# Patient Record
Sex: MALE | Race: WHITE | Employment: UNEMPLOYED | URBAN - METROPOLITAN AREA
[De-identification: names, ages, dates, MRNs, and addresses within clinical notes are randomized per-mention and may not be internally consistent; named-entity substitution may affect disease eponyms.]

---

## 2017-01-04 PROBLEM — R00.0 TACHYCARDIA, UNSPECIFIED: Status: ACTIVE | Noted: 2017-01-04

## 2017-01-05 ENCOUNTER — HOSPITAL ENCOUNTER (OUTPATIENT)
Dept: LAB | Age: 57
Discharge: HOME | End: 2017-01-05
Payer: COMMERCIAL

## 2017-01-05 ENCOUNTER — OFFICE VISIT (OUTPATIENT)
Dept: FAMILY MEDICINE CLINIC | Age: 57
End: 2017-01-05

## 2017-01-05 VITALS
BODY MASS INDEX: 25.16 KG/M2 | SYSTOLIC BLOOD PRESSURE: 104 MMHG | RESPIRATION RATE: 18 BRPM | OXYGEN SATURATION: 96 % | HEIGHT: 68 IN | TEMPERATURE: 98.2 F | WEIGHT: 166 LBS | DIASTOLIC BLOOD PRESSURE: 68 MMHG | HEART RATE: 76 BPM

## 2017-01-05 DIAGNOSIS — Z83.2 FAMILY HISTORY OF HEMOPHILIA: ICD-10-CM

## 2017-01-05 DIAGNOSIS — D50.9 IRON DEFICIENCY ANEMIA, UNSPECIFIED IRON DEFICIENCY ANEMIA TYPE: ICD-10-CM

## 2017-01-05 DIAGNOSIS — K64.8 INTERNAL HEMORRHOID: Primary | ICD-10-CM

## 2017-01-05 DIAGNOSIS — Z11.59 NEED FOR HEPATITIS C SCREENING TEST: ICD-10-CM

## 2017-01-05 DIAGNOSIS — Z23 NEED FOR STREPTOCOCCUS PNEUMONIAE VACCINATION: ICD-10-CM

## 2017-01-05 DIAGNOSIS — K62.5 RECTAL BLEEDING: ICD-10-CM

## 2017-01-05 LAB
ANISOCYTOSIS: ABNORMAL
APTT: 26.5 SEC (ref 21.6–35.4)
BANDED NEUTROPHILS RELATIVE PERCENT: 1 %
BASOPHILS ABSOLUTE: 0 K/UL (ref 0–0.2)
BASOPHILS RELATIVE PERCENT: 0.9 %
BURR CELLS: ABNORMAL
EOSINOPHILS ABSOLUTE: 0 K/UL (ref 0–0.7)
EOSINOPHILS RELATIVE PERCENT: 1.5 %
HCT VFR BLD CALC: 39.6 % (ref 42–52)
HEMOGLOBIN: 12.7 G/DL (ref 14–18)
HEPATITIS C ANTIBODY INTERPRETATION: NORMAL
HYPOCHROMIA: ABNORMAL
INR BLD: 0.9
LYMPHOCYTES ABSOLUTE: 1.9 K/UL (ref 1–4.8)
LYMPHOCYTES RELATIVE PERCENT: 12 %
MACROCYTES: 0
MCH RBC QN AUTO: 29 PG (ref 27–31.3)
MCHC RBC AUTO-ENTMCNC: 32 % (ref 33–37)
MCV RBC AUTO: 90.5 FL (ref 80–100)
MICROCYTES: 0
MONOCYTES ABSOLUTE: 3.3 K/UL (ref 0.2–0.8)
MONOCYTES RELATIVE PERCENT: 20.9 %
NEUTROPHILS ABSOLUTE: 10.7 K/UL (ref 1.4–6.5)
NEUTROPHILS RELATIVE PERCENT: 66 %
OVALOCYTES: ABNORMAL
PDW BLD-RTO: 15.1 % (ref 11.5–14.5)
PLATELET # BLD: ABNORMAL K/UL (ref 130–400)
PLATELET SLIDE REVIEW: ABNORMAL
POIKILOCYTES: ABNORMAL
POLYCHROMASIA: 0
PROTHROMBIN TIME: 10.1 SEC (ref 8.1–13.7)
RBC # BLD: 4.38 M/UL (ref 4.7–6.1)
SMUDGE CELLS: 0.9
WBC # BLD: 15.9 K/UL (ref 4.8–10.8)

## 2017-01-05 PROCEDURE — 85610 PROTHROMBIN TIME: CPT

## 2017-01-05 PROCEDURE — 36415 COLL VENOUS BLD VENIPUNCTURE: CPT

## 2017-01-05 PROCEDURE — 85240 CLOT FACTOR VIII AHG 1 STAGE: CPT

## 2017-01-05 PROCEDURE — 85025 COMPLETE CBC W/AUTO DIFF WBC: CPT

## 2017-01-05 PROCEDURE — 86803 HEPATITIS C AB TEST: CPT

## 2017-01-05 PROCEDURE — 85246 CLOT FACTOR VIII VW ANTIGEN: CPT

## 2017-01-05 PROCEDURE — 99214 OFFICE O/P EST MOD 30 MIN: CPT | Performed by: NURSE PRACTITIONER

## 2017-01-05 PROCEDURE — 85245 CLOT FACTOR VIII VW RISTOCTN: CPT

## 2017-01-05 PROCEDURE — 90670 PCV13 VACCINE IM: CPT | Performed by: NURSE PRACTITIONER

## 2017-01-05 PROCEDURE — 85730 THROMBOPLASTIN TIME PARTIAL: CPT

## 2017-01-05 PROCEDURE — 90471 IMMUNIZATION ADMIN: CPT | Performed by: NURSE PRACTITIONER

## 2017-01-05 RX ORDER — PRAVASTATIN SODIUM 40 MG
40 TABLET ORAL DAILY
Qty: 30 TABLET | Refills: 3 | Status: SHIPPED | OUTPATIENT
Start: 2017-01-05 | End: 2017-05-25 | Stop reason: SDUPTHER

## 2017-01-05 ASSESSMENT — PATIENT HEALTH QUESTIONNAIRE - PHQ9
1. LITTLE INTEREST OR PLEASURE IN DOING THINGS: 0
SUM OF ALL RESPONSES TO PHQ QUESTIONS 1-9: 0
SUM OF ALL RESPONSES TO PHQ9 QUESTIONS 1 & 2: 0
2. FEELING DOWN, DEPRESSED OR HOPELESS: 0

## 2017-01-06 ENCOUNTER — TELEPHONE (OUTPATIENT)
Dept: FAMILY MEDICINE CLINIC | Age: 57
End: 2017-01-06

## 2017-01-06 RX ORDER — AMOXICILLIN AND CLAVULANATE POTASSIUM 875; 125 MG/1; MG/1
1 TABLET, FILM COATED ORAL EVERY 12 HOURS
Qty: 20 TABLET | Refills: 0 | Status: SHIPPED | OUTPATIENT
Start: 2017-01-06 | End: 2017-01-16

## 2017-01-08 LAB
FACTOR VIII ACTIVITY: 160 % (ref 56–191)
VON WILLEBRAND ACTIVITY RCF: 74 % (ref 51–215)
VON WILLEBRAND AG: 123 % (ref 52–214)

## 2017-01-17 ENCOUNTER — HOSPITAL ENCOUNTER (OUTPATIENT)
Dept: LAB | Age: 57
Discharge: HOME | End: 2017-01-17
Payer: COMMERCIAL

## 2017-01-17 ENCOUNTER — OFFICE VISIT (OUTPATIENT)
Dept: FAMILY MEDICINE CLINIC | Age: 57
End: 2017-01-17

## 2017-01-17 VITALS
HEIGHT: 68 IN | WEIGHT: 167 LBS | BODY MASS INDEX: 25.31 KG/M2 | RESPIRATION RATE: 20 BRPM | HEART RATE: 89 BPM | OXYGEN SATURATION: 95 % | DIASTOLIC BLOOD PRESSURE: 68 MMHG | SYSTOLIC BLOOD PRESSURE: 102 MMHG | TEMPERATURE: 98 F

## 2017-01-17 DIAGNOSIS — I10 ESSENTIAL HYPERTENSION: Chronic | ICD-10-CM

## 2017-01-17 DIAGNOSIS — D72.829 LEUKOCYTOSIS, UNSPECIFIED TYPE: ICD-10-CM

## 2017-01-17 DIAGNOSIS — R73.03 BORDERLINE DIABETES: ICD-10-CM

## 2017-01-17 DIAGNOSIS — D72.829 LEUKOCYTOSIS, UNSPECIFIED TYPE: Primary | ICD-10-CM

## 2017-01-17 DIAGNOSIS — E78.2 MIXED HYPERLIPIDEMIA: Chronic | ICD-10-CM

## 2017-01-17 LAB
BASOPHILS ABSOLUTE: 0.1 K/UL (ref 0–0.2)
BASOPHILS RELATIVE PERCENT: 1.2 %
EOSINOPHILS ABSOLUTE: 0.2 K/UL (ref 0–0.7)
EOSINOPHILS RELATIVE PERCENT: 2.2 %
HCT VFR BLD CALC: 39.4 % (ref 42–52)
HEMOGLOBIN: 12.9 G/DL (ref 14–18)
LYMPHOCYTES ABSOLUTE: 1.5 K/UL (ref 1–4.8)
LYMPHOCYTES RELATIVE PERCENT: 14.4 %
MCH RBC QN AUTO: 29.3 PG (ref 27–31.3)
MCHC RBC AUTO-ENTMCNC: 32.8 % (ref 33–37)
MCV RBC AUTO: 89.5 FL (ref 80–100)
MONOCYTES ABSOLUTE: 1.2 K/UL (ref 0.2–0.8)
MONOCYTES RELATIVE PERCENT: 12.1 %
NEUTROPHILS ABSOLUTE: 7.1 K/UL (ref 1.4–6.5)
NEUTROPHILS RELATIVE PERCENT: 70.1 %
PDW BLD-RTO: 15.3 % (ref 11.5–14.5)
PLATELET # BLD: 375 K/UL (ref 130–400)
RBC # BLD: 4.4 M/UL (ref 4.7–6.1)
WBC # BLD: 10.2 K/UL (ref 4.8–10.8)

## 2017-01-17 PROCEDURE — 85025 COMPLETE CBC W/AUTO DIFF WBC: CPT

## 2017-01-17 PROCEDURE — 99213 OFFICE O/P EST LOW 20 MIN: CPT | Performed by: NURSE PRACTITIONER

## 2017-01-17 PROCEDURE — 36415 COLL VENOUS BLD VENIPUNCTURE: CPT

## 2017-01-26 RX ORDER — ISOSORBIDE MONONITRATE 30 MG/1
TABLET, EXTENDED RELEASE ORAL
Qty: 30 TABLET | Refills: 3 | Status: SHIPPED | OUTPATIENT
Start: 2017-01-26 | End: 2017-03-10

## 2017-02-27 ENCOUNTER — TELEPHONE (OUTPATIENT)
Dept: FAMILY MEDICINE CLINIC | Age: 57
End: 2017-02-27

## 2017-03-10 ENCOUNTER — OFFICE VISIT (OUTPATIENT)
Dept: FAMILY MEDICINE CLINIC | Age: 57
End: 2017-03-10

## 2017-03-10 VITALS
WEIGHT: 154 LBS | BODY MASS INDEX: 23.34 KG/M2 | SYSTOLIC BLOOD PRESSURE: 94 MMHG | DIASTOLIC BLOOD PRESSURE: 62 MMHG | OXYGEN SATURATION: 98 % | RESPIRATION RATE: 18 BRPM | TEMPERATURE: 97.5 F | HEART RATE: 108 BPM | HEIGHT: 68 IN

## 2017-03-10 DIAGNOSIS — R10.32 LLQ ABDOMINAL PAIN: Primary | ICD-10-CM

## 2017-03-10 DIAGNOSIS — K62.5 RECTAL BLEED: ICD-10-CM

## 2017-03-10 PROCEDURE — 99214 OFFICE O/P EST MOD 30 MIN: CPT | Performed by: NURSE PRACTITIONER

## 2017-03-10 RX ORDER — NITROGLYCERIN 0.4 MG/1
0.4 TABLET SUBLINGUAL EVERY 5 MIN PRN
Qty: 25 TABLET | Refills: 3 | Status: SHIPPED | OUTPATIENT
Start: 2017-03-10 | End: 2018-02-13 | Stop reason: SDUPTHER

## 2017-03-15 ENCOUNTER — HOSPITAL ENCOUNTER (OUTPATIENT)
Dept: LAB | Age: 57
Discharge: HOME | End: 2017-03-15
Payer: COMMERCIAL

## 2017-03-15 DIAGNOSIS — R10.32 LLQ ABDOMINAL PAIN: ICD-10-CM

## 2017-03-15 LAB
ALBUMIN SERPL-MCNC: 4.3 G/DL (ref 3.9–4.9)
ALP BLD-CCNC: 197 U/L (ref 35–104)
ALT SERPL-CCNC: 32 U/L (ref 0–41)
ANION GAP SERPL CALCULATED.3IONS-SCNC: 10 MEQ/L (ref 7–13)
AST SERPL-CCNC: 27 U/L (ref 0–40)
BASOPHILS ABSOLUTE: 0 K/UL (ref 0–0.2)
BASOPHILS RELATIVE PERCENT: 0 %
BILIRUB SERPL-MCNC: 0.4 MG/DL (ref 0–1.2)
BUN BLDV-MCNC: 16 MG/DL (ref 6–20)
CALCIUM SERPL-MCNC: 9.4 MG/DL (ref 8.6–10.2)
CHLORIDE BLD-SCNC: 98 MEQ/L (ref 98–107)
CO2: 28 MEQ/L (ref 22–29)
CREAT SERPL-MCNC: 0.9 MG/DL (ref 0.7–1.2)
EOSINOPHILS ABSOLUTE: 0.5 K/UL (ref 0–0.7)
EOSINOPHILS RELATIVE PERCENT: 4 %
GFR AFRICAN AMERICAN: >60
GFR NON-AFRICAN AMERICAN: >60
GLOBULIN: 2.9 G/DL (ref 2.3–3.5)
GLUCOSE BLD-MCNC: 122 MG/DL (ref 74–109)
HCT VFR BLD CALC: 42.4 % (ref 42–52)
HEMOGLOBIN: 13.9 G/DL (ref 14–18)
LYMPHOCYTES ABSOLUTE: 2.9 K/UL (ref 1–4.8)
LYMPHOCYTES RELATIVE PERCENT: 24 %
MCH RBC QN AUTO: 29.1 PG (ref 27–31.3)
MCHC RBC AUTO-ENTMCNC: 32.7 % (ref 33–37)
MCV RBC AUTO: 88.8 FL (ref 80–100)
MONOCYTES ABSOLUTE: 1 K/UL (ref 0.2–0.8)
MONOCYTES RELATIVE PERCENT: 8 %
NEUTROPHILS ABSOLUTE: 7.7 K/UL (ref 1.4–6.5)
NEUTROPHILS RELATIVE PERCENT: 64 %
PDW BLD-RTO: 14.1 % (ref 11.5–14.5)
PLATELET # BLD: 371 K/UL (ref 130–400)
PLATELET SLIDE REVIEW: ADEQUATE
POIKILOCYTES: ABNORMAL
POTASSIUM SERPL-SCNC: 4.1 MEQ/L (ref 3.5–5.1)
RBC # BLD: 4.78 M/UL (ref 4.7–6.1)
SODIUM BLD-SCNC: 136 MEQ/L (ref 132–144)
TOTAL PROTEIN: 7.2 G/DL (ref 6.4–8.1)
WBC # BLD: 12 K/UL (ref 4.8–10.8)

## 2017-03-15 PROCEDURE — 85025 COMPLETE CBC W/AUTO DIFF WBC: CPT

## 2017-03-15 PROCEDURE — 36415 COLL VENOUS BLD VENIPUNCTURE: CPT

## 2017-03-15 PROCEDURE — 80053 COMPREHEN METABOLIC PANEL: CPT

## 2017-03-29 RX ORDER — PANTOPRAZOLE SODIUM 20 MG/1
TABLET, DELAYED RELEASE ORAL
Qty: 30 TABLET | Refills: 1 | Status: SHIPPED | OUTPATIENT
Start: 2017-03-29 | End: 2017-05-25 | Stop reason: SDUPTHER

## 2017-05-25 RX ORDER — PRAVASTATIN SODIUM 40 MG
TABLET ORAL
Qty: 30 TABLET | Refills: 3 | Status: SHIPPED | OUTPATIENT
Start: 2017-05-25 | End: 2017-10-05 | Stop reason: SDUPTHER

## 2017-05-25 RX ORDER — PANTOPRAZOLE SODIUM 20 MG/1
TABLET, DELAYED RELEASE ORAL
Qty: 30 TABLET | Refills: 0 | Status: SHIPPED | OUTPATIENT
Start: 2017-05-25 | End: 2017-06-28 | Stop reason: SDUPTHER

## 2017-06-06 DIAGNOSIS — R73.03 BORDERLINE DIABETES: ICD-10-CM

## 2017-06-06 DIAGNOSIS — I25.119 ATHEROSCLEROSIS OF NATIVE CORONARY ARTERY WITH ANGINA PECTORIS, UNSPECIFIED WHETHER NATIVE OR TRANSPLANTED HEART (HCC): Primary | ICD-10-CM

## 2017-06-21 ENCOUNTER — OFFICE VISIT (OUTPATIENT)
Dept: CARDIOLOGY | Age: 57
End: 2017-06-21

## 2017-06-21 VITALS
BODY MASS INDEX: 23.79 KG/M2 | WEIGHT: 157 LBS | HEART RATE: 72 BPM | SYSTOLIC BLOOD PRESSURE: 128 MMHG | DIASTOLIC BLOOD PRESSURE: 80 MMHG | RESPIRATION RATE: 12 BRPM | HEIGHT: 68 IN

## 2017-06-21 DIAGNOSIS — I25.10 CAD S/P PERCUTANEOUS CORONARY ANGIOPLASTY: ICD-10-CM

## 2017-06-21 DIAGNOSIS — R01.1 HEART MURMUR: Chronic | ICD-10-CM

## 2017-06-21 DIAGNOSIS — Z95.2 HISTORY OF AORTIC VALVE REPLACEMENT: Chronic | ICD-10-CM

## 2017-06-21 DIAGNOSIS — Z82.49 FAMILY HISTORY OF HEART DISEASE: ICD-10-CM

## 2017-06-21 DIAGNOSIS — Q27.30 AVM (ARTERIOVENOUS MALFORMATION): Primary | ICD-10-CM

## 2017-06-21 DIAGNOSIS — I35.0 AORTIC VALVE STENOSIS, UNSPECIFIED ETIOLOGY: ICD-10-CM

## 2017-06-21 DIAGNOSIS — Z86.73 HISTORY OF STROKE: ICD-10-CM

## 2017-06-21 DIAGNOSIS — R00.0 TACHYCARDIA, UNSPECIFIED: ICD-10-CM

## 2017-06-21 DIAGNOSIS — Z98.61 CAD S/P PERCUTANEOUS CORONARY ANGIOPLASTY: ICD-10-CM

## 2017-06-21 PROCEDURE — 99213 OFFICE O/P EST LOW 20 MIN: CPT | Performed by: INTERNAL MEDICINE

## 2017-06-21 ASSESSMENT — ENCOUNTER SYMPTOMS
COUGH: 0
APNEA: 0
CHEST TIGHTNESS: 0
SHORTNESS OF BREATH: 0
COLOR CHANGE: 0

## 2017-06-26 ENCOUNTER — HOSPITAL ENCOUNTER (OUTPATIENT)
Dept: LAB | Age: 57
Discharge: HOME | End: 2017-06-26
Payer: COMMERCIAL

## 2017-06-26 DIAGNOSIS — I25.119 ATHEROSCLEROSIS OF NATIVE CORONARY ARTERY WITH ANGINA PECTORIS, UNSPECIFIED WHETHER NATIVE OR TRANSPLANTED HEART (HCC): ICD-10-CM

## 2017-06-26 DIAGNOSIS — R73.03 BORDERLINE DIABETES: ICD-10-CM

## 2017-06-26 LAB
ALBUMIN SERPL-MCNC: 4.3 G/DL (ref 3.9–4.9)
ALP BLD-CCNC: 149 U/L (ref 35–104)
ALT SERPL-CCNC: 30 U/L (ref 0–41)
ANION GAP SERPL CALCULATED.3IONS-SCNC: 12 MEQ/L (ref 7–13)
AST SERPL-CCNC: 30 U/L (ref 0–40)
BILIRUB SERPL-MCNC: 0.6 MG/DL (ref 0–1.2)
BUN BLDV-MCNC: 13 MG/DL (ref 6–20)
CALCIUM SERPL-MCNC: 9.2 MG/DL (ref 8.6–10.2)
CHLORIDE BLD-SCNC: 97 MEQ/L (ref 98–107)
CHOLESTEROL, TOTAL: 152 MG/DL (ref 0–199)
CO2: 24 MEQ/L (ref 22–29)
CREAT SERPL-MCNC: 0.84 MG/DL (ref 0.7–1.2)
GFR AFRICAN AMERICAN: >60
GFR NON-AFRICAN AMERICAN: >60
GLOBULIN: 3.2 G/DL (ref 2.3–3.5)
GLUCOSE BLD-MCNC: 108 MG/DL (ref 74–109)
HBA1C MFR BLD: 6.2 % (ref 4.8–5.9)
HCT VFR BLD CALC: 44.1 % (ref 42–52)
HDLC SERPL-MCNC: 51 MG/DL (ref 40–59)
HEMOGLOBIN: 14.3 G/DL (ref 14–18)
LDL CHOLESTEROL CALCULATED: 72 MG/DL (ref 0–129)
MCH RBC QN AUTO: 29.2 PG (ref 27–31.3)
MCHC RBC AUTO-ENTMCNC: 32.4 % (ref 33–37)
MCV RBC AUTO: 89.9 FL (ref 80–100)
PDW BLD-RTO: 15.4 % (ref 11.5–14.5)
PLATELET # BLD: 421 K/UL (ref 130–400)
POTASSIUM SERPL-SCNC: 4.8 MEQ/L (ref 3.5–5.1)
RBC # BLD: 4.9 M/UL (ref 4.7–6.1)
SODIUM BLD-SCNC: 133 MEQ/L (ref 132–144)
TOTAL PROTEIN: 7.5 G/DL (ref 6.4–8.1)
TRIGL SERPL-MCNC: 144 MG/DL (ref 0–200)
WBC # BLD: 11 K/UL (ref 4.8–10.8)

## 2017-06-26 PROCEDURE — 80061 LIPID PANEL: CPT

## 2017-06-26 PROCEDURE — 36415 COLL VENOUS BLD VENIPUNCTURE: CPT

## 2017-06-26 PROCEDURE — 83036 HEMOGLOBIN GLYCOSYLATED A1C: CPT

## 2017-06-26 PROCEDURE — 85027 COMPLETE CBC AUTOMATED: CPT

## 2017-06-26 PROCEDURE — 80053 COMPREHEN METABOLIC PANEL: CPT

## 2017-06-28 ENCOUNTER — HOSPITAL ENCOUNTER (OUTPATIENT)
Dept: NON INVASIVE DIAGNOSTICS | Age: 57
Discharge: HOME | End: 2017-06-28
Payer: COMMERCIAL

## 2017-06-28 DIAGNOSIS — Z98.61 CAD S/P PERCUTANEOUS CORONARY ANGIOPLASTY: ICD-10-CM

## 2017-06-28 DIAGNOSIS — Z86.73 HISTORY OF STROKE: ICD-10-CM

## 2017-06-28 DIAGNOSIS — Q27.30 AVM (ARTERIOVENOUS MALFORMATION): ICD-10-CM

## 2017-06-28 DIAGNOSIS — Z95.2 HISTORY OF AORTIC VALVE REPLACEMENT: Chronic | ICD-10-CM

## 2017-06-28 DIAGNOSIS — I25.10 CAD S/P PERCUTANEOUS CORONARY ANGIOPLASTY: ICD-10-CM

## 2017-06-28 DIAGNOSIS — R00.0 TACHYCARDIA, UNSPECIFIED: ICD-10-CM

## 2017-06-28 DIAGNOSIS — I35.0 AORTIC VALVE STENOSIS, UNSPECIFIED ETIOLOGY: ICD-10-CM

## 2017-06-28 DIAGNOSIS — R01.1 HEART MURMUR: Chronic | ICD-10-CM

## 2017-06-28 DIAGNOSIS — Z82.49 FAMILY HISTORY OF HEART DISEASE: ICD-10-CM

## 2017-06-28 LAB
LV EF: 60 %
LVEF MODALITY: NORMAL

## 2017-06-28 PROCEDURE — 93306 TTE W/DOPPLER COMPLETE: CPT

## 2017-06-28 RX ORDER — PANTOPRAZOLE SODIUM 20 MG/1
TABLET, DELAYED RELEASE ORAL
Qty: 30 TABLET | Refills: 1 | Status: SHIPPED | OUTPATIENT
Start: 2017-06-28 | End: 2017-08-23 | Stop reason: SDUPTHER

## 2017-06-30 ENCOUNTER — TELEPHONE (OUTPATIENT)
Dept: FAMILY MEDICINE CLINIC | Age: 57
End: 2017-06-30

## 2017-06-30 DIAGNOSIS — B37.0 ORAL THRUSH: ICD-10-CM

## 2017-08-24 RX ORDER — LANCETS
EACH MISCELLANEOUS
Qty: 100 EACH | Refills: 3 | Status: SHIPPED | OUTPATIENT
Start: 2017-08-24 | End: 2018-02-13 | Stop reason: ALTCHOICE

## 2017-08-24 RX ORDER — PANTOPRAZOLE SODIUM 20 MG/1
TABLET, DELAYED RELEASE ORAL
Qty: 30 TABLET | Refills: 3 | Status: SHIPPED | OUTPATIENT
Start: 2017-08-24 | End: 2017-10-05 | Stop reason: SDUPTHER

## 2017-09-27 ENCOUNTER — OFFICE VISIT (OUTPATIENT)
Dept: CARDIOLOGY | Age: 57
End: 2017-09-27

## 2017-09-27 VITALS
BODY MASS INDEX: 23.11 KG/M2 | HEART RATE: 77 BPM | OXYGEN SATURATION: 99 % | DIASTOLIC BLOOD PRESSURE: 84 MMHG | RESPIRATION RATE: 12 BRPM | WEIGHT: 152 LBS | SYSTOLIC BLOOD PRESSURE: 132 MMHG

## 2017-09-27 DIAGNOSIS — Z95.2 HISTORY OF AORTIC VALVE REPLACEMENT: Chronic | ICD-10-CM

## 2017-09-27 DIAGNOSIS — R01.1 HEART MURMUR: Chronic | ICD-10-CM

## 2017-09-27 DIAGNOSIS — Z82.49 FAMILY HISTORY OF HEART DISEASE: ICD-10-CM

## 2017-09-27 DIAGNOSIS — Z98.61 CAD S/P PERCUTANEOUS CORONARY ANGIOPLASTY: ICD-10-CM

## 2017-09-27 DIAGNOSIS — Z86.73 HISTORY OF STROKE: ICD-10-CM

## 2017-09-27 DIAGNOSIS — I25.10 CAD S/P PERCUTANEOUS CORONARY ANGIOPLASTY: ICD-10-CM

## 2017-09-27 DIAGNOSIS — R00.0 TACHYCARDIA, UNSPECIFIED: ICD-10-CM

## 2017-09-27 DIAGNOSIS — I10 ESSENTIAL HYPERTENSION: Primary | Chronic | ICD-10-CM

## 2017-09-27 DIAGNOSIS — I20.0 UNSTABLE ANGINA (HCC): ICD-10-CM

## 2017-09-27 PROCEDURE — 99213 OFFICE O/P EST LOW 20 MIN: CPT | Performed by: INTERNAL MEDICINE

## 2017-09-27 ASSESSMENT — ENCOUNTER SYMPTOMS
SHORTNESS OF BREATH: 0
CHEST TIGHTNESS: 0

## 2017-10-05 ENCOUNTER — OFFICE VISIT (OUTPATIENT)
Dept: FAMILY MEDICINE CLINIC | Age: 57
End: 2017-10-05

## 2017-10-05 VITALS
TEMPERATURE: 98 F | WEIGHT: 155 LBS | OXYGEN SATURATION: 97 % | DIASTOLIC BLOOD PRESSURE: 68 MMHG | SYSTOLIC BLOOD PRESSURE: 114 MMHG | BODY MASS INDEX: 23.49 KG/M2 | RESPIRATION RATE: 16 BRPM | HEIGHT: 68 IN | HEART RATE: 73 BPM

## 2017-10-05 DIAGNOSIS — G89.29 CHRONIC PAIN OF RIGHT KNEE: ICD-10-CM

## 2017-10-05 DIAGNOSIS — M25.561 CHRONIC PAIN OF RIGHT KNEE: ICD-10-CM

## 2017-10-05 DIAGNOSIS — R73.03 BORDERLINE DIABETES: ICD-10-CM

## 2017-10-05 DIAGNOSIS — M25.511 CHRONIC RIGHT SHOULDER PAIN: ICD-10-CM

## 2017-10-05 DIAGNOSIS — I10 ESSENTIAL HYPERTENSION: Primary | Chronic | ICD-10-CM

## 2017-10-05 DIAGNOSIS — E78.2 MIXED HYPERLIPIDEMIA: Chronic | ICD-10-CM

## 2017-10-05 DIAGNOSIS — G89.29 CHRONIC RIGHT SHOULDER PAIN: ICD-10-CM

## 2017-10-05 PROCEDURE — 99213 OFFICE O/P EST LOW 20 MIN: CPT | Performed by: NURSE PRACTITIONER

## 2017-10-05 RX ORDER — PANTOPRAZOLE SODIUM 20 MG/1
TABLET, DELAYED RELEASE ORAL
Qty: 30 TABLET | Refills: 5 | Status: SHIPPED | OUTPATIENT
Start: 2017-10-05 | End: 2018-04-28 | Stop reason: SDUPTHER

## 2017-10-05 RX ORDER — PRAVASTATIN SODIUM 40 MG
TABLET ORAL
Qty: 30 TABLET | Refills: 5 | Status: SHIPPED | OUTPATIENT
Start: 2017-10-05 | End: 2018-03-30 | Stop reason: SDUPTHER

## 2017-10-05 ASSESSMENT — PATIENT HEALTH QUESTIONNAIRE - PHQ9
1. LITTLE INTEREST OR PLEASURE IN DOING THINGS: 0
SUM OF ALL RESPONSES TO PHQ9 QUESTIONS 1 & 2: 0
SUM OF ALL RESPONSES TO PHQ QUESTIONS 1-9: 0
2. FEELING DOWN, DEPRESSED OR HOPELESS: 0

## 2017-10-05 NOTE — MR AVS SNAPSHOT
Commonly known as:  PRAVACHOL   Instructions:  TAKE 1 TABLET BY MOUTH EVERY DAY   Quantity:  30 tablet   Refills:  5   What changed:  See the new instructions.    Changed by:  Hitesh Tang CNP            Where to Get Your Medications      These medications were sent to [address] [facility] [address] [place] [address] [facility] [phone]  [address] [place] [address], [address] [facility] [address]     Phone:  [phone]     metoprolol tartrate 25 MG tablet    pantoprazole 20 MG tablet    pravastatin 40 MG tablet               Your Current Medications Are              pravastatin (PRAVACHOL) 40 MG tablet TAKE 1 TABLET BY MOUTH EVERY DAY    metoprolol tartrate (LOPRESSOR) 25 MG tablet Take 1 tablet by mouth 2 times daily    pantoprazole (PROTONIX) 20 MG tablet Take 1 tablet by mouth daily    [employer] UNILET LANCETS 30G MISC test twice a day    aspirin 81 MG tablet Take 81 mg by mouth daily    nitroGLYCERIN (NITROSTAT) 0.4 MG SL tablet Place 1 tablet under the tongue every 5 minutes as needed for Chest pain    Blood Glucose Monitoring Suppl (TRUERESULT BLOOD GLUCOSE) W/DEVICE KIT     TRUETEST TEST strip     AFLURIA injection       Allergies              Lipitor [Atorvastatin] Other (See Comments)         Additional Information        Basic Information     Date Of Birth Sex Race Ethnicity Preferred Language    1960 Male White Non-/Non  English      Problem List as of 10/5/2017  Date Reviewed: 11/4/2016                Tachycardia, unspecified    Basal cell carcinoma, trunk    Family history of heart disease    History of tobacco abuse    Internal hemorrhoid    Borderline diabetes    Iron deficiency anemia    Hiatal hernia    Diverticulosis    AVM (arteriovenous malformation)    Anemia    History of aortic valve replacement (Chronic)    Hyperglycemia    Hyperlipidemia (Chronic)    Unstable angina (HCC)    History of stroke    CAD S/P percutaneous coronary angioplasty    Hypertension (Chronic)

## 2017-10-05 NOTE — PROGRESS NOTES
shoulder pain  XR SHOULDER RIGHT (MIN 2 VIEWS)         PLAN: Include orders in the DX section. Follow up: 6 months and as needed. sooner as needed. X-rays ordered with office to call with results when available. Refill of medication given. He does not want medication for discomfort.            Electronically signed by Cooper Soto, 10:02 AM 10/5/17

## 2017-10-10 ENCOUNTER — HOSPITAL ENCOUNTER (OUTPATIENT)
Age: 57
Discharge: HOME | End: 2017-10-10
Payer: COMMERCIAL

## 2017-10-10 ENCOUNTER — HOSPITAL ENCOUNTER (OUTPATIENT)
Dept: GENERAL RADIOLOGY | Age: 57
Discharge: HOME | End: 2017-10-10
Payer: COMMERCIAL

## 2017-10-10 DIAGNOSIS — M25.561 CHRONIC PAIN OF RIGHT KNEE: ICD-10-CM

## 2017-10-10 DIAGNOSIS — G89.29 CHRONIC RIGHT SHOULDER PAIN: ICD-10-CM

## 2017-10-10 DIAGNOSIS — G89.29 CHRONIC PAIN OF RIGHT KNEE: ICD-10-CM

## 2017-10-10 DIAGNOSIS — M25.511 CHRONIC RIGHT SHOULDER PAIN: ICD-10-CM

## 2017-10-10 PROCEDURE — 73562 X-RAY EXAM OF KNEE 3: CPT

## 2017-10-10 PROCEDURE — 73030 X-RAY EXAM OF SHOULDER: CPT

## 2017-10-12 ENCOUNTER — TELEPHONE (OUTPATIENT)
Dept: FAMILY MEDICINE CLINIC | Age: 57
End: 2017-10-12

## 2017-10-12 NOTE — TELEPHONE ENCOUNTER
He should not require the vaccination at this time. It can be discussed again when he is over the age of 62 but typically having had shingles creates enough antibodies to help reduce chance of getting shingles again or at least reducing severity of if it does occur.

## 2018-01-04 ENCOUNTER — TELEPHONE (OUTPATIENT)
Dept: FAMILY MEDICINE CLINIC | Age: 58
End: 2018-01-04

## 2018-01-04 NOTE — TELEPHONE ENCOUNTER
I recommend coricidin HBP for the cold. He can take mucinex (NOT [address] DM). For the cough. A good sleep aid for him is melatonin 3 mg at night and if that does not help, zzzquil can be helpful.

## 2018-01-04 NOTE — TELEPHONE ENCOUNTER
Patient is requesting Recommendation for OTC cold medicine & OTC sleep aid; please call patient back at [phone]

## 2018-02-13 ENCOUNTER — OFFICE VISIT (OUTPATIENT)
Dept: FAMILY MEDICINE CLINIC | Age: 58
End: 2018-02-13
Payer: COMMERCIAL

## 2018-02-13 VITALS
SYSTOLIC BLOOD PRESSURE: 120 MMHG | WEIGHT: 150 LBS | HEIGHT: 68 IN | DIASTOLIC BLOOD PRESSURE: 70 MMHG | BODY MASS INDEX: 22.73 KG/M2 | TEMPERATURE: 97.6 F | OXYGEN SATURATION: 98 % | RESPIRATION RATE: 14 BRPM | HEART RATE: 82 BPM

## 2018-02-13 DIAGNOSIS — R01.1 HEART MURMUR: Chronic | ICD-10-CM

## 2018-02-13 DIAGNOSIS — E78.2 MIXED HYPERLIPIDEMIA: Chronic | ICD-10-CM

## 2018-02-13 DIAGNOSIS — I25.10 CAD S/P PERCUTANEOUS CORONARY ANGIOPLASTY: ICD-10-CM

## 2018-02-13 DIAGNOSIS — Z23 NEED FOR VACCINATION FOR STREP PNEUMONIAE: ICD-10-CM

## 2018-02-13 DIAGNOSIS — G89.29 CHRONIC MIDLINE LOW BACK PAIN WITHOUT SCIATICA: ICD-10-CM

## 2018-02-13 DIAGNOSIS — R73.03 BORDERLINE DIABETES: ICD-10-CM

## 2018-02-13 DIAGNOSIS — Z98.61 CAD S/P PERCUTANEOUS CORONARY ANGIOPLASTY: ICD-10-CM

## 2018-02-13 DIAGNOSIS — I10 ESSENTIAL HYPERTENSION: Primary | Chronic | ICD-10-CM

## 2018-02-13 DIAGNOSIS — M54.50 CHRONIC MIDLINE LOW BACK PAIN WITHOUT SCIATICA: ICD-10-CM

## 2018-02-13 PROCEDURE — G8484 FLU IMMUNIZE NO ADMIN: HCPCS | Performed by: NURSE PRACTITIONER

## 2018-02-13 PROCEDURE — G8427 DOCREV CUR MEDS BY ELIG CLIN: HCPCS | Performed by: NURSE PRACTITIONER

## 2018-02-13 PROCEDURE — 90732 PPSV23 VACC 2 YRS+ SUBQ/IM: CPT | Performed by: NURSE PRACTITIONER

## 2018-02-13 PROCEDURE — 1036F TOBACCO NON-USER: CPT | Performed by: NURSE PRACTITIONER

## 2018-02-13 PROCEDURE — G8598 ASA/ANTIPLAT THER USED: HCPCS | Performed by: NURSE PRACTITIONER

## 2018-02-13 PROCEDURE — 99214 OFFICE O/P EST MOD 30 MIN: CPT | Performed by: NURSE PRACTITIONER

## 2018-02-13 PROCEDURE — 3017F COLORECTAL CA SCREEN DOC REV: CPT | Performed by: NURSE PRACTITIONER

## 2018-02-13 PROCEDURE — 90471 IMMUNIZATION ADMIN: CPT | Performed by: NURSE PRACTITIONER

## 2018-02-13 PROCEDURE — G8420 CALC BMI NORM PARAMETERS: HCPCS | Performed by: NURSE PRACTITIONER

## 2018-02-13 RX ORDER — BLOOD-GLUCOSE METER
1 EACH MISCELLANEOUS DAILY
Qty: 1 KIT | Refills: 0 | Status: SHIPPED | OUTPATIENT
Start: 2018-02-13 | End: 2021-02-04 | Stop reason: ALTCHOICE

## 2018-02-13 RX ORDER — BLOOD-GLUCOSE METER
1 KIT MISCELLANEOUS DAILY PRN
COMMUNITY
End: 2018-02-13 | Stop reason: ALTCHOICE

## 2018-02-13 RX ORDER — NITROGLYCERIN 0.4 MG/1
0.4 TABLET SUBLINGUAL EVERY 5 MIN PRN
Qty: 25 TABLET | Refills: 3 | Status: SHIPPED | OUTPATIENT
Start: 2018-02-13 | End: 2018-08-03 | Stop reason: SDUPTHER

## 2018-02-13 RX ORDER — BLOOD-GLUCOSE METER
EACH MISCELLANEOUS DAILY
COMMUNITY
End: 2018-02-13 | Stop reason: SDUPTHER

## 2018-02-13 ASSESSMENT — PATIENT HEALTH QUESTIONNAIRE - PHQ9
SUM OF ALL RESPONSES TO PHQ QUESTIONS 1-9: 0
2. FEELING DOWN, DEPRESSED OR HOPELESS: 0
SUM OF ALL RESPONSES TO PHQ9 QUESTIONS 1 & 2: 0
1. LITTLE INTEREST OR PLEASURE IN DOING THINGS: 0

## 2018-02-13 NOTE — PROGRESS NOTES
Dyslipidemia and Hypertension: Amara Barragan presents for evaluation of lipids. Compliance with treatment thus far has been good. The patient exercises intermittently. Patient here for follow-up of elevated blood pressure. He is exercising and is adherent to low salt diet. Blood pressure is well controlled at home Antihypertensive medication side effects: no medication side effects noted. Use of agents associated with hypertension: none. No new myalgias or GI upset on pravachol. Low back pain: he has had this issue off an on for some time now. He thinks that it started after he got radiation for lymphoma when he was 26. He feels that he has some scar tissue around the soft tissues on both sides of his lower back. He feels \"lumpy\" areas that have been present for well over 25 years. No change. Irregular heart beat: he states that he can hear his heart beat since valve surgery. Sometimes, it seems like there is a skipped beat every now and then. He has not had any palpitations or chest pain. He states that he feels great. He has been under more stress than normal. His father fell at a gym in [place] and broke his hip. He is 80years old. Amara Barragan is up from [place] for just a couple of weeks. A good friend of his has stage 4 cancer and his girlfriend has not been feeling well. Lab Results   Component Value Date    ALT 30 06/26/2017    AST 30 06/26/2017     Lab Results   Component Value Date    CHOL 152 06/26/2017    TRIG 144 06/26/2017    HDL 51 06/26/2017    LDLCALC 72 06/26/2017        PMH: The patient is known to have coexisting coronary artery disease. ROS: This patient reports no chest pain or pressure. There is no shortness of breath or cough. The patient reports no nausea or vomiting. There is no heartburn or indigestion. There is no diarrhea or constipation. No black, bloody, mucusy or tarry stool noticed. The patient reports no bloating and no change in appetite. There is no numbness, tingling or swelling in the extremities. EXAM:  Constitutional Blood pressure 120/70, pulse 82, temperature 97.6 °F (36.4 °C), temperature source Temporal, resp. rate 14, height 5' 8\" (1.727 m), weight 150 lb (68 kg), SpO2 98 %. He has a normal affect, no acute distress, appears well developed and well nourished. Neck:  neck- supple, no mass, non-tender  Lungs:  Normal expansion. Clear to auscultation. No rales, rhonchi, or wheezing., No chest wall tenderness. Heart: Auscultation: Rhythm: normal rate  Murmur(s)-  3/6 systolic throughout the precordium  Abdomen:  Soft, non-tender, normal bowel sounds. No bruits, organomegaly or masses. Extremities: Extremities warm to touch, pink, with no edema. DIAGNOSIS:   1. Essential hypertension  CBC Auto Differential    Comprehensive Metabolic Panel   2. Mixed hyperlipidemia  Lipid Panel   3. CAD S/P percutaneous coronary angioplasty  nitroGLYCERIN (NITROSTAT) 0.4 MG SL tablet   4. Heart murmur     5. Borderline diabetes  Blood Glucose Monitoring Suppl (ACCU-CHEK APRYL PLUS) w/Device KIT    glucose blood VI test strips (ACCU-CHEK APRYL) strip    SOFT TOUCH LANCETS MISC    Hemoglobin A1C    Microalbumin / Creatinine Urine Ratio   6. Chronic midline low back pain without sciatica  XR LUMBAR SPINE (MIN 4 VIEWS)   7. Need for vaccination for Strep pneumoniae  Pneumococcal polysaccharide vaccine 23-valent greater than or equal to 3yo subcutaneous/IM       Plan:  Continue current medicines and dosages. Continue diet and exercise programs, improving where possible. Follow up in 4 months with lab work one week prior. For further details see orders placed. 1. Blood pressure is well controlled on current medication. Continue the same. 2. Lipid panel has been stable. He will do lab work prior to returning to [place]. 3. No recent s/s of [place]. Compliant with medication therapy. 4. No change in heart murmur.      5. Glucose test

## 2018-02-15 ENCOUNTER — TELEPHONE (OUTPATIENT)
Dept: FAMILY MEDICINE CLINIC | Age: 58
End: 2018-02-15

## 2018-02-15 ENCOUNTER — HOSPITAL ENCOUNTER (OUTPATIENT)
Dept: LAB | Age: 58
Discharge: HOME | End: 2018-02-15
Payer: COMMERCIAL

## 2018-02-15 DIAGNOSIS — R73.03 BORDERLINE DIABETES: ICD-10-CM

## 2018-02-15 DIAGNOSIS — E78.2 MIXED HYPERLIPIDEMIA: Chronic | ICD-10-CM

## 2018-02-15 DIAGNOSIS — I10 ESSENTIAL HYPERTENSION: Chronic | ICD-10-CM

## 2018-02-15 LAB
ALBUMIN SERPL-MCNC: 4.3 G/DL (ref 3.9–4.9)
ALP BLD-CCNC: 139 U/L (ref 35–104)
ALT SERPL-CCNC: 30 U/L (ref 0–41)
ANION GAP SERPL CALCULATED.3IONS-SCNC: 12 MEQ/L (ref 7–13)
ANISOCYTOSIS: ABNORMAL
AST SERPL-CCNC: 31 U/L (ref 0–40)
BASOPHILS ABSOLUTE: 0.1 K/UL (ref 0–0.2)
BASOPHILS RELATIVE PERCENT: 1 %
BILIRUB SERPL-MCNC: 0.4 MG/DL (ref 0–1.2)
BUN BLDV-MCNC: 12 MG/DL (ref 6–20)
BURR CELLS: ABNORMAL
CALCIUM SERPL-MCNC: 9.5 MG/DL (ref 8.6–10.2)
CHLORIDE BLD-SCNC: 101 MEQ/L (ref 98–107)
CHOLESTEROL, TOTAL: 131 MG/DL (ref 0–199)
CO2: 28 MEQ/L (ref 22–29)
CREAT SERPL-MCNC: 0.79 MG/DL (ref 0.7–1.2)
CREATININE URINE: 38.7 MG/DL
EOSINOPHILS ABSOLUTE: 0.5 K/UL (ref 0–0.7)
EOSINOPHILS RELATIVE PERCENT: 5 %
GFR AFRICAN AMERICAN: >60
GFR NON-AFRICAN AMERICAN: >60
GLOBULIN: 3.1 G/DL (ref 2.3–3.5)
GLUCOSE BLD-MCNC: 126 MG/DL (ref 74–109)
HBA1C MFR BLD: 6.3 % (ref 4.8–5.9)
HCT VFR BLD CALC: 42.6 % (ref 42–52)
HDLC SERPL-MCNC: 57 MG/DL (ref 40–59)
HEMOGLOBIN: 13.8 G/DL (ref 14–18)
HYPOCHROMIA: ABNORMAL
LDL CHOLESTEROL CALCULATED: 59 MG/DL (ref 0–129)
LYMPHOCYTES ABSOLUTE: 1.2 K/UL (ref 1–4.8)
LYMPHOCYTES RELATIVE PERCENT: 12 %
MCH RBC QN AUTO: 29.6 PG (ref 27–31.3)
MCHC RBC AUTO-ENTMCNC: 32.3 % (ref 33–37)
MCV RBC AUTO: 91.9 FL (ref 80–100)
MICROALBUMIN UR-MCNC: <1.2 MG/DL
MICROALBUMIN/CREAT UR-RTO: NORMAL MG/G (ref 0–30)
MONOCYTES ABSOLUTE: 1.7 K/UL (ref 0.2–0.8)
MONOCYTES RELATIVE PERCENT: 16.8 %
NEUTROPHILS ABSOLUTE: 6.6 K/UL (ref 1.4–6.5)
NEUTROPHILS RELATIVE PERCENT: 65 %
PDW BLD-RTO: 14.5 % (ref 11.5–14.5)
PLATELET # BLD: 388 K/UL (ref 130–400)
PLATELET SLIDE REVIEW: ADEQUATE
POIKILOCYTES: ABNORMAL
POTASSIUM SERPL-SCNC: 5 MEQ/L (ref 3.5–5.1)
RBC # BLD: 4.64 M/UL (ref 4.7–6.1)
SMUDGE CELLS: 3
SODIUM BLD-SCNC: 141 MEQ/L (ref 132–144)
TARGET CELLS: ABNORMAL
TOTAL PROTEIN: 7.4 G/DL (ref 6.4–8.1)
TOXIC GRANULATION: ABNORMAL
TRIGL SERPL-MCNC: 74 MG/DL (ref 0–200)
WBC # BLD: 10.2 K/UL (ref 4.8–10.8)

## 2018-02-15 PROCEDURE — 80061 LIPID PANEL: CPT

## 2018-02-15 PROCEDURE — 36415 COLL VENOUS BLD VENIPUNCTURE: CPT

## 2018-02-15 PROCEDURE — 85025 COMPLETE CBC W/AUTO DIFF WBC: CPT

## 2018-02-15 PROCEDURE — 80053 COMPREHEN METABOLIC PANEL: CPT

## 2018-02-15 PROCEDURE — 82043 UR ALBUMIN QUANTITATIVE: CPT

## 2018-02-15 PROCEDURE — 82570 ASSAY OF URINE CREATININE: CPT

## 2018-02-15 PROCEDURE — 83036 HEMOGLOBIN GLYCOSYLATED A1C: CPT

## 2018-02-15 NOTE — TELEPHONE ENCOUNTER
Patient is calling to ask if you have a name and number for physician to help treat hiss friend that has stage 4 cancer. He states he spoke to you earlier this week about it. He can be reached at [phone].   Vaughn Edouard

## 2018-02-16 NOTE — TELEPHONE ENCOUNTER
Please give him the name Kory Flores. [phone]   She is in [facility]. I have not personally met her but she comes highly recommended by several providers.

## 2018-03-30 DIAGNOSIS — E78.2 MIXED HYPERLIPIDEMIA: Chronic | ICD-10-CM

## 2018-03-30 RX ORDER — PRAVASTATIN SODIUM 40 MG
TABLET ORAL
Qty: 30 TABLET | Refills: 5 | Status: SHIPPED | OUTPATIENT
Start: 2018-03-30 | End: 2018-09-18 | Stop reason: SDUPTHER

## 2018-04-30 RX ORDER — PANTOPRAZOLE SODIUM 20 MG/1
TABLET, DELAYED RELEASE ORAL
Qty: 30 TABLET | Refills: 1 | Status: SHIPPED | OUTPATIENT
Start: 2018-04-30 | End: 2018-04-30 | Stop reason: SDUPTHER

## 2018-04-30 RX ORDER — PANTOPRAZOLE SODIUM 20 MG/1
TABLET, DELAYED RELEASE ORAL
Qty: 30 TABLET | Refills: 5 | Status: SHIPPED | OUTPATIENT
Start: 2018-04-30 | End: 2018-07-05 | Stop reason: SDUPTHER

## 2018-07-18 ENCOUNTER — OFFICE VISIT (OUTPATIENT)
Dept: CARDIOLOGY CLINIC | Age: 58
End: 2018-07-18
Payer: COMMERCIAL

## 2018-07-18 ENCOUNTER — HOSPITAL ENCOUNTER (OUTPATIENT)
Dept: LAB | Age: 58
Discharge: HOME | End: 2018-07-18
Payer: COMMERCIAL

## 2018-07-18 VITALS
RESPIRATION RATE: 20 BRPM | DIASTOLIC BLOOD PRESSURE: 78 MMHG | SYSTOLIC BLOOD PRESSURE: 122 MMHG | HEART RATE: 76 BPM | BODY MASS INDEX: 22.79 KG/M2 | HEIGHT: 68 IN | WEIGHT: 150.4 LBS | TEMPERATURE: 97.5 F | OXYGEN SATURATION: 97 %

## 2018-07-18 DIAGNOSIS — I20.0 UNSTABLE ANGINA (HCC): Primary | ICD-10-CM

## 2018-07-18 DIAGNOSIS — I20.0 UNSTABLE ANGINA (HCC): ICD-10-CM

## 2018-07-18 DIAGNOSIS — Z95.2 HISTORY OF AORTIC VALVE REPLACEMENT: ICD-10-CM

## 2018-07-18 DIAGNOSIS — Z82.49 FAMILY HISTORY OF HEART DISEASE: ICD-10-CM

## 2018-07-18 DIAGNOSIS — Z98.61 CAD S/P PERCUTANEOUS CORONARY ANGIOPLASTY: ICD-10-CM

## 2018-07-18 DIAGNOSIS — I25.10 CAD S/P PERCUTANEOUS CORONARY ANGIOPLASTY: ICD-10-CM

## 2018-07-18 LAB
ALBUMIN SERPL-MCNC: 4.2 G/DL (ref 3.9–4.9)
ALP BLD-CCNC: 161 U/L (ref 35–104)
ALT SERPL-CCNC: 22 U/L (ref 0–41)
ANION GAP SERPL CALCULATED.3IONS-SCNC: 17 MEQ/L (ref 7–13)
AST SERPL-CCNC: 25 U/L (ref 0–40)
ATYPICAL LYMPHOCYTE RELATIVE PERCENT: 1 %
BASOPHILS ABSOLUTE: 0.1 K/UL (ref 0–0.2)
BASOPHILS RELATIVE PERCENT: 1 %
BILIRUB SERPL-MCNC: 0.4 MG/DL (ref 0–1.2)
BUN BLDV-MCNC: 15 MG/DL (ref 6–20)
CALCIUM SERPL-MCNC: 9.2 MG/DL (ref 8.6–10.2)
CHLORIDE BLD-SCNC: 97 MEQ/L (ref 98–107)
CO2: 21 MEQ/L (ref 22–29)
CREAT SERPL-MCNC: 0.71 MG/DL (ref 0.7–1.2)
EOSINOPHILS ABSOLUTE: 0.1 K/UL (ref 0–0.7)
EOSINOPHILS RELATIVE PERCENT: 1 %
GFR AFRICAN AMERICAN: >60
GFR NON-AFRICAN AMERICAN: >60
GLOBULIN: 3.7 G/DL (ref 2.3–3.5)
GLUCOSE BLD-MCNC: 197 MG/DL (ref 74–109)
HCT VFR BLD CALC: 43.7 % (ref 42–52)
HEMOGLOBIN: 14.4 G/DL (ref 14–18)
LYMPHOCYTES ABSOLUTE: 1.8 K/UL (ref 1–4.8)
LYMPHOCYTES RELATIVE PERCENT: 17 %
MCH RBC QN AUTO: 29.6 PG (ref 27–31.3)
MCHC RBC AUTO-ENTMCNC: 33.1 % (ref 33–37)
MCV RBC AUTO: 89.5 FL (ref 80–100)
MONOCYTES ABSOLUTE: 0.7 K/UL (ref 0.2–0.8)
MONOCYTES RELATIVE PERCENT: 6.8 %
NEUTROPHILS ABSOLUTE: 7.5 K/UL (ref 1.4–6.5)
NEUTROPHILS RELATIVE PERCENT: 74 %
PDW BLD-RTO: 14.6 % (ref 11.5–14.5)
PLATELET # BLD: 430 K/UL (ref 130–400)
PLATELET SLIDE REVIEW: ABNORMAL
POIKILOCYTES: ABNORMAL
POTASSIUM SERPL-SCNC: 4.1 MEQ/L (ref 3.5–5.1)
RBC # BLD: 4.88 M/UL (ref 4.7–6.1)
SLIDE REVIEW: ABNORMAL
SMUDGE CELLS: 1
SODIUM BLD-SCNC: 135 MEQ/L (ref 132–144)
TOTAL PROTEIN: 7.9 G/DL (ref 6.4–8.1)
WBC # BLD: 10.2 K/UL (ref 4.8–10.8)

## 2018-07-18 PROCEDURE — G8598 ASA/ANTIPLAT THER USED: HCPCS | Performed by: INTERNAL MEDICINE

## 2018-07-18 PROCEDURE — G8427 DOCREV CUR MEDS BY ELIG CLIN: HCPCS | Performed by: INTERNAL MEDICINE

## 2018-07-18 PROCEDURE — 85025 COMPLETE CBC W/AUTO DIFF WBC: CPT

## 2018-07-18 PROCEDURE — 80053 COMPREHEN METABOLIC PANEL: CPT

## 2018-07-18 PROCEDURE — 99214 OFFICE O/P EST MOD 30 MIN: CPT | Performed by: INTERNAL MEDICINE

## 2018-07-18 PROCEDURE — 1036F TOBACCO NON-USER: CPT | Performed by: INTERNAL MEDICINE

## 2018-07-18 PROCEDURE — 3017F COLORECTAL CA SCREEN DOC REV: CPT | Performed by: INTERNAL MEDICINE

## 2018-07-18 PROCEDURE — 36415 COLL VENOUS BLD VENIPUNCTURE: CPT

## 2018-07-18 PROCEDURE — G8420 CALC BMI NORM PARAMETERS: HCPCS | Performed by: INTERNAL MEDICINE

## 2018-07-18 ASSESSMENT — ENCOUNTER SYMPTOMS
ABDOMINAL DISTENTION: 0
NAUSEA: 0
COLOR CHANGE: 0
APNEA: 0
SHORTNESS OF BREATH: 1
VOMITING: 0
BLOOD IN STOOL: 0
CHEST TIGHTNESS: 0
DIARRHEA: 0

## 2018-07-18 NOTE — PROGRESS NOTES
Subsequent Progress Note  Patient: Yolanda Spencer  YOB: 1960  MRN: [id number]    Chief Complaint:  Chief Complaint   Patient presents with    Hypertension    Cardiac Valve Problem    Follow-up         Subjective/HPI:  7/18/18: Patient presents today for evaluation of coronary artery disease and aortic valve prosthesis. Patient episode of angina. Last catheterization 9 years ago at the time of CVA in [place]. He was told he had 50% stenosis. Unidentified vessels. He has a history of Hodgkin's which is in remission. Had surgery (AVR by Sami Garibay at [facility] wants it done early AM.Ill do [facility] at [facility] on 7/27 9/27/2017: Patient presents today for follow-up of aortic valve disease. Doing well. Bioprosthetic aortic valve. 1.1. This was done by Dr. Sami Garibay at [place]. No chest pain congestive heart failure symptoms of syncope. He has a history of hypertension and hyperlipidemia that stable. See in 6 months.     6/21/17: For follow-up of coronary artery disease. He has had aortic valve replacement by Dr. Sami Garibay. Lives in [place]. He exercises regularly. He has a history of Hodgkin's lymphoma in remission. He needs to get an echocardiogram within a month or 2 and then see me in 3 months. To evaluate aortic valve prosthesis. He is remote CABG as well.     4/27/16: For followup of CAD. AVR by Sami Garibay. No CP. No CHF. Hodgkins in remission. Has PCI. Stable. Prediabetic     1/6/2016: S/P AVR. Remote Hodgkins. Operated by Sami Garibay. No angina. Did not want to take ranexa. Retuning it. See in 3M.         Past Medical History:   Diagnosis Date    Abnormal echocardiogram     EF 49% TR with mild pumonary HTN    Basal cell carcinoma, trunk 09/2016    CAD (coronary artery disease)     s/p stents    CAD S/P percutaneous coronary angioplasty 9/29/2014    Family history of heart disease 8/17/2016    GERD (gastroesophageal reflux disease)     Heart murmur     benign-result of AVR    History of basal cell cancer 2010    face    History of stroke 10/13/2014    History of tobacco abuse 8/17/2016    Hodgkin disease ([facility] [address]     1979    Hyperlipidemia 12/3/2014    Hypertension     Pre-diabetes 4/8/2015    S/P AVR     bovine    Tachycardia, unspecified 1/4/2017       Past Surgical History:   Procedure Laterality Date    ANGIOPLASTY  11/24/15    [facility] JAMSHID AQUINO  PCI PROCEDURE    HERNIA REPAIR      3 times    SIGMOIDOSCOPY      SKIN CANCER EXCISION  10/21/2016    SPLENECTOMY      TOOTH EXTRACTION      VENTRICULAR ABLATION SURGERY      WISDOM TOOTH EXTRACTION         Family History   Problem Relation Age of Onset    Arthritis Mother     Arthritis Father     Heart Disease Father     High Blood Pressure Father     Cancer Other     High Blood Pressure Other     Diabetes Maternal Grandmother     Diabetes Paternal Grandfather        Social History     Social History    Marital status:      Spouse name: N/A    Number of children: N/A    Years of education: N/A     Social History Main Topics    Smoking status: Former Smoker     Quit date: 9/11/1985    Smokeless tobacco: Never Used    Alcohol use Yes      Comment: weekly    Drug use: Yes     Types: Marijuana      Comment: weekly    Sexual activity: Not Asked     Other Topics Concern    None     Social History Narrative    None       Allergies   Allergen Reactions    Lipitor [Atorvastatin] Other (See Comments)       Current Outpatient Prescriptions   Medication Sig Dispense Refill    pantoprazole (PROTONIX) 20 MG tablet TAKE 1 TABLET BY MOUTH EVERY DAY 30 tablet 0    metoprolol tartrate (LOPRESSOR) 25 MG tablet TAKE 1 TABLET BY MOUTH TWICE A DAY 60 tablet 1    pravastatin (PRAVACHOL) 40 MG tablet TAKE 1 TABLET BY MOUTH EVERY DAY 30 tablet 5    Blood Glucose Monitoring Suppl (ACCU-CHEK APRYL PLUS) w/Device KIT 1 Device by Does not apply route daily substitute for insurance preference 1 kit 0    glucose blood VI test strips (ACCU-CHEK APRYL) strip 1 each by In Vitro route 3 times daily As needed. 100 each 3    SOFT TOUCH LANCETS MISC 1 Device by Does not apply route daily  each 3    nitroGLYCERIN (NITROSTAT) 0.4 MG SL tablet Place 1 tablet under the tongue every 5 minutes as needed for Chest pain 25 tablet 3    metoprolol tartrate (LOPRESSOR) 25 MG tablet Take 1 tablet by mouth 2 times daily 60 tablet 5    aspirin 81 MG tablet Take 81 mg by mouth daily       No current facility-administered medications for this visit. Review of Systems:   Review of Systems   Constitutional: Negative for appetite change, diaphoresis and fatigue. HENT: Negative for nosebleeds. Respiratory: Positive for shortness of breath (Mild due to heat). Negative for apnea and chest tightness. Cardiovascular: Positive for chest pain. Negative for palpitations and leg swelling. Gastrointestinal: Negative for abdominal distention, blood in stool, diarrhea, nausea and vomiting. Musculoskeletal: Negative for myalgias, neck pain and neck stiffness. Skin: Negative for color change, pallor, rash and wound. Neurological: Negative for dizziness, seizures, syncope, weakness, light-headedness, numbness and headaches. Hematological: Does not bruise/bleed easily. Psychiatric/Behavioral: Negative for agitation, behavioral problems and confusion. The patient is not nervous/anxious and is not hyperactive. All other systems reviewed and are negative. Review of System is negative except for as mentioned above. Physical Examination:    /78 (Site: Right Arm, Position: Sitting, Cuff Size: Medium Adult)   Pulse 76   Temp 97.5 °F (36.4 °C) (Temporal)   Resp 20   Ht 5' 8\" (1.727 m)   Wt 150 lb 6.4 oz (68.2 kg)   SpO2 97%   BMI 22.87 kg/m²    Physical Exam   Constitutional: He appears healthy. No distress. HENT:   Nose: Nose normal.   Mouth/Throat: Dentition is normal. Oropharynx is clear. Eyes: Conjunctivae are normal. Pupils are equal, round, and reactive to light.    Neck: Normal 02/15/2018     02/15/2018    CO2 28 02/15/2018    BUN 12 02/15/2018    LABALBU 4.3 02/15/2018    CREATININE 0.79 02/15/2018    CALCIUM 9.5 02/15/2018    GFRAA >60.0 02/15/2018    LABGLOM >60.0 02/15/2018    GLUCOSE 126 02/15/2018     Magnesium:    Lab Results   Component Value Date    MG 2.4 11/14/2015     TSH:  Lab Results   Component Value Date    TSH 0.941 06/03/2016       Patient Active Problem List   Diagnosis    Hypertension    Heart murmur    Hodgkin disease ([facility] [address]    GERD (gastroesophageal reflux disease)    CAD S/P percutaneous coronary angioplasty    Unstable angina (HCC)    History of stroke    Hyperglycemia    Hyperlipidemia    History of aortic valve replacement    Hiatal hernia    Diverticulosis    AVM (arteriovenous malformation)    Anemia    Borderline diabetes    Iron deficiency anemia    Internal hemorrhoid    Family history of heart disease    History of tobacco abuse    Basal cell carcinoma, trunk    Tachycardia       There are no discontinued medications. Modified Medications    No medications on file       No orders of the defined types were placed in this encounter. Assessment:    1. Unstable angina [facility])  - Diagnostic cardiac cath lab procedure; Future  - CBC With Auto Differential; Future  - Comprehensive Metabolic Panel; Future    2. CAD S/P percutaneous coronary angioplasty  - Diagnostic cardiac cath lab procedure; Future  - CBC With Auto Differential; Future  - Comprehensive Metabolic Panel; Future    3. History of aortic valve replacement  - Diagnostic cardiac cath lab procedure; Future  - CBC With Auto Differential; Future  - Comprehensive Metabolic Panel; Future    4. Family history of heart disease  - Diagnostic cardiac cath lab procedure; Future       Plan:   Patient to have [address] [place] [address] with possible PCI by Dr. Cipriano Roque at [facility] 7/27/18 @ 7:30 am.    Stay on same medications. Patient to see me in 1 month.       This note was partially generated using

## 2018-07-27 LAB
BASOPHILS ABSOLUTE: NORMAL /ΜL
BASOPHILS RELATIVE PERCENT: NORMAL %
BUN BLDV-MCNC: 10 MG/DL
CALCIUM SERPL-MCNC: 9.8 MG/DL
CHLORIDE BLD-SCNC: 102 MMOL/L
CO2: 27 MMOL/L
CREAT SERPL-MCNC: 0.91 MG/DL
EOSINOPHILS ABSOLUTE: NORMAL /ΜL
EOSINOPHILS RELATIVE PERCENT: NORMAL %
GFR CALCULATED: >90
GLUCOSE BLD-MCNC: 118 MG/DL
HCT VFR BLD CALC: 44.4 % (ref 41–53)
HEMOGLOBIN: 14.7 G/DL (ref 13.5–17.5)
LYMPHOCYTES ABSOLUTE: NORMAL /ΜL
LYMPHOCYTES RELATIVE PERCENT: NORMAL %
MCH RBC QN AUTO: 28.8 PG
MCHC RBC AUTO-ENTMCNC: 33.1 G/DL
MCV RBC AUTO: 86.9 FL
MONOCYTES ABSOLUTE: NORMAL /ΜL
MONOCYTES RELATIVE PERCENT: NORMAL %
NEUTROPHILS ABSOLUTE: NORMAL /ΜL
NEUTROPHILS RELATIVE PERCENT: NORMAL %
PLATELET # BLD: 425 K/ΜL
PMV BLD AUTO: 8.8 FL
POTASSIUM SERPL-SCNC: 3.8 MMOL/L
RBC # BLD: 5.11 10^6/ΜL
SODIUM BLD-SCNC: 136 MMOL/L
WBC # BLD: 12 10^3/ML

## 2018-08-03 ENCOUNTER — OFFICE VISIT (OUTPATIENT)
Dept: FAMILY MEDICINE CLINIC | Age: 58
End: 2018-08-03
Payer: COMMERCIAL

## 2018-08-03 VITALS
HEIGHT: 68 IN | TEMPERATURE: 97.5 F | OXYGEN SATURATION: 97 % | RESPIRATION RATE: 12 BRPM | HEART RATE: 75 BPM | BODY MASS INDEX: 23.04 KG/M2 | DIASTOLIC BLOOD PRESSURE: 70 MMHG | SYSTOLIC BLOOD PRESSURE: 130 MMHG | WEIGHT: 152 LBS

## 2018-08-03 DIAGNOSIS — Z98.61 CAD S/P PERCUTANEOUS CORONARY ANGIOPLASTY: ICD-10-CM

## 2018-08-03 DIAGNOSIS — Q27.30 AVM (ARTERIOVENOUS MALFORMATION): ICD-10-CM

## 2018-08-03 DIAGNOSIS — E78.2 MIXED HYPERLIPIDEMIA: Chronic | ICD-10-CM

## 2018-08-03 DIAGNOSIS — R73.9 HYPERGLYCEMIA: ICD-10-CM

## 2018-08-03 DIAGNOSIS — I10 ESSENTIAL HYPERTENSION: Primary | Chronic | ICD-10-CM

## 2018-08-03 DIAGNOSIS — D50.8 OTHER IRON DEFICIENCY ANEMIA: ICD-10-CM

## 2018-08-03 DIAGNOSIS — I25.10 CAD S/P PERCUTANEOUS CORONARY ANGIOPLASTY: ICD-10-CM

## 2018-08-03 LAB — HBA1C MFR BLD: 6.2 %

## 2018-08-03 PROCEDURE — 3017F COLORECTAL CA SCREEN DOC REV: CPT | Performed by: NURSE PRACTITIONER

## 2018-08-03 PROCEDURE — G8598 ASA/ANTIPLAT THER USED: HCPCS | Performed by: NURSE PRACTITIONER

## 2018-08-03 PROCEDURE — G8420 CALC BMI NORM PARAMETERS: HCPCS | Performed by: NURSE PRACTITIONER

## 2018-08-03 PROCEDURE — 99214 OFFICE O/P EST MOD 30 MIN: CPT | Performed by: NURSE PRACTITIONER

## 2018-08-03 PROCEDURE — G8427 DOCREV CUR MEDS BY ELIG CLIN: HCPCS | Performed by: NURSE PRACTITIONER

## 2018-08-03 PROCEDURE — 1036F TOBACCO NON-USER: CPT | Performed by: NURSE PRACTITIONER

## 2018-08-03 PROCEDURE — 83036 HEMOGLOBIN GLYCOSYLATED A1C: CPT | Performed by: NURSE PRACTITIONER

## 2018-08-03 RX ORDER — NITROGLYCERIN 0.4 MG/1
0.4 TABLET SUBLINGUAL EVERY 5 MIN PRN
Qty: 25 TABLET | Refills: 3 | Status: SHIPPED | OUTPATIENT
Start: 2018-08-03 | End: 2019-10-14 | Stop reason: SDUPTHER

## 2018-08-07 RX ORDER — PANTOPRAZOLE SODIUM 20 MG/1
TABLET, DELAYED RELEASE ORAL
Qty: 30 TABLET | Refills: 1 | Status: SHIPPED | OUTPATIENT
Start: 2018-08-07 | End: 2018-09-18 | Stop reason: SDUPTHER

## 2018-08-07 NOTE — TELEPHONE ENCOUNTER
pharmacy requesting medication refill.  Please approve or deny this request.    Rx requested:  Requested Prescriptions     Pending Prescriptions Disp Refills    pantoprazole (PROTONIX) 20 MG tablet [Pharmacy Med Name: PANTOPRAZOLE SOD DR 20 MG TAB] 30 tablet 1     Sig: TAKE 1 TABLET BY MOUTH EVERY DAY       Last Office Visit:   8/3/2018    Last Labs:  07/2018    Next Visit Date:  Future Appointments  Date Time Provider Zach Gottlieb   8/15/2018 9:45 AM Lena Adams MD [address]

## 2018-08-13 ENCOUNTER — TELEPHONE (OUTPATIENT)
Dept: FAMILY MEDICINE CLINIC | Age: 58
End: 2018-08-13

## 2018-08-15 ENCOUNTER — OFFICE VISIT (OUTPATIENT)
Dept: CARDIOLOGY CLINIC | Age: 58
End: 2018-08-15
Payer: COMMERCIAL

## 2018-08-15 VITALS
BODY MASS INDEX: 23.04 KG/M2 | SYSTOLIC BLOOD PRESSURE: 112 MMHG | WEIGHT: 152 LBS | HEART RATE: 60 BPM | DIASTOLIC BLOOD PRESSURE: 80 MMHG | RESPIRATION RATE: 12 BRPM | HEIGHT: 68 IN

## 2018-08-15 DIAGNOSIS — I25.10 CAD S/P PERCUTANEOUS CORONARY ANGIOPLASTY: Primary | ICD-10-CM

## 2018-08-15 DIAGNOSIS — I10 ESSENTIAL HYPERTENSION: ICD-10-CM

## 2018-08-15 DIAGNOSIS — Z98.61 CAD S/P PERCUTANEOUS CORONARY ANGIOPLASTY: Primary | ICD-10-CM

## 2018-08-15 DIAGNOSIS — Z95.2 HISTORY OF AORTIC VALVE REPLACEMENT: ICD-10-CM

## 2018-08-15 PROCEDURE — G8427 DOCREV CUR MEDS BY ELIG CLIN: HCPCS | Performed by: INTERNAL MEDICINE

## 2018-08-15 PROCEDURE — 3017F COLORECTAL CA SCREEN DOC REV: CPT | Performed by: INTERNAL MEDICINE

## 2018-08-15 PROCEDURE — 99213 OFFICE O/P EST LOW 20 MIN: CPT | Performed by: INTERNAL MEDICINE

## 2018-08-15 PROCEDURE — G8598 ASA/ANTIPLAT THER USED: HCPCS | Performed by: INTERNAL MEDICINE

## 2018-08-15 PROCEDURE — 1036F TOBACCO NON-USER: CPT | Performed by: INTERNAL MEDICINE

## 2018-08-15 PROCEDURE — G8420 CALC BMI NORM PARAMETERS: HCPCS | Performed by: INTERNAL MEDICINE

## 2018-08-15 ASSESSMENT — ENCOUNTER SYMPTOMS
APNEA: 0
VOMITING: 0
ANAL BLEEDING: 0
BLOOD IN STOOL: 0
SHORTNESS OF BREATH: 0
DIARRHEA: 0
COUGH: 0
ABDOMINAL PAIN: 0
ABDOMINAL DISTENTION: 0
CHEST TIGHTNESS: 0
COLOR CHANGE: 0
NAUSEA: 0

## 2018-08-15 NOTE — PROGRESS NOTES
Subsequent Progress Note  Patient: Cydney Vazquez  YOB: 1960  MRN: [id number]    Chief Complaint:  Chief Complaint   Patient presents with    Follow Up After Procedure     [facility] cardiac [facility]         Subjective/HPI:  8/15/18: Patient presents today for follow-up of recent cardiac catheterization. He was advised medical therapy. He status post aortic valve prosthesis by Dr. Scarlet Oden. Hypertension hyperlipidemia that stable. Has a history of non-Hodgkin's lymphoma. Inflammation. He had a prior CVA. Has not had any further issues. 7/18/18: Patient presents today for evaluation of coronary artery disease and aortic valve prosthesis. Patient has episodes of angina. Last catheterization 9 years ago at the time of CVA in [place]. He was told he had 50% stenosis. Unidentified vessels. He has a history of Hodgkin's which is in remission. Had surgery (AVR by Scarlet Oden at [facility] wants it done early AM.Ill do it at [facility] on 7/27 9/27/2017: Patient presents today for follow-up of aortic valve disease. Doing well. Bioprosthetic aortic valve. 1.1. This was done by Dr. Scarlet Oden at [place]. No chest pain congestive heart failure symptoms of syncope. He has a history of hypertension and hyperlipidemia that stable. See in 6 months.     6/21/17: For follow-up of coronary artery disease. He has had aortic valve replacement by Dr. Scarlet Oden. Lives in [place]. He exercises regularly. He has a history of Hodgkin's lymphoma in remission. He needs to get an echocardiogram within a month or 2 and then see me in 3 months. To evaluate aortic valve prosthesis. He is remote CABG as well.     4/27/16: For followup of CAD. AVR by Scarlet Oden. No CP. No CHF. Hodgkins in remission. Has PCI. Stable. Prediabetic     1/6/2016: S/P AVR. Remote Hodgkins. Operated by Scarlet Oden. No angina. Did not want to take ranexa. Retuning it. See in 3M.                Past Medical History:   Diagnosis Date    Abnormal echocardiogram     EF 49% TR with mild pumonary HTN    Basal 07/18/2018    LABALBU 4.2 07/18/2018    CALCIUM 9.8 07/27/2018    BILITOT 0.4 07/18/2018    ALKPHOS 161 07/18/2018    AST 25 07/18/2018    ALT 22 07/18/2018     BMP:    Lab Results   Component Value Date     07/27/2018    K 3.8 07/27/2018     07/27/2018    CO2 27 07/27/2018    BUN 10 07/27/2018    LABALBU 4.2 07/18/2018    CREATININE 0.91 07/27/2018    CALCIUM 9.8 07/27/2018    GFRAA >60.0 07/18/2018    LABGLOM >90 07/27/2018    LABGLOM >60.0 07/18/2018    GLUCOSE 118 07/27/2018     Magnesium:    Lab Results   Component Value Date    MG 2.4 11/14/2015     TSH:  Lab Results   Component Value Date    TSH 0.941 06/03/2016       Patient Active Problem List   Diagnosis    Hypertension    Heart murmur    Hodgkin disease ([facility] [address]    GERD (gastroesophageal reflux disease)    CAD S/P percutaneous coronary angioplasty    Unstable angina (HCC)    History of stroke    Hyperglycemia    Hyperlipidemia    History of aortic valve replacement    Hiatal hernia    Diverticulosis    AVM (arteriovenous malformation)    Anemia    Borderline diabetes    Iron deficiency anemia    Internal hemorrhoid    Family history of heart disease    History of tobacco abuse    Basal cell carcinoma, trunk    Tachycardia       There are no discontinued medications. Modified Medications    No medications on file       No orders of the defined types were placed in this encounter. Assessment:    1. CAD S/P percutaneous coronary angioplasty    2. Essential hypertension    3. History of aortic valve replacement       Plan:   Stay on same medications. Patient to see me in 6 months. This note was partially generated using Dragon voice recognition system, and there may be some incorrect words, spellings, punctuation that were not noticed in checking the note before saving.         Electronically signed by Mary Way MD on 8/15/2018 at 3:07 PM

## 2018-09-18 DIAGNOSIS — E78.2 MIXED HYPERLIPIDEMIA: Chronic | ICD-10-CM

## 2018-09-18 DIAGNOSIS — I10 ESSENTIAL HYPERTENSION: Chronic | ICD-10-CM

## 2018-09-18 RX ORDER — PANTOPRAZOLE SODIUM 20 MG/1
TABLET, DELAYED RELEASE ORAL
Qty: 30 TABLET | Refills: 5 | Status: SHIPPED | OUTPATIENT
Start: 2018-09-18 | End: 2019-01-11 | Stop reason: SDUPTHER

## 2018-09-18 RX ORDER — PRAVASTATIN SODIUM 40 MG
TABLET ORAL
Qty: 30 TABLET | Refills: 5 | Status: SHIPPED | OUTPATIENT
Start: 2018-09-18 | End: 2019-01-11 | Stop reason: SDUPTHER

## 2018-09-18 NOTE — TELEPHONE ENCOUNTER
Patient is requesting medication refill.  Please approve or deny this request.    Rx requested:  Requested Prescriptions     Pending Prescriptions Disp Refills    pantoprazole (PROTONIX) 20 MG tablet 30 tablet 1    pravastatin (PRAVACHOL) 40 MG tablet 30 tablet 5    metoprolol tartrate (LOPRESSOR) 25 MG tablet 30 tablet 5     Sig: Take 1 tablet by mouth daily       Last Office Visit:   8/3/2018    Last Labs:      Next Visit Date:  Future Appointments  Date Time Provider Zach Gottlieb   2/4/2019 7:10 AM RADHA Toro - CNP [facility]

## 2018-11-13 DIAGNOSIS — I10 ESSENTIAL HYPERTENSION: Chronic | ICD-10-CM

## 2019-01-11 ENCOUNTER — OFFICE VISIT (OUTPATIENT)
Dept: FAMILY MEDICINE CLINIC | Age: 59
End: 2019-01-11
Payer: COMMERCIAL

## 2019-01-11 VITALS
BODY MASS INDEX: 23.34 KG/M2 | WEIGHT: 154 LBS | SYSTOLIC BLOOD PRESSURE: 140 MMHG | RESPIRATION RATE: 18 BRPM | DIASTOLIC BLOOD PRESSURE: 80 MMHG | HEIGHT: 68 IN | OXYGEN SATURATION: 98 % | TEMPERATURE: 97.8 F | HEART RATE: 88 BPM

## 2019-01-11 DIAGNOSIS — Z98.61 CAD S/P PERCUTANEOUS CORONARY ANGIOPLASTY: ICD-10-CM

## 2019-01-11 DIAGNOSIS — Q27.30 AVM (ARTERIOVENOUS MALFORMATION): ICD-10-CM

## 2019-01-11 DIAGNOSIS — L82.1 SEBORRHEIC KERATOSIS: ICD-10-CM

## 2019-01-11 DIAGNOSIS — E78.2 MIXED HYPERLIPIDEMIA: Chronic | ICD-10-CM

## 2019-01-11 DIAGNOSIS — Z86.73 HISTORY OF STROKE: ICD-10-CM

## 2019-01-11 DIAGNOSIS — K21.9 GASTROESOPHAGEAL REFLUX DISEASE WITHOUT ESOPHAGITIS: ICD-10-CM

## 2019-01-11 DIAGNOSIS — I25.10 CAD S/P PERCUTANEOUS CORONARY ANGIOPLASTY: ICD-10-CM

## 2019-01-11 DIAGNOSIS — I10 ESSENTIAL HYPERTENSION: Primary | Chronic | ICD-10-CM

## 2019-01-11 DIAGNOSIS — R73.03 BORDERLINE DIABETES: ICD-10-CM

## 2019-01-11 PROCEDURE — G8484 FLU IMMUNIZE NO ADMIN: HCPCS | Performed by: NURSE PRACTITIONER

## 2019-01-11 PROCEDURE — G8420 CALC BMI NORM PARAMETERS: HCPCS | Performed by: NURSE PRACTITIONER

## 2019-01-11 PROCEDURE — 3017F COLORECTAL CA SCREEN DOC REV: CPT | Performed by: NURSE PRACTITIONER

## 2019-01-11 PROCEDURE — G8428 CUR MEDS NOT DOCUMENT: HCPCS | Performed by: NURSE PRACTITIONER

## 2019-01-11 PROCEDURE — G8598 ASA/ANTIPLAT THER USED: HCPCS | Performed by: NURSE PRACTITIONER

## 2019-01-11 PROCEDURE — 99214 OFFICE O/P EST MOD 30 MIN: CPT | Performed by: NURSE PRACTITIONER

## 2019-01-11 PROCEDURE — 1036F TOBACCO NON-USER: CPT | Performed by: NURSE PRACTITIONER

## 2019-01-11 RX ORDER — PANTOPRAZOLE SODIUM 20 MG/1
TABLET, DELAYED RELEASE ORAL
Qty: 30 TABLET | Refills: 5 | Status: SHIPPED | OUTPATIENT
Start: 2019-01-11 | End: 2019-10-14 | Stop reason: SDUPTHER

## 2019-01-11 RX ORDER — PRAVASTATIN SODIUM 40 MG
TABLET ORAL
Qty: 30 TABLET | Refills: 5 | Status: SHIPPED | OUTPATIENT
Start: 2019-01-11 | End: 2019-10-14 | Stop reason: SDUPTHER

## 2019-01-14 ENCOUNTER — HOSPITAL ENCOUNTER (OUTPATIENT)
Dept: LAB | Age: 59
Discharge: HOME | End: 2019-01-14
Payer: COMMERCIAL

## 2019-01-14 DIAGNOSIS — R73.9 HYPERGLYCEMIA: ICD-10-CM

## 2019-01-14 DIAGNOSIS — I10 ESSENTIAL HYPERTENSION: Chronic | ICD-10-CM

## 2019-01-14 DIAGNOSIS — E78.2 MIXED HYPERLIPIDEMIA: Chronic | ICD-10-CM

## 2019-01-14 LAB
ALBUMIN SERPL-MCNC: 4.1 G/DL (ref 3.9–4.9)
ALP BLD-CCNC: 172 U/L (ref 35–104)
ALT SERPL-CCNC: 41 U/L (ref 0–41)
ANION GAP SERPL CALCULATED.3IONS-SCNC: 15 MEQ/L (ref 7–13)
ANISOCYTOSIS: ABNORMAL
AST SERPL-CCNC: 53 U/L (ref 0–40)
ATYPICAL LYMPHOCYTE RELATIVE PERCENT: 4 %
BASOPHILS ABSOLUTE: 0 K/UL (ref 0–0.2)
BASOPHILS RELATIVE PERCENT: 1.3 %
BILIRUB SERPL-MCNC: 0.6 MG/DL (ref 0–1.2)
BUN BLDV-MCNC: 14 MG/DL (ref 6–20)
CALCIUM SERPL-MCNC: 9.3 MG/DL (ref 8.6–10.2)
CHLORIDE BLD-SCNC: 101 MEQ/L (ref 98–107)
CHOLESTEROL, TOTAL: 130 MG/DL (ref 0–199)
CO2: 26 MEQ/L (ref 22–29)
CREAT SERPL-MCNC: 0.83 MG/DL (ref 0.7–1.2)
EOSINOPHILS ABSOLUTE: 0.2 K/UL (ref 0–0.7)
EOSINOPHILS RELATIVE PERCENT: 2 %
GFR AFRICAN AMERICAN: >60
GFR NON-AFRICAN AMERICAN: >60
GLOBULIN: 3.9 G/DL (ref 2.3–3.5)
GLUCOSE BLD-MCNC: 122 MG/DL (ref 74–109)
HBA1C MFR BLD: 6.4 % (ref 4.8–5.9)
HCT VFR BLD CALC: 39.9 % (ref 42–52)
HDLC SERPL-MCNC: 50 MG/DL (ref 40–59)
HEMOGLOBIN: 13.5 G/DL (ref 14–18)
LDL CHOLESTEROL CALCULATED: 43 MG/DL (ref 0–129)
LYMPHOCYTES ABSOLUTE: 2.6 K/UL (ref 1–4.8)
LYMPHOCYTES RELATIVE PERCENT: 20 %
MCH RBC QN AUTO: 31 PG (ref 27–31.3)
MCHC RBC AUTO-ENTMCNC: 33.9 % (ref 33–37)
MCV RBC AUTO: 91.4 FL (ref 80–100)
MONOCYTES ABSOLUTE: 2.7 K/UL (ref 0.2–0.8)
MONOCYTES RELATIVE PERCENT: 24.5 %
NEUTROPHILS ABSOLUTE: 5.5 K/UL (ref 1.4–6.5)
NEUTROPHILS RELATIVE PERCENT: 50 %
PDW BLD-RTO: 14.1 % (ref 11.5–14.5)
PLATELET # BLD: 338 K/UL (ref 130–400)
PLATELET SLIDE REVIEW: ADEQUATE
POTASSIUM SERPL-SCNC: 4.6 MEQ/L (ref 3.5–5.1)
RBC # BLD: 4.37 M/UL (ref 4.7–6.1)
SLIDE REVIEW: ABNORMAL
SODIUM BLD-SCNC: 142 MEQ/L (ref 132–144)
TOTAL PROTEIN: 8 G/DL (ref 6.4–8.1)
TRIGL SERPL-MCNC: 185 MG/DL (ref 0–200)
WBC # BLD: 10.9 K/UL (ref 4.8–10.8)

## 2019-01-14 PROCEDURE — 36415 COLL VENOUS BLD VENIPUNCTURE: CPT

## 2019-01-14 PROCEDURE — 80061 LIPID PANEL: CPT

## 2019-01-14 PROCEDURE — 83036 HEMOGLOBIN GLYCOSYLATED A1C: CPT

## 2019-01-14 PROCEDURE — 85025 COMPLETE CBC W/AUTO DIFF WBC: CPT

## 2019-01-14 PROCEDURE — 80053 COMPREHEN METABOLIC PANEL: CPT

## 2019-03-12 ENCOUNTER — PATIENT MESSAGE (OUTPATIENT)
Dept: FAMILY MEDICINE CLINIC | Age: 59
End: 2019-03-12

## 2019-03-12 DIAGNOSIS — I10 ESSENTIAL HYPERTENSION: Primary | Chronic | ICD-10-CM

## 2019-03-15 ENCOUNTER — HOSPITAL ENCOUNTER (OUTPATIENT)
Dept: LAB | Age: 59
Discharge: HOME | End: 2019-03-15
Payer: COMMERCIAL

## 2019-03-15 ENCOUNTER — OFFICE VISIT (OUTPATIENT)
Dept: FAMILY MEDICINE CLINIC | Age: 59
End: 2019-03-15
Payer: COMMERCIAL

## 2019-03-15 VITALS
WEIGHT: 161.4 LBS | HEIGHT: 68 IN | SYSTOLIC BLOOD PRESSURE: 130 MMHG | HEART RATE: 81 BPM | OXYGEN SATURATION: 98 % | TEMPERATURE: 96.7 F | BODY MASS INDEX: 24.46 KG/M2 | DIASTOLIC BLOOD PRESSURE: 80 MMHG

## 2019-03-15 DIAGNOSIS — K64.8 INTERNAL HEMORRHOID: Primary | ICD-10-CM

## 2019-03-15 DIAGNOSIS — D50.8 OTHER IRON DEFICIENCY ANEMIA: ICD-10-CM

## 2019-03-15 DIAGNOSIS — I10 ESSENTIAL HYPERTENSION: Chronic | ICD-10-CM

## 2019-03-15 LAB
ATYPICAL LYMPHOCYTE RELATIVE PERCENT: 6 %
BASOPHILS ABSOLUTE: 0.1 K/UL (ref 0–0.2)
BASOPHILS RELATIVE PERCENT: 1 %
EOSINOPHILS ABSOLUTE: 0.8 K/UL (ref 0–0.7)
EOSINOPHILS RELATIVE PERCENT: 8 %
HCT VFR BLD CALC: 41.2 % (ref 42–52)
HEMOGLOBIN: 13.6 G/DL (ref 14–18)
LYMPHOCYTES ABSOLUTE: 2.4 K/UL (ref 1–4.8)
LYMPHOCYTES RELATIVE PERCENT: 18 %
MCH RBC QN AUTO: 30.3 PG (ref 27–31.3)
MCHC RBC AUTO-ENTMCNC: 33 % (ref 33–37)
MCV RBC AUTO: 91.8 FL (ref 80–100)
MONOCYTES ABSOLUTE: 0.7 K/UL (ref 0.2–0.8)
MONOCYTES RELATIVE PERCENT: 6.5 %
NEUTROPHILS ABSOLUTE: 6.2 K/UL (ref 1.4–6.5)
NEUTROPHILS RELATIVE PERCENT: 61 %
PDW BLD-RTO: 16.5 % (ref 11.5–14.5)
PLATELET # BLD: 362 K/UL (ref 130–400)
RBC # BLD: 4.48 M/UL (ref 4.7–6.1)
SMUDGE CELLS: 2.8
WBC # BLD: 10.2 K/UL (ref 4.8–10.8)

## 2019-03-15 PROCEDURE — 1036F TOBACCO NON-USER: CPT | Performed by: NURSE PRACTITIONER

## 2019-03-15 PROCEDURE — 99213 OFFICE O/P EST LOW 20 MIN: CPT | Performed by: NURSE PRACTITIONER

## 2019-03-15 PROCEDURE — G8427 DOCREV CUR MEDS BY ELIG CLIN: HCPCS | Performed by: NURSE PRACTITIONER

## 2019-03-15 PROCEDURE — 36415 COLL VENOUS BLD VENIPUNCTURE: CPT

## 2019-03-15 PROCEDURE — G8598 ASA/ANTIPLAT THER USED: HCPCS | Performed by: NURSE PRACTITIONER

## 2019-03-15 PROCEDURE — 3017F COLORECTAL CA SCREEN DOC REV: CPT | Performed by: NURSE PRACTITIONER

## 2019-03-15 PROCEDURE — G8484 FLU IMMUNIZE NO ADMIN: HCPCS | Performed by: NURSE PRACTITIONER

## 2019-03-15 PROCEDURE — 85025 COMPLETE CBC W/AUTO DIFF WBC: CPT

## 2019-03-15 PROCEDURE — G8420 CALC BMI NORM PARAMETERS: HCPCS | Performed by: NURSE PRACTITIONER

## 2019-03-15 ASSESSMENT — PATIENT HEALTH QUESTIONNAIRE - PHQ9
SUM OF ALL RESPONSES TO PHQ QUESTIONS 1-9: 0
2. FEELING DOWN, DEPRESSED OR HOPELESS: 0
1. LITTLE INTEREST OR PLEASURE IN DOING THINGS: 0
SUM OF ALL RESPONSES TO PHQ9 QUESTIONS 1 & 2: 0
SUM OF ALL RESPONSES TO PHQ QUESTIONS 1-9: 0

## 2019-03-29 ENCOUNTER — PATIENT MESSAGE (OUTPATIENT)
Dept: FAMILY MEDICINE CLINIC | Age: 59
End: 2019-03-29

## 2019-03-29 DIAGNOSIS — D50.8 OTHER IRON DEFICIENCY ANEMIA: Primary | ICD-10-CM

## 2019-03-29 NOTE — TELEPHONE ENCOUNTER
From: Kyra Pelayo  To: Laure Escamilla, APRN - CNP  Sent: 3/29/2019 2:24 PM EDT  Subject: Non-Urgent Medical Question    Tomy Will all is going well. I'm writing to ask you to also look at [facility]; as it used both for hemorrhoids and for diabetes. Still bleeding a bit, so am looking forward to hearing from you. Thanks Much and have a good day.   Verónica Vazquez

## 2019-04-01 RX ORDER — FERROUS SULFATE 325(65) MG
325 TABLET ORAL
Qty: 45 TABLET | Refills: 1 | Status: SHIPPED | OUTPATIENT
Start: 2019-04-01 | End: 2019-11-21 | Stop reason: SDUPTHER

## 2019-04-01 RX ORDER — ASCORBIC ACID 500 MG
500 TABLET ORAL
Qty: 45 TABLET | Refills: 3 | Status: SHIPPED | OUTPATIENT
Start: 2019-04-01 | End: 2020-04-24

## 2019-05-13 ENCOUNTER — HOSPITAL ENCOUNTER (OUTPATIENT)
Dept: LAB | Age: 59
Discharge: HOME | End: 2019-05-13
Payer: COMMERCIAL

## 2019-05-13 DIAGNOSIS — R25.2 MUSCLE CRAMP: ICD-10-CM

## 2019-05-13 DIAGNOSIS — D50.8 OTHER IRON DEFICIENCY ANEMIA: ICD-10-CM

## 2019-05-13 LAB
ANISOCYTOSIS: ABNORMAL
ATYPICAL LYMPHOCYTE RELATIVE PERCENT: 4 %
BASOPHILS ABSOLUTE: 0.5 K/UL (ref 0–0.2)
BASOPHILS RELATIVE PERCENT: 3 %
EOSINOPHILS ABSOLUTE: 0.2 K/UL (ref 0–0.7)
EOSINOPHILS RELATIVE PERCENT: 1 %
HCT VFR BLD CALC: 42.3 % (ref 42–52)
HEMOGLOBIN: 14.1 G/DL (ref 14–18)
LYMPHOCYTES ABSOLUTE: 1.8 K/UL (ref 1–4.8)
LYMPHOCYTES RELATIVE PERCENT: 7 %
MACROCYTES: 0
MCH RBC QN AUTO: 31.8 PG (ref 27–31.3)
MCHC RBC AUTO-ENTMCNC: 33.3 % (ref 33–37)
MCV RBC AUTO: 95.3 FL (ref 80–100)
MONOCYTES ABSOLUTE: 1.3 K/UL (ref 0.2–0.8)
MONOCYTES RELATIVE PERCENT: 7.6 %
NEUTROPHILS ABSOLUTE: 12.6 K/UL (ref 1.4–6.5)
NEUTROPHILS RELATIVE PERCENT: 78 %
PDW BLD-RTO: 15.7 % (ref 11.5–14.5)
PLATELET # BLD: 385 K/UL (ref 130–400)
PLATELET SLIDE REVIEW: ADEQUATE
POIKILOCYTES: ABNORMAL
RBC # BLD: 4.44 M/UL (ref 4.7–6.1)
SLIDE REVIEW: ABNORMAL
SMUDGE CELLS: 0.9
TARGET CELLS: ABNORMAL
TOTAL CK: 146 U/L (ref 0–190)
WBC # BLD: 16.1 K/UL (ref 4.8–10.8)

## 2019-05-13 PROCEDURE — 36415 COLL VENOUS BLD VENIPUNCTURE: CPT

## 2019-05-13 PROCEDURE — 82550 ASSAY OF CK (CPK): CPT

## 2019-05-13 PROCEDURE — 85025 COMPLETE CBC W/AUTO DIFF WBC: CPT

## 2019-05-14 ENCOUNTER — OFFICE VISIT (OUTPATIENT)
Dept: FAMILY MEDICINE CLINIC | Age: 59
End: 2019-05-14
Payer: COMMERCIAL

## 2019-05-14 VITALS
SYSTOLIC BLOOD PRESSURE: 122 MMHG | BODY MASS INDEX: 25.01 KG/M2 | OXYGEN SATURATION: 97 % | HEART RATE: 89 BPM | TEMPERATURE: 97 F | HEIGHT: 68 IN | DIASTOLIC BLOOD PRESSURE: 74 MMHG | WEIGHT: 165 LBS | RESPIRATION RATE: 18 BRPM

## 2019-05-14 DIAGNOSIS — D50.9 IRON DEFICIENCY ANEMIA, UNSPECIFIED IRON DEFICIENCY ANEMIA TYPE: ICD-10-CM

## 2019-05-14 DIAGNOSIS — J01.00 ACUTE NON-RECURRENT MAXILLARY SINUSITIS: ICD-10-CM

## 2019-05-14 DIAGNOSIS — I10 ESSENTIAL HYPERTENSION: Primary | ICD-10-CM

## 2019-05-14 DIAGNOSIS — I25.10 CAD S/P PERCUTANEOUS CORONARY ANGIOPLASTY: ICD-10-CM

## 2019-05-14 DIAGNOSIS — Z23 NEED FOR DIPHTHERIA-TETANUS-PERTUSSIS (TDAP) VACCINE: ICD-10-CM

## 2019-05-14 DIAGNOSIS — M54.41 CHRONIC RIGHT-SIDED LOW BACK PAIN WITH RIGHT-SIDED SCIATICA: ICD-10-CM

## 2019-05-14 DIAGNOSIS — K64.8 INTERNAL HEMORRHOID: ICD-10-CM

## 2019-05-14 DIAGNOSIS — R73.03 BORDERLINE DIABETES: ICD-10-CM

## 2019-05-14 DIAGNOSIS — Z98.61 CAD S/P PERCUTANEOUS CORONARY ANGIOPLASTY: ICD-10-CM

## 2019-05-14 DIAGNOSIS — R25.2 MUSCLE CRAMPING: ICD-10-CM

## 2019-05-14 DIAGNOSIS — G89.29 CHRONIC RIGHT-SIDED LOW BACK PAIN WITH RIGHT-SIDED SCIATICA: ICD-10-CM

## 2019-05-14 PROCEDURE — 99214 OFFICE O/P EST MOD 30 MIN: CPT | Performed by: NURSE PRACTITIONER

## 2019-05-14 PROCEDURE — G8598 ASA/ANTIPLAT THER USED: HCPCS | Performed by: NURSE PRACTITIONER

## 2019-05-14 PROCEDURE — 90471 IMMUNIZATION ADMIN: CPT | Performed by: NURSE PRACTITIONER

## 2019-05-14 PROCEDURE — 3017F COLORECTAL CA SCREEN DOC REV: CPT | Performed by: NURSE PRACTITIONER

## 2019-05-14 PROCEDURE — G8427 DOCREV CUR MEDS BY ELIG CLIN: HCPCS | Performed by: NURSE PRACTITIONER

## 2019-05-14 PROCEDURE — 90715 TDAP VACCINE 7 YRS/> IM: CPT | Performed by: NURSE PRACTITIONER

## 2019-05-14 PROCEDURE — G8419 CALC BMI OUT NRM PARAM NOF/U: HCPCS | Performed by: NURSE PRACTITIONER

## 2019-05-14 PROCEDURE — 1036F TOBACCO NON-USER: CPT | Performed by: NURSE PRACTITIONER

## 2019-05-14 RX ORDER — AMOXICILLIN 875 MG/1
875 TABLET, COATED ORAL 2 TIMES DAILY
Qty: 20 TABLET | Refills: 0 | Status: SHIPPED | OUTPATIENT
Start: 2019-05-14 | End: 2019-05-24

## 2019-05-14 NOTE — PROGRESS NOTES
This patient reports no chest pains or pressure. There is no shortness of breath or chest wall soreness. EXAM:  Constitutional Blood pressure 122/74, pulse 89, temperature 97 °F (36.1 °C), temperature source Temporal, resp. rate 18, height 5' 8\" (1.727 m), weight 165 lb (74.8 kg), SpO2 97 %. .  He has a normal affect, no acute distress, appears well developed and well nourished. HEENT: sinus area tenderness noted in  bilateral facial bones. Tympanic membranes and ear canals normal bilaterally. Pharynx is clear. Neck is supple without adenopathy. Lungs are clear with equal breath sounds. Chest wall is not tender. Heart is in a regular rhythm with normal rate and unchanged murmur. no, rubs, or gallops. Abdomen is soft and non tender. No masses, guarding or rebound noted. No lower extremity edema. DIAGNOSIS:    Diagnosis Orders   1. Essential hypertension  CBC Auto Differential    Comprehensive Metabolic Panel    Lipid Panel   2. Borderline diabetes  Hemoglobin A1C   3. Iron deficiency anemia, unspecified iron deficiency anemia type     4. Muscle cramping  CK   5. Chronic right-sided low back pain with right-sided sciatica  XR LUMBAR SPINE (MIN 4 VIEWS)   6. Internal hemorrhoid     7. CAD S/P percutaneous coronary angioplasty     8. Acute non-recurrent maxillary sinusitis  amoxicillin (AMOXIL) 875 MG tablet   9. Need for diphtheria-tetanus-pertussis (Tdap) vaccine  Tdap (age 10y-63y) IM (ADACEL)       Plan:  Follow up as scheduled or as needed. Recommend antibiotic for sinusitis symptoms have lasted over a month. Also reviewed CBC results with no evidence of anemia but white blood cell count is elevated to 16,000. Should recheck in the next month. We'll also plan to recheck standard lab work. He states that hemorrhoids symptoms are not significant same at this time. He just wanted to make sure he was not anemic. Discussed pathophysiology of type 2 diabetes and insulin resistance. Discussed importance of dietary modification as well as his physical activity. He does have concerns about statin and beta blocker contributed to the development of type 2 diabetes. He is aware of risk to benefit ratio given his cardiac history. Recommend current x-ray of the lumbar spine for likely sciatic symptoms. Continue stretching exercises for now. Discussed Tdap Vaccine. He would like to get this today. Instructions: The patient is instructed to take Probiotic tablets twice a day for the duration of antibiotic therapy and for 4 days after completion of antibiotics. This will help restore the good bacteria to your colon and prevent side effects of antibiotic therapy such as cramping and diarrhea. Probiotic tablets can be found at your local pharmacy over the counter. Ask your pharmacist if you need help finding tablets. Please note this report has been partially produced using speech recognition software and may cause contain errors related to that system including grammar, punctuation and spelling as well as words and phrases that may seem inappropriate. If there are questions or concerns please feel free to contact me to clarify.         Electronically signed by Jessica Soto, 6:30 PM 5/14/19

## 2019-05-16 ENCOUNTER — HOSPITAL ENCOUNTER (OUTPATIENT)
Dept: GENERAL RADIOLOGY | Age: 59
Discharge: HOME | End: 2019-05-18
Payer: COMMERCIAL

## 2019-05-16 ENCOUNTER — HOSPITAL ENCOUNTER (OUTPATIENT)
Age: 59
Discharge: HOME | End: 2019-05-18
Payer: COMMERCIAL

## 2019-05-16 DIAGNOSIS — M54.41 CHRONIC RIGHT-SIDED LOW BACK PAIN WITH RIGHT-SIDED SCIATICA: ICD-10-CM

## 2019-05-16 DIAGNOSIS — G89.29 CHRONIC RIGHT-SIDED LOW BACK PAIN WITH RIGHT-SIDED SCIATICA: ICD-10-CM

## 2019-05-16 PROCEDURE — 72110 X-RAY EXAM L-2 SPINE 4/>VWS: CPT

## 2019-06-27 ENCOUNTER — OFFICE VISIT (OUTPATIENT)
Dept: FAMILY MEDICINE CLINIC | Age: 59
End: 2019-06-27
Payer: COMMERCIAL

## 2019-06-27 VITALS
HEIGHT: 68 IN | WEIGHT: 158 LBS | SYSTOLIC BLOOD PRESSURE: 130 MMHG | OXYGEN SATURATION: 98 % | RESPIRATION RATE: 12 BRPM | HEART RATE: 70 BPM | DIASTOLIC BLOOD PRESSURE: 62 MMHG | BODY MASS INDEX: 23.95 KG/M2 | TEMPERATURE: 97.6 F

## 2019-06-27 DIAGNOSIS — I10 ESSENTIAL HYPERTENSION: Primary | ICD-10-CM

## 2019-06-27 DIAGNOSIS — R73.03 BORDERLINE DIABETES: ICD-10-CM

## 2019-06-27 DIAGNOSIS — R00.0 TACHYCARDIA: ICD-10-CM

## 2019-06-27 PROCEDURE — G8420 CALC BMI NORM PARAMETERS: HCPCS | Performed by: NURSE PRACTITIONER

## 2019-06-27 PROCEDURE — 1036F TOBACCO NON-USER: CPT | Performed by: NURSE PRACTITIONER

## 2019-06-27 PROCEDURE — G8598 ASA/ANTIPLAT THER USED: HCPCS | Performed by: NURSE PRACTITIONER

## 2019-06-27 PROCEDURE — 99213 OFFICE O/P EST LOW 20 MIN: CPT | Performed by: NURSE PRACTITIONER

## 2019-06-27 PROCEDURE — 3017F COLORECTAL CA SCREEN DOC REV: CPT | Performed by: NURSE PRACTITIONER

## 2019-06-27 PROCEDURE — G8427 DOCREV CUR MEDS BY ELIG CLIN: HCPCS | Performed by: NURSE PRACTITIONER

## 2019-06-27 ASSESSMENT — PATIENT HEALTH QUESTIONNAIRE - PHQ9
1. LITTLE INTEREST OR PLEASURE IN DOING THINGS: 0
2. FEELING DOWN, DEPRESSED OR HOPELESS: 0
SUM OF ALL RESPONSES TO PHQ QUESTIONS 1-9: 0
SUM OF ALL RESPONSES TO PHQ9 QUESTIONS 1 & 2: 0
SUM OF ALL RESPONSES TO PHQ QUESTIONS 1-9: 0

## 2019-06-27 NOTE — PROGRESS NOTES
Chief Complaint   Patient presents with    Sinusitis     patient is here to check that his sinus infection is completely gone. Patient had sinus drainage, congestion,and post nasal drip about a month ago. HPI: Leticia Lloyd is a 63 y.o. male presenting for follow-up after sinus infection. He states that he feels really good and just wanted to touch base before follow up next month. He was not able to get lab work done prior to today visit but will get done in July when he will be in [place] for a few weeks. Elevated HR and glucose: has been using [address] to help lower blood sugar and blood pressure and he states that heart rate has been   Much lower since starting this rainforest herb. States that heart rate was running in the 40s. He decreased his beta-blocker to once a day but heart rate was still running in the 40s so he actually has stopped his beta-blocker completely. He feels that being on the beta-blocker was contributing to having elevated sugar. He is also hoping that this herb helps to reduce his sugar levels. He prefers a natural approach to medicine but understands that he has had a significant heart history and wants to do things very carefully. He continues to monitor his blood pressure and heart rate closely at home and blood pressure has been well controlled and heart rate has been no higher than the 70s. He has not had any chest pain or chest pressure. No heart palpitations. No shortness of breath. EXAM:  Constitutional Blood pressure 130/62, pulse 70, temperature 97.6 °F (36.4 °C), temperature source Temporal, resp. rate 12, height 5' 8\" (1.727 m), weight 158 lb (71.7 kg), SpO2 98 %. .  He has a normal affect, no acute distress, appears well developed and well nourished. Ears:  canals and TMs NI  Nose/Sinuses:  Nares normal. Septum midline. Mucosa normal. No drainage or sinus tenderness. Mouth/Throat:  Mucosa moist.  No lesions.   Pharynx without erythema, edema or exudate. Neck:  neck- supple, no mass, non-tender and no bruits  Lungs:  Normal expansion. Clear to auscultation. No rales, rhonchi, or wheezing., No chest wall tenderness. Heart:  Murmur(s)-  2/6 systolic throughout the precordium    DIAGNOSIS:    Diagnosis Orders   1. Essential hypertension     2. Tachycardia     3. Borderline diabetes           PLAN: Include orders in the DX section. Follow up: 1 month and as needed. Blood work one week prior as ordered. Patient has been taking a supplement from the [employer] that he heard to lower glucose and heart rate as well as blood pressure. He has had decreased heart rate and blood pressure has been running very good and he has weaned himself off of the beta-blocker and has not had any in his system for 2 weeks. Blood pressure and heart rate have been very well controlled. States that blood sugar levels have been running good and he hopes that his A1c is better next month. He prefers to take a natural approach to medicine and keeps a close eye on vital signs. He is aware of recommendations for current medications but would like to see how things look using his current supplement. Please note this report has been partially produced using speech recognition software and may cause contain errors related to that system including grammar, punctuation and spelling as well as words and phrases that may seem inappropriate. If there are questions or concerns please feel free to contact me to clarify.       Electronically signed by Chance BAKER, 11:41 AM 6/27/19

## 2019-07-01 ENCOUNTER — HOSPITAL ENCOUNTER (OUTPATIENT)
Dept: LAB | Age: 59
Discharge: HOME | End: 2019-07-01
Payer: COMMERCIAL

## 2019-07-01 DIAGNOSIS — R73.03 BORDERLINE DIABETES: ICD-10-CM

## 2019-07-01 DIAGNOSIS — I10 ESSENTIAL HYPERTENSION: ICD-10-CM

## 2019-07-01 DIAGNOSIS — R25.2 MUSCLE CRAMPING: ICD-10-CM

## 2019-07-01 LAB
ALBUMIN SERPL-MCNC: 4.1 G/DL (ref 3.5–4.6)
ALP BLD-CCNC: 145 U/L (ref 35–104)
ALT SERPL-CCNC: 43 U/L (ref 0–41)
ANION GAP SERPL CALCULATED.3IONS-SCNC: 15 MEQ/L (ref 9–15)
AST SERPL-CCNC: 42 U/L (ref 0–40)
BASOPHILS ABSOLUTE: 0.1 K/UL (ref 0–0.2)
BASOPHILS RELATIVE PERCENT: 1.1 %
BILIRUB SERPL-MCNC: 0.7 MG/DL (ref 0.2–0.7)
BUN BLDV-MCNC: 11 MG/DL (ref 6–20)
CALCIUM SERPL-MCNC: 9 MG/DL (ref 8.5–9.9)
CHLORIDE BLD-SCNC: 99 MEQ/L (ref 95–107)
CHOLESTEROL, TOTAL: 112 MG/DL (ref 0–199)
CO2: 25 MEQ/L (ref 20–31)
CREAT SERPL-MCNC: 0.85 MG/DL (ref 0.7–1.2)
EOSINOPHILS ABSOLUTE: 0.3 K/UL (ref 0–0.7)
EOSINOPHILS RELATIVE PERCENT: 2.8 %
GFR AFRICAN AMERICAN: >60
GFR NON-AFRICAN AMERICAN: >60
GLOBULIN: 3.5 G/DL (ref 2.3–3.5)
GLUCOSE BLD-MCNC: 131 MG/DL (ref 70–99)
HBA1C MFR BLD: 6.5 % (ref 4.8–5.9)
HCT VFR BLD CALC: 42.3 % (ref 42–52)
HDLC SERPL-MCNC: 38 MG/DL (ref 40–59)
HEMOGLOBIN: 14.2 G/DL (ref 14–18)
LDL CHOLESTEROL CALCULATED: 54 MG/DL (ref 0–129)
LYMPHOCYTES ABSOLUTE: 1.9 K/UL (ref 1–4.8)
LYMPHOCYTES RELATIVE PERCENT: 18.4 %
MCH RBC QN AUTO: 32.2 PG (ref 27–31.3)
MCHC RBC AUTO-ENTMCNC: 33.6 % (ref 33–37)
MCV RBC AUTO: 95.8 FL (ref 80–100)
MONOCYTES ABSOLUTE: 1.3 K/UL (ref 0.2–0.8)
MONOCYTES RELATIVE PERCENT: 13.3 %
NEUTROPHILS ABSOLUTE: 6.5 K/UL (ref 1.4–6.5)
NEUTROPHILS RELATIVE PERCENT: 64.4 %
PDW BLD-RTO: 15 % (ref 11.5–14.5)
PLATELET # BLD: 347 K/UL (ref 130–400)
POTASSIUM SERPL-SCNC: 4.9 MEQ/L (ref 3.4–4.9)
RBC # BLD: 4.41 M/UL (ref 4.7–6.1)
SODIUM BLD-SCNC: 139 MEQ/L (ref 135–144)
TOTAL CK: 111 U/L (ref 0–190)
TOTAL PROTEIN: 7.6 G/DL (ref 6.3–8)
TRIGL SERPL-MCNC: 102 MG/DL (ref 0–150)
WBC # BLD: 10.1 K/UL (ref 4.8–10.8)

## 2019-07-01 PROCEDURE — 82550 ASSAY OF CK (CPK): CPT

## 2019-07-01 PROCEDURE — 83036 HEMOGLOBIN GLYCOSYLATED A1C: CPT

## 2019-07-01 PROCEDURE — 80053 COMPREHEN METABOLIC PANEL: CPT

## 2019-07-01 PROCEDURE — 85025 COMPLETE CBC W/AUTO DIFF WBC: CPT

## 2019-07-01 PROCEDURE — 36415 COLL VENOUS BLD VENIPUNCTURE: CPT

## 2019-07-01 PROCEDURE — 80061 LIPID PANEL: CPT

## 2019-07-25 ENCOUNTER — OFFICE VISIT (OUTPATIENT)
Dept: FAMILY MEDICINE CLINIC | Age: 59
End: 2019-07-25
Payer: COMMERCIAL

## 2019-07-25 VITALS
BODY MASS INDEX: 24.25 KG/M2 | HEIGHT: 68 IN | DIASTOLIC BLOOD PRESSURE: 80 MMHG | SYSTOLIC BLOOD PRESSURE: 138 MMHG | TEMPERATURE: 97.6 F | HEART RATE: 70 BPM | WEIGHT: 160 LBS | OXYGEN SATURATION: 97 % | RESPIRATION RATE: 20 BRPM

## 2019-07-25 DIAGNOSIS — I25.10 CAD S/P PERCUTANEOUS CORONARY ANGIOPLASTY: ICD-10-CM

## 2019-07-25 DIAGNOSIS — D50.9 IRON DEFICIENCY ANEMIA, UNSPECIFIED IRON DEFICIENCY ANEMIA TYPE: ICD-10-CM

## 2019-07-25 DIAGNOSIS — E78.2 MIXED HYPERLIPIDEMIA: ICD-10-CM

## 2019-07-25 DIAGNOSIS — I10 ESSENTIAL HYPERTENSION: Primary | ICD-10-CM

## 2019-07-25 DIAGNOSIS — Z98.61 CAD S/P PERCUTANEOUS CORONARY ANGIOPLASTY: ICD-10-CM

## 2019-07-25 DIAGNOSIS — R73.03 BORDERLINE DIABETES: ICD-10-CM

## 2019-07-25 PROCEDURE — 1036F TOBACCO NON-USER: CPT | Performed by: NURSE PRACTITIONER

## 2019-07-25 PROCEDURE — G8598 ASA/ANTIPLAT THER USED: HCPCS | Performed by: NURSE PRACTITIONER

## 2019-07-25 PROCEDURE — G8427 DOCREV CUR MEDS BY ELIG CLIN: HCPCS | Performed by: NURSE PRACTITIONER

## 2019-07-25 PROCEDURE — G8420 CALC BMI NORM PARAMETERS: HCPCS | Performed by: NURSE PRACTITIONER

## 2019-07-25 PROCEDURE — 99214 OFFICE O/P EST MOD 30 MIN: CPT | Performed by: NURSE PRACTITIONER

## 2019-07-25 PROCEDURE — 3017F COLORECTAL CA SCREEN DOC REV: CPT | Performed by: NURSE PRACTITIONER

## 2019-07-25 ASSESSMENT — PATIENT HEALTH QUESTIONNAIRE - PHQ9
SUM OF ALL RESPONSES TO PHQ QUESTIONS 1-9: 0
2. FEELING DOWN, DEPRESSED OR HOPELESS: 0
SUM OF ALL RESPONSES TO PHQ9 QUESTIONS 1 & 2: 0
1. LITTLE INTEREST OR PLEASURE IN DOING THINGS: 0
SUM OF ALL RESPONSES TO PHQ QUESTIONS 1-9: 0

## 2019-07-25 NOTE — PROGRESS NOTES
Dyslipidemia and Hypertension: Jayne Sinclair presents for evaluation of lipids. Compliance with treatment thus far has been good. The patient exercises intermittently. Patient here for follow-up of elevated blood pressure. He is exercising and is adherent to low salt diet. Blood pressure is well controlled at home Antihypertensive medication side effects: no medication side effects noted. Use of agents associated with hypertension: none. No new myalgias or GI upset on pravastatin (Pravachol). Blood pressure reads higher in the morning and typically goes down during the day. Evening blood pressure is around 106/56. Heart rate has not gone above 80. He is no longer using his rainforest herb tea. States that heart rate has been well regulated lately and he has been off of the beta-blocker for over a month. He does continue to take cholesterol medication. Pre-diabetes: He has been exercising a lot more and has been trying to eat healthier lately. Admits to not watching his diet as much when he is staying with his parents in [place] but tries eat healthy when at home. He will be home for a couple of months. CAD: He continues to follow routinely with Dr. Odilia Palmer and has an appointment next month. He has not had any chest pains or chest pressures. No heart palpitations. He feels that he is doing well overall and prefers to take less medication. Lab Results   Component Value Date    ALT 43 (H) 07/01/2019    AST 42 (H) 07/01/2019     Lab Results   Component Value Date    CHOL 112 07/01/2019    TRIG 102 07/01/2019    HDL 38 (L) 07/01/2019    LDLCALC 54 07/01/2019        PMH: The patient is known to have coexisting coronary artery disease. ROS: .  Patient denies chest pain, shortness of breath, lightheadedness, blurred vision, peripheral edema, palpitations and dry cough.       EXAM:  Constitutional Blood pressure 138/80, pulse 70, temperature 97.6 °F (36.4 °C), temperature source Temporal, hemorrhoid. 5.  He has not had any signs or symptoms of unstable angina. Please note this report has been partially produced using speech recognition software and may cause contain errors related to that system including grammar, punctuation and spelling as well as words and phrases that may seem inappropriate. If there are questions or concerns please feel free to contact me to clarify.         Electronically signed by Angel Awad, 12:33 PM 7/25/19

## 2019-07-31 ENCOUNTER — OFFICE VISIT (OUTPATIENT)
Dept: CARDIOLOGY CLINIC | Age: 59
End: 2019-07-31
Payer: COMMERCIAL

## 2019-07-31 VITALS
HEART RATE: 67 BPM | BODY MASS INDEX: 23.64 KG/M2 | SYSTOLIC BLOOD PRESSURE: 126 MMHG | DIASTOLIC BLOOD PRESSURE: 82 MMHG | HEIGHT: 68 IN | OXYGEN SATURATION: 98 % | WEIGHT: 156 LBS | RESPIRATION RATE: 20 BRPM

## 2019-07-31 DIAGNOSIS — I10 ESSENTIAL HYPERTENSION: ICD-10-CM

## 2019-07-31 DIAGNOSIS — R01.1 CARDIAC MURMUR: ICD-10-CM

## 2019-07-31 DIAGNOSIS — Z82.49 FAMILY HISTORY OF HEART DISEASE: ICD-10-CM

## 2019-07-31 DIAGNOSIS — I25.10 CAD S/P PERCUTANEOUS CORONARY ANGIOPLASTY: Primary | ICD-10-CM

## 2019-07-31 DIAGNOSIS — Z95.2 HISTORY OF AORTIC VALVE REPLACEMENT: ICD-10-CM

## 2019-07-31 DIAGNOSIS — Z98.61 CAD S/P PERCUTANEOUS CORONARY ANGIOPLASTY: Primary | ICD-10-CM

## 2019-07-31 PROCEDURE — G8427 DOCREV CUR MEDS BY ELIG CLIN: HCPCS | Performed by: INTERNAL MEDICINE

## 2019-07-31 PROCEDURE — 1036F TOBACCO NON-USER: CPT | Performed by: INTERNAL MEDICINE

## 2019-07-31 PROCEDURE — 99213 OFFICE O/P EST LOW 20 MIN: CPT | Performed by: INTERNAL MEDICINE

## 2019-07-31 PROCEDURE — 3017F COLORECTAL CA SCREEN DOC REV: CPT | Performed by: INTERNAL MEDICINE

## 2019-07-31 PROCEDURE — G8598 ASA/ANTIPLAT THER USED: HCPCS | Performed by: INTERNAL MEDICINE

## 2019-07-31 PROCEDURE — G8420 CALC BMI NORM PARAMETERS: HCPCS | Performed by: INTERNAL MEDICINE

## 2019-07-31 ASSESSMENT — ENCOUNTER SYMPTOMS
APNEA: 0
NAUSEA: 0
CHEST TIGHTNESS: 0
VOMITING: 0
DIARRHEA: 0
SHORTNESS OF BREATH: 0
BLOOD IN STOOL: 0

## 2019-07-31 NOTE — PROGRESS NOTES
CP.No CHF. Hodgkins in remission. Has PCI. Stable. Prediabetic     1/6/2016: S/P AVR. Remote Hodgkins. Operated by Kaylie Stephenson. No angina. Did not want to take ranexa. Retuning it. See in 3M.            Past Medical History:   Diagnosis Date    Abnormal echocardiogram     EF 49% TR with mild pumonary HTN    Basal cell carcinoma, trunk 09/2016    CAD (coronary artery disease)     s/p stents    CAD S/P percutaneous coronary angioplasty 9/29/2014    Family history of heart disease 8/17/2016    GERD (gastroesophageal reflux disease)     Heart murmur     benign-result of AVR    History of basal cell cancer 2010    face    History of stroke 10/13/2014    History of tobacco abuse 8/17/2016    Hodgkin disease ([facility] [address]     1979    Hyperlipidemia 12/3/2014    Hypertension     Pre-diabetes 4/8/2015    S/P AVR     bovine    Tachycardia, unspecified 1/4/2017       Past Surgical History:   Procedure Laterality Date    ANGIOPLASTY  11/24/15    [facility] AQUINO  PCI PROCEDURE    CARDIAC CATHETERIZATION  07/27/2018    HERNIA REPAIR      3 times    SIGMOIDOSCOPY      SKIN CANCER EXCISION  10/21/2016    SPLENECTOMY      TOOTH EXTRACTION      VENTRICULAR ABLATION SURGERY      WISDOM TOOTH EXTRACTION         Family History   Problem Relation Age of Onset    Arthritis Mother     Arthritis Father     Heart Disease Father     High Blood Pressure Father     Cancer Other     High Blood Pressure Other     Diabetes Maternal Grandmother     Diabetes Paternal Grandfather        Social History     Socioeconomic History    Marital status:      Spouse name: None    Number of children: None    Years of education: None    Highest education level: None   Occupational History    None   Social Needs    Financial resource strain: None    Food insecurity:     Worry: None     Inability: None    Transportation needs:     Medical: None     Non-medical: None   Tobacco Use    Smoking status: Former Smoker     Packs/day: 0.25     Years:

## 2019-08-08 ASSESSMENT — ENCOUNTER SYMPTOMS: COLOR CHANGE: 0

## 2019-10-14 DIAGNOSIS — Z98.61 CAD S/P PERCUTANEOUS CORONARY ANGIOPLASTY: ICD-10-CM

## 2019-10-14 DIAGNOSIS — K21.9 GASTROESOPHAGEAL REFLUX DISEASE WITHOUT ESOPHAGITIS: ICD-10-CM

## 2019-10-14 DIAGNOSIS — I25.10 CAD S/P PERCUTANEOUS CORONARY ANGIOPLASTY: ICD-10-CM

## 2019-10-14 DIAGNOSIS — E78.2 MIXED HYPERLIPIDEMIA: Chronic | ICD-10-CM

## 2019-10-14 RX ORDER — PRAVASTATIN SODIUM 40 MG
TABLET ORAL
Qty: 30 TABLET | Refills: 5 | Status: SHIPPED | OUTPATIENT
Start: 2019-10-14 | End: 2020-06-05

## 2019-10-14 RX ORDER — PANTOPRAZOLE SODIUM 20 MG/1
TABLET, DELAYED RELEASE ORAL
Qty: 30 TABLET | Refills: 5 | Status: SHIPPED | OUTPATIENT
Start: 2019-10-14 | End: 2019-12-12 | Stop reason: SDUPTHER

## 2019-10-14 RX ORDER — NITROGLYCERIN 0.4 MG/1
0.4 TABLET SUBLINGUAL EVERY 5 MIN PRN
Qty: 25 TABLET | Refills: 3 | Status: SHIPPED | OUTPATIENT
Start: 2019-10-14 | End: 2021-05-06

## 2019-11-21 RX ORDER — FERROUS SULFATE 325(65) MG
325 TABLET ORAL
Qty: 13 TABLET | Refills: 6 | Status: SHIPPED | OUTPATIENT
Start: 2019-11-22 | End: 2020-08-10 | Stop reason: SDUPTHER

## 2019-11-27 ENCOUNTER — TELEPHONE (OUTPATIENT)
Dept: FAMILY MEDICINE CLINIC | Age: 59
End: 2019-11-27

## 2019-12-07 ENCOUNTER — HOSPITAL ENCOUNTER (OUTPATIENT)
Dept: LAB | Age: 59
Discharge: HOME | End: 2019-12-07
Payer: COMMERCIAL

## 2019-12-07 DIAGNOSIS — R73.03 BORDERLINE DIABETES: ICD-10-CM

## 2019-12-07 DIAGNOSIS — E78.2 MIXED HYPERLIPIDEMIA: ICD-10-CM

## 2019-12-07 DIAGNOSIS — I10 ESSENTIAL HYPERTENSION: ICD-10-CM

## 2019-12-07 LAB
ALBUMIN SERPL-MCNC: 4.3 G/DL (ref 3.5–4.6)
ALP BLD-CCNC: 183 U/L (ref 35–104)
ALT SERPL-CCNC: 24 U/L (ref 0–41)
ANION GAP SERPL CALCULATED.3IONS-SCNC: 15 MEQ/L (ref 9–15)
AST SERPL-CCNC: 38 U/L (ref 0–40)
BASOPHILS ABSOLUTE: 0.1 K/UL (ref 0–0.2)
BASOPHILS RELATIVE PERCENT: 1.2 %
BILIRUB SERPL-MCNC: 0.9 MG/DL (ref 0.2–0.7)
BUN BLDV-MCNC: 16 MG/DL (ref 6–20)
CALCIUM SERPL-MCNC: 9.5 MG/DL (ref 8.5–9.9)
CHLORIDE BLD-SCNC: 99 MEQ/L (ref 95–107)
CHOLESTEROL, TOTAL: 127 MG/DL (ref 0–199)
CO2: 24 MEQ/L (ref 20–31)
CREAT SERPL-MCNC: 0.77 MG/DL (ref 0.7–1.2)
CREATININE URINE: 74.3 MG/DL
EOSINOPHILS ABSOLUTE: 0.1 K/UL (ref 0–0.7)
EOSINOPHILS RELATIVE PERCENT: 0.8 %
GFR AFRICAN AMERICAN: >60
GFR NON-AFRICAN AMERICAN: >60
GLOBULIN: 3.9 G/DL (ref 2.3–3.5)
GLUCOSE BLD-MCNC: 109 MG/DL (ref 70–99)
HBA1C MFR BLD: 6.2 % (ref 4.8–5.9)
HCT VFR BLD CALC: 44.9 % (ref 42–52)
HDLC SERPL-MCNC: 66 MG/DL (ref 40–59)
HEMOGLOBIN: 14.8 G/DL (ref 14–18)
LDL CHOLESTEROL CALCULATED: 50 MG/DL (ref 0–129)
LYMPHOCYTES ABSOLUTE: 1.4 K/UL (ref 1–4.8)
LYMPHOCYTES RELATIVE PERCENT: 13.7 %
MCH RBC QN AUTO: 31 PG (ref 27–31.3)
MCHC RBC AUTO-ENTMCNC: 33 % (ref 33–37)
MCV RBC AUTO: 93.9 FL (ref 80–100)
MICROALBUMIN UR-MCNC: 5.9 MG/DL
MICROALBUMIN/CREAT UR-RTO: 79.4 MG/G (ref 0–30)
MONOCYTES ABSOLUTE: 1.2 K/UL (ref 0.2–0.8)
MONOCYTES RELATIVE PERCENT: 12.1 %
NEUTROPHILS ABSOLUTE: 7.3 K/UL (ref 1.4–6.5)
NEUTROPHILS RELATIVE PERCENT: 72.2 %
PDW BLD-RTO: 15 % (ref 11.5–14.5)
PLATELET # BLD: 379 K/UL (ref 130–400)
POTASSIUM SERPL-SCNC: 4.4 MEQ/L (ref 3.4–4.9)
RBC # BLD: 4.78 M/UL (ref 4.7–6.1)
SODIUM BLD-SCNC: 138 MEQ/L (ref 135–144)
TOTAL PROTEIN: 8.2 G/DL (ref 6.3–8)
TRIGL SERPL-MCNC: 57 MG/DL (ref 0–150)
WBC # BLD: 10.2 K/UL (ref 4.8–10.8)

## 2019-12-07 PROCEDURE — 80061 LIPID PANEL: CPT

## 2019-12-07 PROCEDURE — 82043 UR ALBUMIN QUANTITATIVE: CPT

## 2019-12-07 PROCEDURE — 85025 COMPLETE CBC W/AUTO DIFF WBC: CPT

## 2019-12-07 PROCEDURE — 36415 COLL VENOUS BLD VENIPUNCTURE: CPT

## 2019-12-07 PROCEDURE — 83036 HEMOGLOBIN GLYCOSYLATED A1C: CPT

## 2019-12-07 PROCEDURE — 82570 ASSAY OF URINE CREATININE: CPT

## 2019-12-07 PROCEDURE — 80053 COMPREHEN METABOLIC PANEL: CPT

## 2019-12-12 ENCOUNTER — TELEPHONE (OUTPATIENT)
Dept: FAMILY MEDICINE CLINIC | Age: 59
End: 2019-12-12

## 2019-12-12 ENCOUNTER — OFFICE VISIT (OUTPATIENT)
Dept: FAMILY MEDICINE CLINIC | Age: 59
End: 2019-12-12
Payer: COMMERCIAL

## 2019-12-12 ENCOUNTER — HOSPITAL ENCOUNTER (OUTPATIENT)
Age: 59
Discharge: HOME | End: 2019-12-12
Payer: COMMERCIAL

## 2019-12-12 VITALS
DIASTOLIC BLOOD PRESSURE: 80 MMHG | SYSTOLIC BLOOD PRESSURE: 140 MMHG | WEIGHT: 160 LBS | OXYGEN SATURATION: 97 % | TEMPERATURE: 96.5 F | RESPIRATION RATE: 18 BRPM | BODY MASS INDEX: 24.25 KG/M2 | HEART RATE: 71 BPM | HEIGHT: 68 IN

## 2019-12-12 DIAGNOSIS — I10 ESSENTIAL HYPERTENSION: Primary | ICD-10-CM

## 2019-12-12 DIAGNOSIS — K21.9 GASTROESOPHAGEAL REFLUX DISEASE WITHOUT ESOPHAGITIS: ICD-10-CM

## 2019-12-12 DIAGNOSIS — R20.0 NUMBNESS AND TINGLING OF RIGHT ARM: ICD-10-CM

## 2019-12-12 DIAGNOSIS — Z95.2 HISTORY OF AORTIC VALVE REPLACEMENT: ICD-10-CM

## 2019-12-12 DIAGNOSIS — R20.2 NUMBNESS AND TINGLING OF RIGHT ARM: ICD-10-CM

## 2019-12-12 DIAGNOSIS — Z98.61 CAD S/P PERCUTANEOUS CORONARY ANGIOPLASTY: ICD-10-CM

## 2019-12-12 DIAGNOSIS — R73.03 BORDERLINE DIABETES: ICD-10-CM

## 2019-12-12 DIAGNOSIS — I25.10 CAD S/P PERCUTANEOUS CORONARY ANGIOPLASTY: ICD-10-CM

## 2019-12-12 DIAGNOSIS — E78.2 MIXED HYPERLIPIDEMIA: ICD-10-CM

## 2019-12-12 LAB
EKG ATRIAL RATE: 284 BPM
EKG P AXIS: 87 DEGREES
EKG Q-T INTERVAL: 412 MS
EKG QRS DURATION: 112 MS
EKG QTC CALCULATION (BAZETT): 447 MS
EKG R AXIS: -33 DEGREES
EKG T AXIS: 42 DEGREES
EKG VENTRICULAR RATE: 71 BPM

## 2019-12-12 PROCEDURE — 93005 ELECTROCARDIOGRAM TRACING: CPT

## 2019-12-12 PROCEDURE — G8427 DOCREV CUR MEDS BY ELIG CLIN: HCPCS | Performed by: NURSE PRACTITIONER

## 2019-12-12 PROCEDURE — G8484 FLU IMMUNIZE NO ADMIN: HCPCS | Performed by: NURSE PRACTITIONER

## 2019-12-12 PROCEDURE — 3017F COLORECTAL CA SCREEN DOC REV: CPT | Performed by: NURSE PRACTITIONER

## 2019-12-12 PROCEDURE — G8420 CALC BMI NORM PARAMETERS: HCPCS | Performed by: NURSE PRACTITIONER

## 2019-12-12 PROCEDURE — 99214 OFFICE O/P EST MOD 30 MIN: CPT | Performed by: NURSE PRACTITIONER

## 2019-12-12 PROCEDURE — G8598 ASA/ANTIPLAT THER USED: HCPCS | Performed by: NURSE PRACTITIONER

## 2019-12-12 PROCEDURE — 1036F TOBACCO NON-USER: CPT | Performed by: NURSE PRACTITIONER

## 2019-12-12 RX ORDER — AMOXICILLIN 500 MG/1
CAPSULE ORAL
Refills: 0 | COMMUNITY
Start: 2019-10-25 | End: 2020-07-06 | Stop reason: CLARIF

## 2019-12-12 RX ORDER — PANTOPRAZOLE SODIUM 20 MG/1
TABLET, DELAYED RELEASE ORAL
Qty: 30 TABLET | Refills: 5 | Status: SHIPPED | OUTPATIENT
Start: 2019-12-12 | End: 2020-07-09 | Stop reason: SDUPTHER

## 2019-12-12 ASSESSMENT — PATIENT HEALTH QUESTIONNAIRE - PHQ9
1. LITTLE INTEREST OR PLEASURE IN DOING THINGS: 0
SUM OF ALL RESPONSES TO PHQ QUESTIONS 1-9: 0
SUM OF ALL RESPONSES TO PHQ QUESTIONS 1-9: 0
SUM OF ALL RESPONSES TO PHQ9 QUESTIONS 1 & 2: 0
2. FEELING DOWN, DEPRESSED OR HOPELESS: 0

## 2020-04-27 RX ORDER — LORATADINE 10 MG
TABLET ORAL
Qty: 13 TABLET | Refills: 5 | Status: SHIPPED | OUTPATIENT
Start: 2020-04-27 | End: 2020-12-24

## 2020-05-05 ENCOUNTER — TELEPHONE (OUTPATIENT)
Dept: FAMILY MEDICINE CLINIC | Age: 60
End: 2020-05-05

## 2020-06-05 RX ORDER — PRAVASTATIN SODIUM 40 MG
TABLET ORAL
Qty: 30 TABLET | Refills: 1 | Status: SHIPPED | OUTPATIENT
Start: 2020-06-05 | End: 2020-08-09

## 2020-06-05 NOTE — TELEPHONE ENCOUNTER
Pharmacy is requesting medication refill.  Please approve or deny this request.    Rx requested:  Requested Prescriptions     Pending Prescriptions Disp Refills    pravastatin (PRAVACHOL) 40 MG tablet [Pharmacy Med Name: PRAVASTATIN SODIUM 40 MG TAB] 30 tablet 1     Sig: TAKE 1 TABLET BY MOUTH EVERY DAY         Last Office Visit:   12/12/2019      Next Visit Date:  Future Appointments   Date Time Provider Zach Gottlieb   7/6/2020  7:10 AM [facility]

## 2020-07-02 ENCOUNTER — HOSPITAL ENCOUNTER (OUTPATIENT)
Dept: LAB | Age: 60
Discharge: HOME | End: 2020-07-02
Payer: COMMERCIAL

## 2020-07-02 LAB
ACANTHOCYTES: ABNORMAL
ALBUMIN SERPL-MCNC: 4.4 G/DL (ref 3.5–4.6)
ALP BLD-CCNC: 156 U/L (ref 35–104)
ALT SERPL-CCNC: 22 U/L (ref 0–41)
ANION GAP SERPL CALCULATED.3IONS-SCNC: 15 MEQ/L (ref 9–15)
ANISOCYTOSIS: ABNORMAL
AST SERPL-CCNC: 31 U/L (ref 0–40)
ATYPICAL LYMPHOCYTE RELATIVE PERCENT: 3 %
BANDED NEUTROPHILS RELATIVE PERCENT: 2 %
BASOPHILS ABSOLUTE: 0.1 K/UL (ref 0–0.2)
BASOPHILS RELATIVE PERCENT: 1 %
BILIRUB SERPL-MCNC: 1.8 MG/DL (ref 0.2–0.7)
BUN BLDV-MCNC: 18 MG/DL (ref 6–20)
BURR CELLS: ABNORMAL
CALCIUM SERPL-MCNC: 9.9 MG/DL (ref 8.5–9.9)
CHLORIDE BLD-SCNC: 98 MEQ/L (ref 95–107)
CHOLESTEROL, TOTAL: 141 MG/DL (ref 0–199)
CO2: 23 MEQ/L (ref 20–31)
CREAT SERPL-MCNC: 0.69 MG/DL (ref 0.7–1.2)
EOSINOPHILS ABSOLUTE: 0 K/UL (ref 0–0.7)
EOSINOPHILS RELATIVE PERCENT: 3.2 %
GFR AFRICAN AMERICAN: >60
GFR NON-AFRICAN AMERICAN: >60
GLOBULIN: 3.6 G/DL (ref 2.3–3.5)
GLUCOSE BLD-MCNC: 124 MG/DL (ref 70–99)
HBA1C MFR BLD: 6.5 % (ref 4.8–5.9)
HCT VFR BLD CALC: 47.9 % (ref 42–52)
HDLC SERPL-MCNC: 65 MG/DL (ref 40–59)
HEMOGLOBIN: 16 G/DL (ref 14–18)
LDL CHOLESTEROL CALCULATED: 62 MG/DL (ref 0–129)
LYMPHOCYTES ABSOLUTE: 2.5 K/UL (ref 1–4.8)
LYMPHOCYTES RELATIVE PERCENT: 23 %
MCH RBC QN AUTO: 31.3 PG (ref 27–31.3)
MCHC RBC AUTO-ENTMCNC: 33.3 % (ref 33–37)
MCV RBC AUTO: 93.9 FL (ref 80–100)
MONOCYTES ABSOLUTE: 0.5 K/UL (ref 0.2–0.8)
MONOCYTES RELATIVE PERCENT: 4.9 %
NEUTROPHILS ABSOLUTE: 6.7 K/UL (ref 1.4–6.5)
NEUTROPHILS RELATIVE PERCENT: 66 %
PDW BLD-RTO: 14.1 % (ref 11.5–14.5)
PLATELET # BLD: 349 K/UL (ref 130–400)
PLATELET SLIDE REVIEW: NORMAL
POIKILOCYTES: ABNORMAL
POTASSIUM SERPL-SCNC: 4.5 MEQ/L (ref 3.4–4.9)
RBC # BLD: 5.1 M/UL (ref 4.7–6.1)
SMUDGE CELLS: 4.9
SODIUM BLD-SCNC: 136 MEQ/L (ref 135–144)
TOTAL PROTEIN: 8 G/DL (ref 6.3–8)
TRIGL SERPL-MCNC: 72 MG/DL (ref 0–150)
WBC # BLD: 9.8 K/UL (ref 4.8–10.8)

## 2020-07-02 PROCEDURE — 85025 COMPLETE CBC W/AUTO DIFF WBC: CPT

## 2020-07-02 PROCEDURE — 80061 LIPID PANEL: CPT

## 2020-07-02 PROCEDURE — 80053 COMPREHEN METABOLIC PANEL: CPT

## 2020-07-02 PROCEDURE — 36415 COLL VENOUS BLD VENIPUNCTURE: CPT

## 2020-07-02 PROCEDURE — 83036 HEMOGLOBIN GLYCOSYLATED A1C: CPT

## 2020-07-06 ENCOUNTER — OFFICE VISIT (OUTPATIENT)
Dept: FAMILY MEDICINE CLINIC | Age: 60
End: 2020-07-06
Payer: COMMERCIAL

## 2020-07-06 VITALS
HEART RATE: 68 BPM | SYSTOLIC BLOOD PRESSURE: 140 MMHG | HEIGHT: 68 IN | DIASTOLIC BLOOD PRESSURE: 80 MMHG | WEIGHT: 154 LBS | OXYGEN SATURATION: 98 % | TEMPERATURE: 97.3 F | BODY MASS INDEX: 23.34 KG/M2 | RESPIRATION RATE: 16 BRPM

## 2020-07-06 PROCEDURE — 3017F COLORECTAL CA SCREEN DOC REV: CPT | Performed by: NURSE PRACTITIONER

## 2020-07-06 PROCEDURE — G8427 DOCREV CUR MEDS BY ELIG CLIN: HCPCS | Performed by: NURSE PRACTITIONER

## 2020-07-06 PROCEDURE — 99214 OFFICE O/P EST MOD 30 MIN: CPT | Performed by: NURSE PRACTITIONER

## 2020-07-06 PROCEDURE — 1036F TOBACCO NON-USER: CPT | Performed by: NURSE PRACTITIONER

## 2020-07-06 PROCEDURE — G8420 CALC BMI NORM PARAMETERS: HCPCS | Performed by: NURSE PRACTITIONER

## 2020-07-06 ASSESSMENT — PATIENT HEALTH QUESTIONNAIRE - PHQ9
SUM OF ALL RESPONSES TO PHQ QUESTIONS 1-9: 0
1. LITTLE INTEREST OR PLEASURE IN DOING THINGS: 0
2. FEELING DOWN, DEPRESSED OR HOPELESS: 0
SUM OF ALL RESPONSES TO PHQ QUESTIONS 1-9: 0
SUM OF ALL RESPONSES TO PHQ9 QUESTIONS 1 & 2: 0

## 2020-07-06 NOTE — PROGRESS NOTES
Dyslipidemia and Hypertension: Susy Bai presents for evaluation of lipids. Compliance with treatment thus far has been fair. The patient exercises intermittently. Patient here for follow-up of elevated blood pressure. He is exercising and is adherent to low salt diet. Blood pressure is well controlled at home Antihypertensive medication side effects: no medication side effects noted. Use of agents associated with hypertension: none. No new myalgias or GI upset on pravastatin (Pravachol). Pre-diabetes: he states that his diet has not been as good lately as it has been in the past. Has had holiday celebrations followed by pandemic situation. Has been getting some normal physical activity. Skin lesion: face. Started about 6 months ago. He states that he first noticed the area and initially thought that it was a pimple. A layer of skin came off and it began to bleed. Now it appears to be a crusted spot. Has not gone away. He does have a history of skin cancer. Rib discomfort: He feels that he occasionally has issues with ribs slipping in and out of place on the right lower chest wall. Typically occurs when he is lifting weights or moving in certain positions. Symptoms are not persistent. Has had this once before in his life and it went away completely without any intervention. Intermittent alcohol intake: He states that he continues to have intermittent alcohol intake but does tend to have issues at times with moderation. States that he started having some abdominal pain symptoms in the right upper quadrant a few months ago and because he does have an issue with obstruction of bile duct, he stopped drinking for 6 weeks. Symptoms resolved but he has found that he does have a difficult time limiting his alcohol portion when he drinks. He is interested in discussing potential medication.     Lump to right armpit area: He states that about a month ago he had a small lump to his right armpit

## 2020-07-09 RX ORDER — PANTOPRAZOLE SODIUM 20 MG/1
TABLET, DELAYED RELEASE ORAL
Qty: 30 TABLET | Refills: 5 | Status: SHIPPED | OUTPATIENT
Start: 2020-07-09 | End: 2021-03-02

## 2020-07-09 NOTE — TELEPHONE ENCOUNTER
pt requesting medication refill. Please approve or deny this request.    Rx requested:  Requested Prescriptions     Pending Prescriptions Disp Refills    pantoprazole (PROTONIX) 20 MG tablet 30 tablet 5     Sig: TAKE 1 TABLET BY MOUTH EVERY DAY         Last Office Visit:   7/6/2020      Next Visit Date:  No future appointments.

## 2020-08-10 RX ORDER — PRAVASTATIN SODIUM 40 MG
TABLET ORAL
Qty: 30 TABLET | Refills: 1 | Status: SHIPPED | OUTPATIENT
Start: 2020-08-10 | End: 2020-11-01

## 2020-08-10 RX ORDER — FERROUS SULFATE 325(65) MG
325 TABLET ORAL
Qty: 13 TABLET | Refills: 6 | Status: SHIPPED | OUTPATIENT
Start: 2020-08-10 | End: 2021-06-02

## 2020-08-10 NOTE — TELEPHONE ENCOUNTER
pharmacy requesting medication refill. Please approve or deny this request.    Rx requested:  Requested Prescriptions     Pending Prescriptions Disp Refills    ferrous sulfate (IRON 325) 325 (65 Fe) MG tablet 13 tablet 6     Sig: Take 1 tablet by mouth three times a week         Last Office Visit:   7/6/2020      Next Visit Date:  No future appointments.

## 2020-09-30 ENCOUNTER — HOSPITAL ENCOUNTER (OUTPATIENT)
Dept: LAB | Age: 60
Discharge: HOME | End: 2020-09-30
Payer: COMMERCIAL

## 2020-09-30 LAB
ALBUMIN SERPL-MCNC: 4 G/DL (ref 3.5–4.6)
ALP BLD-CCNC: 183 U/L (ref 35–104)
ALT SERPL-CCNC: 35 U/L (ref 0–41)
ANION GAP SERPL CALCULATED.3IONS-SCNC: 13 MEQ/L (ref 9–15)
AST SERPL-CCNC: 43 U/L (ref 0–40)
BASOPHILS ABSOLUTE: 0.3 K/UL (ref 0–0.2)
BASOPHILS RELATIVE PERCENT: 3.3 %
BILIRUB SERPL-MCNC: 1 MG/DL (ref 0.2–0.7)
BUN BLDV-MCNC: 16 MG/DL (ref 6–20)
CALCIUM SERPL-MCNC: 9.6 MG/DL (ref 8.5–9.9)
CHLORIDE BLD-SCNC: 98 MEQ/L (ref 95–107)
CHOLESTEROL, TOTAL: 141 MG/DL (ref 0–199)
CO2: 25 MEQ/L (ref 20–31)
CREAT SERPL-MCNC: 0.75 MG/DL (ref 0.7–1.2)
EOSINOPHILS ABSOLUTE: 0.3 K/UL (ref 0–0.7)
EOSINOPHILS RELATIVE PERCENT: 2.9 %
GFR AFRICAN AMERICAN: >60
GFR NON-AFRICAN AMERICAN: >60
GLOBULIN: 3.2 G/DL (ref 2.3–3.5)
GLUCOSE BLD-MCNC: 136 MG/DL (ref 70–99)
HBA1C MFR BLD: 6.9 % (ref 4.8–5.9)
HCT VFR BLD CALC: 47 % (ref 42–52)
HDLC SERPL-MCNC: 70 MG/DL (ref 40–59)
HEMOGLOBIN: 15.6 G/DL (ref 14–18)
HEPATITIS C ANTIBODY INTERPRETATION: NORMAL
LDL CHOLESTEROL CALCULATED: 57 MG/DL (ref 0–129)
LYMPHOCYTES ABSOLUTE: 1 K/UL (ref 1–4.8)
LYMPHOCYTES RELATIVE PERCENT: 10.6 %
MCH RBC QN AUTO: 31.2 PG (ref 27–31.3)
MCHC RBC AUTO-ENTMCNC: 33.2 % (ref 33–37)
MCV RBC AUTO: 94 FL (ref 80–100)
MONOCYTES ABSOLUTE: 1.3 K/UL (ref 0.2–0.8)
MONOCYTES RELATIVE PERCENT: 14.6 %
NEUTROPHILS ABSOLUTE: 6.3 K/UL (ref 1.4–6.5)
NEUTROPHILS RELATIVE PERCENT: 68.6 %
PDW BLD-RTO: 14.7 % (ref 11.5–14.5)
PLATELET # BLD: 299 K/UL (ref 130–400)
POTASSIUM SERPL-SCNC: 4.7 MEQ/L (ref 3.4–4.9)
RBC # BLD: 5 M/UL (ref 4.7–6.1)
SODIUM BLD-SCNC: 136 MEQ/L (ref 135–144)
TOTAL PROTEIN: 7.2 G/DL (ref 6.3–8)
TRIGL SERPL-MCNC: 68 MG/DL (ref 0–150)
WBC # BLD: 9.2 K/UL (ref 4.8–10.8)

## 2020-09-30 PROCEDURE — 83036 HEMOGLOBIN GLYCOSYLATED A1C: CPT

## 2020-09-30 PROCEDURE — 80061 LIPID PANEL: CPT

## 2020-09-30 PROCEDURE — 85025 COMPLETE CBC W/AUTO DIFF WBC: CPT

## 2020-09-30 PROCEDURE — 80053 COMPREHEN METABOLIC PANEL: CPT

## 2020-09-30 PROCEDURE — 36415 COLL VENOUS BLD VENIPUNCTURE: CPT

## 2020-09-30 PROCEDURE — 84080 ASSAY ALKALINE PHOSPHATASES: CPT

## 2020-09-30 PROCEDURE — 84075 ASSAY ALKALINE PHOSPHATASE: CPT

## 2020-09-30 PROCEDURE — 86803 HEPATITIS C AB TEST: CPT

## 2020-10-05 LAB
ALK PHOS OTHER CALC: 0 U/L
ALK PHOSPHATASE: 217 U/L (ref 40–120)
ALKALINE PHOSPHATASE BONE FRACTION: 65 U/L (ref 0–55)
ALKALINE PHOSPHATASE LIVER FRACTION: 152 U/L (ref 0–94)

## 2020-10-09 ENCOUNTER — OFFICE VISIT (OUTPATIENT)
Dept: FAMILY MEDICINE CLINIC | Age: 60
End: 2020-10-09
Payer: COMMERCIAL

## 2020-10-09 VITALS
WEIGHT: 145 LBS | BODY MASS INDEX: 21.98 KG/M2 | HEIGHT: 68 IN | RESPIRATION RATE: 14 BRPM | TEMPERATURE: 98 F | HEART RATE: 65 BPM | DIASTOLIC BLOOD PRESSURE: 82 MMHG | OXYGEN SATURATION: 97 % | SYSTOLIC BLOOD PRESSURE: 122 MMHG

## 2020-10-09 PROCEDURE — G8420 CALC BMI NORM PARAMETERS: HCPCS | Performed by: NURSE PRACTITIONER

## 2020-10-09 PROCEDURE — G8484 FLU IMMUNIZE NO ADMIN: HCPCS | Performed by: NURSE PRACTITIONER

## 2020-10-09 PROCEDURE — 1036F TOBACCO NON-USER: CPT | Performed by: NURSE PRACTITIONER

## 2020-10-09 PROCEDURE — 3017F COLORECTAL CA SCREEN DOC REV: CPT | Performed by: NURSE PRACTITIONER

## 2020-10-09 PROCEDURE — 99214 OFFICE O/P EST MOD 30 MIN: CPT | Performed by: NURSE PRACTITIONER

## 2020-10-09 PROCEDURE — G8427 DOCREV CUR MEDS BY ELIG CLIN: HCPCS | Performed by: NURSE PRACTITIONER

## 2020-10-09 NOTE — PROGRESS NOTES
Alkaline Phosphatase, Isoenzymes    PTH, Intact    TSH without Reflex    T4, Free   9. Right buttock pain     10. Piriformis muscle pain         Plan:  Continue current medicines and dosages. Continue diet and exercise programs, improving where possible. Follow up in 4-5 months with lab work one week prior. For further details see orders placed. 1.  Blood pressure is well controlled. Continue low-salt diet. 2.  Lipid panel stable overall. HDL has improved. Continue current physical activity level. 3.  Discussed that hemoglobin A1c is quite elevated at 6.9 for him. Discussed that this does meet criteria for diagnosis of type 2 diabetes but we will plan to recheck after the holidays. He is aware of need to cut back on simple sugars, starches and alcohol. 4.  He has not had any signs or symptoms of unstable angina. Continues to rely on rainforest herb to help with overall heart health. Has taken routine medication in the past and declines to restart. 5.  No recent symptoms reported. Continues to take proton pump inhibitor routinely. 6.  No recent symptoms. He states this was several years ago and has been in remission since that time. 7.  Vitamin D is stable. Recommend over-the-counter supplement. 8.  Discussed elevation of liver enzymes as well as alkaline phosphatase level involving both the liver and the bone. Recommend rechecking this after the holidays. Patient declines any additional testing at this time and states that he will cut back on alcohol intake. May want bone scan when the patient returns from [place] in the winter. He is aware. 9.  10.  Discussed recommended stretching exercises and need to notify me should symptoms worsen or persist.  He verbalizes understanding. Discussed that with his current location of pain the symptoms are likely coming from sciatic nerve rather than hip.       Please note this report has been partially produced using speech

## 2020-11-03 RX ORDER — PRAVASTATIN SODIUM 40 MG
40 TABLET ORAL DAILY
Qty: 30 TABLET | Refills: 0 | Status: SHIPPED | OUTPATIENT
Start: 2020-11-03 | End: 2020-12-29

## 2020-12-24 RX ORDER — LORATADINE 10 MG
TABLET ORAL
Qty: 13 TABLET | Refills: 5 | Status: SHIPPED | OUTPATIENT
Start: 2020-12-24 | End: 2021-08-12

## 2020-12-26 NOTE — TELEPHONE ENCOUNTER
Pharmacy is  requesting medication refill. Please approve or deny this request.    Rx requested:  Requested Prescriptions     Pending Prescriptions Disp Refills    pravastatin (PRAVACHOL) 40 MG tablet [Pharmacy Med Name: PRAVASTATIN SODIUM 40 MG TAB] 30 tablet 0     Sig: TAKE 1 TABLET BY MOUTH EVERY DAY         Last Office Visit:   10/9/2020      Next Visit Date:  No future appointments.

## 2020-12-29 RX ORDER — PRAVASTATIN SODIUM 40 MG
TABLET ORAL
Qty: 30 TABLET | Refills: 0 | Status: SHIPPED | OUTPATIENT
Start: 2020-12-29 | End: 2021-01-25

## 2021-02-04 ENCOUNTER — VIRTUAL VISIT (OUTPATIENT)
Dept: FAMILY MEDICINE CLINIC | Age: 61
End: 2021-02-04
Payer: COMMERCIAL

## 2021-02-04 DIAGNOSIS — G43.109 OCULAR MIGRAINE: ICD-10-CM

## 2021-02-04 DIAGNOSIS — R20.0 RIGHT ARM NUMBNESS: ICD-10-CM

## 2021-02-04 DIAGNOSIS — G89.29 CHRONIC RIGHT-SIDED LOW BACK PAIN WITH RIGHT-SIDED SCIATICA: ICD-10-CM

## 2021-02-04 DIAGNOSIS — M54.41 CHRONIC RIGHT-SIDED LOW BACK PAIN WITH RIGHT-SIDED SCIATICA: ICD-10-CM

## 2021-02-04 DIAGNOSIS — G47.9 SLEEP DISTURBANCE: ICD-10-CM

## 2021-02-04 DIAGNOSIS — M25.551 RIGHT HIP PAIN: Primary | ICD-10-CM

## 2021-02-04 PROCEDURE — G8427 DOCREV CUR MEDS BY ELIG CLIN: HCPCS | Performed by: NURSE PRACTITIONER

## 2021-02-04 PROCEDURE — 99214 OFFICE O/P EST MOD 30 MIN: CPT | Performed by: NURSE PRACTITIONER

## 2021-02-04 PROCEDURE — 3017F COLORECTAL CA SCREEN DOC REV: CPT | Performed by: NURSE PRACTITIONER

## 2021-02-04 SDOH — ECONOMIC STABILITY: TRANSPORTATION INSECURITY
IN THE PAST 12 MONTHS, HAS THE LACK OF TRANSPORTATION KEPT YOU FROM MEDICAL APPOINTMENTS OR FROM GETTING MEDICATIONS?: NO

## 2021-02-04 SDOH — ECONOMIC STABILITY: FOOD INSECURITY: WITHIN THE PAST 12 MONTHS, YOU WORRIED THAT YOUR FOOD WOULD RUN OUT BEFORE YOU GOT MONEY TO BUY MORE.: NEVER TRUE

## 2021-02-04 ASSESSMENT — PATIENT HEALTH QUESTIONNAIRE - PHQ9
1. LITTLE INTEREST OR PLEASURE IN DOING THINGS: 0
2. FEELING DOWN, DEPRESSED OR HOPELESS: 0
SUM OF ALL RESPONSES TO PHQ QUESTIONS 1-9: 0
SUM OF ALL RESPONSES TO PHQ QUESTIONS 1-9: 0

## 2021-02-04 NOTE — PROGRESS NOTES
2/4/2021    TELEHEALTH EVALUATION -- Audio/Visual (During BIPPS-83 public health emergency)    Due to COVID 19 outbreak, patient's office visit was converted to a virtual visit. Patient was contacted and agreed to proceed with a virtual visit via D[employer]. me  The risks and benefits of converting to a virtual visit were discussed in light of the current infectious disease epidemic. Patient also understood that insurance coverage and co-pays are up to their individual insurance plans. Estiven Nicole is a 62 y.o. male being evaluated by a Virtual Visit (video visit) encounter to address concerns as mentioned above. A caregiver was present when appropriate. Due to this being a TeleHealth encounter (During XTDJT-05 public health emergency), evaluation of the following organ systems was limited: Vitals/Constitutional/EENT/Resp/CV/GI//MS/Neuro/Skin/Heme-Lymph-Imm. Pursuant to the emergency declaration under the [address] [place] [address] and the [employer] Act, this Virtual Visit was conducted with patient's (and/or legal guardian's) consent, to reduce the patient's risk of exposure to COVID-19 and provide necessary medical care. The patient (and/or legal guardian) has also been advised to contact this office for worsening conditions or problems, and seek emergency medical treatment and/or call 911 if deemed necessary. Patient identification was verified at the start of the visit: Yes    Total time spent for this encounter: Not billed by time    Services were provided through a video synchronous discussion virtually to substitute for in-person clinic visit. Patient and provider were located at their individual homes. The patient is talking with me virtually from his home and I am located at my office in [facility].        HPI: Andrea Lopes (:  1960) has requested an audio/video evaluation for the following concern(s):    Alcohol intake: has cut intake down to half of what he was consuming before. He states that he has been working hard on decreasing the amount of alcohol that he consumes regularly. Has been feeling okay overall. Sleep: breaths different when laying on the back. States that he can often feel breath switching from nose to being forced out of mouth more harshly. He is not sure if he could have something like sleep apnea going on which is another reason that he cut back on his alcohol intake. Back pain/hip pain: upper area lateral hip pain. He has started doing some exercises as well and this location of pain is mostly gone. Chiropractor recommended paying more attention to the posture and position of the feet when standing. He has since done this. Now having pain in the right gluteus and right piriformis muscle. Has been using muscle roller and can feel a bump to that area when he rolls over it. There is no such lump on the left side when using same device. The pain is localized above that area of the lump however. When moving around the pain is not severe. When sitting for a couple of minutes in a standard dining room chair, he has severe pain from 3 to about a level 10 at times. Leg pulls up almost as if a reflex during the episodes of severe pain. 10-15 minutes of walking the pain improves. He is interested in     Tendinitis right arm: improved over the past few weeks but still present. . Arm numbness. Worse when leaning forward as if to adjust water in shower. He denies any elbow pain. No shoulder or neck pain. No weakness of the arm noted. No swelling or redness/warmth. Social History     Tobacco Use    Smoking status: Former Smoker     Packs/day: 0.25     Years: 5.00     Pack years: 1.25     Types: Pipe     Quit date: 1985     Years since quittin.4    Smokeless tobacco: Never Used   Substance Use Topics    Alcohol use: Yes     Comment: weekly    Drug use: Yes     Types: Marijuana     Comment: weekly       PHYSICAL EXAMINATION:  [ INSTRUCTIONS:  \"[x]\" Indicates a positive item  \"[]\" Indicates a negative item  -- DELETE ALL ITEMS NOT EXAMINED]  [x] Alert  [x] Oriented to person/place/time    [x] No apparent distress  [] Toxic appearing    [] Face flushed appearing [] Sclera clear  [] Lips are cyanotic      [x] Breathing appears normal  [] Appears tachypneic      [] Rash on visible skin    [x] Cranial Nerves II-XII grossly intact    [x] Motor grossly intact in visible upper extremities    [] Motor grossly intact in visible lower extremities    [x] Normal Mood  [] Anxious appearing    [] Depressed appearing  [] Confused appearing      [] Poor short term memory  [] Poor long term memory    [] OTHER:      Due to this being a TeleHealth encounter, evaluation of the following organ systems is limited: Vitals/Constitutional/EENT/Resp/CV/GI//MS/Neuro/Skin/Heme-Lymph-Imm. ASSESSMENT/PLAN:   Diagnosis Orders   1. Right hip pain  XR HIP 2-3 VW W PELVIS RIGHT    XR LUMBAR SPINE (MIN 4 VIEWS)   2. Chronic right-sided low back pain with right-sided sciatica  XR HIP 2-3 VW W PELVIS RIGHT    XR LUMBAR SPINE (MIN 4 VIEWS)   3. Right arm numbness     4. Ocular migraine     5. Sleep disturbance           Return in about 3 months (around 2021) for Diabetes- in office or doxy. 1.  2.  X-rays ordered to be faxed to nearby location in [place] where he is currently staying. He also plans to continue to follow with his chiropractor as well. [facility]. 3.  Discussed some differential diagnoses but recommend upper arm/shoulder/neck stretching exercises and showed instruction for some. Notify me if symptoms do not improve. 4.  Discussed that symptoms sound consistent with diagnosis of ocular migraine. He will notify me if any worsening of symptoms over time and is encouraged to have routine eye exam.    5.  Continue with decrease in routine alcohol consumption. This may help with breathing issues while sleeping. Sleep on the side rather than on the back as well. Please note this report has been partially produced using speech recognition software and may cause contain errors related to that system including grammar, punctuation and spelling as well as words and phrases that may seem inappropriate. If there are questions or concerns please feel free to contact me to clarify. An  electronic signature was used to authenticate this note. --RADHA Patel CNP on 2/4/2021 at 10:56 AM        Pursuant to the emergency declaration under the [facility] waiver authority and the [employer] and [employer] Act, this Virtual  Visit was conducted, with patient's consent, to reduce the patient's risk of exposure to COVID-19 and provide continuity of care for an established patient. Services were provided through a video synchronous discussion virtually to substitute for in-person clinic visit.

## 2021-02-11 DIAGNOSIS — M25.551 RIGHT HIP PAIN: ICD-10-CM

## 2021-02-11 DIAGNOSIS — M54.41 CHRONIC RIGHT-SIDED LOW BACK PAIN WITH RIGHT-SIDED SCIATICA: ICD-10-CM

## 2021-02-11 DIAGNOSIS — G89.29 CHRONIC RIGHT-SIDED LOW BACK PAIN WITH RIGHT-SIDED SCIATICA: ICD-10-CM

## 2021-02-11 NOTE — RESULT ENCOUNTER NOTE
Please notify Wilmer Jon that x-ray of the low back and hip show that he has significant/severe degenerative or arthritic changes of the lumbar spine throughout the lumbar spine. Generally, recommendation for orthopedic or neuro spine surgery is advised. Physical therapy can also be helpful to some degree. Is he interested in seeing a specialist at this time? The x-ray of the hip shows mild arthritic changes.

## 2021-02-18 ENCOUNTER — OFFICE VISIT (OUTPATIENT)
Dept: FAMILY MEDICINE CLINIC | Age: 61
End: 2021-02-18
Payer: COMMERCIAL

## 2021-02-18 DIAGNOSIS — G47.9 SLEEP DISTURBANCE: ICD-10-CM

## 2021-02-18 DIAGNOSIS — R06.81 WITNESSED EPISODE OF APNEA: ICD-10-CM

## 2021-02-18 DIAGNOSIS — G89.29 CHRONIC RIGHT-SIDED LOW BACK PAIN WITH RIGHT-SIDED SCIATICA: Primary | ICD-10-CM

## 2021-02-18 DIAGNOSIS — I25.10 CAD S/P PERCUTANEOUS CORONARY ANGIOPLASTY: ICD-10-CM

## 2021-02-18 DIAGNOSIS — M47.26 OSTEOARTHRITIS OF SPINE WITH RADICULOPATHY, LUMBAR REGION: ICD-10-CM

## 2021-02-18 DIAGNOSIS — K21.9 GASTROESOPHAGEAL REFLUX DISEASE WITHOUT ESOPHAGITIS: ICD-10-CM

## 2021-02-18 DIAGNOSIS — M54.41 CHRONIC RIGHT-SIDED LOW BACK PAIN WITH RIGHT-SIDED SCIATICA: Primary | ICD-10-CM

## 2021-02-18 DIAGNOSIS — R93.7 ABNORMAL X-RAY OF LUMBAR SPINE: ICD-10-CM

## 2021-02-18 DIAGNOSIS — Z98.61 CAD S/P PERCUTANEOUS CORONARY ANGIOPLASTY: ICD-10-CM

## 2021-02-18 PROCEDURE — 3017F COLORECTAL CA SCREEN DOC REV: CPT | Performed by: NURSE PRACTITIONER

## 2021-02-18 PROCEDURE — G8427 DOCREV CUR MEDS BY ELIG CLIN: HCPCS | Performed by: NURSE PRACTITIONER

## 2021-02-18 PROCEDURE — 99214 OFFICE O/P EST MOD 30 MIN: CPT | Performed by: NURSE PRACTITIONER

## 2021-02-18 ASSESSMENT — PATIENT HEALTH QUESTIONNAIRE - PHQ9
SUM OF ALL RESPONSES TO PHQ QUESTIONS 1-9: 0
SUM OF ALL RESPONSES TO PHQ9 QUESTIONS 1 & 2: 0
SUM OF ALL RESPONSES TO PHQ QUESTIONS 1-9: 0
2. FEELING DOWN, DEPRESSED OR HOPELESS: 0

## 2021-02-18 NOTE — PATIENT INSTRUCTIONS
Patient Education        Sciatica: Exercises  Introduction  Here are some examples of typical rehabilitation exercises for your condition. Start each exercise slowly. Ease off the exercise if you start to have pain. Your doctor or physical therapist will tell you when you can start these exercises and which ones will work best for you. When you are not being active, find a comfortable position for rest. Some people are comfortable on the floor or a medium-firm bed with a small pillow under their head and another under their knees. Some people prefer to lie on their side with a pillow between their knees. Don't stay in one position for too long. Take short walks (10 to 20 minutes) every 2 to 3 hours. Avoid slopes, hills, and stairs until you feel better. Walk only distances you can manage without pain, especially leg pain. How to do the exercises  Back stretches   1. Get down on your hands and knees on the floor. 2. Relax your head and allow it to droop. Round your back up toward the ceiling until you feel a nice stretch in your upper, middle, and lower back. Hold this stretch for as long as it feels comfortable, or about 15 to 30 seconds. 3. Return to the starting position with a flat back while you are on your hands and knees. 4. Let your back sway by pressing your stomach toward the floor. Lift your buttocks toward the ceiling. 5. Hold this position for 15 to 30 seconds. 6. Repeat 2 to 4 times. Follow-up care is a key part of your treatment and safety. Be sure to make and go to all appointments, and call your doctor if you are having problems. It's also a good idea to know your test results and keep a list of the medicines you take. Where can you learn more? Go to [email or web address]://[email or web address] Dental [email or web address] org and sign in to your [employer] account. Enter D065 in the [employer] box to learn more about \"Sciatica: Exercises. \"     If you do not have an account, please click on the \"Sign Up Now\" link.  Current as of: March 2, 2020               Content Version: 12.6  © 2091-8821 [employer]. Care instructions adapted under license by [facility] ([facility]). If you have questions about a medical condition or this instruction, always ask your healthcare professional. [address] any warranty or liability for your use of this information. Patient Education        Low Back Pain: Exercises  Introduction  Here are some examples of exercises for you to try. The exercises may be suggested for a condition or for rehabilitation. Start each exercise slowly. Ease off the exercises if you start to have pain. You will be told when to start these exercises and which ones will work best for you. How to do the exercises  Press-up   7. Lie on your stomach, supporting your body with your forearms. 8. Press your elbows down into the floor to raise your upper back. As you do this, relax your stomach muscles and allow your back to arch without using your back muscles. As your press up, do not let your hips or pelvis come off the floor. 9. Hold for 15 to 30 seconds, then relax. 10. Repeat 2 to 4 times. Alternate arm and leg (bird dog) exercise   Do this exercise slowly. Try to keep your body straight at all times, and do not let one hip drop lower than the other. 1. Start on the floor, on your hands and knees. 2. Tighten your belly muscles. 3. Raise one leg off the floor, and hold it straight out behind you. Be careful not to let your hip drop down, because that will twist your trunk. 4. Hold for about 6 seconds, then lower your leg and switch to the other leg. 5. Repeat 8 to 12 times on each leg. 6. Over time, work up to holding for 10 to 30 seconds each time. 7. If you feel stable and secure with your leg raised, try raising the opposite arm straight out in front of you at the same time. Knee-to-chest exercise   1.  Lie on your back with your knees bent and your feet flat on the floor. 2. Bring one knee to your chest, keeping the other foot flat on the floor (or keeping the other leg straight, whichever feels better on your lower back). 3. Keep your lower back pressed to the floor. Hold for at least 15 to 30 seconds. 4. Relax, and lower the knee to the starting position. 5. Repeat with the other leg. Repeat 2 to 4 times with each leg. 6. To get more stretch, put your other leg flat on the floor while pulling your knee to your chest.    Curl-ups   1. Lie on the floor on your back with your knees bent at a 90-degree angle. Your feet should be flat on the floor, about 12 inches from your buttocks. 2. Cross your arms over your chest. If this bothers your neck, try putting your hands behind your neck (not your head), with your elbows spread apart. 3. Slowly tighten your belly muscles and raise your shoulder blades off the floor. 4. Keep your head in line with your body, and do not press your chin to your chest.  5. Hold this position for 1 or 2 seconds, then slowly lower yourself back down to the floor. 6. Repeat 8 to 12 times. Pelvic tilt exercise   1. Lie on your back with your knees bent. 2. \"Brace\" your stomach. This means to tighten your muscles by pulling in and imagining your belly button moving toward your spine. You should feel like your back is pressing to the floor and your hips and pelvis are rocking back. 3. Hold for about 6 seconds while you breathe smoothly. 4. Repeat 8 to 12 times. Heel dig bridging   1. Lie on your back with both knees bent and your ankles bent so that only your heels are digging into the floor. Your knees should be bent about 90 degrees. 2. Then push your heels into the floor, squeeze your buttocks, and lift your hips off the floor until your shoulders, hips, and knees are all in a straight line.   3. Hold for about 6 seconds as you continue to breathe normally, and then slowly lower your hips back down to the floor and rest for up to 10 seconds. 4. Do 8 to 12 repetitions. Hamstring stretch in doorway   1. Lie on your back in a doorway, with one leg through the open door. 2. Slide your leg up the wall to straighten your knee. You should feel a gentle stretch down the back of your leg. 3. Hold the stretch for at least 15 to 30 seconds. Do not arch your back, point your toes, or bend either knee. Keep one heel touching the floor and the other heel touching the wall. 4. Repeat with your other leg. 5. Do 2 to 4 times for each leg. Hip flexor stretch   1. Kneel on the floor with one knee bent and one leg behind you. Place your forward knee over your foot. Keep your other knee touching the floor. 2. Slowly push your hips forward until you feel a stretch in the upper thigh of your rear leg. 3. Hold the stretch for at least 15 to 30 seconds. Repeat with your other leg. 4. Do 2 to 4 times on each side. Wall sit   1. Stand with your back 10 to 12 inches away from a wall. 2. Lean into the wall until your back is flat against it. 3. Slowly slide down until your knees are slightly bent, pressing your lower back into the wall. 4. Hold for about 6 seconds, then slide back up the wall. 5. Repeat 8 to 12 times. Follow-up care is a key part of your treatment and safety. Be sure to make and go to all appointments, and call your doctor if you are having problems. It's also a good idea to know your test results and keep a list of the medicines you take. Where can you learn more? Go to https://frandy.Wave [email or web address] org and sign in to your [employer] account. Enter Z377 in the [employer] box to learn more about \"Low Back Pain: Exercises. \"     If you do not have an account, please click on the \"Sign Up Now\" link. Current as of: March 2, 2020               Content Version: 12.6  © 1585-5090 [employer]. Care instructions adapted under license by [facility] ([facility]).  If you have questions about a medical condition or this instruction, always ask your healthcare professional. [place] [address] any warranty or liability for your use of this information. Patient Education        Learning About Sleep Apnea  What is it? Sleep apnea means that breathing stops for short periods during sleep. When you stop breathing or have reduced airflow into your lungs during sleep, you don't sleep well and you can be very tired during the day. The oxygen levels in your blood may go down, and carbon dioxide levels go up. It may lead to other problems, such as high blood pressure and heart disease. Sleep apnea can range from mild to severe, based on how often breathing stops during sleep. Breathing may stop as few as 5 times an hour (mild apnea) to 30 or more times an hour (severe apnea). Obstructive sleep apnea is the most common type. This most often occurs because your airways are blocked or partly blocked. Central sleep apnea is less common. It happens when the brain has trouble controlling breathing. Some people have both types. That's called complex sleep apnea. What are the symptoms? There are symptoms of sleep apnea that you may notice and symptoms that others may notice when you're asleep. Symptoms you may notice include:  · Feeling extremely sleepy during the day. · Feeling unrefreshed or tired after a night's sleep. · Problems with memory and concentration, or mood changes. · Morning or night headaches. · Heartburn or a sour taste in your mouth at night. · Swelling of the legs. · Getting up often during the night to urinate. · A dry mouth or sore throat in the morning. Your bed partner may notice that you:  · Have episodes of not breathing. · Snore loudly. Almost all people who have sleep apnea snore. But not all people who snore have sleep apnea. · Toss and turn during sleep. · Have nighttime choking or gasping spells. How is it diagnosed?   Your doctor will probably do a physical exam and ask about your past health. He or she may also ask you or your bed partner about your snoring and sleep behavior and how tired you feel during the day. Your doctor may suggest a sleep study. Sleep studies are a series of tests that look at what happens to the body during sleep. They check for how often you stop breathing or have too little air flowing into your lungs during sleep. They also find out how much oxygen you have in your blood during sleep. A sleep study may take place in your home. Or it might take place at a sleep center, where you will spend the night. How is it treated? Sleep apnea is often treated with devices that deliver air through a mask to help keep your airways open. These include:  · Continuous positive airway pressure (CPAP). This increases air pressure in your throat. It keeps your airway open when you breathe in. It's the most common device. · Bilevel positive airway pressure (BiPAP). This uses different air pressures when you breathe in and out. · Adaptive servo ventilation (ASV). It senses pauses in breathing and adjusts air pressure. It's mostly used for central sleep apnea. If your tonsils or other tissues are blocking your airway, your doctor may suggest surgery to open the airway. How can you care for yourself? You may be able to treat mild sleep apnea by making changes in how you live and the way you sleep. For example, it may help to:  · Lose weight if you are overweight. · Sleep on your side, not your back. · Avoid alcohol and medicines such as sedatives before bed. You may also try an oral breathing device. It helps keep your airways open while you sleep. Where can you learn more? Go to https://frandy.ePetWorld. org and sign in to your [employer] account. Enter S121 in the [employer] box to learn more about \"Learning About Sleep Apnea. \"     If you do not have an account, please click on the \"Sign Up Now\" link.   Current as of: February 24, 2020               Content Version: 12.6  © 0657-7587 [employer]. Care instructions adapted under license by [facility] ([facility]). If you have questions about a medical condition or this instruction, always ask your healthcare professional. [address] any warranty or liability for your use of this information.

## 2021-02-18 NOTE — PROGRESS NOTES
2021    TELEHEALTH EVALUATION -- Audio/Visual (During NUMJL-09 public health emergency)    Due to [place] 19 outbreak, patient's office visit was converted to a virtual visit. Patient was contacted and agreed to proceed with a virtual visit via Doxy. me  The risks and benefits of converting to a virtual visit were discussed in light of the current infectious disease epidemic. Patient also understood that insurance coverage and co-pays are up to their individual insurance plans. Angelo Cano is a 62 y.o. male being evaluated by a Virtual Visit (video visit) encounter to address concerns as mentioned above. A caregiver was present when appropriate. Due to this being a TeleHealth encounter (During OIUJF-03 public health emergency), evaluation of the following organ systems was limited: Vitals/Constitutional/EENT/Resp/CV/GI//MS/Neuro/Skin/Heme-Lymph-Imm. Pursuant to the emergency declaration under the [address] [place] [address] and the [employer] and [employer] Act, this Virtual Visit was conducted with patient's (and/or legal guardian's) consent, to reduce the patient's risk of exposure to COVID-19 and provide necessary medical care. The patient (and/or legal guardian) has also been advised to contact this office for worsening conditions or problems, and seek emergency medical treatment and/or call 911 if deemed necessary. Patient identification was verified at the start of the visit: Yes    Total time spent for this encounter: Not billed by time    Services were provided through a video synchronous discussion virtually to substitute for in-person clinic visit. Patient and provider were located at their individual homes. The patient is talking with me virtually from his home and I am located at my office in [facility].        HPI:    Angelo Cano (:  1960) has requested an audio/video evaluation for the following concern(s):    Chronic low back pain: he states that symptoms persist. He recently had X-ray done of lower back and hip. He is able to see his x-ray images and noted new calcium growth to left pelvic region. He feels that his pain is secondary to muscle. Is thinking that maybe trigger point could be to blame. Pain bothers him now when he sits down. No pain with walking. When he lays down, he has to take a bit to relax the muscles before he can get comfortable. He has noticed that he has been shifting his weight more on his right hip. Feels that his posture has been bad lately. Would like to start with some stretching exercises/therapy exercises while he is still in [place] and will plan to follow-up with me next month in office when he is back in [place] for a few days. He does have images on his phone from recent testing that he is able to show me through video visit today. History of sleep apnea symptoms: He states that when sleeping on his side he has not had any issues. Has had apneic events that are witnessed by his significant other and he has woken up from sleep for these episodes. He is interested in trying strengthening exercises first before having any formal testing or referrals. Coronary artery disease: He states that he has not had any recent cardiac symptoms and has been very active including running on a routine basis. He has not had any activity intolerance. Has been taking baby aspirin. Is still taking cholesterol medication with no reported stomach upset or muscle cramping. ROS: This patient reports no chest pain or pressure. There is no shortness of breath or cough. The patient reports no nausea or vomiting. There is no heartburn or indigestion. There is no diarrhea or constipation. No black, bloody, mucusy or tarry stool noticed. The patient reports no bloating and no change in appetite.  There is no numbness, tingling or swelling in the extremities. Prior to Visit Medications    Medication Sig Taking?  Authorizing Provider   pravastatin (PRAVACHOL) 40 MG tablet TAKE 1 TABLET BY MOUTH EVERY DAY Yes RADHA Mcneill CNP   [facility] VITAMIN C 500 MG tablet TAKE 1 TABLET BY MOUTH 3 TIMES A WEEK Yes RADHA Mcneill CNP   ferrous sulfate (IRON 325) 325 (65 Fe) MG tablet Take 1 tablet by mouth three times a week Yes RADHA Pires CNP   pantoprazole (PROTONIX) 20 MG tablet TAKE 1 TABLET BY MOUTH EVERY DAY Yes RADHA Pires CNP   nitroGLYCERIN (NITROSTAT) 0.4 MG SL tablet Place 1 tablet under the tongue every 5 minutes as needed for Chest pain Yes RADHA Pires CNP   aspirin 81 MG tablet Take 81 mg by mouth daily Yes Historical Provider, MD       Social History     Tobacco Use    Smoking status: Former Smoker     Packs/day: 0.25     Years: 5.00     Pack years: 1.25     Types: Pipe     Quit date: 1985     Years since quittin.4    Smokeless tobacco: Never Used   Substance Use Topics    Alcohol use: Yes     Comment: weekly    Drug use: Yes     Types: Marijuana     Comment: weekly        PHYSICAL EXAMINATION:  [ INSTRUCTIONS:  \"[x]\" Indicates a positive item  \"[]\" Indicates a negative item  -- DELETE ALL ITEMS NOT EXAMINED]  [x] Alert  [x] Oriented to person/place/time    [x] No apparent distress  [] Toxic appearing    [] Face flushed appearing [] Sclera clear  [] Lips are cyanotic      [x] Breathing appears normal  [] Appears tachypneic      [] Rash on visible skin    [x] Cranial Nerves II-XII grossly intact    [x] Motor grossly intact in visible upper extremities    [] Motor grossly intact in visible lower extremities    [x] Normal Mood  [] Anxious appearing    [] Depressed appearing  [] Confused appearing      [] Poor short term memory  [] Poor long term memory    [] OTHER:      Due to this being a TeleHealth encounter, evaluation of the following organ systems is limited: Vitals/Constitutional/EENT/Resp/CV/GI//MS/Neuro/Skin/Heme-Lymph-Imm. ASSESSMENT/PLAN:   Diagnosis Orders   1. Chronic right-sided low back pain with right-sided sciatica  MRI LUMBAR SPINE WO CONTRAST   2. Abnormal x-ray of lumbar spine  MRI LUMBAR SPINE WO CONTRAST   3. Osteoarthritis of spine with radiculopathy, lumbar region  MRI LUMBAR SPINE WO CONTRAST   4. Witnessed episode of apnea     5. Sleep disturbance     6. CAD S/P percutaneous coronary angioplasty     7. Gastroesophageal reflux disease without esophagitis          Return for keep march appt scheduled in office. Follow up next month as scheduled in office. .  2.  3.  We reviewed recent x-ray of the lumbar spine and hip radiological interpretation to include significant degenerative changes with loss of disc space to the lumbar spine. No significant findings to the hip x-ray. Discussed likely nerve involvement given his symptoms. I do recommend some formal therapy stretching exercises to do prior to appointment with me next month. I will also order MRI to be done when he is back in [place] next month. In reviewing images that he showed to me on his phone, I was able to see significant disc space loss to the lumbar spine. 4.  5.  He is encouraged to try to sleep on his side when possible. I will try to locate some exercises to help with upper airway strengthening per his request and this will be mailed to him. 6.  7.  No recent symptoms of epigastric or chest pain reported. We will plan for physical examination next month in office. Please note this report has been partially produced using speech recognition software and may cause contain errors related to that system including grammar, punctuation and spelling as well as words and phrases that may seem inappropriate. If there are questions or concerns please feel free to contact me to clarify. An  electronic signature was used to authenticate this note.     --Juan Infante, APRN - CNP on 2/18/2021 at 9:52 AM        Pursuant to the emergency declaration under the [address] [place] [address], [facility] waiver authority and the [employer] and [employer] Act, this Virtual  Visit was conducted, with patient's consent, to reduce the patient's risk of exposure to COVID-19 and provide continuity of care for an established patient. Services were provided through a video synchronous discussion virtually to substitute for in-person clinic visit.

## 2021-03-02 DIAGNOSIS — K21.9 GASTROESOPHAGEAL REFLUX DISEASE WITHOUT ESOPHAGITIS: ICD-10-CM

## 2021-03-02 RX ORDER — PANTOPRAZOLE SODIUM 20 MG/1
TABLET, DELAYED RELEASE ORAL
Qty: 30 TABLET | Refills: 5 | Status: SHIPPED | OUTPATIENT
Start: 2021-03-02 | End: 2021-09-16 | Stop reason: SDUPTHER

## 2021-03-22 ENCOUNTER — HOSPITAL ENCOUNTER (OUTPATIENT)
Dept: LAB | Age: 61
Discharge: HOME | End: 2021-03-22
Payer: COMMERCIAL

## 2021-03-22 DIAGNOSIS — R74.8 ELEVATED ALKALINE PHOSPHATASE LEVEL: ICD-10-CM

## 2021-03-22 DIAGNOSIS — E78.2 MIXED HYPERLIPIDEMIA: ICD-10-CM

## 2021-03-22 DIAGNOSIS — R73.03 BORDERLINE DIABETES: ICD-10-CM

## 2021-03-22 LAB
ACANTHOCYTES: ABNORMAL
ALBUMIN SERPL-MCNC: 4.1 G/DL (ref 3.5–4.6)
ALP BLD-CCNC: 179 U/L (ref 35–104)
ALT SERPL-CCNC: 21 U/L (ref 0–41)
ANION GAP SERPL CALCULATED.3IONS-SCNC: 14 MEQ/L (ref 9–15)
ANISOCYTOSIS: ABNORMAL
AST SERPL-CCNC: 36 U/L (ref 0–40)
BASOPHILS ABSOLUTE: 0.2 K/UL (ref 0–0.2)
BASOPHILS RELATIVE PERCENT: 2 %
BILIRUB SERPL-MCNC: 1 MG/DL (ref 0.2–0.7)
BUN BLDV-MCNC: 12 MG/DL (ref 6–20)
BURR CELLS: ABNORMAL
CALCIUM SERPL-MCNC: 9.5 MG/DL (ref 8.5–9.9)
CHLORIDE BLD-SCNC: 100 MEQ/L (ref 95–107)
CHOLESTEROL, TOTAL: 139 MG/DL (ref 0–199)
CO2: 25 MEQ/L (ref 20–31)
CREAT SERPL-MCNC: 0.62 MG/DL (ref 0.7–1.2)
EOSINOPHILS ABSOLUTE: 0.1 K/UL (ref 0–0.7)
EOSINOPHILS RELATIVE PERCENT: 1 %
GFR AFRICAN AMERICAN: >60
GFR NON-AFRICAN AMERICAN: >60
GLOBULIN: 3.8 G/DL (ref 2.3–3.5)
GLUCOSE BLD-MCNC: 121 MG/DL (ref 70–99)
HBA1C MFR BLD: 6.3 % (ref 4.8–5.9)
HCT VFR BLD CALC: 50 % (ref 42–52)
HDLC SERPL-MCNC: 65 MG/DL (ref 40–59)
HEMOGLOBIN: 16.5 G/DL (ref 14–18)
LDL CHOLESTEROL CALCULATED: 62 MG/DL (ref 0–129)
LYMPHOCYTES ABSOLUTE: 1.2 K/UL (ref 1–4.8)
LYMPHOCYTES RELATIVE PERCENT: 13 %
MCH RBC QN AUTO: 31.7 PG (ref 27–31.3)
MCHC RBC AUTO-ENTMCNC: 32.9 % (ref 33–37)
MCV RBC AUTO: 96.5 FL (ref 80–100)
MONOCYTES ABSOLUTE: 2 K/UL (ref 0.2–0.8)
MONOCYTES RELATIVE PERCENT: 20.6 %
NEUTROPHILS ABSOLUTE: 6.1 K/UL (ref 1.4–6.5)
NEUTROPHILS RELATIVE PERCENT: 64 %
NUCLEATED RED BLOOD CELLS: 1 /100 WBC
PARATHYROID HORMONE INTACT: 84.6 PG/ML (ref 15–65)
PDW BLD-RTO: 14.2 % (ref 11.5–14.5)
PLATELET # BLD: 374 K/UL (ref 130–400)
PLATELET SLIDE REVIEW: NORMAL
POIKILOCYTES: ABNORMAL
POTASSIUM SERPL-SCNC: 4.7 MEQ/L (ref 3.4–4.9)
RBC # BLD: 5.19 M/UL (ref 4.7–6.1)
SMUDGE CELLS: 2
SODIUM BLD-SCNC: 139 MEQ/L (ref 135–144)
T4 FREE: 1.37 NG/DL (ref 0.84–1.68)
TOTAL PROTEIN: 7.9 G/DL (ref 6.3–8)
TRIGL SERPL-MCNC: 60 MG/DL (ref 0–150)
TSH SERPL DL<=0.05 MIU/L-ACNC: 1.33 UIU/ML (ref 0.44–3.86)
WBC # BLD: 9.6 K/UL (ref 4.8–10.8)

## 2021-03-22 PROCEDURE — 80053 COMPREHEN METABOLIC PANEL: CPT

## 2021-03-22 PROCEDURE — 84080 ASSAY ALKALINE PHOSPHATASES: CPT

## 2021-03-22 PROCEDURE — 84075 ASSAY ALKALINE PHOSPHATASE: CPT

## 2021-03-22 PROCEDURE — 84443 ASSAY THYROID STIM HORMONE: CPT

## 2021-03-22 PROCEDURE — 80061 LIPID PANEL: CPT

## 2021-03-22 PROCEDURE — 84439 ASSAY OF FREE THYROXINE: CPT

## 2021-03-22 PROCEDURE — 83036 HEMOGLOBIN GLYCOSYLATED A1C: CPT

## 2021-03-22 PROCEDURE — 85025 COMPLETE CBC W/AUTO DIFF WBC: CPT

## 2021-03-22 PROCEDURE — 83970 ASSAY OF PARATHORMONE: CPT

## 2021-03-22 PROCEDURE — 36415 COLL VENOUS BLD VENIPUNCTURE: CPT

## 2021-03-24 LAB
ALK PHOS OTHER CALC: 0 U/L
ALK PHOSPHATASE: 187 U/L (ref 40–120)
ALKALINE PHOSPHATASE BONE FRACTION: 41 U/L (ref 0–55)
ALKALINE PHOSPHATASE LIVER FRACTION: 146 U/L (ref 0–94)

## 2021-03-25 ENCOUNTER — OFFICE VISIT (OUTPATIENT)
Dept: CARDIOLOGY CLINIC | Age: 61
End: 2021-03-25
Payer: COMMERCIAL

## 2021-03-25 ENCOUNTER — OFFICE VISIT (OUTPATIENT)
Dept: FAMILY MEDICINE CLINIC | Age: 61
End: 2021-03-25
Payer: COMMERCIAL

## 2021-03-25 VITALS
TEMPERATURE: 97.6 F | SYSTOLIC BLOOD PRESSURE: 152 MMHG | OXYGEN SATURATION: 98 % | BODY MASS INDEX: 23.79 KG/M2 | HEART RATE: 80 BPM | HEIGHT: 68 IN | WEIGHT: 157 LBS | DIASTOLIC BLOOD PRESSURE: 88 MMHG

## 2021-03-25 VITALS
TEMPERATURE: 97.1 F | HEIGHT: 68 IN | RESPIRATION RATE: 16 BRPM | WEIGHT: 150 LBS | BODY MASS INDEX: 22.73 KG/M2 | HEART RATE: 67 BPM | DIASTOLIC BLOOD PRESSURE: 80 MMHG | SYSTOLIC BLOOD PRESSURE: 138 MMHG | OXYGEN SATURATION: 96 %

## 2021-03-25 DIAGNOSIS — I10 ESSENTIAL HYPERTENSION: ICD-10-CM

## 2021-03-25 DIAGNOSIS — Z95.2 AORTIC VALVE PROSTHESIS PRESENT: ICD-10-CM

## 2021-03-25 DIAGNOSIS — E78.2 MIXED HYPERLIPIDEMIA: ICD-10-CM

## 2021-03-25 DIAGNOSIS — I10 ESSENTIAL HYPERTENSION: Primary | ICD-10-CM

## 2021-03-25 DIAGNOSIS — I25.10 CAD S/P PERCUTANEOUS CORONARY ANGIOPLASTY: ICD-10-CM

## 2021-03-25 DIAGNOSIS — Z98.61 CAD S/P PERCUTANEOUS CORONARY ANGIOPLASTY: ICD-10-CM

## 2021-03-25 DIAGNOSIS — R01.1 HEART MURMUR: Primary | Chronic | ICD-10-CM

## 2021-03-25 DIAGNOSIS — R79.89 ELEVATED PTHRP LEVEL: ICD-10-CM

## 2021-03-25 DIAGNOSIS — E55.9 VITAMIN D DEFICIENCY: ICD-10-CM

## 2021-03-25 DIAGNOSIS — G89.29 CHRONIC RIGHT-SIDED LOW BACK PAIN WITH RIGHT-SIDED SCIATICA: ICD-10-CM

## 2021-03-25 DIAGNOSIS — M54.41 CHRONIC RIGHT-SIDED LOW BACK PAIN WITH RIGHT-SIDED SCIATICA: ICD-10-CM

## 2021-03-25 DIAGNOSIS — R73.03 BORDERLINE DIABETES: ICD-10-CM

## 2021-03-25 PROCEDURE — G8484 FLU IMMUNIZE NO ADMIN: HCPCS | Performed by: INTERNAL MEDICINE

## 2021-03-25 PROCEDURE — G8427 DOCREV CUR MEDS BY ELIG CLIN: HCPCS | Performed by: NURSE PRACTITIONER

## 2021-03-25 PROCEDURE — 3017F COLORECTAL CA SCREEN DOC REV: CPT | Performed by: NURSE PRACTITIONER

## 2021-03-25 PROCEDURE — G8484 FLU IMMUNIZE NO ADMIN: HCPCS | Performed by: NURSE PRACTITIONER

## 2021-03-25 PROCEDURE — G8427 DOCREV CUR MEDS BY ELIG CLIN: HCPCS | Performed by: INTERNAL MEDICINE

## 2021-03-25 PROCEDURE — 99214 OFFICE O/P EST MOD 30 MIN: CPT | Performed by: NURSE PRACTITIONER

## 2021-03-25 PROCEDURE — G8420 CALC BMI NORM PARAMETERS: HCPCS | Performed by: NURSE PRACTITIONER

## 2021-03-25 PROCEDURE — 93000 ELECTROCARDIOGRAM COMPLETE: CPT | Performed by: INTERNAL MEDICINE

## 2021-03-25 PROCEDURE — 3017F COLORECTAL CA SCREEN DOC REV: CPT | Performed by: INTERNAL MEDICINE

## 2021-03-25 PROCEDURE — 99215 OFFICE O/P EST HI 40 MIN: CPT | Performed by: INTERNAL MEDICINE

## 2021-03-25 PROCEDURE — G8420 CALC BMI NORM PARAMETERS: HCPCS | Performed by: INTERNAL MEDICINE

## 2021-03-25 PROCEDURE — 1036F TOBACCO NON-USER: CPT | Performed by: INTERNAL MEDICINE

## 2021-03-25 PROCEDURE — 1036F TOBACCO NON-USER: CPT | Performed by: NURSE PRACTITIONER

## 2021-03-25 ASSESSMENT — ENCOUNTER SYMPTOMS
CHEST TIGHTNESS: 0
SHORTNESS OF BREATH: 0

## 2021-03-25 ASSESSMENT — PATIENT HEALTH QUESTIONNAIRE - PHQ9: 1. LITTLE INTEREST OR PLEASURE IN DOING THINGS: 0

## 2021-03-25 NOTE — PROGRESS NOTES
[facility] CARDIOLOGY OFFICE FOLLOW-UP      Patient: Sandy Moscoso  YOB: 1960  MRN: [id number]    Chief Complaint:  Chief Complaint   Patient presents with    1 Year Follow Up    Coronary Artery Disease    Hypertension         Subjective/HPI:  3/25/21: Patient presents today for follow up of coronary artery disease. Had aortic valve replacement by Dr. Pepe Arrieta at [facility] 10 years ago. EKG shows atrial flutter. He needs to go on anticoagulation. Last echo showed bioprosthetic valve area 1.1. He has a history of Hodgkin's Lymphoma, which has been treated. He is in remission. Has dyslipidemia. He is here to see his physicians but he currently lives in [place]. I will try to get an echocardiogram on him. Also start him on Eliquis 5 mg twice daily. 7/31/19: Patient presents today for follow-up of coronary artery disease. Aortic valve prosthesis by Dr. Pepe Arrieta. Hypertension hyperlipidemia stable. History of non-Hodgkin's lymphoma. Clinically doing very well. No symptoms. He will see me in 1 year     8/15/18: Patient presents today for follow-up of recent cardiac catheterization. He was advised medical therapy. He status post aortic valve prosthesis by Dr. Castillo. Hypertension hyperlipidemia that stable. Has a history of non-Hodgkin's lymphoma. Inflammation. He had a prior CVA. Has not had any further issues.     7/18/18: Patient presents today for evaluation of coronary artery disease and aortic valve prosthesis. Patient has episodes of angina. Last catheterization 9 years ago at the time of CVA in [place]. He was told he had 50% stenosis. Unidentified vessels. He has a history of Hodgkin's which is in remission. Had surgery (AVR by Pepe Arrieta at [facility] wants it done early AM.Ill do [facility] at [facility] on 7/27 9/27/2017: Patient presents today for follow-up of aortic valve disease. Doing well. Bioprosthetic aortic valve. 1.1. This was done by Dr. Pepe Arrieta at [facility].  No chest pain congestive heart failure symptoms of syncope. He has a history of hypertension and hyperlipidemia that stable. See in 6 months.     6/21/17: For follow-up of coronary artery disease. He has had aortic valve replacement by Dr. Chastity Marin. Lives in [place]. He exercises regularly. He has a history of Hodgkin's lymphoma in remission. He needs to get an echocardiogram within a month or 2 and then see me in 3 months. To evaluate aortic valve prosthesis. He is remote CABG as well.     4/27/16: For followup of CAD. AVR by Chastity Marin. No CP. No CHF. Hodgkins in remission. Has PCI. Stable. Prediabetic     1/6/2016: S/P AVR. Remote Hodgkins. Operated by Chastity Marin. No angina. Did not want to take ranexa. Retuning it. See in 3M.              Past Medical History:   Diagnosis Date    Abnormal echocardiogram     EF 49% TR with mild pumonary HTN    Basal cell carcinoma, trunk 09/2016    CAD (coronary artery disease)     s/p stents    CAD S/P percutaneous coronary angioplasty 9/29/2014    Family history of heart disease 8/17/2016    GERD (gastroesophageal reflux disease)     Heart murmur     benign-result of AVR    History of basal cell cancer 2010    face    History of stroke 10/13/2014    History of tobacco abuse 8/17/2016    Hodgkin disease ([facility] [address]     1979    Hyperlipidemia 12/3/2014    Hypertension     Pre-diabetes 4/8/2015    S/P AVR     bovine    Tachycardia, unspecified 1/4/2017       Past Surgical History:   Procedure Laterality Date    ANGIOPLASTY  11/24/15    [facility]  PCI PROCEDURE    CARDIAC CATHETERIZATION  07/27/2018    HERNIA REPAIR      3 times    SIGMOIDOSCOPY      SKIN CANCER EXCISION  10/21/2016    SPLENECTOMY      TOOTH EXTRACTION      VENTRICULAR ABLATION SURGERY      WISDOM TOOTH EXTRACTION         Family History   Problem Relation Age of Onset    Arthritis Mother     Arthritis Father     Heart Disease Father     High Blood Pressure Father     Cancer Other     High Blood Pressure Other     Diabetes Maternal Grandmother  Diabetes Paternal Grandfather        Social History     Socioeconomic History    Marital status:      Spouse name: Not on file    Number of children: Not on file    Years of education: Not on file    Highest education level: Not on file   Occupational History    Not on file   Social Needs    Financial resource strain: Not hard at all   Fulda-Jacquie insecurity     Worry: Never true     Inability: Never true   [employer] needs     Medical: No     Non-medical: No   Tobacco Use    Smoking status: Former Smoker     Packs/day: 0.25     Years: 5.00     Pack years: 1.25     Types: Pipe     Quit date: 1985     Years since quittin.5    Smokeless tobacco: Never Used   Substance and Sexual Activity    Alcohol use: Yes     Comment: weekly    Drug use: Yes     Types: Marijuana     Comment: weekly    Sexual activity: Not on file   Lifestyle    Physical activity     Days per week: Not on file     Minutes per session: Not on file    Stress: Not on file   Relationships    Social connections     Talks on phone: Not on file     Gets together: Not on file     Attends Protestant service: Not on file     Active member of club or organization: Not on file     Attends meetings of clubs or organizations: Not on file     Relationship status: Not on file    Intimate partner violence     Fear of current or ex partner: Not on file     Emotionally abused: Not on file     Physically abused: Not on file     Forced sexual activity: Not on file   Other Topics Concern    Not on file   Social History Narrative    Not on file       Allergies   Allergen Reactions    Lipitor [Atorvastatin] Other (See Comments)    Seasonal Other (See Comments)       Current Outpatient Medications   Medication Sig Dispense Refill    pantoprazole (PROTONIX) 20 MG tablet TAKE 1 TABLET BY MOUTH EVERY DAY 30 tablet 5    pravastatin (PRAVACHOL) 40 MG tablet TAKE 1 TABLET BY MOUTH EVERY DAY 30 tablet 5    [employer]S VITAMIN C 500 MG tablet TAKE 1 TABLET BY MOUTH 3 TIMES A WEEK 13 tablet 5    ferrous sulfate (IRON 325) 325 (65 Fe) MG tablet Take 1 tablet by mouth three times a week 13 tablet 6    nitroGLYCERIN (NITROSTAT) 0.4 MG SL tablet Place 1 tablet under the tongue every 5 minutes as needed for Chest pain 25 tablet 3    aspirin 81 MG tablet Take 81 mg by mouth daily       No current facility-administered medications for this visit. Review of Systems:   Review of Systems   Constitutional: Negative for activity change, appetite change, diaphoresis, fatigue and unexpected weight change. HENT: Negative for facial swelling, nosebleeds, trouble swallowing and voice change. Respiratory: Negative for apnea, chest tightness, shortness of breath and wheezing. Cardiovascular: Negative for chest pain, palpitations and leg swelling. Gastrointestinal: Negative for abdominal distention, anal bleeding, blood in stool, nausea and vomiting. Genitourinary: Negative for decreased urine volume and dysuria. Musculoskeletal: Negative for gait problem and myalgias. Skin: Negative for color change, pallor, rash and wound. Neurological: Negative for dizziness, syncope, facial asymmetry, weakness, light-headedness, numbness and headaches. Hematological: Does not bruise/bleed easily. Psychiatric/Behavioral: Negative for agitation, behavioral problems, confusion, hallucinations and suicidal ideas. The patient is not nervous/anxious. All other systems reviewed and are negative. Review of System is negative except for as mentioned above. Physical Examination:    BP (!) 152/88 (Site: Left Upper Arm, Position: Sitting, Cuff Size: Medium Adult)   Pulse 80   Temp 97.6 °F (36.4 °C) (Temporal)   Ht 5' 8\" (1.727 m)   Wt 157 lb (71.2 kg)   SpO2 98%   BMI 23.87 kg/m²    Physical Exam   Constitutional: He appears healthy. No distress. HENT:   Nose: Nose normal.   Mouth/Throat: Dentition is normal. Oropharynx is clear.    Eyes: Pupils are equal, round, and reactive to light. Conjunctivae are normal.   Neck: Normal range of motion and thyroid normal. Neck supple. Cardiovascular: Regular rhythm, S1 normal, S2 normal, normal heart sounds, intact distal pulses and normal pulses. PMI is not displaced. No murmur heard. Pulmonary/Chest: He has no wheezes. He has no rales. He exhibits no tenderness. Abdominal: Soft. Bowel sounds are normal. He exhibits no distension and no mass. There is no splenomegaly or hepatomegaly. There is no abdominal tenderness. No hernia. Neurological: He is alert and oriented to person, place, and time. He has normal motor skills. Gait normal.   Skin: Skin is warm and dry. No cyanosis. No jaundice. Nails show no clubbing. Patient Active Problem List   Diagnosis    Hypertension    Heart murmur    Hodgkin disease ([address]    GERD (gastroesophageal reflux disease)    CAD S/P percutaneous coronary angioplasty    Unstable angina (HCC)    History of stroke    Hyperglycemia    Hyperlipidemia    History of aortic valve replacement    Hiatal hernia    Diverticulosis    AVM (arteriovenous malformation)    Anemia    Borderline diabetes    Iron deficiency anemia    Internal hemorrhoid    Family history of heart disease    History of tobacco abuse    Basal cell carcinoma, trunk    Tachycardia           Orders Placed This Encounter   Procedures    EKG 12 lead     Order Specific Question:   Reason for Exam?     Answer:   Irregular heart rate    Echo 2D w doppler w color complete     Standing Status:   Future     Standing Expiration Date:   3/25/2022     Order Specific Question:   Reason for exam:     Answer:   aortic valve prosthesis         No orders of the defined types were placed in this encounter. Assessment/Orders:       ICD-10-CM    1. Heart murmur  R01.1 EKG 12 lead     Echo 2D w doppler w color complete     CANCELED: Echo 2D w doppler w color complete   2.  Aortic valve prosthesis present Z95.2 Echo 2D w doppler w color complete     CANCELED: Echo 2D w doppler w color complete       No orders of the defined types were placed in this encounter. There are no discontinued medications. Orders Placed This Encounter   Procedures    EKG 12 lead     Order Specific Question:   Reason for Exam?     Answer:   Irregular heart rate    Echo 2D w doppler w color complete     Standing Status:   Future     Standing Expiration Date:   3/25/2022     Order Specific Question:   Reason for exam:     Answer:   aortic valve prosthesis         Plan:  Start Eliquis 5 mg twice daily    Stay on same medications. See me after testing is completed.         Electronically signed by: Keenan Mcintosh MD  3/26/2021 12:58 PM

## 2021-03-25 NOTE — PROGRESS NOTES
Dyslipidemia and Hypertension: Alexa Arora presents for evaluation of lipids. Compliance with treatment thus far has been good. The patient exercises daily. Patient here for follow-up of elevated blood pressure. He is exercising and is adherent to low salt diet. Blood pressure is well controlled at home Antihypertensive medication side effects: no medication side effects noted. Use of agents associated with hypertension: none. No new myalgias or GI upset on pravastatin (Pravachol). Pre-diabetes: He states that he has been trying to eat healthy but admits to eating well when cooking for his parents in [place]. He states that he has been very physically active however and is hoping that her sugar levels are better. He does not report any low sugar episodes. CAD: He will be seeing Dr. Aniceto Cortez later on today. He has not had any cardiac/pulmonary symptoms. Has been taking baby aspirin routinely. No recent reported need for nitro. Low back pain: He states that his back and hip pain symptoms are much better with doing stretching exercise. He has very little symptom currently and therefore has not gotten MRI done. Lab Results   Component Value Date    ALT 21 03/22/2021    AST 36 03/22/2021     Lab Results   Component Value Date    CHOL 139 03/22/2021    TRIG 60 03/22/2021    HDL 65 (H) 03/22/2021    LDLCALC 62 03/22/2021        PMH: The patient is known to have coexisting coronary artery disease. ROS: This patient reports no chest pain or pressure. There is no shortness of breath or cough. The patient reports no nausea or vomiting. There is no heartburn or indigestion. There is no diarrhea or constipation. No black, bloody, mucusy or tarry stool noticed. The patient reports no bloating and no change in appetite. There is no numbness, tingling or swelling in the extremities.        EXAM:  Constitutional Blood pressure 138/80, pulse 67, temperature 97.1 °F (36.2 °C), temperature source Temporal, resp. rate 16, height 5' 8\" (1.727 m), weight 150 lb (68 kg), SpO2 96 %. .  He has a normal affect, no acute distress, appears well developed and well nourished. Neck:  neck- supple, no mass, non-tender and no bruits  Lungs:  Normal expansion. Clear to auscultation. No rales, rhonchi, or wheezing., No chest wall tenderness. Heart:  Heart sounds are normal.  Regular rate and rhythm without significant murmur (1-2+ 2nd right ICS), gallop or rub. Abdomen:  Soft, non-tender, normal bowel sounds. No bruits, organomegaly or masses. Extremities: Extremities warm to touch, pink, with no edema. DIAGNOSIS:    Diagnosis Orders   1. Essential hypertension     2. Mixed hyperlipidemia  CBC Auto Differential    Comprehensive Metabolic Panel    Lipid Panel   3. Borderline diabetes  Hemoglobin A1C   4. Vitamin D deficiency     5. CAD S/P percutaneous coronary angioplasty     6. Elevated PTHrP level  TSH without Reflex    T4, Free    PTH, Intact   7. Chronic right-sided low back pain with right-sided sciatica         Plan:  Continue current medicines and dosages. Continue diet and exercise programs, improving where possible. Follow up in 6 months with lab work one week prior. For further details see orders placed. 1.  Blood pressure is well controlled with patient's diet, activity and herbal supplement. Heart murmur is very much diminished when compared to previous assessments. He has been very physically active and ran to the office today for his appointment. He is commended for his healthy lifestyle choices. He will be following with cardiology later today. 2.  Lipid panel is well controlled on statin. No side effects reported. Continue the same. 3.  Discussed improvement in hemoglobin A1c now down to 6.3. He will continue to work on his diet and will remain physically active and we can repeat blood work in the fall. 4.  Vitamin D has been stable.   Continue daily weightbearing activity. 5.  He has an appointment scheduled with cardiologist later today and has had no recent cardiac symptoms. Heart murmur is significantly decreased when compared to evaluations of the past.    6.  Discussed elevated parathyroid hormone. Can plan to recheck in the future. Discussed option of ultrasound of the thyroid. He is not currently having any dysphagia but has had occasional sleep apnea symptoms. He will notify me should this worsen. 7.  Symptoms have significantly improved with stretching exercise. MRI not indicated at this time. Please note this report has been partially produced using speech recognition software and may cause contain errors related to that system including grammar, punctuation and spelling as well as words and phrases that may seem inappropriate. If there are questions or concerns please feel free to contact me to clarify.         Electronically signed by Tu BARTON, 11:05 AM 3/25/21

## 2021-03-26 ASSESSMENT — ENCOUNTER SYMPTOMS
TROUBLE SWALLOWING: 0
APNEA: 0
ABDOMINAL DISTENTION: 0
BLOOD IN STOOL: 0
VOICE CHANGE: 0
ANAL BLEEDING: 0
COLOR CHANGE: 0
WHEEZING: 0
VOMITING: 0
NAUSEA: 0
FACIAL SWELLING: 0

## 2021-04-01 NOTE — TELEPHONE ENCOUNTER
requesting medication refill. Please approve or deny this request.    Rx requested:  Requested Prescriptions     Pending Prescriptions Disp Refills    apixaban (ELIQUIS) 5 MG TABS tablet 60 tablet 5     Sig: Take 1 tablet by mouth 2 times daily         Last Office Visit:   3/25/2021      Next Visit Date:  No future appointments.

## 2021-05-06 DIAGNOSIS — I25.10 CAD S/P PERCUTANEOUS CORONARY ANGIOPLASTY: ICD-10-CM

## 2021-05-06 DIAGNOSIS — Z98.61 CAD S/P PERCUTANEOUS CORONARY ANGIOPLASTY: ICD-10-CM

## 2021-05-06 RX ORDER — NITROGLYCERIN 0.4 MG/1
0.4 TABLET SUBLINGUAL EVERY 5 MIN PRN
Qty: 25 TABLET | Refills: 3 | Status: SHIPPED | OUTPATIENT
Start: 2021-05-06

## 2021-05-06 NOTE — TELEPHONE ENCOUNTER
Requesting medication refill. Please approve or deny this request.    Rx requested:  Requested Prescriptions     Pending Prescriptions Disp Refills    nitroGLYCERIN (NITROSTAT) 0.4 MG SL tablet [Pharmacy Med Name: NITROGLYCERIN 0.4 MG TABLET SL] 25 tablet 3     Sig: PLACE 1 TABLET UNDER THE TONGUE EVERY 5 MINUTES AS NEEDED FOR CHEST PAIN       Last Office Visit:   3/25/2021    Last Filled:      Last Labs:      Next Visit Date:  No future appointments.

## 2021-06-02 RX ORDER — FERROUS SULFATE 325(65) MG
TABLET ORAL
Qty: 13 TABLET | Refills: 6 | Status: SHIPPED | OUTPATIENT
Start: 2021-06-02 | End: 2021-09-16 | Stop reason: SDUPTHER

## 2021-06-22 ENCOUNTER — VIRTUAL VISIT (OUTPATIENT)
Dept: FAMILY MEDICINE CLINIC | Age: 61
End: 2021-06-22
Payer: COMMERCIAL

## 2021-06-22 DIAGNOSIS — I25.10 CAD S/P PERCUTANEOUS CORONARY ANGIOPLASTY: ICD-10-CM

## 2021-06-22 DIAGNOSIS — L98.9 SKIN LESION: ICD-10-CM

## 2021-06-22 DIAGNOSIS — Z95.2 HISTORY OF AORTIC VALVE REPLACEMENT: ICD-10-CM

## 2021-06-22 DIAGNOSIS — Z98.61 CAD S/P PERCUTANEOUS CORONARY ANGIOPLASTY: ICD-10-CM

## 2021-06-22 DIAGNOSIS — L72.3 SEBACEOUS CYST: ICD-10-CM

## 2021-06-22 DIAGNOSIS — E78.2 MIXED HYPERLIPIDEMIA: Chronic | ICD-10-CM

## 2021-06-22 DIAGNOSIS — T78.2XXD ANAPHYLAXIS, SUBSEQUENT ENCOUNTER: Primary | ICD-10-CM

## 2021-06-22 DIAGNOSIS — T63.463D: ICD-10-CM

## 2021-06-22 PROCEDURE — G8427 DOCREV CUR MEDS BY ELIG CLIN: HCPCS | Performed by: NURSE PRACTITIONER

## 2021-06-22 PROCEDURE — 1036F TOBACCO NON-USER: CPT | Performed by: NURSE PRACTITIONER

## 2021-06-22 PROCEDURE — G8420 CALC BMI NORM PARAMETERS: HCPCS | Performed by: NURSE PRACTITIONER

## 2021-06-22 PROCEDURE — 99214 OFFICE O/P EST MOD 30 MIN: CPT | Performed by: NURSE PRACTITIONER

## 2021-06-22 PROCEDURE — 3017F COLORECTAL CA SCREEN DOC REV: CPT | Performed by: NURSE PRACTITIONER

## 2021-06-22 RX ORDER — PRAVASTATIN SODIUM 40 MG
TABLET ORAL
Qty: 30 TABLET | Refills: 5 | Status: SHIPPED | OUTPATIENT
Start: 2021-06-22 | End: 2021-09-16 | Stop reason: SDUPTHER

## 2021-06-22 RX ORDER — EPINEPHRINE 0.3 MG/.3ML
0.3 INJECTION SUBCUTANEOUS
COMMUNITY
Start: 2021-06-15 | End: 2022-10-20 | Stop reason: SDUPTHER

## 2021-06-22 NOTE — PROGRESS NOTES
2021    TELEHEALTH EVALUATION -- Audio/Visual (During NRTPP-52 public health emergency)    Due to COVID 19 outbreak, patient's office visit was converted to a virtual visit. Patient was contacted and agreed to proceed with a virtual visit via Doxy. me  The risks and benefits of converting to a virtual visit were discussed in light of the current infectious disease epidemic. Patient also understood that insurance coverage and co-pays are up to their individual insurance plans. Anjelica Burden, was evaluated through a synchronous (real-time) audio-video encounter. The patient (or guardian if applicable) is aware that this is a billable service. Verbal consent to proceed has been obtained within the past 12 months. The visit was conducted pursuant to the emergency declaration under the [address] [place] [address], [address] [place] [address] authority and the [employer] Act. Patient identification was verified, and a caregiver was present when appropriate. The patient was located in a state where the provider was credentialed to provide care. Total time spent for this encounter: Not billed by time    The patient is talking with me virtually from his home and I am located at my office in [facility]. HPI:    Anjelica Burden (:  1960) has requested an audio/video evaluation for the following concern(s):    Anaphylactic reaction: he states that he was stung by a baby wasp while cutting grass. He had 9 sting bites near his sock line. He developed hives all over. He initially decided to take a shower and then try to lie down. This is when he started to feel some bumps on his lips and after communicating this with his significant other he noticed that he had a significant change in his voice. He developed loss of voice and throat swelling.   Was driven by a family member to the ER as this was quicker than waiting for an ambulance to arrive. He was given cortisone, epinepherine injection and benadryl. He states that after a few minutes he started to feel better and his voice returned to normal.  He has never had an issue with insect sting allergy before. Feels that it is because he was stung so many times. He theorizes that perhaps the wasp was stuck in a sock and while attempting to get out stung him multiple times in a row. There is still a red area where the final stinger left a nadege. No redness otherwise. No swelling or warmth. No drainage or bleeding. He states that he now has EpiPen with him if needed. This incident occurred on [address]. He has no residual symptoms other than the small nadege for the final wasp stinger injected him. Coronary artery disease/history of aortic valve replacement/dyslipidemia:  He states that he has not had any recent cardiac symptoms and continues to run about a mile a day. He is interested in establishing care with an interventional cardiologist to discuss appropriate timing of follow-up heart catheterization to check on status of cardiac stents as well as valve replacement. He does not report any upset stomach or muscle cramping with statin. Consumes healthy diet overall and does remain physically active. Skin bumps/lesion: He states that he has developed some small firm bumps to his forehead area over recent months. They look somewhat like pimples but not quite. Not painful and do not drain anything. He is only concerned because of his history of skin cancer. Upon further questioning he has had some more sun exposure but does wear hats often to protect his skin. Review of Systems   Please note this report has been partially produced using speech recognition software and may cause contain errors related to that system including grammar, punctuation and spelling as well as words and phrases that may seem inappropriate.  If there are questions or concerns please feel free to contact me to clarify. Prior to Visit Medications    Medication Sig Taking?  Authorizing Provider   EPINEPHrine (EPIPEN) 0.3 MG/0.3ML SOAJ injection Inject 0.3 mg into the muscle once as needed Yes Historical Provider, MD   pravastatin (PRAVACHOL) 40 MG tablet TAKE 1 TABLET BY MOUTH EVERY DAY Yes RADHA Mcneill CNP   ferrous sulfate (IRON 325) 325 (65 Fe) MG tablet TAKE 1 TABLET BY MOUTH 3 TIMES A WEEK Yes RADHA Mcneill CNP   nitroGLYCERIN (NITROSTAT) 0.4 MG SL tablet PLACE 1 TABLET UNDER THE TONGUE EVERY 5 MINUTES AS NEEDED FOR CHEST PAIN Yes RADHA Acosta CNP   pantoprazole (PROTONIX) 20 MG tablet TAKE 1 TABLET BY MOUTH EVERY DAY Yes RADHA Mcneill CNP   [facility] VITAMIN C 500 MG tablet TAKE 1 TABLET BY MOUTH 3 TIMES A WEEK Yes RADHA Mcneill CNP   aspirin 81 MG tablet Take 81 mg by mouth daily Yes Historical Provider, MD       Social History     Tobacco Use    Smoking status: Former Smoker     Packs/day: 0.25     Years: 5.00     Pack years: 1.25     Types: Pipe     Quit date: 1985     Years since quittin.8    Smokeless tobacco: Never Used   Vaping Use    Vaping Use: Never assessed   Substance Use Topics    Alcohol use: Yes     Comment: weekly    Drug use: Yes     Types: Marijuana     Comment: weekly       PHYSICAL EXAMINATION:  [ INSTRUCTIONS:  \"[x]\" Indicates a positive item  \"[]\" Indicates a negative item  -- DELETE ALL ITEMS NOT EXAMINED]  [x] Alert  [x] Oriented to person/place/time    [x] No apparent distress  [] Toxic appearing    [] Face flushed appearing [] Sclera clear  [] Lips are cyanotic      [x] Breathing appears normal  [] Appears tachypneic      [] Rash on visible skin    [x] Cranial Nerves II-XII grossly intact    [x] Motor grossly intact in visible upper extremities    [] Motor grossly intact in visible lower extremities    [x] Normal Mood  [] Anxious appearing    [] Depressed appearing  [] Confused appearing      [] Poor short term memory  [] Poor long term memory    [x] OTHER: Multiple small cysts located to medial forehead area just above the eyebrows. No redness or warmth seen. No pustules. No vesicles. Due to this being a TeleHealth encounter, evaluation of the following organ systems is limited: Vitals/Constitutional/EENT/Resp/CV/GI//MS/Neuro/Skin/Heme-Lymph-Imm. ASSESSMENT/PLAN:   Diagnosis Orders   1. Anaphylaxis, subsequent encounter     2. Anaphylactic reaction to wasp sting, assault, subsequent encounter     3. CAD S/P percutaneous coronary angioplasty  Ambulatory referral to Cardiology   4. History of aortic valve replacement  Ambulatory referral to Cardiology   5. Mixed hyperlipidemia  pravastatin (PRAVACHOL) 40 MG tablet   6. Skin lesion- forehead     7. Sebaceous cyst         Return in about 6 weeks (around 8/3/2021) for in office visit.level 2.      1.  2.  Symptoms have resolved since recent anaphylactic reaction to insect sting. He has access to epinephrine pen and has Benadryl at home as well. Likely that his reaction was secondary to multiple stings. He is aware of recommendation to monitor the stinger site for any signs of infection and notify me should this occur. He verbalizes understanding. I was able to review hospital summary from ER in [place]. [place])    3.  4.  5.  Discussed option of referral to [facility] per his request.  He would like to follow with a provider that would be able to perform heart catheterization on him if needed and he is interested in testing to ensure that stents are still patent and that valve is still functioning well. No cardiac symptoms reported. 6.  7.  Discussed likely sebaceous cyst given visualization through virtual visit today. Likely secondary to wearing hat and increased heat in [place]. Can try routine skin cleansing products to see if symptoms improve. Can further assess at next visit if still present.   Can also refer to dermatology if desired/needed at that time. Please note this report has been partially produced using speech recognition software and may cause contain errors related to that system including grammar, punctuation and spelling as well as words and phrases that may seem inappropriate. If there are questions or concerns please feel free to contact me to clarify. An  electronic signature was used to authenticate this note. --RADHA Thomas - CNP on 6/22/2021 at 2:51 PM        Pursuant to the emergency declaration under the [facility], [facility] waiver authority and the [employer] Act, this Virtual  Visit was conducted, with patient's consent, to reduce the patient's risk of exposure to COVID-19 and provide continuity of care for an established patient. Services were provided through a video synchronous discussion virtually to substitute for in-person clinic visit.

## 2021-06-23 NOTE — RESULT ENCOUNTER NOTE
This patient has an appointment scheduled on 7-6. We will discuss results at that visit. IV discontinued, cath removed intact

## 2021-07-19 ENCOUNTER — HOSPITAL ENCOUNTER (OUTPATIENT)
Dept: LAB | Age: 61
Discharge: HOME | End: 2021-07-19
Payer: COMMERCIAL

## 2021-07-19 DIAGNOSIS — R73.03 BORDERLINE DIABETES: ICD-10-CM

## 2021-07-19 DIAGNOSIS — E78.2 MIXED HYPERLIPIDEMIA: ICD-10-CM

## 2021-07-19 DIAGNOSIS — R79.89 ELEVATED PTHRP LEVEL: ICD-10-CM

## 2021-07-19 LAB
ALBUMIN SERPL-MCNC: 4 G/DL (ref 3.5–4.6)
ALP BLD-CCNC: 228 U/L (ref 35–104)
ALT SERPL-CCNC: 39 U/L (ref 0–41)
ANION GAP SERPL CALCULATED.3IONS-SCNC: 12 MEQ/L (ref 9–15)
AST SERPL-CCNC: 44 U/L (ref 0–40)
BASOPHILS ABSOLUTE: 0 K/UL (ref 0–0.2)
BASOPHILS RELATIVE PERCENT: 1 %
BILIRUB SERPL-MCNC: 0.7 MG/DL (ref 0.2–0.7)
BUN BLDV-MCNC: 17 MG/DL (ref 8–23)
BURR CELLS: ABNORMAL
CALCIUM SERPL-MCNC: 9.3 MG/DL (ref 8.5–9.9)
CHLORIDE BLD-SCNC: 101 MEQ/L (ref 95–107)
CHOLESTEROL, TOTAL: 161 MG/DL (ref 0–199)
CO2: 25 MEQ/L (ref 20–31)
CREAT SERPL-MCNC: 0.82 MG/DL (ref 0.7–1.2)
EOSINOPHILS ABSOLUTE: 0.2 K/UL (ref 0–0.7)
EOSINOPHILS RELATIVE PERCENT: 2 %
GFR AFRICAN AMERICAN: >60
GFR NON-AFRICAN AMERICAN: >60
GLOBULIN: 2.9 G/DL (ref 2.3–3.5)
GLUCOSE BLD-MCNC: 128 MG/DL (ref 70–99)
HBA1C MFR BLD: 6.4 % (ref 4.8–5.9)
HCT VFR BLD CALC: 45.3 % (ref 42–52)
HDLC SERPL-MCNC: 64 MG/DL (ref 40–59)
HEMOGLOBIN: 15.2 G/DL (ref 14–18)
HOWELL-JOLLY BODIES: ABNORMAL
LDL CHOLESTEROL CALCULATED: 85 MG/DL (ref 0–129)
LYMPHOCYTES ABSOLUTE: 0.4 K/UL (ref 1–4.8)
LYMPHOCYTES RELATIVE PERCENT: 3 %
MCH RBC QN AUTO: 32.8 PG (ref 27–31.3)
MCHC RBC AUTO-ENTMCNC: 33.6 % (ref 33–37)
MCV RBC AUTO: 97.8 FL (ref 80–100)
MONOCYTES ABSOLUTE: 1.9 K/UL (ref 0.2–0.8)
MONOCYTES RELATIVE PERCENT: 16.2 %
NEUTROPHILS ABSOLUTE: 9.5 K/UL (ref 1.4–6.5)
NEUTROPHILS RELATIVE PERCENT: 79 %
PARATHYROID HORMONE INTACT: 54.2 PG/ML (ref 15–65)
PDW BLD-RTO: 14.5 % (ref 11.5–14.5)
PLATELET # BLD: 320 K/UL (ref 130–400)
PLATELET SLIDE REVIEW: NORMAL
POIKILOCYTES: ABNORMAL
POTASSIUM SERPL-SCNC: 4.5 MEQ/L (ref 3.4–4.9)
RBC # BLD: 4.64 M/UL (ref 4.7–6.1)
SODIUM BLD-SCNC: 138 MEQ/L (ref 135–144)
T4 FREE: 1.28 NG/DL (ref 0.84–1.68)
TOTAL PROTEIN: 6.9 G/DL (ref 6.3–8)
TRIGL SERPL-MCNC: 58 MG/DL (ref 0–150)
TSH SERPL DL<=0.05 MIU/L-ACNC: 1.26 UIU/ML (ref 0.44–3.86)
WBC # BLD: 12 K/UL (ref 4.8–10.8)

## 2021-07-19 PROCEDURE — 83970 ASSAY OF PARATHORMONE: CPT

## 2021-07-19 PROCEDURE — 36415 COLL VENOUS BLD VENIPUNCTURE: CPT

## 2021-07-19 PROCEDURE — 84439 ASSAY OF FREE THYROXINE: CPT

## 2021-07-19 PROCEDURE — 84443 ASSAY THYROID STIM HORMONE: CPT

## 2021-07-19 PROCEDURE — 83036 HEMOGLOBIN GLYCOSYLATED A1C: CPT

## 2021-07-19 PROCEDURE — 80061 LIPID PANEL: CPT

## 2021-07-19 PROCEDURE — 80053 COMPREHEN METABOLIC PANEL: CPT

## 2021-07-19 PROCEDURE — 85025 COMPLETE CBC W/AUTO DIFF WBC: CPT

## 2021-07-22 ENCOUNTER — OFFICE VISIT (OUTPATIENT)
Dept: FAMILY MEDICINE CLINIC | Age: 61
End: 2021-07-22
Payer: COMMERCIAL

## 2021-07-22 VITALS
BODY MASS INDEX: 23.34 KG/M2 | TEMPERATURE: 97.6 F | SYSTOLIC BLOOD PRESSURE: 136 MMHG | WEIGHT: 154 LBS | HEIGHT: 68 IN | OXYGEN SATURATION: 96 % | HEART RATE: 69 BPM | DIASTOLIC BLOOD PRESSURE: 72 MMHG | RESPIRATION RATE: 18 BRPM

## 2021-07-22 DIAGNOSIS — Q27.30 AVM (ARTERIOVENOUS MALFORMATION): ICD-10-CM

## 2021-07-22 DIAGNOSIS — Z95.2 HISTORY OF AORTIC VALVE REPLACEMENT: ICD-10-CM

## 2021-07-22 DIAGNOSIS — R79.89 ELEVATED PTHRP LEVEL: ICD-10-CM

## 2021-07-22 DIAGNOSIS — E55.9 VITAMIN D DEFICIENCY: ICD-10-CM

## 2021-07-22 DIAGNOSIS — G89.29 CHRONIC RIGHT SHOULDER PAIN: ICD-10-CM

## 2021-07-22 DIAGNOSIS — M54.41 CHRONIC RIGHT-SIDED LOW BACK PAIN WITH RIGHT-SIDED SCIATICA: ICD-10-CM

## 2021-07-22 DIAGNOSIS — M25.511 CHRONIC RIGHT SHOULDER PAIN: ICD-10-CM

## 2021-07-22 DIAGNOSIS — R73.03 BORDERLINE DIABETES: ICD-10-CM

## 2021-07-22 DIAGNOSIS — Z98.61 CAD S/P PERCUTANEOUS CORONARY ANGIOPLASTY: ICD-10-CM

## 2021-07-22 DIAGNOSIS — I10 ESSENTIAL HYPERTENSION: Primary | ICD-10-CM

## 2021-07-22 DIAGNOSIS — E78.2 MIXED HYPERLIPIDEMIA: ICD-10-CM

## 2021-07-22 DIAGNOSIS — I25.10 CAD S/P PERCUTANEOUS CORONARY ANGIOPLASTY: ICD-10-CM

## 2021-07-22 DIAGNOSIS — C81.90 HODGKIN LYMPHOMA, UNSPECIFIED HODGKIN LYMPHOMA TYPE, UNSPECIFIED BODY REGION (HCC): ICD-10-CM

## 2021-07-22 DIAGNOSIS — T63.463D: ICD-10-CM

## 2021-07-22 DIAGNOSIS — G89.29 CHRONIC RIGHT-SIDED LOW BACK PAIN WITH RIGHT-SIDED SCIATICA: ICD-10-CM

## 2021-07-22 PROCEDURE — G8420 CALC BMI NORM PARAMETERS: HCPCS | Performed by: NURSE PRACTITIONER

## 2021-07-22 PROCEDURE — 99214 OFFICE O/P EST MOD 30 MIN: CPT | Performed by: NURSE PRACTITIONER

## 2021-07-22 PROCEDURE — 1036F TOBACCO NON-USER: CPT | Performed by: NURSE PRACTITIONER

## 2021-07-22 PROCEDURE — 3017F COLORECTAL CA SCREEN DOC REV: CPT | Performed by: NURSE PRACTITIONER

## 2021-07-22 PROCEDURE — G8427 DOCREV CUR MEDS BY ELIG CLIN: HCPCS | Performed by: NURSE PRACTITIONER

## 2021-07-22 ASSESSMENT — PATIENT HEALTH QUESTIONNAIRE - PHQ9
1. LITTLE INTEREST OR PLEASURE IN DOING THINGS: 0
SUM OF ALL RESPONSES TO PHQ QUESTIONS 1-9: 0
2. FEELING DOWN, DEPRESSED OR HOPELESS: 0
SUM OF ALL RESPONSES TO PHQ9 QUESTIONS 1 & 2: 0

## 2021-07-22 NOTE — PROGRESS NOTES
Dyslipidemia and Hypertension: Abhinav Iniguez presents for evaluation of lipids. Compliance with treatment thus far has been good. The patient exercises daily. Patient here for follow-up of elevated blood pressure. He is exercising and is adherent to low salt diet. Blood pressure is well controlled at home Antihypertensive medication side effects: no medication side effects noted. Use of agents associated with hypertension: none. No new myalgias or GI upset on pravastatin (Pravachol). Pre-diabetes: He states that he has been consuming more sugar lately. He has noticed that he has been getting upset stomach with artificial sweeteners. He continues to get a lot of routine physical activity but admits that his diet could use some improvement. CAD/history of aortic valve replacement: He denies any recent cardiac symptoms. States that he will be establishing care with cardiology in summer of this year. He prefers to follow with interventional cardiologist given his past history. He does not report any weight fluctuation. No orthopnea. Chronic right low back pain:  He continues to get massages on occasion and he has a muscle roller which can be helpful. Gets worse with sitting. He states that symptoms do get better as he moves around more. He has not noticed any worsening of symptoms since her last visit. He has noticed a bump to his mid to lower back and he is not sure how long it has been there. Feels firm like bone. He does admit to having an injury as a child when he fell out of a tree onto his back. He does not report any changes to the skin. No significant leg weakness reported. Right shoulder pain: he feels that he had an old injury from [facility] and might have some tendinitis. History of anaphylaxis: He states that he has not had any residual symptoms after recently having anaphylaxis secondary to multiple wasp stings. He does have EpiPen available if needed.     History of Hodgkin lymphoma: He does not report any swollen glands: No unexplained weight loss. There is no reported hemoptysis. Lab Results   Component Value Date    ALT 39 07/19/2021    AST 44 (H) 07/19/2021     Lab Results   Component Value Date    CHOL 161 07/19/2021    TRIG 58 07/19/2021    HDL 64 (H) 07/19/2021    LDLCALC 85 07/19/2021        PMH: The patient is known to have coexisting coronary artery disease. ROS: This patient reports no chest pain or pressure. There is no shortness of breath or cough. The patient reports no nausea or vomiting. There is no heartburn or indigestion. There is no diarrhea or constipation. No black, bloody, mucusy or tarry stool noticed. The patient reports no bloating and no change in appetite. There is no numbness, tingling or swelling in the extremities. EXAM:  Constitutional Blood pressure 136/72, pulse 69, temperature 97.6 °F (36.4 °C), temperature source Temporal, resp. rate 18, height 5' 8\" (1.727 m), weight 154 lb (69.9 kg), SpO2 96 %. .  He has a normal affect, no acute distress, appears well developed and well nourished. Neck:  neck- supple, no mass, non-tender and no bruits  Lungs:  Normal expansion. Clear to auscultation. No rales, rhonchi, or wheezing., No chest wall tenderness. Heart:  Murmur(s)-  2/6 systolic throughout the precordium, radiates to carotids  Abdomen:  Soft, non-tender, normal bowel sounds. No bruits, organomegaly or masses. Extremities: Extremities warm to touch, pink, with no edema. DIAGNOSIS:    Diagnosis Orders   1. Essential hypertension     2. Mixed hyperlipidemia  CBC Auto Differential    Comprehensive Metabolic Panel    Lipid Panel   3. Borderline diabetes  Hemoglobin A1C   4. Vitamin D deficiency     5. Hodgkin lymphoma, unspecified Hodgkin lymphoma type, unspecified body region ([address]     6. AVM (arteriovenous malformation)     7. Elevated PTHrP level  PTH, Intact    TSH without Reflex    T4, Free   8.  CAD S/P percutaneous coronary angioplasty  ECHO Complete 2D W Doppler W Color    EKG 12 lead   9. Chronic right-sided low back pain with right-sided sciatica     10. 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 History of aortic valve replacement  ECHO Complete 2D W Doppler W Color    EKG 12 lead   12. Chronic right shoulder pain         Plan:  Continue current medicines and dosages. Continue diet and exercise programs, improving where possible. Follow up in 3 months with lab work one week prior. For further details see orders placed. 1.  Blood pressure is overall well controlled. He prefers to remain off of beta-blocker medication and has been using natural rainforest herb. 2.  Lipid panel is stable with LDL is above goal with history of CAD. Continue statin and he plans to work more on his diet. Continue routine physical activity. 3.  Hemoglobin A1c is slightly increased but still below threshold of diabetes. He plans to reduce sugar intake in his diet in the near future. We will repeat blood work in the fall. Mutaba bark. Heart tonic and to lower blood sugar. He would like for me to look into this for him. 4.  Vitamin D has been stable. We will continue to check in the future. 5.  No recent constitutional symptoms reported. 6.  8.  11. We will order current echocardiogram as well as EKG. He plans to establish care with interventional cardiology in the fall. No cardiac symptoms reported. 9. 12.  Discussed option of referral to physical therapy in the future for ongoing pain issues. Can also get current diagnostic testing if symptoms worsen. Continue stretching exercises. 10.  No persistent symptoms after recent significant anaphylactic reaction.   Please note this report has been partially produced using speech recognition software and may cause contain errors related to that system including grammar, punctuation and spelling as well as words and phrases that may seem inappropriate. If there are questions or concerns please feel free to contact me to clarify.         Electronically signed by RADHA Mckeon CNP-CNP, 8:47 AM 7/22/21

## 2021-09-08 ENCOUNTER — TELEPHONE (OUTPATIENT)
Dept: FAMILY MEDICINE CLINIC | Age: 61
End: 2021-09-08

## 2021-09-08 NOTE — TELEPHONE ENCOUNTER
Leydi Duque from [facility] called said the Echo was denied he is scheduled for 9/14/2021 to have the test done they are requesting an appeal to call at [employer] plan at [phone] they are saying there is no hx of results of heart test valve replacement or surgery date. I looked in the chart and I found that he had a left heart cathetrization coronary angiography on 7/27/2018. cp

## 2021-09-09 NOTE — TELEPHONE ENCOUNTER
[id number] Khadijah Bennett I will discuss with the patient at his appointment with me next week. Please let the patient know that this test was denied by his insurance.

## 2021-09-13 ENCOUNTER — HOSPITAL ENCOUNTER (OUTPATIENT)
Dept: NON INVASIVE DIAGNOSTICS | Age: 61
Discharge: HOME | End: 2021-09-13
Payer: COMMERCIAL

## 2021-09-13 ENCOUNTER — HOSPITAL ENCOUNTER (OUTPATIENT)
Dept: LAB | Age: 61
Discharge: HOME | End: 2021-09-13
Payer: COMMERCIAL

## 2021-09-13 DIAGNOSIS — R73.03 BORDERLINE DIABETES: ICD-10-CM

## 2021-09-13 DIAGNOSIS — R79.89 ELEVATED PTHRP LEVEL: ICD-10-CM

## 2021-09-13 DIAGNOSIS — E78.2 MIXED HYPERLIPIDEMIA: ICD-10-CM

## 2021-09-13 LAB
ACANTHOCYTES: ABNORMAL
ALBUMIN SERPL-MCNC: 4.2 G/DL (ref 3.5–4.6)
ALP BLD-CCNC: 186 U/L (ref 35–104)
ALT SERPL-CCNC: 25 U/L (ref 0–41)
ANION GAP SERPL CALCULATED.3IONS-SCNC: 12 MEQ/L (ref 9–15)
AST SERPL-CCNC: 34 U/L (ref 0–40)
BASOPHILS ABSOLUTE: 0 K/UL (ref 0–0.2)
BASOPHILS RELATIVE PERCENT: 1.5 %
BILIRUB SERPL-MCNC: 0.8 MG/DL (ref 0.2–0.7)
BUN BLDV-MCNC: 14 MG/DL (ref 8–23)
BURR CELLS: ABNORMAL
CALCIUM SERPL-MCNC: 9.7 MG/DL (ref 8.5–9.9)
CHLORIDE BLD-SCNC: 100 MEQ/L (ref 95–107)
CHOLESTEROL, TOTAL: 168 MG/DL (ref 0–199)
CO2: 26 MEQ/L (ref 20–31)
CREAT SERPL-MCNC: 0.66 MG/DL (ref 0.7–1.2)
EKG ATRIAL RATE: 276 BPM
EKG P AXIS: 92 DEGREES
EKG Q-T INTERVAL: 476 MS
EKG QRS DURATION: 144 MS
EKG QTC CALCULATION (BAZETT): 510 MS
EKG R AXIS: -59 DEGREES
EKG T AXIS: 44 DEGREES
EKG VENTRICULAR RATE: 69 BPM
EOSINOPHILS ABSOLUTE: 0.1 K/UL (ref 0–0.7)
EOSINOPHILS RELATIVE PERCENT: 1 %
GFR AFRICAN AMERICAN: >60
GFR NON-AFRICAN AMERICAN: >60
GLOBULIN: 2.8 G/DL (ref 2.3–3.5)
GLUCOSE BLD-MCNC: 128 MG/DL (ref 70–99)
HBA1C MFR BLD: 6.5 % (ref 4.8–5.9)
HCT VFR BLD CALC: 48 % (ref 42–52)
HDLC SERPL-MCNC: 71 MG/DL (ref 40–59)
HEMOGLOBIN: 16.2 G/DL (ref 14–18)
LDL CHOLESTEROL CALCULATED: 83 MG/DL (ref 0–129)
LYMPHOCYTES ABSOLUTE: 0.5 K/UL (ref 1–4.8)
LYMPHOCYTES RELATIVE PERCENT: 5 %
MCH RBC QN AUTO: 32.3 PG (ref 27–31.3)
MCHC RBC AUTO-ENTMCNC: 33.7 % (ref 33–37)
MCV RBC AUTO: 95.9 FL (ref 80–100)
MONOCYTES ABSOLUTE: 1.6 K/UL (ref 0.2–0.8)
MONOCYTES RELATIVE PERCENT: 15.2 %
NEUTROPHILS ABSOLUTE: 8.2 K/UL (ref 1.4–6.5)
NEUTROPHILS RELATIVE PERCENT: 79 %
PARATHYROID HORMONE INTACT: 62.1 PG/ML (ref 15–65)
PDW BLD-RTO: 13.7 % (ref 11.5–14.5)
PLATELET # BLD: 387 K/UL (ref 130–400)
PLATELET SLIDE REVIEW: ADEQUATE
POIKILOCYTES: ABNORMAL
POTASSIUM SERPL-SCNC: 5.5 MEQ/L (ref 3.4–4.9)
RBC # BLD: 5.01 M/UL (ref 4.7–6.1)
SODIUM BLD-SCNC: 138 MEQ/L (ref 135–144)
T4 FREE: 1.27 NG/DL (ref 0.84–1.68)
TOTAL PROTEIN: 7 G/DL (ref 6.3–8)
TRIGL SERPL-MCNC: 69 MG/DL (ref 0–150)
TSH SERPL DL<=0.05 MIU/L-ACNC: 1.38 UIU/ML (ref 0.44–3.86)
WBC # BLD: 10.4 K/UL (ref 4.8–10.8)

## 2021-09-13 PROCEDURE — 80053 COMPREHEN METABOLIC PANEL: CPT

## 2021-09-13 PROCEDURE — 93005 ELECTROCARDIOGRAM TRACING: CPT

## 2021-09-13 PROCEDURE — 83036 HEMOGLOBIN GLYCOSYLATED A1C: CPT

## 2021-09-13 PROCEDURE — 83970 ASSAY OF PARATHORMONE: CPT

## 2021-09-13 PROCEDURE — 36415 COLL VENOUS BLD VENIPUNCTURE: CPT

## 2021-09-13 PROCEDURE — 85025 COMPLETE CBC W/AUTO DIFF WBC: CPT

## 2021-09-13 PROCEDURE — 84443 ASSAY THYROID STIM HORMONE: CPT

## 2021-09-13 PROCEDURE — 80061 LIPID PANEL: CPT

## 2021-09-13 PROCEDURE — 84439 ASSAY OF FREE THYROXINE: CPT

## 2021-09-13 PROCEDURE — 93010 ELECTROCARDIOGRAM REPORT: CPT | Performed by: INTERNAL MEDICINE

## 2021-09-16 ENCOUNTER — OFFICE VISIT (OUTPATIENT)
Dept: FAMILY MEDICINE CLINIC | Age: 61
End: 2021-09-16
Payer: COMMERCIAL

## 2021-09-16 ENCOUNTER — HOSPITAL ENCOUNTER (OUTPATIENT)
Dept: LAB | Age: 61
Discharge: HOME | End: 2021-09-16
Payer: COMMERCIAL

## 2021-09-16 ENCOUNTER — TELEPHONE (OUTPATIENT)
Dept: FAMILY MEDICINE CLINIC | Age: 61
End: 2021-09-16

## 2021-09-16 VITALS
RESPIRATION RATE: 18 BRPM | OXYGEN SATURATION: 98 % | HEIGHT: 68 IN | DIASTOLIC BLOOD PRESSURE: 78 MMHG | TEMPERATURE: 97.6 F | SYSTOLIC BLOOD PRESSURE: 142 MMHG | HEART RATE: 69 BPM | WEIGHT: 152.4 LBS | BODY MASS INDEX: 23.1 KG/M2

## 2021-09-16 DIAGNOSIS — M54.41 CHRONIC RIGHT-SIDED LOW BACK PAIN WITH RIGHT-SIDED SCIATICA: ICD-10-CM

## 2021-09-16 DIAGNOSIS — I25.10 CAD S/P PERCUTANEOUS CORONARY ANGIOPLASTY: ICD-10-CM

## 2021-09-16 DIAGNOSIS — Z98.61 CAD S/P PERCUTANEOUS CORONARY ANGIOPLASTY: ICD-10-CM

## 2021-09-16 DIAGNOSIS — K21.9 GASTROESOPHAGEAL REFLUX DISEASE WITHOUT ESOPHAGITIS: ICD-10-CM

## 2021-09-16 DIAGNOSIS — I10 ESSENTIAL HYPERTENSION: Primary | ICD-10-CM

## 2021-09-16 DIAGNOSIS — E87.5 SERUM POTASSIUM ELEVATED: ICD-10-CM

## 2021-09-16 DIAGNOSIS — R73.03 BORDERLINE DIABETES: ICD-10-CM

## 2021-09-16 DIAGNOSIS — E78.2 MIXED HYPERLIPIDEMIA: Chronic | ICD-10-CM

## 2021-09-16 DIAGNOSIS — G89.29 CHRONIC RIGHT-SIDED LOW BACK PAIN WITH RIGHT-SIDED SCIATICA: ICD-10-CM

## 2021-09-16 DIAGNOSIS — R71.8 ELEVATED MCV: ICD-10-CM

## 2021-09-16 DIAGNOSIS — E55.9 VITAMIN D DEFICIENCY: ICD-10-CM

## 2021-09-16 DIAGNOSIS — D50.8 OTHER IRON DEFICIENCY ANEMIA: ICD-10-CM

## 2021-09-16 LAB
FOLATE: 6.3 NG/ML (ref 7.3–26.1)
POTASSIUM SERPL-SCNC: 4.8 MEQ/L (ref 3.4–4.9)
VITAMIN B-12: 613 PG/ML (ref 232–1245)

## 2021-09-16 PROCEDURE — 3017F COLORECTAL CA SCREEN DOC REV: CPT | Performed by: NURSE PRACTITIONER

## 2021-09-16 PROCEDURE — G8427 DOCREV CUR MEDS BY ELIG CLIN: HCPCS | Performed by: NURSE PRACTITIONER

## 2021-09-16 PROCEDURE — 99214 OFFICE O/P EST MOD 30 MIN: CPT | Performed by: NURSE PRACTITIONER

## 2021-09-16 PROCEDURE — 36415 COLL VENOUS BLD VENIPUNCTURE: CPT

## 2021-09-16 PROCEDURE — 84132 ASSAY OF SERUM POTASSIUM: CPT

## 2021-09-16 PROCEDURE — G8420 CALC BMI NORM PARAMETERS: HCPCS | Performed by: NURSE PRACTITIONER

## 2021-09-16 PROCEDURE — 82746 ASSAY OF FOLIC ACID SERUM: CPT

## 2021-09-16 PROCEDURE — 82607 VITAMIN B-12: CPT

## 2021-09-16 PROCEDURE — 1036F TOBACCO NON-USER: CPT | Performed by: NURSE PRACTITIONER

## 2021-09-16 RX ORDER — FERROUS SULFATE 325(65) MG
TABLET ORAL
Qty: 13 TABLET | Refills: 6 | Status: SHIPPED | OUTPATIENT
Start: 2021-09-16 | End: 2022-01-13 | Stop reason: ALTCHOICE

## 2021-09-16 RX ORDER — PANTOPRAZOLE SODIUM 20 MG/1
TABLET, DELAYED RELEASE ORAL
Qty: 30 TABLET | Refills: 5 | Status: SHIPPED | OUTPATIENT
Start: 2021-09-16 | End: 2022-01-13 | Stop reason: SDUPTHER

## 2021-09-16 RX ORDER — PRAVASTATIN SODIUM 40 MG
TABLET ORAL
Qty: 30 TABLET | Refills: 5 | Status: SHIPPED | OUTPATIENT
Start: 2021-09-16 | End: 2022-01-13 | Stop reason: SDUPTHER

## 2021-09-16 RX ORDER — ASCORBIC ACID 500 MG
TABLET ORAL
Qty: 13 TABLET | Refills: 0 | Status: SHIPPED | OUTPATIENT
Start: 2021-09-16 | End: 2021-11-15

## 2021-09-16 ASSESSMENT — PATIENT HEALTH QUESTIONNAIRE - PHQ9
SUM OF ALL RESPONSES TO PHQ QUESTIONS 1-9: 0
2. FEELING DOWN, DEPRESSED OR HOPELESS: 0
SUM OF ALL RESPONSES TO PHQ QUESTIONS 1-9: 0
SUM OF ALL RESPONSES TO PHQ9 QUESTIONS 1 & 2: 0
SUM OF ALL RESPONSES TO PHQ QUESTIONS 1-9: 0
1. LITTLE INTEREST OR PLEASURE IN DOING THINGS: 0

## 2021-09-16 NOTE — PROGRESS NOTES
Dyslipidemia and Hypertension/pre-diabetes: Lincoln Aquino presents for evaluation of lipids. Compliance with treatment thus far has been good. The patient exercises daily. Patient here for follow-up of elevated blood pressure. He is exercising and is adherent to low salt diet. Blood pressure is well controlled at home Antihypertensive medication side effects: no medication side effects noted. Use of agents associated with hypertension: none. No new myalgias or GI upset on pravastatin (Pravachol). He states that he has been getting routine physical activity on a daily basis and has been going to the gym a lot more. Has made healthy lifestyle changes in recent weeks. He plans to continue to work on his diet and improve physical activity. Prefers more natural approach to health care. Is taking statin routinely. CAD: He will be seeing interventional cardiologist tomorrow. He states that he had his heart valve replaced 12 years ago. Has cardiac stents. Not sure when he will need follow up testing/monitoring. He has most recently followed with cardiology in the [place] office but states that he would prefer to establish care with interventional cardiologist in the event that procedure is needed. He denies any recent cardiopulmonary symptoms and continues to remain very physically active. Right lower back pain: He recently had a cupping done at the [facility]. Golden day. Has been advised to take co Q 10 because of statin. And also advised to take B12 sublingual.   He would like to discuss this with me today. He states that the cupping session was very helpful and he plans to try acupuncture in the near future as well. Otherwise no significant change in back pain symptoms. Does have pain worse in the right buttock region.         Lab Results   Component Value Date    ALT 25 09/13/2021    AST 34 09/13/2021     Lab Results   Component Value Date    CHOL 168 09/13/2021    TRIG 69 09/13/2021    HDL 71 (H) 09/13/2021    LDLCALC 83 09/13/2021        PMH: The patient is known to have coexisting coronary artery disease. ROS: This patient reports no chest pain or pressure. There is no shortness of breath or cough. The patient reports no nausea or vomiting. There is no heartburn or indigestion. There is no diarrhea or constipation. No black, bloody, mucusy or tarry stool noticed. The patient reports no bloating and no change in appetite. There is no numbness, tingling or swelling in the extremities. EXAM:  Constitutional Blood pressure (!) 142/78, pulse 69, temperature 97.6 °F (36.4 °C), temperature source Temporal, resp. rate 18, height 5' 8\" (1.727 m), weight 152 lb 6.4 oz (69.1 kg), SpO2 98 %. .  He has a normal affect, no acute distress, appears well developed and well nourished. Neck:  neck- supple, no mass, non-tender and no bruits  Lungs:  Normal expansion. Clear to auscultation. No rales, rhonchi, or wheezing., No chest wall tenderness. Heart:  Murmur(s)-  2/6 systolic throughout the precordium, radiates to carotids  Abdomen:  Soft, non-tender, normal bowel sounds. No bruits, organomegaly or masses. Extremities: Extremities warm to touch, pink, with no edema.       DIAGNOSIS:    Diagnosis Orders   1. Essential hypertension     2. Mixed hyperlipidemia  pravastatin (PRAVACHOL) 40 MG tablet   3. Gastroesophageal reflux disease without esophagitis  pantoprazole (PROTONIX) 20 MG tablet   4. Borderline diabetes  CBC Auto Differential    Comprehensive Metabolic Panel    Lipid Panel    Hemoglobin A1C    Microalbumin / Creatinine Urine Ratio   5. Vitamin D deficiency     6. Chronic right-sided low back pain with right-sided sciatica     7. CAD S/P percutaneous coronary angioplasty     8. Other iron deficiency anemia  ferrous sulfate (IRON 325) 325 (65 Fe) MG tablet    ascorbic acid (VITAMIN C) 500 MG tablet   9. Serum potassium elevated  Potassium   10.  Elevated MCV  Vitamin B12 & Folate       Plan:  Continue current medicines and dosages. Continue diet and exercise programs, improving where possible. Follow up in 3 months with lab work one week prior. For further details see orders placed. 1.  Blood pressure is improved with recheck. He states his blood pressure tends to run higher in the morning and improves as the day goes on. Continues low-salt diet. He prefers to avoid taking excessive prescription medication. 2.  Lipid panel is stable on statin. No side effects reported. He will plan to start taking co-Q10.    3. No symptoms reported while taking pantoprazole routinely. 4.  Hemoglobin A1c is at 6.5 technically under criteria for diabetes. We will repeat blood work in about 3 months when [place] is back in [place] from [place]. He will make more changes to his diet. 5.  Vitamin D has been stable. We will plan to recheck in the future. 6.  Symptoms are overall stable with no recent exacerbation. Is following with holistic provider in [place]. 7.  He plans to establish care tomorrow with interventional cardiology. No cardio pulmonary symptoms reported. I did review most recent EKG. He has been told in the past by providers that interpretation of EKG being atrial flutter is more artifact. He will plan to discuss further with cardiologist tomorrow. 8.  Continue current iron supplementation. No evidence of significant anemia. 9.  Most recent potassium level is reading high. We will repeat this morning. 10.  We will get baseline B12 and folate level. Please note this report has been partially produced using speech recognition software and may cause contain errors related to that system including grammar, punctuation and spelling as well as words and phrases that may seem inappropriate. If there are questions or concerns please feel free to contact me to clarify.         Electronically signed by RADHA Sarah, 8:13 AM 9/16/21

## 2021-09-16 NOTE — TELEPHONE ENCOUNTER
Initial research into the supplement appears to show no significant contraindication. This may lower his blood pressure. Notify me of any lightheadedness or dizziness. He can get the standard flu shot this year.

## 2021-09-16 NOTE — TELEPHONE ENCOUNTER
Patient is interested on herb sennie of [facility]? Patient is asking what flu shot or vaccine etc.. should he get. Please advise. mj

## 2021-09-17 ENCOUNTER — OFFICE VISIT (OUTPATIENT)
Dept: CARDIOLOGY CLINIC | Age: 61
End: 2021-09-17
Payer: COMMERCIAL

## 2021-09-17 VITALS
SYSTOLIC BLOOD PRESSURE: 160 MMHG | WEIGHT: 154 LBS | DIASTOLIC BLOOD PRESSURE: 90 MMHG | BODY MASS INDEX: 23.42 KG/M2 | HEART RATE: 68 BPM | RESPIRATION RATE: 16 BRPM | OXYGEN SATURATION: 98 %

## 2021-09-17 DIAGNOSIS — E78.2 MIXED HYPERLIPIDEMIA: Chronic | ICD-10-CM

## 2021-09-17 DIAGNOSIS — Z98.61 CAD S/P PERCUTANEOUS CORONARY ANGIOPLASTY: ICD-10-CM

## 2021-09-17 DIAGNOSIS — Z87.891 HISTORY OF TOBACCO ABUSE: ICD-10-CM

## 2021-09-17 DIAGNOSIS — Z95.2 HISTORY OF AORTIC VALVE REPLACEMENT: Chronic | ICD-10-CM

## 2021-09-17 DIAGNOSIS — I25.10 CAD S/P PERCUTANEOUS CORONARY ANGIOPLASTY: ICD-10-CM

## 2021-09-17 DIAGNOSIS — I10 ESSENTIAL HYPERTENSION: Primary | ICD-10-CM

## 2021-09-17 PROBLEM — R00.0 TACHYCARDIA: Status: RESOLVED | Noted: 2017-01-04 | Resolved: 2021-09-17

## 2021-09-17 PROCEDURE — 3017F COLORECTAL CA SCREEN DOC REV: CPT | Performed by: INTERNAL MEDICINE

## 2021-09-17 PROCEDURE — G8427 DOCREV CUR MEDS BY ELIG CLIN: HCPCS | Performed by: INTERNAL MEDICINE

## 2021-09-17 PROCEDURE — 99214 OFFICE O/P EST MOD 30 MIN: CPT | Performed by: INTERNAL MEDICINE

## 2021-09-17 PROCEDURE — G8420 CALC BMI NORM PARAMETERS: HCPCS | Performed by: INTERNAL MEDICINE

## 2021-09-17 PROCEDURE — 1036F TOBACCO NON-USER: CPT | Performed by: INTERNAL MEDICINE

## 2021-09-17 NOTE — PROGRESS NOTES
Chief Complaint   Patient presents with   [facility] Cardiologist     REFERRED BY Ayan    Coronary Artery Disease    Cardiac Valve Problem     AORTIC VALVE REPLACEMENT       Patient presents for initial medical evaluation. Patient is followed on a regular basis by Dr. Jose Plasencia, APRN - CNP. HX OF CAD S/P PCI. HX OF AVR at [facility] 10 yrs ago. Patient with hx of cancer at age 26 and had radiation to chest. Patient with hx of Aflutter since 2019. Not on full dose DOAC. Patient with hx of hemorrhoids. Last echo in 2017 with EF of 60%, AV with mean gradient of 16mmhg. Pt denies chest pain, dyspnea, dyspnea on exertion, change in exercise capacity, fatigue,  nausea, vomiting, diarrhea, constipation, motor weakness, insomnia, weight loss, syncope, dizziness, lightheadedness, palpitations, PND, orthopnea, or claudication. He is active without any angina.              Patient Active Problem List   Diagnosis    Hypertension    Heart murmur    Hodgkin disease ([address]    GERD (gastroesophageal reflux disease)    CAD S/P percutaneous coronary angioplasty    History of stroke    Hyperglycemia    Hyperlipidemia    History of aortic valve replacement    Hiatal hernia    Diverticulosis    AVM (arteriovenous malformation)    Anemia    Borderline diabetes    Iron deficiency anemia    Internal hemorrhoid    Family history of heart disease    History of tobacco abuse    Basal cell carcinoma, trunk       Past Surgical History:   Procedure Laterality Date    ANGIOPLASTY  11/24/15    MG JAMSHID AQUINO  PCI PROCEDURE    AORTIC VALVE REPLACEMENT  2010    Bovine-MODEL# [id number]    CARDIAC CATHETERIZATION  07/27/2018    HERNIA REPAIR      3 times    PTCA  2008/2010    RESOLUTE/MEDTRONICS     SIGMOIDOSCOPY      SKIN CANCER EXCISION  10/21/2016    SPLENECTOMY      TOOTH EXTRACTION      VENTRICULAR ABLATION SURGERY      WISDOM TOOTH EXTRACTION         Social History     Socioeconomic History    Marital status:      Spouse name: None    Number of children: None    Years of education: None    Highest education level: None   Occupational History    None   Tobacco Use    Smoking status: Former Smoker     Packs/day: 0.25     Years: 5.00     Pack years: 1.25     Types: Pipe     Quit date: 1985     Years since quittin.0    Smokeless tobacco: Never Used   Vaping Use    Vaping Use: Never assessed   Substance and Sexual Activity    Alcohol use: Yes     Comment: weekly    Drug use: Yes     Types: Marijuana     Comment: weekly    Sexual activity: None   Other Topics Concern    None   Social History Narrative    None     Social Determinants of Health     Financial Resource Strain: Low Risk     Difficulty of Paying Living Expenses: Not hard at all   Food Insecurity: No Food Insecurity    Worried About Running Out of Food in the Last Year: Never true    Mil of Food in the Last Year: Never true   Transportation Needs: No Transportation Needs    Lack of Transportation (Medical): No    Lack of Transportation (Non-Medical):  No   Physical Activity:     Days of Exercise per Week:     Minutes of Exercise per Session:    Stress:     Feeling of Stress :    Social Connections:     Frequency of Communication with Friends and Family:     Frequency of Social Gatherings with Friends and Family:     Attends Catholic Services:     Active Member of Clubs or Organizations:     Attends Club or Organization Meetings:     Marital Status:    Intimate Partner Violence:     Fear of Current or Ex-Partner:     Emotionally Abused:     Physically Abused:     Sexually Abused:        Family History   Problem Relation Age of Onset    Arthritis Mother     Arthritis Father     Heart Disease Father     High Blood Pressure Father     Cancer Other     High Blood Pressure Other     Diabetes Maternal Grandmother     Diabetes Paternal Grandfather        Current Outpatient Medications   Medication Sig Dispense Refill    NONFORMULARY perda jim caa-rainforrest herb      rivaroxaban (XARELTO) 20 MG TABS tablet Take 1 tablet by mouth Daily with supper 30 tablet 6    pravastatin (PRAVACHOL) 40 MG tablet TAKE 1 TABLET BY MOUTH EVERY DAY 30 tablet 5    pantoprazole (PROTONIX) 20 MG tablet TAKE 1 TABLET BY MOUTH EVERY DAY 30 tablet 5    ferrous sulfate (IRON 325) 325 (65 Fe) MG tablet TAKE 1 TABLET BY MOUTH 3 TIMES A WEEK 13 tablet 6    ascorbic acid (VITAMIN C) 500 MG tablet TAKE 1 TABLET BY MOUTH 3 TIMES A WEEK 13 tablet 0    EPINEPHrine (EPIPEN) 0.3 MG/0.3ML SOAJ injection Inject 0.3 mg into the muscle once as needed      nitroGLYCERIN (NITROSTAT) 0.4 MG SL tablet PLACE 1 TABLET UNDER THE TONGUE EVERY 5 MINUTES AS NEEDED FOR CHEST PAIN 25 tablet 3    aspirin 81 MG tablet Take 81 mg by mouth daily       No current facility-administered medications for this visit. Insect extract allergy skin test, Lipitor [atorvastatin], and Seasonal    Review of Systems:  General ROS: negative  Psychological ROS: negative  Hematological and Lymphatic ROS: No history of blood clots or bleeding disorder. Respiratory ROS: no cough, shortness of breath, or wheezing  Cardiovascular ROS: no chest pain or dyspnea on exertion  Gastrointestinal ROS: no abdominal pain, change in bowel habits, or black or bloody stools  Genito-Urinary ROS: no dysuria, trouble voiding, or hematuria  Musculoskeletal ROS: negative  Neurological ROS: no TIA or stroke symptoms  Dermatological ROS: negative    VITALS:  Blood pressure (!) 160/90, pulse 68, resp. rate 16, weight 154 lb (69.9 kg), SpO2 98 %. Body mass index is 23.42 kg/m². Physical Examination:  General appearance - alert, well appearing, and in no distress and normal appearing weight  Mental status - alert, oriented to person, place, and time  Neck - Neck is supple, no JVD or carotid bruits. No thyromegaly or adenopathy.    Chest - clear to auscultation, no wheezes, rales or rhonchi, symmetric air entry  Heart - normal rate, regular rhythm, normal S1, S2, no murmurs, rubs, clicks or gallops  Abdomen - soft, nontender, nondistended, no masses or organomegaly  Neurological - alert, oriented, normal speech, no focal findings or movement disorder noted  Extremities - peripheral pulses normal, no pedal edema, no clubbing or cyanosis  Skin - normal coloration and turgor, no rashes, no suspicious skin lesions noted      EKG: aflutter    Orders Placed This Encounter   Procedures    ECHO Complete 2D W Doppler W Color       ASSESSMENT:     Diagnosis Orders   1. Essential hypertension  ECHO Complete 2D W Doppler W Color   2. Mixed hyperlipidemia     3. History of tobacco abuse     4. History of aortic valve replacement     5. CAD S/P percutaneous coronary angioplasty           PLAN:         As always, aggressive risk factor modification is strongly recommended. We should adhere to the JNC VIII guidelines for HTN management and the NCEP ATP III guidelines for LDL-C management. Cardiac diet is always recommended with low fat, cholesterol, calories and sodium. Continue medications at current doses. Start on ELiquis 5mg BID then plan for CDN in the next 4- 6 weeks. And anti arrhythmic drug loading. Check ECHO    Obtain a nuclear myocardial perfusion stress test to r/o cardiac ischemia if warranted by symptoms. Patient was advised and encouraged to check blood pressure at home or at a pharmacy, maintain a logbook, and also call us back if blood pressure are above the target ranges or if it is low. Patient clearly understands and agrees to the instructions. We will need to continue to monitor muscle and liver enzymes, BUN, CR, and electrolytes.

## 2021-11-15 DIAGNOSIS — D50.8 OTHER IRON DEFICIENCY ANEMIA: ICD-10-CM

## 2021-11-15 RX ORDER — ASCORBIC ACID 500 MG
TABLET ORAL
Qty: 13 TABLET | Refills: 0 | Status: SHIPPED | OUTPATIENT
Start: 2021-11-15 | End: 2022-01-13 | Stop reason: ALTCHOICE

## 2021-11-15 NOTE — TELEPHONE ENCOUNTER
pharmacy requesting medication refill.  Please approve or deny this request.    Rx requested:  Requested Prescriptions     Pending Prescriptions Disp Refills    ascorbic acid ([facility] VITAMIN C-JASON HIPS) 500 MG tablet [Pharmacy Med Name: [facility] VIT C-JASON HIPS 500 MG TAB] 13 tablet 0     Sig: TAKE 1 TABLET BY MOUTH THREE TIMES A WEEK         Last Office Visit:   9/16/2021      Next Visit Date:  Future Appointments   Date Time Provider Zach Gottlieb   1/11/2022  8:00 AM CRISTIAN [address]   1/13/2022  8:10 AM RADHA Vargas - ROSA Shields   1/13/2022  1:00 PM Dayo Connell [facility]

## 2021-12-15 RX ORDER — SULFAMETHOXAZOLE AND TRIMETHOPRIM 800; 160 MG/1; MG/1
1 TABLET ORAL 2 TIMES DAILY
Qty: 20 TABLET | Refills: 0 | Status: SHIPPED | OUTPATIENT
Start: 2021-12-15 | End: 2021-12-25

## 2021-12-21 ENCOUNTER — PATIENT MESSAGE (OUTPATIENT)
Dept: FAMILY MEDICINE CLINIC | Age: 61
End: 2021-12-21

## 2021-12-21 DIAGNOSIS — I49.9 IRREGULAR HEARTBEAT: ICD-10-CM

## 2021-12-21 DIAGNOSIS — I25.10 CAD S/P PERCUTANEOUS CORONARY ANGIOPLASTY: Primary | ICD-10-CM

## 2021-12-21 DIAGNOSIS — Z98.61 CAD S/P PERCUTANEOUS CORONARY ANGIOPLASTY: Primary | ICD-10-CM

## 2021-12-21 RX ORDER — AMLODIPINE BESYLATE 5 MG/1
5 TABLET ORAL DAILY
Qty: 30 TABLET | Refills: 5 | Status: SHIPPED | OUTPATIENT
Start: 2021-12-21 | End: 2022-01-13 | Stop reason: SINTOL

## 2022-01-11 ENCOUNTER — HOSPITAL ENCOUNTER (OUTPATIENT)
Dept: NON INVASIVE DIAGNOSTICS | Age: 62
Discharge: HOME | End: 2022-01-11
Payer: COMMERCIAL

## 2022-01-11 ENCOUNTER — HOSPITAL ENCOUNTER (OUTPATIENT)
Dept: LAB | Age: 62
Discharge: HOME | End: 2022-01-11
Payer: COMMERCIAL

## 2022-01-11 DIAGNOSIS — R73.03 BORDERLINE DIABETES: ICD-10-CM

## 2022-01-11 DIAGNOSIS — I10 ESSENTIAL HYPERTENSION: ICD-10-CM

## 2022-01-11 LAB
ALBUMIN SERPL-MCNC: 4 G/DL (ref 3.5–4.6)
ALP BLD-CCNC: 154 U/L (ref 35–104)
ALT SERPL-CCNC: 33 U/L (ref 0–41)
ANION GAP SERPL CALCULATED.3IONS-SCNC: 14 MEQ/L (ref 9–15)
AST SERPL-CCNC: 52 U/L (ref 0–40)
BASOPHILS ABSOLUTE: 0.1 K/UL (ref 0–0.2)
BASOPHILS RELATIVE PERCENT: 1.3 %
BILIRUB SERPL-MCNC: 1 MG/DL (ref 0.2–0.7)
BUN BLDV-MCNC: 10 MG/DL (ref 8–23)
CALCIUM SERPL-MCNC: 9.7 MG/DL (ref 8.5–9.9)
CHLORIDE BLD-SCNC: 98 MEQ/L (ref 95–107)
CHOLESTEROL, TOTAL: 174 MG/DL (ref 0–199)
CO2: 23 MEQ/L (ref 20–31)
CREAT SERPL-MCNC: 0.66 MG/DL (ref 0.7–1.2)
CREATININE URINE: 27.9 MG/DL
EOSINOPHILS ABSOLUTE: 0.1 K/UL (ref 0–0.7)
EOSINOPHILS RELATIVE PERCENT: 1.1 %
GFR AFRICAN AMERICAN: >60
GFR NON-AFRICAN AMERICAN: >60
GLOBULIN: 3.3 G/DL (ref 2.3–3.5)
GLUCOSE BLD-MCNC: 139 MG/DL (ref 70–99)
HBA1C MFR BLD: 6.4 % (ref 4.8–5.9)
HCT VFR BLD CALC: 46.7 % (ref 42–52)
HDLC SERPL-MCNC: 86 MG/DL (ref 40–59)
HEMOGLOBIN: 15.3 G/DL (ref 14–18)
LDL CHOLESTEROL CALCULATED: 78 MG/DL (ref 0–129)
LV EF: 55 %
LVEF MODALITY: NORMAL
LYMPHOCYTES ABSOLUTE: 0.9 K/UL (ref 1–4.8)
LYMPHOCYTES RELATIVE PERCENT: 9.8 %
MCH RBC QN AUTO: 31 PG (ref 27–31.3)
MCHC RBC AUTO-ENTMCNC: 32.8 % (ref 33–37)
MCV RBC AUTO: 94.7 FL (ref 80–100)
MICROALBUMIN UR-MCNC: <1.2 MG/DL
MICROALBUMIN/CREAT UR-RTO: NORMAL MG/G (ref 0–30)
MONOCYTES ABSOLUTE: 1.2 K/UL (ref 0.2–0.8)
MONOCYTES RELATIVE PERCENT: 13.2 %
NEUTROPHILS ABSOLUTE: 6.6 K/UL (ref 1.4–6.5)
NEUTROPHILS RELATIVE PERCENT: 74.6 %
PDW BLD-RTO: 14.7 % (ref 11.5–14.5)
PLATELET # BLD: 262 K/UL (ref 130–400)
POTASSIUM SERPL-SCNC: 5 MEQ/L (ref 3.4–4.9)
RBC # BLD: 4.93 M/UL (ref 4.7–6.1)
SODIUM BLD-SCNC: 135 MEQ/L (ref 135–144)
TOTAL PROTEIN: 7.3 G/DL (ref 6.3–8)
TRIGL SERPL-MCNC: 51 MG/DL (ref 0–150)
WBC # BLD: 8.8 K/UL (ref 4.8–10.8)

## 2022-01-11 PROCEDURE — 93306 TTE W/DOPPLER COMPLETE: CPT

## 2022-01-11 PROCEDURE — 83036 HEMOGLOBIN GLYCOSYLATED A1C: CPT

## 2022-01-11 PROCEDURE — 85025 COMPLETE CBC W/AUTO DIFF WBC: CPT

## 2022-01-11 PROCEDURE — 82570 ASSAY OF URINE CREATININE: CPT

## 2022-01-11 PROCEDURE — 80061 LIPID PANEL: CPT

## 2022-01-11 PROCEDURE — 36415 COLL VENOUS BLD VENIPUNCTURE: CPT

## 2022-01-11 PROCEDURE — 82043 UR ALBUMIN QUANTITATIVE: CPT

## 2022-01-11 PROCEDURE — 80053 COMPREHEN METABOLIC PANEL: CPT

## 2022-01-12 ENCOUNTER — HOSPITAL ENCOUNTER (OUTPATIENT)
Dept: NON INVASIVE DIAGNOSTICS | Age: 62
Discharge: HOME | End: 2022-01-12
Payer: COMMERCIAL

## 2022-01-12 ENCOUNTER — TELEPHONE (OUTPATIENT)
Dept: FAMILY MEDICINE CLINIC | Age: 62
End: 2022-01-12

## 2022-01-12 LAB
EKG ATRIAL RATE: 276 BPM
EKG P AXIS: 91 DEGREES
EKG Q-T INTERVAL: 460 MS
EKG QRS DURATION: 140 MS
EKG QTC CALCULATION (BAZETT): 492 MS
EKG R AXIS: -61 DEGREES
EKG T AXIS: 53 DEGREES
EKG VENTRICULAR RATE: 69 BPM

## 2022-01-12 PROCEDURE — 93005 ELECTROCARDIOGRAM TRACING: CPT

## 2022-01-13 ENCOUNTER — OFFICE VISIT (OUTPATIENT)
Dept: CARDIOLOGY CLINIC | Age: 62
End: 2022-01-13
Payer: COMMERCIAL

## 2022-01-13 ENCOUNTER — OFFICE VISIT (OUTPATIENT)
Dept: FAMILY MEDICINE CLINIC | Age: 62
End: 2022-01-13
Payer: COMMERCIAL

## 2022-01-13 VITALS
SYSTOLIC BLOOD PRESSURE: 142 MMHG | RESPIRATION RATE: 18 BRPM | WEIGHT: 154 LBS | BODY MASS INDEX: 23.34 KG/M2 | HEART RATE: 69 BPM | HEIGHT: 68 IN | OXYGEN SATURATION: 97 % | TEMPERATURE: 98 F | DIASTOLIC BLOOD PRESSURE: 74 MMHG

## 2022-01-13 VITALS
RESPIRATION RATE: 16 BRPM | WEIGHT: 158.6 LBS | DIASTOLIC BLOOD PRESSURE: 82 MMHG | BODY MASS INDEX: 24.12 KG/M2 | SYSTOLIC BLOOD PRESSURE: 136 MMHG | OXYGEN SATURATION: 98 % | HEART RATE: 67 BPM

## 2022-01-13 DIAGNOSIS — R01.1 HEART MURMUR: Chronic | ICD-10-CM

## 2022-01-13 DIAGNOSIS — I48.19 PERSISTENT ATRIAL FIBRILLATION (HCC): ICD-10-CM

## 2022-01-13 DIAGNOSIS — Z95.2 HISTORY OF AORTIC VALVE REPLACEMENT: Chronic | ICD-10-CM

## 2022-01-13 DIAGNOSIS — I10 ESSENTIAL HYPERTENSION: Primary | ICD-10-CM

## 2022-01-13 DIAGNOSIS — Z98.61 CAD S/P PERCUTANEOUS CORONARY ANGIOPLASTY: Primary | ICD-10-CM

## 2022-01-13 DIAGNOSIS — E55.9 VITAMIN D DEFICIENCY: ICD-10-CM

## 2022-01-13 DIAGNOSIS — E78.2 MIXED HYPERLIPIDEMIA: Chronic | ICD-10-CM

## 2022-01-13 DIAGNOSIS — Z87.891 HISTORY OF TOBACCO ABUSE: ICD-10-CM

## 2022-01-13 DIAGNOSIS — I25.10 CAD S/P PERCUTANEOUS CORONARY ANGIOPLASTY: Primary | ICD-10-CM

## 2022-01-13 DIAGNOSIS — K21.9 GASTROESOPHAGEAL REFLUX DISEASE WITHOUT ESOPHAGITIS: ICD-10-CM

## 2022-01-13 DIAGNOSIS — C81.90 HODGKIN LYMPHOMA, UNSPECIFIED HODGKIN LYMPHOMA TYPE, UNSPECIFIED BODY REGION (HCC): ICD-10-CM

## 2022-01-13 DIAGNOSIS — I49.9 IRREGULAR HEARTBEAT: ICD-10-CM

## 2022-01-13 DIAGNOSIS — Q27.30 AVM (ARTERIOVENOUS MALFORMATION): ICD-10-CM

## 2022-01-13 DIAGNOSIS — I25.10 CAD S/P PERCUTANEOUS CORONARY ANGIOPLASTY: ICD-10-CM

## 2022-01-13 DIAGNOSIS — I10 PRIMARY HYPERTENSION: Chronic | ICD-10-CM

## 2022-01-13 DIAGNOSIS — Z90.81 HISTORY OF SPLENECTOMY: ICD-10-CM

## 2022-01-13 DIAGNOSIS — R73.03 BORDERLINE DIABETES: ICD-10-CM

## 2022-01-13 DIAGNOSIS — Z98.61 CAD S/P PERCUTANEOUS CORONARY ANGIOPLASTY: ICD-10-CM

## 2022-01-13 PROCEDURE — 3017F COLORECTAL CA SCREEN DOC REV: CPT | Performed by: INTERNAL MEDICINE

## 2022-01-13 PROCEDURE — G8484 FLU IMMUNIZE NO ADMIN: HCPCS | Performed by: INTERNAL MEDICINE

## 2022-01-13 PROCEDURE — G8427 DOCREV CUR MEDS BY ELIG CLIN: HCPCS | Performed by: NURSE PRACTITIONER

## 2022-01-13 PROCEDURE — G8420 CALC BMI NORM PARAMETERS: HCPCS | Performed by: NURSE PRACTITIONER

## 2022-01-13 PROCEDURE — G8484 FLU IMMUNIZE NO ADMIN: HCPCS | Performed by: NURSE PRACTITIONER

## 2022-01-13 PROCEDURE — G8427 DOCREV CUR MEDS BY ELIG CLIN: HCPCS | Performed by: INTERNAL MEDICINE

## 2022-01-13 PROCEDURE — 1036F TOBACCO NON-USER: CPT | Performed by: INTERNAL MEDICINE

## 2022-01-13 PROCEDURE — 1036F TOBACCO NON-USER: CPT | Performed by: NURSE PRACTITIONER

## 2022-01-13 PROCEDURE — 99214 OFFICE O/P EST MOD 30 MIN: CPT | Performed by: NURSE PRACTITIONER

## 2022-01-13 PROCEDURE — 3017F COLORECTAL CA SCREEN DOC REV: CPT | Performed by: NURSE PRACTITIONER

## 2022-01-13 PROCEDURE — G8420 CALC BMI NORM PARAMETERS: HCPCS | Performed by: INTERNAL MEDICINE

## 2022-01-13 PROCEDURE — 99214 OFFICE O/P EST MOD 30 MIN: CPT | Performed by: INTERNAL MEDICINE

## 2022-01-13 RX ORDER — PANTOPRAZOLE SODIUM 20 MG/1
TABLET, DELAYED RELEASE ORAL
Qty: 30 TABLET | Refills: 5 | Status: SHIPPED | OUTPATIENT
Start: 2022-01-13 | End: 2022-01-31 | Stop reason: SDUPTHER

## 2022-01-13 RX ORDER — AMLODIPINE BESYLATE 5 MG/1
5 TABLET ORAL DAILY
Qty: 30 TABLET | Refills: 5 | Status: CANCELLED | OUTPATIENT
Start: 2022-01-13

## 2022-01-13 RX ORDER — PRAVASTATIN SODIUM 40 MG
TABLET ORAL
Qty: 30 TABLET | Refills: 5 | Status: SHIPPED | OUTPATIENT
Start: 2022-01-13 | End: 2022-01-31 | Stop reason: SDUPTHER

## 2022-01-13 NOTE — PROGRESS NOTES
Dyslipidemia and Hypertension: Alisha Almodovar presents for evaluation of lipids. Compliance with treatment thus far has been excellent. The patient exercises daily. Patient here for follow-up of elevated blood pressure. He is exercising and is adherent to low salt diet. Blood pressure is well controlled at home Antihypertensive medication side effects: no medication side effects noted. Use of agents associated with hypertension: none. No new myalgias or GI upset on pravastatin (Pravachol). He could not tolerate low-dose amlodipine secondary to significant lower extremity edema and feels that his sugar levels were elevating with the medication as well. Prediabetes: He states that he has been consuming more sugary food products over the holidays and has been drinking alcohol more routinely because of the holidays. He continues to get a lot of routine physical activity and has not had any activity intolerance. He does not report any low sugar episodes. GERD: He states that he has not had any significant reflux symptoms while taking Protonix routinely. He denies any midepigastric discomfort. Irregular heart beat: He has an appointment scheduled today as well with cardiology. Has been taking Xarelto with no reported abnormal bruising or bleeding on the half tablet. Does have frequent nosebleeds however if he takes a full tablet. He has not had any heart palpitations. No lightheadedness or dizziness reported. Lab Results   Component Value Date    ALT 33 01/11/2022    AST 52 (H) 01/11/2022     Lab Results   Component Value Date    CHOL 174 01/11/2022    TRIG 51 01/11/2022    HDL 86 (H) 01/11/2022    LDLCALC 78 01/11/2022        PMH: The patient is known to have coexisting coronary artery disease. ROS: This patient reports no chest pain or pressure. There is no shortness of breath or cough. The patient reports no nausea or vomiting. There is no heartburn or indigestion.   There is no diarrhea or constipation. No black, bloody, mucusy or tarry stool noticed. The patient reports no bloating and no change in appetite. There is no numbness, tingling or swelling in the extremities. EXAM:  Constitutional Blood pressure (!) 142/74, pulse 69, temperature 98 °F (36.7 °C), temperature source Temporal, resp. rate 18, height 5' 8\" (1.727 m), weight 154 lb (69.9 kg), SpO2 97 %. .  He has a normal affect, no acute distress, appears well developed and well nourished. Neck:  neck- supple, no mass, non-tender and no bruits  Lungs:  Normal expansion.  Clear to auscultation.  No rales, rhonchi, or wheezing., No chest wall tenderness. Heart:  Murmur(s)-  2/6 systolic throughout the precordium, radiates to carotids  Abdomen:  Soft, non-tender, normal bowel sounds.  No bruits, organomegaly or masses. Extremities: Extremities warm to touch, pink, with no edema. DIAGNOSIS:    Diagnosis Orders   1. Essential hypertension     2. Mixed hyperlipidemia  pravastatin (PRAVACHOL) 40 MG tablet    CBC Auto Differential    Comprehensive Metabolic Panel    Lipid Panel   3. Vitamin D deficiency  Vitamin D 25 Hydroxy   4. Borderline diabetes  Hemoglobin A1C   5. Gastroesophageal reflux disease without esophagitis  pantoprazole (PROTONIX) 20 MG tablet   6. Hodgkin lymphoma, unspecified Hodgkin lymphoma type, unspecified body region ([facility] [address]     7. AVM (arteriovenous malformation)     8. Irregular heartbeat  rivaroxaban (XARELTO) 10 MG TABS tablet   9. CAD S/P percutaneous coronary angioplasty     10. History of splenectomy         Plan:  Continue current medicines and dosages. Continue diet and exercise programs, improving where possible. Follow up in 6 months with lab work one week prior. For further details see orders placed. 1.  2.  Blood pressure is elevated. Amlodipine gave him swelling of the ankles and he would like to discuss blood pressure management more with cardiology.   He continues to take statin with no

## 2022-01-13 NOTE — PROGRESS NOTES
Chief Complaint   Patient presents with    3 Month Follow-Up    Abnormal Test Results     ekg 1/13/2022    Coronary Artery Disease    Hypertension       Patient presents for initial medical evaluation. Patient is followed on a regular basis by Dr. Nithin Ray, APRN - CNP. HX OF CAD S/P PCI. HX OF AVR at [facility] 10 yrs ago. Patient with hx of cancer at age 26 and had radiation to chest. Patient with hx of Aflutter since 2019. Not on full dose DOAC. Patient with hx of hemorrhoids. Last echo in 2017 with EF of 60%, AV with mean gradient of 16mmhg. Pt denies chest pain, dyspnea, dyspnea on exertion, change in exercise capacity, fatigue,  nausea, vomiting, diarrhea, constipation, motor weakness, insomnia, weight loss, syncope, dizziness, lightheadedness, palpitations, PND, orthopnea, or claudication. He is active without any angina. 1-13-22: doing well. No issues. Noted to venkatesh in afib last OV, placed on DOAC. HX OF CAD S/P PCI. HX OF AVR at [facility] 10 yrs ago. Patient with hx of cancer at age 26 and had radiation to chest. Patient with hx of Aflutter since 2019. Not on full dose DOAC. Patient with hx of hemorrhoids. Echo with EF of 55%, normal bioprosthetic AV in place. Pt denies chest pain, dyspnea, dyspnea on exertion, change in exercise capacity, fatigue,  nausea, vomiting, diarrhea, constipation, motor weakness, insomnia, weight loss, syncope, dizziness, lightheadedness, palpitations, PND, orthopnea, or claudication. EKG with NSR< no ischemia. EKG with afib/flutter.              Patient Active Problem List   Diagnosis    Hypertension    Heart murmur    [address]gkin disease ([facility] [address]    GERD (gastroesophageal reflux disease)    CAD S/P percutaneous coronary angioplasty    History of stroke    Hyperglycemia    Hyperlipidemia    History of aortic valve replacement    Hiatal hernia    Diverticulosis    AVM (arteriovenous malformation)    Anemia    Borderline diabetes    Iron deficiency Communication with Friends and Family: Not on file    Frequency of Social Gatherings with Friends and Family: Not on file    Attends Christianity Services: Not on file    Active Member of Clubs or Organizations: Not on file    Attends Club or Organization Meetings: Not on file    Marital Status: Not on file   Intimate Partner Violence:     Fear of Current or Ex-Partner: Not on file    Emotionally Abused: Not on file    Physically Abused: Not on file    Sexually Abused: Not on file   Housing Stability:     Unable to Pay for Housing in the Last Year: Not on file    Number of Places Lived in the Last Year: Not on file    Unstable Housing in the Last Year: Not on file       Family History   Problem Relation Age of Onset    Arthritis Mother     Arthritis Father     Heart Disease Father     High Blood Pressure Father     Cancer Other     High Blood Pressure Other     Diabetes Maternal Grandmother     Diabetes Paternal Grandfather        Current Outpatient Medications   Medication Sig Dispense Refill    pravastatin (PRAVACHOL) 40 MG tablet TAKE 1 TABLET BY MOUTH EVERY DAY 30 tablet 5    pantoprazole (PROTONIX) 20 MG tablet TAKE 1 TABLET BY MOUTH EVERY DAY 30 tablet 5    rivaroxaban (XARELTO) 10 MG TABS tablet Take 1 tablet by mouth Daily with supper 30 tablet 5    NONFORMULARY perda humme caa-rainforrest herb      EPINEPHrine (EPIPEN) 0.3 MG/0.3ML SOAJ injection Inject 0.3 mg into the muscle once as needed      nitroGLYCERIN (NITROSTAT) 0.4 MG SL tablet PLACE 1 TABLET UNDER THE TONGUE EVERY 5 MINUTES AS NEEDED FOR CHEST PAIN 25 tablet 3    aspirin 81 MG tablet Take 81 mg by mouth daily       No current facility-administered medications for this visit. Insect extract allergy skin test, Lipitor [atorvastatin], and Seasonal    Review of Systems:  General ROS: negative  Psychological ROS: negative  Hematological and Lymphatic ROS: No history of blood clots or bleeding disorder.    Respiratory ROS: no cough, shortness of breath, or wheezing  Cardiovascular ROS: no chest pain or dyspnea on exertion  Gastrointestinal ROS: no abdominal pain, change in bowel habits, or black or bloody stools  Genito-Urinary ROS: no dysuria, trouble voiding, or hematuria  Musculoskeletal ROS: negative  Neurological ROS: no TIA or stroke symptoms  Dermatological ROS: negative    VITALS:  Blood pressure 136/82, pulse 67, resp. rate 16, weight 158 lb 9.6 oz (71.9 kg), SpO2 98 %. Body mass index is 24.12 kg/m². Physical Examination:  General appearance - alert, well appearing, and in no distress and normal appearing weight  Mental status - alert, oriented to person, place, and time  Neck - Neck is supple, no JVD or carotid bruits. No thyromegaly or adenopathy. Chest - clear to auscultation, no wheezes, rales or rhonchi, symmetric air entry  Heart - normal rate, regular rhythm, normal S1, S2, no murmurs, rubs, clicks or gallops  Abdomen - soft, nontender, nondistended, no masses or organomegaly  Neurological - alert, oriented, normal speech, no focal findings or movement disorder noted  Extremities - peripheral pulses normal, no pedal edema, no clubbing or cyanosis  Skin - normal coloration and turgor, no rashes, no suspicious skin lesions noted      EKG: aflutter    No orders of the defined types were placed in this encounter. ASSESSMENT:     Diagnosis Orders   1. CAD S/P percutaneous coronary angioplasty     2. Heart murmur     3. Primary hypertension     4. Mixed hyperlipidemia     5. History of aortic valve replacement     6. History of tobacco abuse     7. Persistent atrial fibrillation (HCC)           PLAN:         As always, aggressive risk factor modification is strongly recommended. We should adhere to the JNC VIII guidelines for HTN management and the NCEP ATP III guidelines for LDL-C management. Cardiac diet is always recommended with low fat, cholesterol, calories and sodium.     Continue medications at current doses.     ELiquis 5mg BID then plan for CDN in the next 4- 6 weeks. And anti arrhythmic drug loading. Check EKG    Check ECHO in future given hx of Bio AVR. Will continue to monitor patient clinically, if symptoms develop or worsen, they are to let me know ASAP or head to the nearest emergeny room. Patient was advised and encouraged to check blood pressure at home or at a pharmacy, maintain a logbook, and also call us back if blood pressure are above the target ranges or if it is low. Patient clearly understands and agrees to the instructions. We will need to continue to monitor muscle and liver enzymes, BUN, CR, and electrolytes.

## 2022-01-17 ENCOUNTER — ANESTHESIA EVENT (OUTPATIENT)
Dept: CARDIAC CATH/INVASIVE PROCEDURES | Age: 62
End: 2022-01-17
Payer: COMMERCIAL

## 2022-01-19 ENCOUNTER — HOSPITAL ENCOUNTER (OUTPATIENT)
Dept: CARDIAC CATH/INVASIVE PROCEDURES | Age: 62
Discharge: HOME | End: 2022-01-19
Attending: INTERNAL MEDICINE | Admitting: INTERNAL MEDICINE
Payer: COMMERCIAL

## 2022-01-19 ENCOUNTER — ANESTHESIA (OUTPATIENT)
Dept: CARDIAC CATH/INVASIVE PROCEDURES | Age: 62
End: 2022-01-19
Payer: COMMERCIAL

## 2022-01-19 VITALS — TEMPERATURE: 97.7 F | SYSTOLIC BLOOD PRESSURE: 113 MMHG | DIASTOLIC BLOOD PRESSURE: 64 MMHG | OXYGEN SATURATION: 96 %

## 2022-01-19 VITALS
DIASTOLIC BLOOD PRESSURE: 79 MMHG | TEMPERATURE: 98.4 F | SYSTOLIC BLOOD PRESSURE: 139 MMHG | RESPIRATION RATE: 18 BRPM | HEART RATE: 76 BPM | OXYGEN SATURATION: 96 %

## 2022-01-19 LAB
ANION GAP SERPL CALCULATED.3IONS-SCNC: 14 MEQ/L (ref 9–15)
BUN BLDV-MCNC: 8 MG/DL (ref 8–23)
CALCIUM SERPL-MCNC: 9.2 MG/DL (ref 8.5–9.9)
CHLORIDE BLD-SCNC: 102 MEQ/L (ref 95–107)
CO2: 22 MEQ/L (ref 20–31)
CREAT SERPL-MCNC: 0.64 MG/DL (ref 0.7–1.2)
EKG ATRIAL RATE: 272 BPM
EKG P AXIS: 90 DEGREES
EKG Q-T INTERVAL: 486 MS
EKG QRS DURATION: 122 MS
EKG QTC CALCULATION (BAZETT): 516 MS
EKG R AXIS: -54 DEGREES
EKG T AXIS: 29 DEGREES
EKG VENTRICULAR RATE: 68 BPM
GFR AFRICAN AMERICAN: >60
GFR NON-AFRICAN AMERICAN: >60
GLUCOSE BLD-MCNC: 92 MG/DL (ref 70–99)
POTASSIUM SERPL-SCNC: 4.3 MEQ/L (ref 3.4–4.9)
SODIUM BLD-SCNC: 138 MEQ/L (ref 135–144)

## 2022-01-19 PROCEDURE — 93005 ELECTROCARDIOGRAM TRACING: CPT | Performed by: INTERNAL MEDICINE

## 2022-01-19 PROCEDURE — 2500000003 HC RX 250 WO HCPCS: Performed by: NURSE ANESTHETIST, CERTIFIED REGISTERED

## 2022-01-19 PROCEDURE — 6360000002 HC RX W HCPCS: Performed by: NURSE ANESTHETIST, CERTIFIED REGISTERED

## 2022-01-19 PROCEDURE — 92960 CARDIOVERSION ELECTRIC EXT: CPT

## 2022-01-19 PROCEDURE — 92960 CARDIOVERSION ELECTRIC EXT: CPT | Performed by: INTERNAL MEDICINE

## 2022-01-19 PROCEDURE — 80048 BASIC METABOLIC PNL TOTAL CA: CPT

## 2022-01-19 PROCEDURE — 3700000000 HC ANESTHESIA ATTENDED CARE

## 2022-01-19 PROCEDURE — 93010 ELECTROCARDIOGRAM REPORT: CPT | Performed by: INTERNAL MEDICINE

## 2022-01-19 RX ORDER — SOTALOL HYDROCHLORIDE 80 MG/1
80 TABLET ORAL ONCE
Status: DISCONTINUED | OUTPATIENT
Start: 2022-01-19 | End: 2022-01-19 | Stop reason: HOSPADM

## 2022-01-19 RX ORDER — LIDOCAINE HYDROCHLORIDE 20 MG/ML
INJECTION, SOLUTION INFILTRATION; PERINEURAL PRN
Status: DISCONTINUED | OUTPATIENT
Start: 2022-01-19 | End: 2022-01-19 | Stop reason: SDUPTHER

## 2022-01-19 RX ORDER — SODIUM CHLORIDE 0.9 % (FLUSH) 0.9 %
5-40 SYRINGE (ML) INJECTION PRN
Status: DISCONTINUED | OUTPATIENT
Start: 2022-01-19 | End: 2022-01-19 | Stop reason: HOSPADM

## 2022-01-19 RX ORDER — SOTALOL HYDROCHLORIDE 80 MG/1
80 TABLET ORAL 2 TIMES DAILY
Qty: 60 TABLET | Refills: 6 | Status: SHIPPED | OUTPATIENT
Start: 2022-01-19 | End: 2022-01-31 | Stop reason: SINTOL

## 2022-01-19 RX ORDER — PROPOFOL 10 MG/ML
INJECTION, EMULSION INTRAVENOUS PRN
Status: DISCONTINUED | OUTPATIENT
Start: 2022-01-19 | End: 2022-01-19 | Stop reason: SDUPTHER

## 2022-01-19 RX ORDER — SODIUM CHLORIDE 9 MG/ML
25 INJECTION, SOLUTION INTRAVENOUS PRN
Status: DISCONTINUED | OUTPATIENT
Start: 2022-01-19 | End: 2022-01-19 | Stop reason: HOSPADM

## 2022-01-19 RX ORDER — SODIUM CHLORIDE 0.9 % (FLUSH) 0.9 %
5-40 SYRINGE (ML) INJECTION EVERY 12 HOURS SCHEDULED
Status: DISCONTINUED | OUTPATIENT
Start: 2022-01-19 | End: 2022-01-19 | Stop reason: HOSPADM

## 2022-01-19 RX ORDER — SODIUM CHLORIDE 9 MG/ML
INJECTION, SOLUTION INTRAVENOUS CONTINUOUS
Status: DISCONTINUED | OUTPATIENT
Start: 2022-01-19 | End: 2022-01-19 | Stop reason: HOSPADM

## 2022-01-19 RX ADMIN — PROPOFOL 100 MG: 10 INJECTION, EMULSION INTRAVENOUS at 13:43

## 2022-01-19 RX ADMIN — LIDOCAINE HYDROCHLORIDE 40 MG: 20 INJECTION, SOLUTION INFILTRATION; PERINEURAL at 13:43

## 2022-01-19 NOTE — PROGRESS NOTES
Discharge instructions given to patient and wife. Understanding verbalized. Pt discharged to care of family via wheelchair.

## 2022-01-19 NOTE — ANESTHESIA POSTPROCEDURE EVALUATION
Department of Anesthesiology  Postprocedure Note    Patient: Garrick Koroma  MRN: [id number]  YOB: 1960  Date of evaluation: 1/19/2022  Time:  1:51 PM     Procedure Summary     Date: 01/19/22 Room / Location: Cardiac Cath Services    Anesthesia Start: 3272 Anesthesia Stop:     Procedure: CATH LAB WITH ANESTHESIA Diagnosis:     Scheduled Providers:  Responsible Provider: Kev Kim MD    Anesthesia Type: MAC ASA Status: 3          Anesthesia Type: No value filed. Hermes Phase I:      Hermes Phase II:      Last vitals: Reviewed and per EMR flowsheets.        Anesthesia Post Evaluation    Patient location during evaluation: bedside  Patient participation: complete - patient participated  Level of consciousness: awake  Pain score: 0  Airway patency: patent  Nausea & Vomiting: no nausea  Complications: no  Cardiovascular status: hemodynamically stable  Respiratory status: acceptable  Hydration status: stable

## 2022-01-19 NOTE — ANESTHESIA PRE PROCEDURE
Diagnosis Code    Hypertension I10    Heart murmur R01.1    Hodgkin disease (HCC) C81.90    GERD (gastroesophageal reflux disease) K21.9    CAD S/P percutaneous coronary angioplasty I25.10, Z98.61    History of stroke Z86.73    Hyperglycemia R73.9    Hyperlipidemia E78.5    History of aortic valve replacement Z95.2    Hiatal hernia K44.9    Diverticulosis K57.90    AVM (arteriovenous malformation) Q27.30    Anemia D64.9    Borderline diabetes R73.03    Iron deficiency anemia D50.9    Internal hemorrhoid K64.8    Family history of heart disease Z82.49    History of tobacco abuse Z87.891    Basal cell carcinoma, trunk C44.519    Persistent atrial fibrillation (HCC) I48.19       Past Medical History:        Diagnosis Date    Abnormal echocardiogram     EF 49% TR with mild pumonary HTN    Basal cell carcinoma, trunk 09/2016    CAD (coronary artery disease)     s/p stents    CAD S/P percutaneous coronary angioplasty 9/29/2014    Family history of heart disease 8/17/2016    GERD (gastroesophageal reflux disease)     Heart murmur     benign-result of AVR    History of basal cell cancer 2010    face    History of stroke 10/13/2014    History of tobacco abuse 8/17/2016    Hodgkin disease ([facility] [address]     1979    Hyperlipidemia 12/3/2014    Hypertension     Persistent atrial fibrillation ([facility] [address] 1/13/2022    Pre-diabetes 4/8/2015    S/P AVR     bovine    Tachycardia, unspecified 1/4/2017       Past Surgical History:        Procedure Laterality Date    ANGIOPLASTY  11/24/15    MG JAMSHID AQUINO  PCI PROCEDURE    AORTIC VALVE REPLACEMENT  2010    Bovine-MODEL# [id number]    CARDIAC CATHETERIZATION  07/27/2018    HERNIA REPAIR      3 times    PTCA  2008/2010    RESOLUTE/MEDTRONICS     SIGMOIDOSCOPY      SKIN CANCER EXCISION  10/21/2016    SPLENECTOMY      TOOTH EXTRACTION      VENTRICULAR ABLATION SURGERY      WISDOM TOOTH EXTRACTION         Social History:    Social History     Tobacco Use    Smoking status: Former Smoker     Packs/day: 0.25     Years: 5.00     Pack years: 1.25     Types: Pipe     Quit date: 1985     Years since quittin.3    Smokeless tobacco: Never Used   Substance Use Topics    Alcohol use: Yes     Comment: weekly                                Counseling given: Not Answered      Vital Signs (Current):   Vitals:    22 1236   BP: (!) 159/77   Pulse: 67   Resp: 18   Temp: 98.4 °F (36.9 °C)   TempSrc: Temporal   SpO2: 99%                                              BP Readings from Last 3 Encounters:   22 (!) 159/77   22 136/82   22 (!) 142/74       NPO Status:                                                                                 BMI:   Wt Readings from Last 3 Encounters:   22 158 lb 9.6 oz (71.9 kg)   22 154 lb (69.9 kg)   21 154 lb (69.9 kg)     There is no height or weight on file to calculate BMI.    CBC:   Lab Results   Component Value Date    WBC 8.8 2022    RBC 4.93 2022    HGB 15.3 2022    HCT 46.7 2022    MCV 94.7 2022    RDW 14.7 2022     2022       CMP:   Lab Results   Component Value Date     2022    K 5.0 2022    CL 98 2022    CO2 23 2022    BUN 10 2022    CREATININE 0.66 2022    GFRAA >60.0 2022    LABGLOM >60.0 2022    GLUCOSE 139 2022    PROT 7.3 2022    CALCIUM 9.7 2022    BILITOT 1.0 2022    ALKPHOS 154 2022    AST 52 2022    ALT 33 2022       POC Tests: No results for input(s): POCGLU, POCNA, POCK, POCCL, POCBUN, POCHEMO, POCHCT in the last 72 hours.     Coags:   Lab Results   Component Value Date    PROTIME 10.1 2017    INR 0.9 2017    APTT 26.5 2017       HCG (If Applicable): No results found for: PREGTESTUR, PREGSERUM, HCG, HCGQUANT     ABGs: No results found for: PHART, PO2ART, NBN9EHB, RMU3JIF, BEART, U5KCFWZR     Type & Screen (If

## 2022-01-20 ENCOUNTER — OFFICE VISIT (OUTPATIENT)
Dept: CARDIOLOGY CLINIC | Age: 62
End: 2022-01-20
Payer: COMMERCIAL

## 2022-01-20 VITALS
DIASTOLIC BLOOD PRESSURE: 88 MMHG | HEIGHT: 68 IN | SYSTOLIC BLOOD PRESSURE: 180 MMHG | OXYGEN SATURATION: 96 % | WEIGHT: 155 LBS | BODY MASS INDEX: 23.49 KG/M2 | HEART RATE: 95 BPM

## 2022-01-20 DIAGNOSIS — R01.1 HEART MURMUR: Primary | ICD-10-CM

## 2022-01-20 DIAGNOSIS — I10 PRIMARY HYPERTENSION: Chronic | ICD-10-CM

## 2022-01-20 DIAGNOSIS — Z09 HOSPITAL DISCHARGE FOLLOW-UP: ICD-10-CM

## 2022-01-20 DIAGNOSIS — I48.19 PERSISTENT ATRIAL FIBRILLATION (HCC): ICD-10-CM

## 2022-01-20 DIAGNOSIS — E78.2 MIXED HYPERLIPIDEMIA: Chronic | ICD-10-CM

## 2022-01-20 LAB
EKG ATRIAL RATE: 81 BPM
EKG P AXIS: 80 DEGREES
EKG P-R INTERVAL: 248 MS
EKG Q-T INTERVAL: 450 MS
EKG QRS DURATION: 140 MS
EKG QTC CALCULATION (BAZETT): 522 MS
EKG R AXIS: -46 DEGREES
EKG T AXIS: 41 DEGREES
EKG VENTRICULAR RATE: 81 BPM

## 2022-01-20 PROCEDURE — G8484 FLU IMMUNIZE NO ADMIN: HCPCS | Performed by: NURSE PRACTITIONER

## 2022-01-20 PROCEDURE — 99213 OFFICE O/P EST LOW 20 MIN: CPT | Performed by: NURSE PRACTITIONER

## 2022-01-20 PROCEDURE — 1036F TOBACCO NON-USER: CPT | Performed by: NURSE PRACTITIONER

## 2022-01-20 PROCEDURE — 93010 ELECTROCARDIOGRAM REPORT: CPT | Performed by: INTERNAL MEDICINE

## 2022-01-20 PROCEDURE — G8427 DOCREV CUR MEDS BY ELIG CLIN: HCPCS | Performed by: NURSE PRACTITIONER

## 2022-01-20 PROCEDURE — 3017F COLORECTAL CA SCREEN DOC REV: CPT | Performed by: NURSE PRACTITIONER

## 2022-01-20 PROCEDURE — 93000 ELECTROCARDIOGRAM COMPLETE: CPT | Performed by: NURSE PRACTITIONER

## 2022-01-20 PROCEDURE — G8420 CALC BMI NORM PARAMETERS: HCPCS | Performed by: NURSE PRACTITIONER

## 2022-01-20 NOTE — PATIENT INSTRUCTIONS
Patient Education        Learning About Rhythm-Control Medicines  Introduction    Rhythm-control medicines return your heart to a normal rhythm. And they keep it at a normal rhythm. They are also called antiarrhythmics. Doctors may use these medicines for many reasons. These reasons include:  · You have symptoms from a heart rhythm problem. · You had electric cardioversion. Or you will have it. · You had catheter ablation. · You tried medicine to control your heart rate. But it did not help. · You are at risk of having a dangerous heart rhythm. These medicines can have serious side effects. Examples  · Amiodarone (Pacerone)  · Dofetilide (Tikosyn)  · Propafenone (Rythmol)  · Sotalol (Betapace AF)  Possible side effects  Side effects depend on which medicine you take. One serious one is a very irregular heart rate. Read the information that comes with your medicine. What to know about taking this medicine  · You may be in the hospital when you start this medicine. Your doctor will watch what it does to your heart. · Be sure to ask your doctor any questions. You may want to know more about the pros and cons of the medicine. · Your doctor will check you often. He or she will make sure you are not having problems. Be sure to go to all of your visits. · You may need regular blood tests. These make sure you are taking the right amount of medicine. · Take your medicines exactly as prescribed. Call your doctor if you think you are having a problem with your medicine. · Check with your doctor or pharmacist before you use any other medicines. This includes over-the-counter medicines. Make sure your doctor knows all of the medicines, vitamins, herbal products, and supplements you take. Taking some medicines together can cause problems. Where can you learn more? Go to [email or web address].[employer]. org and sign in to your [employer] account.  Enter L864 in the Wavii box to learn more about \"Learning About Rhythm-Control Medicines. \"     If you do not have an account, please click on the \"Sign Up Now\" link. Current as of: April 29, 2021               Content Version: 13.1  © 2006-2021 [employer]. Care instructions adapted under license by [facility] ([facility]). If you have questions about a medical condition or this instruction, always ask your healthcare professional. [address] any warranty or liability for your use of this information. Patient Education        sotalol (oral/injection)  Pronunciation:  JACK ta lol  Brand:  Betapace, Betapace AF, Sorine, Sotalol Hydrochloride AF, Sotylize  What is the most important information I should know about sotalol? You should not use sotalol if you have asthma, low potassium, or a serious heart condition such as severe heart failure, long QT syndrome, slow heartbeats that have caused you to faint, \"sick sinus syndrome\" or \"AV block\" (unless you have a pacemaker). You will receive your first few doses of sotalol in a hospital setting where your heart can be monitored in case the medicine causes serious side effects. What is sotalol? Sotalol is a beta-blocker that affects the heart and circulation within the atrium and ventricles (the upper and lower chambers of the heart that allow blood to flow into and out of the heart). Sotalol is used to help keep the heart beating normally in people with certain heart rhythm disorders, such as atrial fibrillation, atrial flutter, ventricular tachycardia, and ventricular fibrillation. Sotalol may also be used for purposes not listed in this medication guide. What should I discuss with my healthcare provider before taking sotalol?   You should not use sotalol if you are allergic to it, or if you have:  · a serious heart condition such as \"sick sinus syndrome\" or \"AV block\" (unless you have a pacemaker);  · long QT syndrome (in you or a family member);  · severe heart failure;  · slow heartbeats that have caused you to faint;  · asthma or other breathing disorder;  · very low levels of potassium in your blood; or  · (if you take sotalol for atrial fibrillation or atrial flutter) severe kidney disease; Do not give this medicine to a child without medical advice. Tell your doctor if you have ever had:  · kidney disease (or if you are on dialysis);  · an electrolyte imbalance (such as low levels of potassium or magnesium in your blood);  · congestive heart failure;  · coronary artery disease (hardened arteries);  · breathing problems such as bronchitis or emphysema;  · a thyroid disorder;  · diabetes (using sotalol can make it harder for you to tell when you have low blood sugar);  · a severe allergic reaction. Tell your doctor if you are pregnant. You should not breastfeed while using sotalol. How should I take sotalol? Follow all directions on your prescription label and read all medication guides or instruction sheets. Your doctor may occasionally change your dose. Use the medicine exactly as directed. Sotalol oral is taken by mouth. Sotalol injection is given as an infusion into a vein. A healthcare provider will give you this injection if you are unable to take the medicine by mouth. You will receive your first few doses of sotalol in a hospital setting where your heart can be monitored in case the medicine causes serious side effects. If you already take heart rhythm medication, you may need to stop taking it when you start using sotalol. Carefully follow your doctor's instructions. Measure liquid medicine carefully. Use the dosing syringe provided, or use a medicine dose-measuring device (not a kitchen spoon). Sotalol doses are based on age and body surface area (height and weight) in children. Your child's dose needs may change if the child gains or loses weight, or is still growing.   Call your doctor if you are sick with vomiting or diarrhea, or if you have increased thirst, decreased appetite, or are sweating more than usual. You can easily become dehydrated while taking sotalol. This can lead to very low blood pressure, a serious electrolyte imbalance, or kidney failure. You will need frequent medical tests. Your heart function may need to be checked using an electrocardiograph or ECG (sometimes called an EKG). You may also need heart function tests for 1 to 2 weeks after your last dose. Keep using this medicine as directed, even if you feel well. You may need to take sotalol for the rest of your life. Do not skip doses or stop using sotalol without your doctor's advice. Stopping suddenly may make your condition worse. Follow your doctor's instructions about tapering your dose. If you need surgery, tell your surgeon you currently use this medicine. Store at room temperature away from moisture and heat. Do not allow liquid medicine to freeze. Your pharmacist may prepare an oral suspension (liquid) form of sotalol. Keep the suspension at room temperature and throw away suspension any left over after 3 months of use. What happens if I miss a dose? Skip the missed dose and use your next dose at the regular time. Do not use two doses at one time. Try not to miss any doses. Get your prescription refilled before you run out of medicine completely. What happens if I overdose? Seek emergency medical attention or call the Poison Help line at 1-659.254.6471. An overdose of sotalol can be fatal.  What should I avoid while taking sotalol? Avoid taking an antacid within 2 hours before or 2 hours after you take sotalol. Some antacids can make it harder for your body to absorb sotalol. What are the possible side effects of sotalol? Get emergency medical help if you have signs of an allergic reaction: hives; difficult breathing; swelling of your face, lips, tongue, or throat.   Call your doctor at once if you have:  · chest pain;  · fast or pounding heartbeats, not a substitute for, the expertise, skill, knowledge and judgment of healthcare practitioners. The absence of a warning for a given drug or drug combination in no way should be construed to indicate that the drug or drug combination is safe, effective or appropriate for any given patient. [facility] does not assume any responsibility for any aspect of healthcare administered with the aid of information [facility] provides. The information contained herein is not intended to cover all possible uses, directions, precautions, warnings, drug interactions, allergic reactions, or adverse effects. If you have questions about the drugs you are taking, check with your doctor, nurse or pharmacist.  Copyright 5947-7788 [address]: 12.01. Revision date: 6/24/2019. Care instructions adapted under license by [facility] [address] If you have questions about a medical condition or this instruction, always ask your healthcare professional. [place] [address] any warranty or liability for your use of this information.

## 2022-01-24 DIAGNOSIS — R60.0 LOWER EXTREMITY EDEMA: Primary | ICD-10-CM

## 2022-01-25 ENCOUNTER — TELEPHONE (OUTPATIENT)
Dept: FAMILY MEDICINE CLINIC | Age: 62
End: 2022-01-25

## 2022-01-25 ENCOUNTER — HOSPITAL ENCOUNTER (OUTPATIENT)
Dept: LAB | Age: 62
Discharge: HOME | End: 2022-01-25
Payer: COMMERCIAL

## 2022-01-25 DIAGNOSIS — R60.0 LOWER EXTREMITY EDEMA: ICD-10-CM

## 2022-01-25 LAB
ALBUMIN SERPL-MCNC: 3.7 G/DL (ref 3.5–4.6)
ALP BLD-CCNC: 157 U/L (ref 35–104)
ALT SERPL-CCNC: 36 U/L (ref 0–41)
ANION GAP SERPL CALCULATED.3IONS-SCNC: 14 MEQ/L (ref 9–15)
AST SERPL-CCNC: 68 U/L (ref 0–40)
BILIRUB SERPL-MCNC: 0.9 MG/DL (ref 0.2–0.7)
BUN BLDV-MCNC: 10 MG/DL (ref 8–23)
CALCIUM SERPL-MCNC: 8.7 MG/DL (ref 8.5–9.9)
CHLORIDE BLD-SCNC: 103 MEQ/L (ref 95–107)
CO2: 24 MEQ/L (ref 20–31)
CREAT SERPL-MCNC: 0.65 MG/DL (ref 0.7–1.2)
GFR AFRICAN AMERICAN: >60
GFR NON-AFRICAN AMERICAN: >60
GLOBULIN: 2.8 G/DL (ref 2.3–3.5)
GLUCOSE BLD-MCNC: 133 MG/DL (ref 70–99)
MAGNESIUM: 1.8 MG/DL (ref 1.7–2.4)
POTASSIUM SERPL-SCNC: 4 MEQ/L (ref 3.4–4.9)
PRO-BNP: 708 PG/ML
SODIUM BLD-SCNC: 141 MEQ/L (ref 135–144)
TOTAL PROTEIN: 6.5 G/DL (ref 6.3–8)
TROPONIN: 0.02 NG/ML (ref 0–0.01)

## 2022-01-25 PROCEDURE — 36415 COLL VENOUS BLD VENIPUNCTURE: CPT

## 2022-01-25 PROCEDURE — 80053 COMPREHEN METABOLIC PANEL: CPT

## 2022-01-25 PROCEDURE — 84484 ASSAY OF TROPONIN QUANT: CPT

## 2022-01-25 PROCEDURE — 83735 ASSAY OF MAGNESIUM: CPT

## 2022-01-25 PROCEDURE — 83880 ASSAY OF NATRIURETIC PEPTIDE: CPT

## 2022-01-25 NOTE — PROCEDURES
[address]                      [address], [address] [facility]                                 PROCEDURE NOTE    PATIENT NAME: Kartik Love                   :        1960  MED REC NO:   [id number]                            ROOM:  ACCOUNT NO:   [de-identified]                           ADMIT DATE: 2022  PROVIDER:     Theresa Simons DO    DATE OF PROCEDURE:  2022    PROCEDURE PERFORMED:  External cardioversion. PROCEDURE PERFORMED BY:  Dayo Simons DO    INDICATIONS:  Atrial fibrillation. COMPLICATIONS:  None. PROCEDURE IN DETAIL:  The procedure was explained to the patient with  risks and benefits including risk of stroke. The patient understands  and is agreeable to proceed. Anesthesia was present throughout the  entire procedure, please see anesthesia notes for details of medications  administered. The pads were applied in the anterior and posterior  position. With synchronized biphasic wave forms at 200 joules, 1 shock  was successfully applied in restoring sinus rhythm. The patient had no  immediate post-procedure complications. The rhythm was maintained and a  12-lead EKG was requested. IMPRESSION:  Successful external cardioversion to normal sinus rhythm. PLAN:  1. Sotalol drug loading. 2.  Continue with oral anticoagulation. 3.  The patient to follow up in the office for EKG checks.         Sherry Chavez DO    D: 2022 7:37:16       T: 2022 8:44:26     [email or web address]  Job#: [id number]     Doc#: [id number]    CC:

## 2022-01-25 NOTE — TELEPHONE ENCOUNTER
I spoke with Sanjana Guzmán his wife which she is in the hippa form. I ex[pained to her that patient troponin levels were high and Luis Armando Soto is recommending that he is to go to the ER asap. Afshin Rawls said that when he gets back she will take him to [facility] in [place].

## 2022-01-31 ENCOUNTER — TELEPHONE (OUTPATIENT)
Dept: FAMILY MEDICINE CLINIC | Age: 62
End: 2022-01-31

## 2022-01-31 ENCOUNTER — OFFICE VISIT (OUTPATIENT)
Dept: FAMILY MEDICINE CLINIC | Age: 62
End: 2022-01-31
Payer: COMMERCIAL

## 2022-01-31 VITALS
OXYGEN SATURATION: 98 % | BODY MASS INDEX: 22.49 KG/M2 | HEIGHT: 68 IN | RESPIRATION RATE: 18 BRPM | TEMPERATURE: 97 F | DIASTOLIC BLOOD PRESSURE: 74 MMHG | HEART RATE: 96 BPM | SYSTOLIC BLOOD PRESSURE: 146 MMHG | WEIGHT: 148.4 LBS

## 2022-01-31 DIAGNOSIS — Z98.61 CAD S/P PERCUTANEOUS CORONARY ANGIOPLASTY: ICD-10-CM

## 2022-01-31 DIAGNOSIS — I51.89 DIASTOLIC DYSFUNCTION: ICD-10-CM

## 2022-01-31 DIAGNOSIS — R60.0 LOWER EXTREMITY EDEMA: Primary | ICD-10-CM

## 2022-01-31 DIAGNOSIS — Z98.890 HISTORY OF CARDIOVERSION: ICD-10-CM

## 2022-01-31 DIAGNOSIS — E53.8 B12 DEFICIENCY: ICD-10-CM

## 2022-01-31 DIAGNOSIS — I25.10 CAD S/P PERCUTANEOUS CORONARY ANGIOPLASTY: ICD-10-CM

## 2022-01-31 DIAGNOSIS — I10 ESSENTIAL HYPERTENSION: ICD-10-CM

## 2022-01-31 DIAGNOSIS — E78.2 MIXED HYPERLIPIDEMIA: Chronic | ICD-10-CM

## 2022-01-31 DIAGNOSIS — K21.9 GASTROESOPHAGEAL REFLUX DISEASE WITHOUT ESOPHAGITIS: ICD-10-CM

## 2022-01-31 PROCEDURE — G8420 CALC BMI NORM PARAMETERS: HCPCS | Performed by: NURSE PRACTITIONER

## 2022-01-31 PROCEDURE — 99214 OFFICE O/P EST MOD 30 MIN: CPT | Performed by: NURSE PRACTITIONER

## 2022-01-31 PROCEDURE — G8427 DOCREV CUR MEDS BY ELIG CLIN: HCPCS | Performed by: NURSE PRACTITIONER

## 2022-01-31 PROCEDURE — 1036F TOBACCO NON-USER: CPT | Performed by: NURSE PRACTITIONER

## 2022-01-31 PROCEDURE — G8484 FLU IMMUNIZE NO ADMIN: HCPCS | Performed by: NURSE PRACTITIONER

## 2022-01-31 PROCEDURE — 3017F COLORECTAL CA SCREEN DOC REV: CPT | Performed by: NURSE PRACTITIONER

## 2022-01-31 RX ORDER — PRAVASTATIN SODIUM 40 MG
TABLET ORAL
Qty: 90 TABLET | Refills: 3 | Status: SHIPPED | OUTPATIENT
Start: 2022-01-31

## 2022-01-31 RX ORDER — HYDROCHLOROTHIAZIDE 25 MG/1
25 TABLET ORAL EVERY MORNING
Qty: 90 TABLET | Refills: 1 | Status: SHIPPED | OUTPATIENT
Start: 2022-01-31

## 2022-01-31 RX ORDER — RIVAROXABAN 10 MG/1
10 TABLET, FILM COATED ORAL
Qty: 90 TABLET | Refills: 3 | Status: SHIPPED | OUTPATIENT
Start: 2022-01-31 | End: 2022-05-05 | Stop reason: ALTCHOICE

## 2022-01-31 RX ORDER — PANTOPRAZOLE SODIUM 20 MG/1
TABLET, DELAYED RELEASE ORAL
Qty: 90 TABLET | Refills: 3 | Status: SHIPPED | OUTPATIENT
Start: 2022-01-31

## 2022-01-31 RX ORDER — MAGNESIUM 200 MG
1 TABLET ORAL DAILY
Qty: 90 TABLET | Refills: 3 | Status: SHIPPED | OUTPATIENT
Start: 2022-01-31 | End: 2022-03-03 | Stop reason: SDUPTHER

## 2022-01-31 NOTE — PROGRESS NOTES
Chief Complaint   Patient presents with   [facility] ED Follow-up     elevated tropinin levels, pt was seen in [facility]. Records scanned in. Pt declined ever having symptoms. Pt was given sotalol and xarelto after ED visit, pt stopped these medications due to noticing low BP and swelling in legs. HPI: Princess Macias is a 62 y.o. male presenting for follow-up of ER evaluation for elevated troponin. He also had some recent lower extremity edema. CAD/diastolic dysfunction/history of Atrial fibrillation:   He states that he saw his cardiologist earlier this month and was advised to have a procedure to shock his heart back into a normal rhythm. He had recent cardioversion at [facility].  He states that he did not truly understand the procedure or possible complications. He was started on a blood thinner and a different type of beta-blocker and states that he has had significant side effects. He was on Xarelto in the past and had severe bleeding. He stopped it at that time. He was also on a beta-blocker metoprolol. He was able to get his heart rate and blood pressure better controlled with reinforced herbs and prefers a more natural approach to medicine. He states that since having the cardioversion he has had significant elevation of heart rate. He does typically have daily alcohol intake and knows that there is an interaction between sotalol and alcohol intake. His blood pressures were in the 80s and he felt terrible. He stopped the sotalol completely 2 days ago. He is still on the Xarelto and wanted to discuss with me. He has plans to return to [place] later this week. He states that [facility] emergency room told him that his troponin level was improving was likely secondary to recent cardioversion. They advised him to have follow-up echocardiogram.  Their most recent report on file was from 2015.   The patient states that he had an echocardiogram prior to cardioversion and showed improved heart function overall. He was told that he may benefit from water pill but he states that the swelling in his feet has gone down since stopping the sotalol. He has not had any difficulty breathing while laying down. Has not been as active lately but has been going to the gym routinely while living in [place]. He would like to get back to his previous lifestyle. He states that he has been feeling worse lately than he has in a long time. Felt much better before the cardioversion took place. He would prefer to follow with nurse practitioner through cardiology rather than physician. Patient would like to have enough time during visit to explain his holistic approach to medicine preference. He would like to continue co-Q10 and B12 supplementation. Is requesting 90-day refills prior to heading to [place] for the next few months. He continues to take Protonix routinely for acid reflux which has been helpful but he is aware that this does prevent full absorption of B12. I have reviewed the following diagnostic data:  Cardiology procedure notes, EKG, echocardiogram from January, 2022. ROS: This patient reports no chest pain or pressure. There is no shortness of breath or cough. The patient reports no nausea or vomiting. There is no heartburn or indigestion. There is no diarrhea or constipation. No black, bloody, mucusy or tarry stool noticed. The patient reports no bloating and no change in appetite. EXAM:  Constitutional Blood pressure (!) 146/74, pulse 96, temperature 97 °F (36.1 °C), temperature source Temporal, resp. rate 18, height 5' 8\" (1.727 m), weight 148 lb 6.4 oz (67.3 kg), SpO2 98 %. .  He has a normal affect, no acute distress, appears well developed and well nourished. Neck:  neck- supple, no mass, non-tender and no bruits  Lungs:  Normal expansion. Clear to auscultation. No rales, rhonchi, or wheezing., No chest wall tenderness.   Heart: Murmur(s)-  2/6 diastolic throughout the precordium  Abdomen:  Soft, non-tender, normal bowel sounds. No bruits, organomegaly or masses. Extremities: Extremities warm to touch, pink, with no edema. DIAGNOSIS:    Diagnosis Orders   1. Lower extremity edema  hydroCHLOROthiazide (HYDRODIURIL) 25 MG tablet    CBC Auto Differential    Comprehensive Metabolic Panel   2. Diastolic dysfunction  metoprolol tartrate (LOPRESSOR) 25 MG tablet   3. Essential hypertension     4. CAD S/P percutaneous coronary angioplasty  Coenzyme Q10 100 MG CHEW   5. History of cardioversion  metoprolol tartrate (LOPRESSOR) 25 MG tablet    rivaroxaban (XARELTO) 10 MG TABS tablet   6. B12 deficiency  Cyanocobalamin (B-12) 1000 MCG SUBL    Vitamin B12 & Folate   7. Mixed hyperlipidemia  pravastatin (PRAVACHOL) 40 MG tablet   8. Gastroesophageal reflux disease without esophagitis  pantoprazole (PROTONIX) 20 MG tablet         PLAN: Include orders in the DX section. follow up in the summer. Blood work one week prior as ordered. 1.  2.  3.  4.  5.  We discussed recommendation for beta-blocker therapy with history of diastolic dysfunction and recent cardioversion. He has discontinued the sotalol secondary to side effects. Have advised him to restart metoprolol twice daily as he has tolerated this better in the past.  Notify me if heart rate does not improve. Notify me if edema symptoms return/worsen. We discussed recommendation to continue anticoagulation after cardioversion. If he develops abnormal bleeding notify me or cardiologist.     6.  We will check B12 level this week. Sublingual supplement ordered. 7.  He continues to take statin with no reported stomach upset or muscle cramping. 8.  Continue proton pump inhibitor. Helps with gastric/GI protection especially while taking Xarelto. He plans to return to [place] later this week. Discussed option of low-dose HCTZ if lower extremity edema is significant.     I advised him to start this medication today however so that we can repeat blood work on Thursday to determine electrolyte balance while on medication before leaving out of state. He does not have to remain on diuretic if no symptoms present. Please note this report has been partially produced using speech recognition software and may cause contain errors related to that system including grammar, punctuation and spelling as well as words and phrases that may seem inappropriate. If there are questions or concerns please feel free to contact me to clarify.       Electronically signed by RADHA Crouch CNP-CNP, 9:32 AM 1/31/22

## 2022-01-31 NOTE — TELEPHONE ENCOUNTER
[facility] pharmacy calling regarding 2 medications. Insurance is only the covering the Xarelto for a max quantity of 35 every 144 days. They are not sure if it needs prior authorization, or if insurance wants eliquis. Please advise. She also wanted to let you know the coenzyme Q10 does not come in a chewable tablet so, they are going to switch it out for a soft gel capsule.

## 2022-02-04 ENCOUNTER — HOSPITAL ENCOUNTER (OUTPATIENT)
Dept: LAB | Age: 62
Discharge: HOME | End: 2022-02-04
Payer: COMMERCIAL

## 2022-02-04 DIAGNOSIS — R60.0 LOWER EXTREMITY EDEMA: ICD-10-CM

## 2022-02-04 DIAGNOSIS — E53.8 B12 DEFICIENCY: ICD-10-CM

## 2022-02-04 LAB
ALBUMIN SERPL-MCNC: 3.9 G/DL (ref 3.5–4.6)
ALP BLD-CCNC: 211 U/L (ref 35–104)
ALT SERPL-CCNC: 42 U/L (ref 0–41)
ANION GAP SERPL CALCULATED.3IONS-SCNC: 15 MEQ/L (ref 9–15)
AST SERPL-CCNC: 58 U/L (ref 0–40)
BASOPHILS ABSOLUTE: 0 K/UL (ref 0–0.2)
BASOPHILS RELATIVE PERCENT: 0.5 %
BILIRUB SERPL-MCNC: 0.6 MG/DL (ref 0.2–0.7)
BUN BLDV-MCNC: 17 MG/DL (ref 8–23)
CALCIUM SERPL-MCNC: 9.4 MG/DL (ref 8.5–9.9)
CHLORIDE BLD-SCNC: 91 MEQ/L (ref 95–107)
CO2: 24 MEQ/L (ref 20–31)
CREAT SERPL-MCNC: 0.7 MG/DL (ref 0.7–1.2)
EOSINOPHILS ABSOLUTE: 0.1 K/UL (ref 0–0.7)
EOSINOPHILS RELATIVE PERCENT: 0.8 %
GFR AFRICAN AMERICAN: >60
GFR NON-AFRICAN AMERICAN: >60
GLOBULIN: 3.6 G/DL (ref 2.3–3.5)
GLUCOSE BLD-MCNC: 113 MG/DL (ref 70–99)
HCT VFR BLD CALC: 50.2 % (ref 42–52)
HEMOGLOBIN: 16.7 G/DL (ref 14–18)
LYMPHOCYTES ABSOLUTE: 1.1 K/UL (ref 1–4.8)
LYMPHOCYTES RELATIVE PERCENT: 11.4 %
MCH RBC QN AUTO: 31 PG (ref 27–31.3)
MCHC RBC AUTO-ENTMCNC: 33.2 % (ref 33–37)
MCV RBC AUTO: 93.5 FL (ref 80–100)
MONOCYTES ABSOLUTE: 1.5 K/UL (ref 0.2–0.8)
MONOCYTES RELATIVE PERCENT: 15.9 %
NEUTROPHILS ABSOLUTE: 6.9 K/UL (ref 1.4–6.5)
NEUTROPHILS RELATIVE PERCENT: 71.4 %
PDW BLD-RTO: 15.6 % (ref 11.5–14.5)
PLATELET # BLD: 376 K/UL (ref 130–400)
POTASSIUM SERPL-SCNC: 3.9 MEQ/L (ref 3.4–4.9)
RBC # BLD: 5.37 M/UL (ref 4.7–6.1)
SODIUM BLD-SCNC: 130 MEQ/L (ref 135–144)
TOTAL PROTEIN: 7.5 G/DL (ref 6.3–8)
WBC # BLD: 9.7 K/UL (ref 4.8–10.8)

## 2022-02-04 PROCEDURE — 85025 COMPLETE CBC W/AUTO DIFF WBC: CPT

## 2022-02-04 PROCEDURE — 82607 VITAMIN B-12: CPT

## 2022-02-04 PROCEDURE — 80053 COMPREHEN METABOLIC PANEL: CPT

## 2022-02-04 PROCEDURE — 82746 ASSAY OF FOLIC ACID SERUM: CPT

## 2022-02-04 PROCEDURE — 36415 COLL VENOUS BLD VENIPUNCTURE: CPT

## 2022-02-05 LAB
FOLATE: 4.1 NG/ML
VITAMIN B-12: 1003 PG/ML (ref 232–1245)

## 2022-03-02 ENCOUNTER — PATIENT MESSAGE (OUTPATIENT)
Dept: FAMILY MEDICINE CLINIC | Age: 62
End: 2022-03-02

## 2022-03-02 DIAGNOSIS — E53.8 B12 DEFICIENCY: ICD-10-CM

## 2022-03-02 NOTE — TELEPHONE ENCOUNTER
From: Xi Lucio  To: Lamar Singhkylecale  Sent: 3/2/2022 10:10 AM EST  Subject: Upcoming dental work    [address]; Hope all is good in your life. I got your message on my blood work and am following your advice. 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 But instead request my doctor provide whatever prescription they feel I should have. My previous dentist gave me Amoxicillin 2,000mg (in four 500mg tablets). If you want me to have those, could you [facility] at [address] [place] [address] Estrella Mo [place], ph# [phone]? Also, could I get my sublingual B12 as a prescription? If so, that could [facility] regular [facility] [place]. Other than dental stuff, I seem to be doing fine. No apparent side-effect from the meds, and I'm back to the gym. Thanks again and have a safe and happy few months.    Ginny Carrasco

## 2022-03-03 RX ORDER — AMOXICILLIN 500 MG/1
2000 TABLET, FILM COATED ORAL SEE ADMIN INSTRUCTIONS
Qty: 4 TABLET | Refills: 2 | Status: SHIPPED | OUTPATIENT
Start: 2022-03-03 | End: 2022-10-31 | Stop reason: SDUPTHER

## 2022-03-03 RX ORDER — MAGNESIUM 200 MG
1 TABLET ORAL DAILY
Qty: 90 TABLET | Refills: 3 | Status: SHIPPED | OUTPATIENT
Start: 2022-03-03

## 2022-05-05 ENCOUNTER — OFFICE VISIT (OUTPATIENT)
Dept: CARDIOLOGY CLINIC | Age: 62
End: 2022-05-05
Payer: COMMERCIAL

## 2022-05-05 VITALS
DIASTOLIC BLOOD PRESSURE: 80 MMHG | SYSTOLIC BLOOD PRESSURE: 138 MMHG | RESPIRATION RATE: 16 BRPM | HEART RATE: 75 BPM | OXYGEN SATURATION: 97 % | WEIGHT: 163 LBS | BODY MASS INDEX: 24.78 KG/M2

## 2022-05-05 DIAGNOSIS — R01.1 HEART MURMUR: Primary | Chronic | ICD-10-CM

## 2022-05-05 DIAGNOSIS — Z98.61 CAD S/P PERCUTANEOUS CORONARY ANGIOPLASTY: ICD-10-CM

## 2022-05-05 DIAGNOSIS — I48.19 PERSISTENT ATRIAL FIBRILLATION (HCC): Primary | ICD-10-CM

## 2022-05-05 DIAGNOSIS — I48.19 PERSISTENT ATRIAL FIBRILLATION (HCC): ICD-10-CM

## 2022-05-05 DIAGNOSIS — I25.10 CAD S/P PERCUTANEOUS CORONARY ANGIOPLASTY: ICD-10-CM

## 2022-05-05 PROCEDURE — 1036F TOBACCO NON-USER: CPT | Performed by: NURSE PRACTITIONER

## 2022-05-05 PROCEDURE — 99213 OFFICE O/P EST LOW 20 MIN: CPT | Performed by: NURSE PRACTITIONER

## 2022-05-05 PROCEDURE — G8427 DOCREV CUR MEDS BY ELIG CLIN: HCPCS | Performed by: NURSE PRACTITIONER

## 2022-05-05 PROCEDURE — 3017F COLORECTAL CA SCREEN DOC REV: CPT | Performed by: NURSE PRACTITIONER

## 2022-05-05 PROCEDURE — 93000 ELECTROCARDIOGRAM COMPLETE: CPT | Performed by: NURSE PRACTITIONER

## 2022-05-05 PROCEDURE — G8420 CALC BMI NORM PARAMETERS: HCPCS | Performed by: NURSE PRACTITIONER

## 2022-05-06 NOTE — PROGRESS NOTES
Chief Complaint   Patient presents with    Atrial Fibrillation       Patient presents for initial medical evaluation. Patient is followed on a regular basis by Dr. Susana Lozano, APRN - CNP. HX OF CAD S/P PCI. HX OF AVR at [facility] 10 yrs ago. Patient with hx of cancer at age 26 and had radiation to chest. Patient with hx of Aflutter since 2019. Not on full dose DOAC. Patient with hx of hemorrhoids. Last echo in 2017 with EF of 60%, AV with mean gradient of 16mmhg. Pt denies chest pain, dyspnea, dyspnea on exertion, change in exercise capacity, fatigue,  nausea, vomiting, diarrhea, constipation, motor weakness, insomnia, weight loss, syncope, dizziness, lightheadedness, palpitations, PND, orthopnea, or claudication. He is active without any angina. 1-13-22: doing well. No issues. Noted to venkatesh in afib last OV, placed on DOAC. HX OF CAD S/P PCI. HX OF AVR at [facility] 10 yrs ago. Patient with hx of cancer at age 26 and had radiation to chest. Patient with hx of Aflutter since 2019. Not on full dose DOAC. Patient with hx of hemorrhoids. Echo with EF of 55%, normal bioprosthetic AV in place. Pt denies chest pain, dyspnea, dyspnea on exertion, change in exercise capacity, fatigue,  nausea, vomiting, diarrhea, constipation, motor weakness, insomnia, weight loss, syncope, dizziness, lightheadedness, palpitations, PND, orthopnea, or claudication. EKG with NSR< no ischemia. EKG with afib/flutter. 1/20/2022:HX OF CAD S/P PCI. HX OF AVR at [facility] 10 yrs ago. Patient with hx of cancer at age 26 and had radiation to chest. Patient with hx of Aflutter since 2019. Not on full dose DOAC. Patient with hx of hemorrhoids. Echo with EF of 55%, normal bioprosthetic AV in place. Patient seen in office today status post cardioversion and sotalol drug loading 1 week ago. EKG done in office today shows patient in normal sinus rhythm, heart rate 87, QTc 469. Patient states he is feeling well overall.   States he exercises at the gym regularly. Pt denies chest pain, dyspnea, dyspnea on exertion, change in exercise capacity, fatigue,  nausea, vomiting, diarrhea, constipation, motor weakness, insomnia, weight loss, syncope, dizziness, lightheadedness, palpitations, PND, orthopnea, or claudication. No bleeding issues. Pt denies any angina or CHF type symptoms. No recent hospitalizations. Pt is compliant with all Rx medications. Blood pressure is high in the office today at 180/88. Patient states he gets whitecoat syndrome. Patient diligently checks his blood pressure and heart rate at home and states they have been normal.    5/5/2022: Patient seen in office today for follow-up visit. He reports continued, mild intermittent episodes of palpitations. Patient is unsure whether he is going in and out of A. fib. EKG in office today shows sinus rhythm, chronic right bundle branch block and first-degree AV block. No new changes when compared to last EKG. Pt denies chest pain, dyspnea, dyspnea on exertion, change in exercise capacity, fatigue,  nausea, vomiting, diarrhea, constipation, motor weakness, insomnia, weight loss, syncope, dizziness, lightheadedness, PND, orthopnea, or claudication. No bleeding issues. Pt denies any angina or CHF type symptoms. No recent hospitalizations. Pt is compliant with all Rx medications. Lung sounds are clear to auscultation. No carotid bruit heard. Blood pressure and heart rate are under control.          Patient Active Problem List   Diagnosis    Hypertension    Heart murmur    Hodgkin disease ([address]    GERD (gastroesophageal reflux disease)    CAD S/P percutaneous coronary angioplasty    History of stroke    Hyperglycemia    Hyperlipidemia    History of aortic valve replacement    Hiatal hernia    Diverticulosis    AVM (arteriovenous malformation)    Anemia    Borderline diabetes    Iron deficiency anemia    Internal hemorrhoid    Family history of heart disease  History of tobacco abuse    Basal cell carcinoma, trunk    Persistent atrial fibrillation (HCC)       Past Surgical History:   Procedure Laterality Date    ANGIOPLASTY  11/24/15    [facility]SH[facility] AQUINO  PCI PROCEDURE    AORTIC VALVE REPLACEMENT      Bovine-MODEL# [id number]    CARDIAC CATHETERIZATION  2018    HERNIA REPAIR      3 times    PTCA      RESOLUTE/MEDTRONICS     SIGMOIDOSCOPY      SKIN CANCER EXCISION  10/21/2016    SPLENECTOMY      TOOTH EXTRACTION      VENTRICULAR ABLATION SURGERY      WISDOM TOOTH EXTRACTION         Social History     Socioeconomic History    Marital status:      Spouse name: None    Number of children: None    Years of education: None    Highest education level: None   Occupational History    None   Tobacco Use    Smoking status: Former Smoker     Packs/day: 0.25     Years: 5.00     Pack years: 1.25     Types: Pipe     Quit date: 1985     Years since quittin.6    Smokeless tobacco: Never Used   Vaping Use    Vaping Use: None   Substance and Sexual Activity    Alcohol use: Yes     Comment: weekly    Drug use: Yes     Types: Marijuana [place] [address])     Comment: weekly    Sexual activity: None   Other Topics Concern    None   Social History Narrative    None     Social Determinants of Health     Financial Resource Strain:     Difficulty of Paying Living Expenses: Not on file   Food Insecurity:     Worried About Running Out of Food in the Last Year: Not on file    Mil of Food in the Last Year: Not on file   Transportation Needs:     Lack of Transportation (Medical): Not on file    Lack of Transportation (Non-Medical):  Not on file   Physical Activity:     Days of Exercise per Week: Not on file    Minutes of Exercise per Session: Not on file   Stress:     Feeling of Stress : Not on file   Social Connections:     Frequency of Communication with Friends and Family: Not on file    Frequency of Social Gatherings with Friends and Family: Not on file    Attends [employer] Services: Not on file    Active Member of Clubs or Organizations: Not on file    Attends Club or Organization Meetings: Not on file    Marital Status: Not on file   Intimate Partner Violence:     Fear of Current or Ex-Partner: Not on file    Emotionally Abused: Not on file    Physically Abused: Not on file    Sexually Abused: Not on file   Housing Stability:     Unable to Pay for Housing in the Last Year: Not on file    Number of [place] in the Last Year: Not on file    Unstable Housing in the Last Year: Not on file       Family History   Problem Relation Age of Onset    Arthritis Mother     Arthritis Father     Heart Disease Father     High Blood Pressure Father     Cancer Other     High Blood Pressure Other     Diabetes Maternal Grandmother     Diabetes Paternal Grandfather        Current Outpatient Medications   Medication Sig Dispense Refill    Amoxicillin 500 MG TABS Take 2,000 mg by mouth See Admin Instructions Prior to dental procedures.  4 tablet 2    Cyanocobalamin (B-12) 1000 MCG SUBL Place 1 tablet under the tongue daily 90 tablet 3    Coenzyme Q10 100 MG CHEW Take 1 tablet by mouth daily 90 tablet 3    pravastatin (PRAVACHOL) 40 MG tablet TAKE 1 TABLET BY MOUTH EVERY DAY 90 tablet 3    pantoprazole (PROTONIX) 20 MG tablet TAKE 1 TABLET BY MOUTH EVERY DAY 90 tablet 3    metoprolol tartrate (LOPRESSOR) 25 MG tablet TAKE 1 TABLET BY MOUTH TWICE A  tablet 3    hydroCHLOROthiazide (HYDRODIURIL) 25 MG tablet Take 1 tablet by mouth every morning 90 tablet 1    apixaban (ELIQUIS) 2.5 MG TABS tablet Take 1 tablet by mouth 2 times daily 180 tablet 1    NONFORMULARY perda humme [id number] herb      EPINEPHrine (EPIPEN) 0.3 MG/0.3ML SOAJ injection Inject 0.3 mg into the muscle once as needed      nitroGLYCERIN (NITROSTAT) 0.4 MG SL tablet PLACE 1 TABLET UNDER THE TONGUE EVERY 5 MINUTES AS NEEDED FOR CHEST PAIN 25 tablet 3    aspirin 81 MG tablet Take 81 mg by mouth daily       No current facility-administered medications for this visit. Insect extract allergy skin test, Lipitor [atorvastatin], and Seasonal    Review of Systems:  General ROS: negative  Psychological ROS: negative  Hematological and Lymphatic ROS: No history of blood clots or bleeding disorder. Respiratory ROS: no cough, shortness of breath, or wheezing  Cardiovascular ROS: no chest pain or dyspnea on exertion  Gastrointestinal ROS: no abdominal pain, change in bowel habits, or black or bloody stools  Genito-Urinary ROS: no dysuria, trouble voiding, or hematuria  Musculoskeletal ROS: negative  Neurological ROS: no TIA or stroke symptoms  Dermatological ROS: negative    VITALS:  Blood pressure 138/80, pulse 75, resp. rate 16, weight 163 lb (73.9 kg), SpO2 97 %. Body mass index is 24.78 kg/m². Physical Examination:  General appearance - alert, well appearing, and in no distress and normal appearing weight  Mental status - alert, oriented to person, place, and time  Neck - Neck is supple, no JVD or carotid bruits. No thyromegaly or adenopathy. Chest - clear to auscultation, no wheezes, rales or rhonchi, symmetric air entry  Heart - normal rate, regular rhythm, normal S1, S2, no murmurs, rubs, clicks or gallops  Abdomen - soft, nontender, nondistended, no masses or organomegaly  Neurological - alert, oriented, normal speech, no focal findings or movement disorder noted  Extremities - peripheral pulses normal, no pedal edema, no clubbing or cyanosis  Skin - normal coloration and turgor, no rashes, no suspicious skin lesions noted      EKG: aflutter    Orders Placed This Encounter   Procedures    EKG 12 lead       ASSESSMENT:     Diagnosis Orders   1. Heart murmur     2. Persistent atrial fibrillation (HCC)  EKG 12 lead   3. CAD S/P percutaneous coronary angioplasty           PLAN:     As always, aggressive risk factor modification is strongly recommended.  We should adhere to the JNC VIII guidelines for HTN management and the NCEP ATP III guidelines for LDL-C management. Cardiac diet is always recommended with low fat, cholesterol, calories and sodium. Continue medications at current doses. Check EKG    1 week event monitor to determine if he is having any PAF    Discussed addition of more magnesium in his diet to help with palpitations. Check ECHO in future given hx of Bio AVR. Will continue to monitor patient clinically, if symptoms develop or worsen, they are to let me know ASAP or head to the nearest emergeny room. Patient was advised and encouraged to check blood pressure at home or at a pharmacy, maintain a logbook, and also call us back if blood pressure are above the target ranges or if it is low. Patient clearly understands and agrees to the instructions. We will need to continue to monitor muscle and liver enzymes, BUN, CR, and electrolytes.

## 2022-05-17 ENCOUNTER — TELEPHONE (OUTPATIENT)
Dept: CARDIOLOGY CLINIC | Age: 62
End: 2022-05-17

## 2022-05-17 DIAGNOSIS — I48.19 PERSISTENT ATRIAL FIBRILLATION (HCC): Primary | ICD-10-CM

## 2022-05-17 NOTE — TELEPHONE ENCOUNTER
We do not order delayed start monitors. Per Gio they had reached out to the patient and he states he wasn't sure he wanted to wear it. Spoke with patient and he is going to send the monitor back and not complete this testing. Patient would like to know how much magnesium you recommend he take daily. Can mychart or call back.

## 2022-05-18 ENCOUNTER — TELEPHONE (OUTPATIENT)
Dept: CARDIOLOGY CLINIC | Age: 62
End: 2022-05-18

## 2022-05-18 RX ORDER — MAGNESIUM OXIDE 400 MG/1
400 TABLET ORAL DAILY
Qty: 90 TABLET | Refills: 2 | Status: SHIPPED | OUTPATIENT
Start: 2022-05-18

## 2022-07-06 NOTE — TELEPHONE ENCOUNTER
patient requesting medication refill. Due to insurance coverage patient is requesting that the current script be issued in 60 tablet quantities with multiple refills, equivalent to the old script, for approval purposes.  Please approve or deny this request.    Rx requested:  Requested Prescriptions     Pending Prescriptions Disp Refills    apixaban (ELIQUIS) 2.5 MG TABS tablet 180 tablet 1     Sig: Take 1 tablet by mouth 2 times daily         Last Office Visit:   1/31/2022      Next Visit Date:  Future Appointments   Date Time Provider Zach Gottlieb   7/19/2022  8:00 AM Jeffry Paul 91   11/14/2022  8:00 AM Gordo Jolly[facility]

## 2022-10-14 ENCOUNTER — HOSPITAL ENCOUNTER (OUTPATIENT)
Dept: LAB | Age: 62
Discharge: HOME | End: 2022-10-14
Payer: COMMERCIAL

## 2022-10-14 DIAGNOSIS — E78.2 MIXED HYPERLIPIDEMIA: Chronic | ICD-10-CM

## 2022-10-14 DIAGNOSIS — E55.9 VITAMIN D DEFICIENCY: ICD-10-CM

## 2022-10-14 DIAGNOSIS — R73.03 BORDERLINE DIABETES: ICD-10-CM

## 2022-10-14 LAB
ACANTHOCYTES: ABNORMAL
ALBUMIN SERPL-MCNC: 4.7 G/DL (ref 3.5–4.6)
ALP BLD-CCNC: 143 U/L (ref 35–104)
ALT SERPL-CCNC: 30 U/L (ref 0–41)
ANION GAP SERPL CALCULATED.3IONS-SCNC: 18 MEQ/L (ref 9–15)
ANISOCYTOSIS: ABNORMAL
AST SERPL-CCNC: 45 U/L (ref 0–40)
BASOPHILS ABSOLUTE: 0.4 K/UL (ref 0–0.2)
BASOPHILS RELATIVE PERCENT: 4 %
BILIRUB SERPL-MCNC: 0.6 MG/DL (ref 0.2–0.7)
BUN BLDV-MCNC: 16 MG/DL (ref 8–23)
CALCIUM SERPL-MCNC: 9.8 MG/DL (ref 8.5–9.9)
CHLORIDE BLD-SCNC: 97 MEQ/L (ref 95–107)
CHOLESTEROL, TOTAL: 160 MG/DL (ref 0–199)
CO2: 23 MEQ/L (ref 20–31)
CREAT SERPL-MCNC: 0.81 MG/DL (ref 0.7–1.2)
EOSINOPHILS ABSOLUTE: 0.4 K/UL (ref 0–0.7)
EOSINOPHILS RELATIVE PERCENT: 4 %
GFR AFRICAN AMERICAN: >60
GFR NON-AFRICAN AMERICAN: >60
GLOBULIN: 3.2 G/DL (ref 2.3–3.5)
GLUCOSE BLD-MCNC: 118 MG/DL (ref 70–99)
HBA1C MFR BLD: 6.7 % (ref 4.8–5.9)
HCT VFR BLD CALC: 43.3 % (ref 42–52)
HDLC SERPL-MCNC: 56 MG/DL (ref 40–59)
HEMOGLOBIN: 14.2 G/DL (ref 14–18)
LDL CHOLESTEROL CALCULATED: 90 MG/DL (ref 0–129)
LYMPHOCYTES ABSOLUTE: 0.7 K/UL (ref 1–4.8)
LYMPHOCYTES RELATIVE PERCENT: 7 %
MCH RBC QN AUTO: 31.1 PG (ref 27–31.3)
MCHC RBC AUTO-ENTMCNC: 32.8 % (ref 33–37)
MCV RBC AUTO: 94.8 FL (ref 80–100)
MONOCYTES ABSOLUTE: 2.1 K/UL (ref 0.2–0.8)
MONOCYTES RELATIVE PERCENT: 21.9 %
NEUTROPHILS ABSOLUTE: 6.1 K/UL (ref 1.4–6.5)
NEUTROPHILS RELATIVE PERCENT: 64 %
PDW BLD-RTO: 15.2 % (ref 11.5–14.5)
PLATELET # BLD: 354 K/UL (ref 130–400)
PLATELET SLIDE REVIEW: NORMAL
POIKILOCYTES: ABNORMAL
POTASSIUM SERPL-SCNC: 5.1 MEQ/L (ref 3.4–4.9)
RBC # BLD: 4.57 M/UL (ref 4.7–6.1)
SODIUM BLD-SCNC: 138 MEQ/L (ref 135–144)
TOTAL PROTEIN: 7.9 G/DL (ref 6.3–8)
TRIGL SERPL-MCNC: 72 MG/DL (ref 0–150)
WBC # BLD: 9.6 K/UL (ref 4.8–10.8)

## 2022-10-14 PROCEDURE — 80053 COMPREHEN METABOLIC PANEL: CPT

## 2022-10-14 PROCEDURE — 83036 HEMOGLOBIN GLYCOSYLATED A1C: CPT

## 2022-10-14 PROCEDURE — 36415 COLL VENOUS BLD VENIPUNCTURE: CPT

## 2022-10-14 PROCEDURE — 80061 LIPID PANEL: CPT

## 2022-10-14 PROCEDURE — 82306 VITAMIN D 25 HYDROXY: CPT

## 2022-10-14 PROCEDURE — 85025 COMPLETE CBC W/AUTO DIFF WBC: CPT

## 2022-10-15 LAB — VITAMIN D 25-HYDROXY: 30.5 NG/ML

## 2022-10-17 ENCOUNTER — TELEPHONE (OUTPATIENT)
Dept: CARDIOLOGY CLINIC | Age: 62
End: 2022-10-17

## 2022-10-20 ENCOUNTER — OFFICE VISIT (OUTPATIENT)
Dept: FAMILY MEDICINE CLINIC | Age: 62
End: 2022-10-20
Payer: COMMERCIAL

## 2022-10-20 VITALS
TEMPERATURE: 96.2 F | OXYGEN SATURATION: 95 % | DIASTOLIC BLOOD PRESSURE: 82 MMHG | HEART RATE: 83 BPM | HEIGHT: 66 IN | RESPIRATION RATE: 16 BRPM | SYSTOLIC BLOOD PRESSURE: 146 MMHG | BODY MASS INDEX: 26.26 KG/M2 | WEIGHT: 163.4 LBS

## 2022-10-20 DIAGNOSIS — Z98.61 CAD S/P PERCUTANEOUS CORONARY ANGIOPLASTY: ICD-10-CM

## 2022-10-20 DIAGNOSIS — I10 ESSENTIAL HYPERTENSION: Primary | ICD-10-CM

## 2022-10-20 DIAGNOSIS — T63.481S: ICD-10-CM

## 2022-10-20 DIAGNOSIS — R73.03 BORDERLINE DIABETES: ICD-10-CM

## 2022-10-20 DIAGNOSIS — E53.8 B12 DEFICIENCY: ICD-10-CM

## 2022-10-20 DIAGNOSIS — E55.9 VITAMIN D DEFICIENCY: ICD-10-CM

## 2022-10-20 DIAGNOSIS — E53.8 FOLATE DEFICIENCY: ICD-10-CM

## 2022-10-20 DIAGNOSIS — C81.90 HODGKIN LYMPHOMA, UNSPECIFIED HODGKIN LYMPHOMA TYPE, UNSPECIFIED BODY REGION (HCC): ICD-10-CM

## 2022-10-20 DIAGNOSIS — I25.10 CAD S/P PERCUTANEOUS CORONARY ANGIOPLASTY: ICD-10-CM

## 2022-10-20 DIAGNOSIS — E78.2 MIXED HYPERLIPIDEMIA: ICD-10-CM

## 2022-10-20 DIAGNOSIS — I51.89 DIASTOLIC DYSFUNCTION: ICD-10-CM

## 2022-10-20 DIAGNOSIS — Z95.2 HISTORY OF AORTIC VALVE REPLACEMENT: ICD-10-CM

## 2022-10-20 DIAGNOSIS — D50.8 OTHER IRON DEFICIENCY ANEMIA: ICD-10-CM

## 2022-10-20 PROCEDURE — G8427 DOCREV CUR MEDS BY ELIG CLIN: HCPCS | Performed by: NURSE PRACTITIONER

## 2022-10-20 PROCEDURE — 1036F TOBACCO NON-USER: CPT | Performed by: NURSE PRACTITIONER

## 2022-10-20 PROCEDURE — G8419 CALC BMI OUT NRM PARAM NOF/U: HCPCS | Performed by: NURSE PRACTITIONER

## 2022-10-20 PROCEDURE — 99214 OFFICE O/P EST MOD 30 MIN: CPT | Performed by: NURSE PRACTITIONER

## 2022-10-20 PROCEDURE — 3017F COLORECTAL CA SCREEN DOC REV: CPT | Performed by: NURSE PRACTITIONER

## 2022-10-20 PROCEDURE — G8484 FLU IMMUNIZE NO ADMIN: HCPCS | Performed by: NURSE PRACTITIONER

## 2022-10-20 RX ORDER — CHOLECALCIFEROL (VITAMIN D3) 125 MCG
CAPSULE ORAL
COMMUNITY
Start: 2022-09-26 | End: 2022-10-20 | Stop reason: SDUPTHER

## 2022-10-20 RX ORDER — EPINEPHRINE 0.3 MG/.3ML
0.3 INJECTION SUBCUTANEOUS
Qty: 2 EACH | Refills: 2 | Status: SHIPPED | OUTPATIENT
Start: 2022-10-20 | End: 2022-10-20

## 2022-10-20 SDOH — ECONOMIC STABILITY: FOOD INSECURITY: WITHIN THE PAST 12 MONTHS, THE FOOD YOU BOUGHT JUST DIDN'T LAST AND YOU DIDN'T HAVE MONEY TO GET MORE.: NEVER TRUE

## 2022-10-20 SDOH — ECONOMIC STABILITY: FOOD INSECURITY: WITHIN THE PAST 12 MONTHS, YOU WORRIED THAT YOUR FOOD WOULD RUN OUT BEFORE YOU GOT MONEY TO BUY MORE.: NEVER TRUE

## 2022-10-20 ASSESSMENT — PATIENT HEALTH QUESTIONNAIRE - PHQ9
1. LITTLE INTEREST OR PLEASURE IN DOING THINGS: 1
SUM OF ALL RESPONSES TO PHQ9 QUESTIONS 1 & 2: 2
SUM OF ALL RESPONSES TO PHQ QUESTIONS 1-9: 2
SUM OF ALL RESPONSES TO PHQ QUESTIONS 1-9: 2
7. TROUBLE CONCENTRATING ON THINGS, SUCH AS READING THE NEWSPAPER OR WATCHING TELEVISION: 0
SUM OF ALL RESPONSES TO PHQ QUESTIONS 1-9: 2
2. FEELING DOWN, DEPRESSED OR HOPELESS: 1
3. TROUBLE FALLING OR STAYING ASLEEP: 0
10. IF YOU CHECKED OFF ANY PROBLEMS, HOW DIFFICULT HAVE THESE PROBLEMS MADE IT FOR YOU TO DO YOUR WORK, TAKE CARE OF THINGS AT HOME, OR GET ALONG WITH OTHER PEOPLE: 0
5. POOR APPETITE OR OVEREATING: 0
8. MOVING OR SPEAKING SO SLOWLY THAT OTHER PEOPLE COULD HAVE NOTICED. OR THE OPPOSITE, BEING SO FIGETY OR RESTLESS THAT YOU HAVE BEEN MOVING AROUND A LOT MORE THAN USUAL: 0
SUM OF ALL RESPONSES TO PHQ QUESTIONS 1-9: 2
6. FEELING BAD ABOUT YOURSELF - OR THAT YOU ARE A FAILURE OR HAVE LET YOURSELF OR YOUR FAMILY DOWN: 0
4. FEELING TIRED OR HAVING LITTLE ENERGY: 0
9. THOUGHTS THAT YOU WOULD BE BETTER OFF DEAD, OR OF HURTING YOURSELF: 0

## 2022-10-20 ASSESSMENT — COLUMBIA-SUICIDE SEVERITY RATING SCALE - C-SSRS
6. HAVE YOU EVER DONE ANYTHING, STARTED TO DO ANYTHING, OR PREPARED TO DO ANYTHING TO END YOUR LIFE?: NO
1. WITHIN THE PAST MONTH, HAVE YOU WISHED YOU WERE DEAD OR WISHED YOU COULD GO TO SLEEP AND NOT WAKE UP?: NO
2. HAVE YOU ACTUALLY HAD ANY THOUGHTS OF KILLING YOURSELF?: NO

## 2022-10-20 ASSESSMENT — SOCIAL DETERMINANTS OF HEALTH (SDOH): HOW HARD IS IT FOR YOU TO PAY FOR THE VERY BASICS LIKE FOOD, HOUSING, MEDICAL CARE, AND HEATING?: NOT HARD AT ALL

## 2022-10-20 NOTE — PROGRESS NOTES
Dyslipidemia and Hypertension/elevated glucose: Jordan Ivy presents for evaluation of lipids. Compliance with treatment thus far has been good. The patient exercises daily. Patient here for follow-up of elevated blood pressure. He is exercising and is adherent to low salt diet. Blood pressure is well controlled at home Antihypertensive medication side effects: no medication side effects noted. Use of agents associated with hypertension: none. No new myalgias or GI upset on pravastatin (Pravachol). He continues to utilize [employer] [facility] for general cardiac and glucose management health. [address] herb. Kisha de michele -( cows foot )used with other [employer] herb. CAD/history of valve replacement: He continues to follow routinely with [facility] cardiology and has an upcoming appointment next week. He has not had any new onset cardiovascular symptoms. Feels that he has been healthy overall over the past recent months. He has not been eating the healthiest over the summer months. Father was very ill and family was around a lot more often and cooking comforting foods. He is getting back to routine diet and is getting routine activity more often. He denies any orthopnea. No chest congestion or fluid retention. Iron deficiency/folic acid deficiency: He continues to take folic acid and M71 supplement. He does not report any recent activity intolerance. He continues to take Eliquis as well as baby aspirin routinely and has not noticed any abnormal bruising or bleeding. Acid reflux/gastritis symptoms are well managed with Protonix. No side effects reported. History of anaphylaxis to insect sting: He states that he was stung by a flying large black ant when in [place] in the past.  He has significant difficulty breathing and chest pain and was treated at local emergency room. Was told to always keep epinephrine injectable medication on hand.   He will need a current prescription. Lab Results   Component Value Date    ALT 30 10/14/2022    AST 45 (H) 10/14/2022     Lab Results   Component Value Date    CHOL 160 10/14/2022    TRIG 72 10/14/2022    HDL 56 10/14/2022    [address] 90 10/14/2022        PMH: The patient is known to have coexisting coronary artery disease. ROS: This patient reports no chest pain or pressure. There is no shortness of breath or cough. The patient reports no nausea or vomiting. There is no heartburn or indigestion. There is no diarrhea or constipation. No black, bloody, mucusy or tarry stool noticed. The patient reports no bloating and no change in appetite. There is no numbness, tingling or swelling in the extremities. EXAM:  Constitutional Blood pressure (!) 146/82, pulse 83, temperature (!) 96.2 °F (35.7 °C), temperature source Temporal, resp. rate 16, height 5' 6\" (1.676 m), weight 163 lb 6.4 oz (74.1 kg), SpO2 95 %. .  He has a normal affect, no acute distress, appears well developed and well nourished. Lungs:  Normal expansion. Clear to auscultation. No rales, rhonchi, or wheezing., No chest wall tenderness. Heart:  Murmur(s)-  2/6 diastolic throughout the precordium. Abdomen:  Soft, non-tender, normal bowel sounds. No bruits, organomegaly or masses. Extremities: Extremities warm to touch, pink, with no edema. DIAGNOSIS:    Diagnosis Orders   1. Essential hypertension        2. Mixed hyperlipidemia        3. Vitamin D deficiency        4. B12 deficiency        5. Diastolic dysfunction        6. CAD S/P percutaneous coronary angioplasty        7. Hodgkin lymphoma, unspecified Hodgkin lymphoma type, unspecified body region ([facility] [address]        8. 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 Borderline diabetes        10. History of aortic valve replacement        11. Other iron deficiency anemia        12.  Folate deficiency            Plan: Continue current medicines and dosages. Continue diet and exercise programs, improving where possible. Follow up in 4 months with lab work one week prior. For further details see orders placed. 1.  2.  5.  6.  10.  He continues to follow routinely with [address] [place] [address] cardiology with upcoming appointment later this month. He has been taking medication as prescribed with no reported side effects. Blood pressure is well controlled at home and he feels that he does have some whitecoat syndrome when coming into the office. No new onset cardio pulmonary symptoms reported. No abnormal bruising or bleeding. 7.  No evidence of recurrence. No reported lymphadenopathy. Blood test results are overall normal.    8.  Refill of EpiPen given for history of insect induced anaphylaxis. 9.  We reviewed recent blood test results with overall stable findings. He will cut back on simple sugars and starches in the diet and get back to previously routine physical activity. 11.  12.  Continue current supplementation and doses as there are no side effects. Records updated to include [facility]forest herb supplement for general circulatory health. Please note this report has been partially produced using speech recognition software and may cause contain errors related to that system including grammar, punctuation and spelling as well as words and phrases that may seem inappropriate. If there are questions or concerns please feel free to contact me to clarify.         Electronically signed by RADHA Latif, 11:00 AM 10/20/22

## 2022-10-26 NOTE — TELEPHONE ENCOUNTER
Pharmacy is  requesting medication refill. Please approve or deny this request.    Rx requested:  Requested Prescriptions     Pending Prescriptions Disp Refills    [facility] VITAMIN C 500 MG tablet [Pharmacy Med Name: [facility] VITAMIN C 500 MG TABLET] 13 tablet 5     Sig: TAKE 1 TABLET BY MOUTH 3 TIMES A WEEK         Last Office Visit:   10/9/2020      Next Visit Date:  No future appointments. POD # 1

## 2022-10-31 ENCOUNTER — OFFICE VISIT (OUTPATIENT)
Dept: CARDIOLOGY CLINIC | Age: 62
End: 2022-10-31
Payer: COMMERCIAL

## 2022-10-31 VITALS
BODY MASS INDEX: 25.82 KG/M2 | SYSTOLIC BLOOD PRESSURE: 130 MMHG | HEART RATE: 61 BPM | DIASTOLIC BLOOD PRESSURE: 80 MMHG | WEIGHT: 160 LBS

## 2022-10-31 DIAGNOSIS — Z98.61 CAD S/P PERCUTANEOUS CORONARY ANGIOPLASTY: Primary | ICD-10-CM

## 2022-10-31 DIAGNOSIS — I10 PRIMARY HYPERTENSION: Chronic | ICD-10-CM

## 2022-10-31 DIAGNOSIS — E78.2 MIXED HYPERLIPIDEMIA: Chronic | ICD-10-CM

## 2022-10-31 DIAGNOSIS — I25.10 CAD S/P PERCUTANEOUS CORONARY ANGIOPLASTY: Primary | ICD-10-CM

## 2022-10-31 PROCEDURE — 93000 ELECTROCARDIOGRAM COMPLETE: CPT | Performed by: NURSE PRACTITIONER

## 2022-10-31 PROCEDURE — 3074F SYST BP LT 130 MM HG: CPT | Performed by: NURSE PRACTITIONER

## 2022-10-31 PROCEDURE — 3078F DIAST BP <80 MM HG: CPT | Performed by: NURSE PRACTITIONER

## 2022-10-31 PROCEDURE — G8419 CALC BMI OUT NRM PARAM NOF/U: HCPCS | Performed by: NURSE PRACTITIONER

## 2022-10-31 PROCEDURE — G8484 FLU IMMUNIZE NO ADMIN: HCPCS | Performed by: NURSE PRACTITIONER

## 2022-10-31 PROCEDURE — G8427 DOCREV CUR MEDS BY ELIG CLIN: HCPCS | Performed by: NURSE PRACTITIONER

## 2022-10-31 PROCEDURE — 99213 OFFICE O/P EST LOW 20 MIN: CPT | Performed by: NURSE PRACTITIONER

## 2022-10-31 PROCEDURE — 3017F COLORECTAL CA SCREEN DOC REV: CPT | Performed by: NURSE PRACTITIONER

## 2022-10-31 PROCEDURE — 1036F TOBACCO NON-USER: CPT | Performed by: NURSE PRACTITIONER

## 2022-10-31 RX ORDER — AMOXICILLIN 500 MG/1
2000 TABLET, FILM COATED ORAL SEE ADMIN INSTRUCTIONS
Qty: 4 TABLET | Refills: 2 | Status: SHIPPED | OUTPATIENT
Start: 2022-10-31

## 2022-10-31 NOTE — PROGRESS NOTES
Chief Complaint   Patient presents with    Atrial Fibrillation     PATIENT DID NOT HAVE THE EVENT MONITOR DONE. HE DID NOT WANT TO HAVE IT DONE. Patient presents for initial medical evaluation. Patient is followed on a regular basis by Dr. Aline Petersen, APRN - CNP. HX OF CAD S/P PCI. HX OF AVR at [facility] 10 yrs ago. Patient with hx of cancer at age 26 and had radiation to chest. Patient with hx of Aflutter since 2019. Not on full dose DOAC. Patient with hx of hemorrhoids. Last echo in 2017 with EF of 60%, AV with mean gradient of 16mmhg. Pt denies chest pain, dyspnea, dyspnea on exertion, change in exercise capacity, fatigue,  nausea, vomiting, diarrhea, constipation, motor weakness, insomnia, weight loss, syncope, dizziness, lightheadedness, palpitations, PND, orthopnea, or claudication. He is active without any angina. 1-13-22: doing well. No issues. Noted to venkatesh in afib last OV, placed on DOAC. HX OF CAD S/P PCI. HX OF AVR at [facility] 10 yrs ago. Patient with hx of cancer at age 26 and had radiation to chest. Patient with hx of Aflutter since 2019. Not on full dose DOAC. Patient with hx of hemorrhoids. Echo with EF of 55%, normal bioprosthetic AV in place. Pt denies chest pain, dyspnea, dyspnea on exertion, change in exercise capacity, fatigue,  nausea, vomiting, diarrhea, constipation, motor weakness, insomnia, weight loss, syncope, dizziness, lightheadedness, palpitations, PND, orthopnea, or claudication. EKG with NSR< no ischemia. EKG with afib/flutter. 1/20/2022:HX OF CAD S/P PCI. HX OF AVR at [facility] 10 yrs ago. Patient with hx of cancer at age 26 and had radiation to chest. Patient with hx of Aflutter since 2019. Not on full dose DOAC. Patient with hx of hemorrhoids. Echo with EF of 55%, normal bioprosthetic AV in place. Patient seen in office today status post cardioversion and sotalol drug loading 1 week ago.   EKG done in office today shows patient in normal sinus rhythm, heart rate 87, QTc 469. Patient states he is feeling well overall. States he exercises at the gym regularly. Pt denies chest pain, dyspnea, dyspnea on exertion, change in exercise capacity, fatigue,  nausea, vomiting, diarrhea, constipation, motor weakness, insomnia, weight loss, syncope, dizziness, lightheadedness, palpitations, PND, orthopnea, or claudication. No bleeding issues. Pt denies any angina or CHF type symptoms. No recent hospitalizations. Pt is compliant with all Rx medications. Blood pressure is high in the office today at 180/88. Patient states he gets whitecoat syndrome. Patient diligently checks his blood pressure and heart rate at home and states they have been normal.    5/5/2022: Patient seen in office today for follow-up visit. He reports continued, mild intermittent episodes of palpitations. Patient is unsure whether he is going in and out of A. fib. EKG in office today shows sinus rhythm, chronic right bundle branch block and first-degree AV block. No new changes when compared to last EKG. Pt denies chest pain, dyspnea, dyspnea on exertion, change in exercise capacity, fatigue,  nausea, vomiting, diarrhea, constipation, motor weakness, insomnia, weight loss, syncope, dizziness, lightheadedness, PND, orthopnea, or claudication. No bleeding issues. Pt denies any angina or CHF type symptoms. No recent hospitalizations. Pt is compliant with all Rx medications. Lung sounds are clear to auscultation. No carotid bruit heard. Blood pressure and heart rate are under control. 10/31/2022: Patient seen in office today for follow-up visit. EKG in office today shows sinus rhythm, chronic RBBB and first-degree AV block, no new changes when compared to previous EKGs. Patient reports he has been feeling very well overall. Reports he goes to the gym multiple times per week. Reports cardio and weightlifting with no episodes of chest pain or shortness of breath.   Reports he and his wife recently had COVID but he has recovered well, no issues. Pt denies chest pain, dyspnea, dyspnea on exertion, change in exercise capacity, fatigue,  nausea, vomiting, diarrhea, constipation, motor weakness, insomnia, weight loss, syncope, dizziness, lightheadedness, palpitations, PND, orthopnea, or claudication. No bleeding issues. Pt denies any angina or CHF type symptoms. No recent hospitalizations. Pt is compliant with all Rx medications. Blood pressure and heart rate are under control.        Patient Active Problem List   Diagnosis    Hypertension    Heart murmur    Hodgkin disease ([facility] [address]    GERD (gastroesophageal reflux disease)    CAD S/P percutaneous coronary angioplasty    History of stroke    Hyperglycemia    Hyperlipidemia    History of aortic valve replacement    Hiatal hernia    Diverticulosis    AVM (arteriovenous malformation)    Anemia    Borderline diabetes    Iron deficiency anemia    Internal hemorrhoid    Family history of heart disease    History of tobacco abuse    Basal cell carcinoma, trunk    Persistent atrial fibrillation ([facility] [address]       Past Surgical History:   Procedure Laterality Date    ANGIOPLASTY  11/24/15    MG JASMHID AQUINO  PCI PROCEDURE    AORTIC VALVE REPLACEMENT      Bovine-MODEL# [id number]    CARDIAC CATHETERIZATION  2018    HERNIA REPAIR      3 times    PTCA      RESOLUTE/MEDTRONICS     SIGMOIDOSCOPY      SKIN CANCER EXCISION  10/21/2016    SPLENECTOMY      TOOTH EXTRACTION      VENTRICULAR ABLATION SURGERY      WISDOM TOOTH EXTRACTION         Social History     Socioeconomic History    Marital status:    Tobacco Use    Smoking status: Former     Packs/day: 0.25     Years: 5.00     Pack years: 1.25     Types: Pipe, Cigarettes     Quit date: 1985     Years since quittin.1    Smokeless tobacco: Never   Substance and Sexual Activity    Alcohol use: Yes     Comment: weekly    Drug use: Yes     Types: Marijuana Lucas Antonio)     Comment: weekly     Social Determinants of Health     Financial Resource Strain: Low Risk     Difficulty of Paying Living Expenses: Not hard at all   Food Insecurity: No Food Insecurity    Worried About Running Out of Food in the Last Year: Never true    Ran Out of Food in the Last Year: Never true       Family History   Problem Relation Age of Onset    Arthritis Mother     Arthritis Father     Heart Disease Father     High Blood Pressure Father     Cancer Other     High Blood Pressure Other     Diabetes Maternal Grandmother     Diabetes Paternal Grandfather        Current Outpatient Medications   Medication Sig Dispense Refill    Amoxicillin 500 MG TABS Take 2,000 mg by mouth See Admin Instructions Prior to dental procedures. 4 tablet 2    Misc Natural Products (DAILY HERBS BLOOD SUGAR BALANC PO) Take by mouth Pata de michele herb -rainforest herb      apixaban (ELIQUIS) 2.5 MG TABS tablet Take 1 tablet by mouth 2 times daily 60 tablet 5    magnesium oxide (MAG-OX) 400 MG tablet Take 1 tablet by mouth daily 90 tablet 2    Cyanocobalamin (B-12) 1000 MCG SUBL Place 1 tablet under the tongue daily 90 tablet 3    Coenzyme Q10 100 MG CHEW Take 1 tablet by mouth daily 90 tablet 3    pravastatin (PRAVACHOL) 40 MG tablet TAKE 1 TABLET BY MOUTH EVERY DAY 90 tablet 3    pantoprazole (PROTONIX) 20 MG tablet TAKE 1 TABLET BY MOUTH EVERY DAY 90 tablet 3    metoprolol tartrate (LOPRESSOR) 25 MG tablet TAKE 1 TABLET BY MOUTH TWICE A  tablet 3    hydroCHLOROthiazide (HYDRODIURIL) 25 MG tablet Take 1 tablet by mouth every morning 90 tablet 1    NONFORMULARY perda humme caa-rainforrest herb      nitroGLYCERIN (NITROSTAT) 0.4 MG SL tablet PLACE 1 TABLET UNDER THE TONGUE EVERY 5 MINUTES AS NEEDED FOR CHEST PAIN 25 tablet 3    aspirin 81 MG tablet Take 81 mg by mouth daily      EPINEPHrine (EPIPEN) 0.3 MG/0.3ML SOAJ injection Inject 0.3 mLs into the muscle once as needed (insect sting) 2 each 2     No current facility-administered medications for this visit. Insect extract allergy skin test, Lipitor [atorvastatin], and Seasonal    Review of Systems:  General ROS: negative  Psychological ROS: negative  Hematological and Lymphatic ROS: No history of blood clots or bleeding disorder. Respiratory ROS: no cough, shortness of breath, or wheezing  Cardiovascular ROS: no chest pain or dyspnea on exertion  Gastrointestinal ROS: no abdominal pain, change in bowel habits, or black or bloody stools  Genito-Urinary ROS: no dysuria, trouble voiding, or hematuria  Musculoskeletal ROS: negative  Neurological ROS: no TIA or stroke symptoms  Dermatological ROS: negative    VITALS:  Blood pressure 130/80, pulse 61, weight 160 lb (72.6 kg). Body mass index is 25.82 kg/m². Physical Examination:  General appearance - alert, well appearing, and in no distress and normal appearing weight  Mental status - alert, oriented to person, place, and time  Neck - Neck is supple, no JVD or carotid bruits. No thyromegaly or adenopathy. Chest - clear to auscultation, no wheezes, rales or rhonchi, symmetric air entry  Heart - normal rate, regular rhythm, normal S1, S2, no murmurs, rubs, clicks or gallops  Abdomen - soft, nontender, nondistended, no masses or organomegaly  Neurological - alert, oriented, normal speech, no focal findings or movement disorder noted  Extremities - peripheral pulses normal, no pedal edema, no clubbing or cyanosis  Skin - normal coloration and turgor, no rashes, no suspicious skin lesions noted      EKG: aflutter    Orders Placed This Encounter   Procedures    EKG 12 Lead       ASSESSMENT:     Diagnosis Orders   1. CAD S/P percutaneous coronary angioplasty        2. Mixed hyperlipidemia        3. Primary hypertension                PLAN:     As always, aggressive risk factor modification is strongly recommended. We should adhere to the JNC VIII guidelines for HTN management and the NCEP ATP III guidelines for LDL-C management.     Cardiac diet is always recommended with low fat, cholesterol, calories and sodium. Continue medications at current doses. Check EKG    Discussed addition of more magnesium in his diet to help with palpitations. Check ECHO in future given hx of Bio AVR. Will continue to monitor patient clinically, if symptoms develop or worsen, they are to let me know ASAP or head to the nearest emergeny room. Patient was advised and encouraged to check blood pressure at home or at a pharmacy, maintain a logbook, and also call us back if blood pressure are above the target ranges or if it is low. Patient clearly understands and agrees to the instructions. We will need to continue to monitor muscle and liver enzymes, BUN, CR, and electrolytes.

## 2022-11-14 DIAGNOSIS — T63.481S: ICD-10-CM

## 2022-11-14 RX ORDER — EPINEPHRINE 0.3 MG/.3ML
0.3 INJECTION SUBCUTANEOUS
Qty: 2 EACH | Refills: 2 | Status: SHIPPED | OUTPATIENT
Start: 2022-11-14 | End: 2022-11-14

## 2023-02-20 DIAGNOSIS — K21.9 GASTROESOPHAGEAL REFLUX DISEASE WITHOUT ESOPHAGITIS: ICD-10-CM

## 2023-02-20 RX ORDER — PANTOPRAZOLE SODIUM 20 MG/1
TABLET, DELAYED RELEASE ORAL
Qty: 90 TABLET | Refills: 3 | Status: SHIPPED | OUTPATIENT
Start: 2023-02-20

## 2023-02-20 NOTE — TELEPHONE ENCOUNTER
Please approve or deny this refill request. The order is pended. Thank you.     LOV 10/31/2022    Next Visit Date:  Future Appointments   Date Time Provider Zach Gottlieb   5/1/2023  8:15 AM RADHA Ozuna - CNP [facility]

## 2023-03-14 ENCOUNTER — TELEPHONE (OUTPATIENT)
Dept: CARDIOLOGY CLINIC | Age: 63
End: 2023-03-14

## 2023-03-14 NOTE — TELEPHONE ENCOUNTER
Appointment For: Adriel Samuel ([id number])   Visit Type: MYCHART FOLLOW-UP ([id number])      3/20/2023    8:45 AM  15 mins. [facility]      Patient Comments:   Cardiac follow-up and Rxs     Appointment Scheduled  (Newest Message First)  RADHA Esparza CNP 1 hour ago (6:21 AM)     TR  This is a reminder for your upcoming appointment. Location of Appointment: [facility]  Provider: RADHA Albert CNP  Date: 3/20/23  Time: 8:45 AM     Please arrive 15 minutes prior to appointment time, bring insurance card, photo ID and copay. There is 1 questionnaire available for your appointment. [email or web address] your available questionnaires]      Please click [email or web address]] to view more details about your appointment. This [employer] message has not been read. Batch Job User Mony  Malika Guevara 8 hours ago (12:01 AM)     Malika Guevara  Patient Appointment Schedule Request Pool 11 hours ago (8:28 PM)     Appointment For: Adriel Samuel ([id number])  Visit Type: [address] ([id number])     3/20/2023    8:45 AM  15 mins. [facility]     Patient Comments:  Cardiac follow-up and Rxs       Mychart, Processor  Malika Guevara 11 hours ago (8:28 PM)     PM  Appointment Information:      Visit Type: Follow Up Appointment          Date: 3/20/2023                  Dept: [facility] Cardiology                  Provider: Oscar Ya                  Time: 8:45 AM                  Length: 15 min     Appt Status: Scheduled        Appt Instructions:      Please arrive 15 minutes prior to appointment time, bring insurance card, photo   ID and copay. This MyChart message has not been read.

## 2023-03-20 ENCOUNTER — OFFICE VISIT (OUTPATIENT)
Dept: CARDIOLOGY CLINIC | Age: 63
End: 2023-03-20
Payer: COMMERCIAL

## 2023-03-20 VITALS
BODY MASS INDEX: 25.34 KG/M2 | HEART RATE: 65 BPM | DIASTOLIC BLOOD PRESSURE: 84 MMHG | SYSTOLIC BLOOD PRESSURE: 128 MMHG | WEIGHT: 157 LBS

## 2023-03-20 DIAGNOSIS — Z95.2 AORTIC VALVE PROSTHESIS PRESENT: ICD-10-CM

## 2023-03-20 DIAGNOSIS — R55 SYNCOPE, UNSPECIFIED SYNCOPE TYPE: ICD-10-CM

## 2023-03-20 DIAGNOSIS — R00.2 PALPITATIONS: ICD-10-CM

## 2023-03-20 DIAGNOSIS — I25.10 CAD S/P PERCUTANEOUS CORONARY ANGIOPLASTY: Primary | ICD-10-CM

## 2023-03-20 DIAGNOSIS — Q27.30 AVM (ARTERIOVENOUS MALFORMATION): ICD-10-CM

## 2023-03-20 DIAGNOSIS — I10 PRIMARY HYPERTENSION: Chronic | ICD-10-CM

## 2023-03-20 DIAGNOSIS — Z98.61 CAD S/P PERCUTANEOUS CORONARY ANGIOPLASTY: Primary | ICD-10-CM

## 2023-03-20 PROCEDURE — 3074F SYST BP LT 130 MM HG: CPT | Performed by: NURSE PRACTITIONER

## 2023-03-20 PROCEDURE — G8419 CALC BMI OUT NRM PARAM NOF/U: HCPCS | Performed by: NURSE PRACTITIONER

## 2023-03-20 PROCEDURE — 1036F TOBACCO NON-USER: CPT | Performed by: NURSE PRACTITIONER

## 2023-03-20 PROCEDURE — 93000 ELECTROCARDIOGRAM COMPLETE: CPT | Performed by: NURSE PRACTITIONER

## 2023-03-20 PROCEDURE — 3079F DIAST BP 80-89 MM HG: CPT | Performed by: NURSE PRACTITIONER

## 2023-03-20 PROCEDURE — G8484 FLU IMMUNIZE NO ADMIN: HCPCS | Performed by: NURSE PRACTITIONER

## 2023-03-20 PROCEDURE — G8427 DOCREV CUR MEDS BY ELIG CLIN: HCPCS | Performed by: NURSE PRACTITIONER

## 2023-03-20 PROCEDURE — 3017F COLORECTAL CA SCREEN DOC REV: CPT | Performed by: NURSE PRACTITIONER

## 2023-03-20 PROCEDURE — 99213 OFFICE O/P EST LOW 20 MIN: CPT | Performed by: NURSE PRACTITIONER

## 2023-03-20 NOTE — PROGRESS NOTES
well, no issues. Pt denies chest pain, dyspnea, dyspnea on exertion, change in exercise capacity, fatigue,  nausea, vomiting, diarrhea, constipation, motor weakness, insomnia, weight loss, syncope, dizziness, lightheadedness, palpitations, PND, orthopnea, or claudication. No bleeding issues. Pt denies any angina or CHF type symptoms. No recent hospitalizations. Pt is compliant with all Rx medications. Blood pressure and heart rate are under control. 3/20/2023: Patient reports some mild dizziness. Reports recently he was doing toe touches [place] the airport and had a syncopal episode. Patient also reports noticing irregular heartbeats on his blood pressure cuff. We discussed event monitor in the past but patient was reluctant giving bulkiness of machine. We did discuss Zio patch today in office. Patient states the dizziness and palpitations are similar to when he needed stents years ago. Reports she would like his arteries and artificial valve reevaluated at this time. Patient unable to tolerate stress test in the past.  Patient is agreeable to cardiac CTA. Patient denies any episodes of chest pain or shortness of breath. Reports he is still very active and working out in the gym almost daily. Pt denies chest pain, dyspnea, dyspnea on exertion, change in exercise capacity, fatigue,  nausea, vomiting, diarrhea, constipation, motor weakness, insomnia, weight loss, PND, orthopnea, or claudication. No bleeding issues. Pt denies any angina or CHF type symptoms. No recent hospitalizations. Pt is compliant with all Rx medications. Blood pressure and heart rate are under control.        Patient Active Problem List   Diagnosis    Hypertension    Heart murmur    Hodgkin disease (HCC)    GERD (gastroesophageal reflux disease)    CAD S/P percutaneous coronary angioplasty    History of stroke    Hyperglycemia    Hyperlipidemia    History of aortic valve replacement    Hiatal hernia

## 2023-03-21 ENCOUNTER — OFFICE VISIT (OUTPATIENT)
Dept: FAMILY MEDICINE CLINIC | Age: 63
End: 2023-03-21
Payer: COMMERCIAL

## 2023-03-21 VITALS
HEART RATE: 89 BPM | TEMPERATURE: 98.4 F | OXYGEN SATURATION: 98 % | BODY MASS INDEX: 25.07 KG/M2 | WEIGHT: 156 LBS | HEIGHT: 66 IN | DIASTOLIC BLOOD PRESSURE: 84 MMHG | SYSTOLIC BLOOD PRESSURE: 138 MMHG

## 2023-03-21 DIAGNOSIS — E11.9 DIABETES MELLITUS TYPE 2, DIET-CONTROLLED (HCC): Primary | ICD-10-CM

## 2023-03-21 DIAGNOSIS — I48.19 PERSISTENT ATRIAL FIBRILLATION (HCC): ICD-10-CM

## 2023-03-21 DIAGNOSIS — K21.9 GASTROESOPHAGEAL REFLUX DISEASE WITHOUT ESOPHAGITIS: ICD-10-CM

## 2023-03-21 DIAGNOSIS — E78.2 MIXED HYPERLIPIDEMIA: Chronic | ICD-10-CM

## 2023-03-21 DIAGNOSIS — I10 PRIMARY HYPERTENSION: Chronic | ICD-10-CM

## 2023-03-21 PROCEDURE — 99214 OFFICE O/P EST MOD 30 MIN: CPT

## 2023-03-21 PROCEDURE — G8484 FLU IMMUNIZE NO ADMIN: HCPCS

## 2023-03-21 PROCEDURE — 3017F COLORECTAL CA SCREEN DOC REV: CPT

## 2023-03-21 PROCEDURE — 3079F DIAST BP 80-89 MM HG: CPT

## 2023-03-21 PROCEDURE — 2022F DILAT RTA XM EVC RTNOPTHY: CPT

## 2023-03-21 PROCEDURE — G8427 DOCREV CUR MEDS BY ELIG CLIN: HCPCS

## 2023-03-21 PROCEDURE — 3075F SYST BP GE 130 - 139MM HG: CPT

## 2023-03-21 PROCEDURE — 1036F TOBACCO NON-USER: CPT

## 2023-03-21 PROCEDURE — G8419 CALC BMI OUT NRM PARAM NOF/U: HCPCS

## 2023-03-21 PROCEDURE — 3046F HEMOGLOBIN A1C LEVEL >9.0%: CPT

## 2023-03-21 RX ORDER — PRAVASTATIN SODIUM 40 MG
TABLET ORAL
Qty: 90 TABLET | Refills: 3 | Status: SHIPPED | OUTPATIENT
Start: 2023-03-21

## 2023-03-21 RX ORDER — NITROGLYCERIN 0.4 MG/1
0.4 TABLET SUBLINGUAL EVERY 5 MIN PRN
Qty: 25 TABLET | Refills: 3 | Status: SHIPPED | OUTPATIENT
Start: 2023-03-21

## 2023-03-21 RX ORDER — AMOXICILLIN 500 MG/1
2000 TABLET, FILM COATED ORAL SEE ADMIN INSTRUCTIONS
Qty: 4 TABLET | Refills: 2 | Status: SHIPPED | OUTPATIENT
Start: 2023-03-21

## 2023-03-21 RX ORDER — MAGNESIUM 200 MG
1 TABLET ORAL DAILY
Qty: 90 TABLET | Refills: 3 | Status: SHIPPED | OUTPATIENT
Start: 2023-03-21

## 2023-03-21 RX ORDER — UREA 10 %
LOTION (ML) TOPICAL
COMMUNITY
Start: 2020-02-02

## 2023-03-21 RX ORDER — PANTOPRAZOLE SODIUM 20 MG/1
TABLET, DELAYED RELEASE ORAL
Qty: 90 TABLET | Refills: 3 | Status: SHIPPED | OUTPATIENT
Start: 2023-03-21

## 2023-03-21 RX ORDER — MAGNESIUM OXIDE 400 MG/1
400 TABLET ORAL DAILY
Qty: 90 TABLET | Refills: 2 | Status: SHIPPED | OUTPATIENT
Start: 2023-03-21

## 2023-03-21 SDOH — ECONOMIC STABILITY: INCOME INSECURITY: HOW HARD IS IT FOR YOU TO PAY FOR THE VERY BASICS LIKE FOOD, HOUSING, MEDICAL CARE, AND HEATING?: NOT HARD AT ALL

## 2023-03-21 SDOH — ECONOMIC STABILITY: FOOD INSECURITY: WITHIN THE PAST 12 MONTHS, THE FOOD YOU BOUGHT JUST DIDN'T LAST AND YOU DIDN'T HAVE MONEY TO GET MORE.: NEVER TRUE

## 2023-03-21 SDOH — ECONOMIC STABILITY: HOUSING INSECURITY
IN THE LAST 12 MONTHS, WAS THERE A TIME WHEN YOU DID NOT HAVE A STEADY PLACE TO SLEEP OR SLEPT IN A SHELTER (INCLUDING NOW)?: NO

## 2023-03-21 SDOH — ECONOMIC STABILITY: FOOD INSECURITY: WITHIN THE PAST 12 MONTHS, YOU WORRIED THAT YOUR FOOD WOULD RUN OUT BEFORE YOU GOT MONEY TO BUY MORE.: NEVER TRUE

## 2023-03-21 ASSESSMENT — COLUMBIA-SUICIDE SEVERITY RATING SCALE - C-SSRS
1. WITHIN THE PAST MONTH, HAVE YOU WISHED YOU WERE DEAD OR WISHED YOU COULD GO TO SLEEP AND NOT WAKE UP?: NO
2. HAVE YOU ACTUALLY HAD ANY THOUGHTS OF KILLING YOURSELF?: NO
6. HAVE YOU EVER DONE ANYTHING, STARTED TO DO ANYTHING, OR PREPARED TO DO ANYTHING TO END YOUR LIFE?: NO

## 2023-03-21 ASSESSMENT — ENCOUNTER SYMPTOMS
COUGH: 0
SHORTNESS OF BREATH: 0
CHEST TIGHTNESS: 0
NAUSEA: 0
COLOR CHANGE: 0
DIARRHEA: 0

## 2023-03-21 ASSESSMENT — PATIENT HEALTH QUESTIONNAIRE - PHQ9
SUM OF ALL RESPONSES TO PHQ9 QUESTIONS 1 & 2: 0
3. TROUBLE FALLING OR STAYING ASLEEP: 0
7. TROUBLE CONCENTRATING ON THINGS, SUCH AS READING THE NEWSPAPER OR WATCHING TELEVISION: 0
9. THOUGHTS THAT YOU WOULD BE BETTER OFF DEAD, OR OF HURTING YOURSELF: 0
8. MOVING OR SPEAKING SO SLOWLY THAT OTHER PEOPLE COULD HAVE NOTICED. OR THE OPPOSITE, BEING SO FIGETY OR RESTLESS THAT YOU HAVE BEEN MOVING AROUND A LOT MORE THAN USUAL: 0
10. IF YOU CHECKED OFF ANY PROBLEMS, HOW DIFFICULT HAVE THESE PROBLEMS MADE IT FOR YOU TO DO YOUR WORK, TAKE CARE OF THINGS AT HOME, OR GET ALONG WITH OTHER PEOPLE: 0
SUM OF ALL RESPONSES TO PHQ QUESTIONS 1-9: 0
SUM OF ALL RESPONSES TO PHQ QUESTIONS 1-9: 0
5. POOR APPETITE OR OVEREATING: 0
SUM OF ALL RESPONSES TO PHQ QUESTIONS 1-9: 0
1. LITTLE INTEREST OR PLEASURE IN DOING THINGS: 0
2. FEELING DOWN, DEPRESSED OR HOPELESS: 0
6. FEELING BAD ABOUT YOURSELF - OR THAT YOU ARE A FAILURE OR HAVE LET YOURSELF OR YOUR FAMILY DOWN: 0
4. FEELING TIRED OR HAVING LITTLE ENERGY: 0
SUM OF ALL RESPONSES TO PHQ QUESTIONS 1-9: 0

## 2023-03-21 NOTE — PROGRESS NOTES
motion. Cervical back: Full passive range of motion without pain, normal range of motion and neck supple. No rigidity. Right lower leg: No edema. Left lower leg: No edema. Lymphadenopathy:      Cervical:      Right cervical: No superficial or posterior cervical adenopathy. Left cervical: No superficial or posterior cervical adenopathy. Skin:     General: Skin is warm and dry. Capillary Refill: Capillary refill takes less than 2 seconds. Coloration: Skin is not jaundiced or pale. Neurological:      General: No focal deficit present. Mental Status: He is alert and oriented to person, place, and time. Motor: Motor function is intact. No weakness. Coordination: Coordination is intact. Coordination normal.      Gait: Gait is intact. Gait normal.   Psychiatric:         Attention and Perception: Attention and perception normal.         Mood and Affect: Mood normal.         Speech: Speech normal.         Behavior: Behavior normal. Behavior is cooperative. Thought Content: Thought content normal.         Cognition and Memory: Cognition and memory normal.         Judgment: Judgment normal.                     An electronic signature was used to authenticate this note.      Sapna Dubon, APRN - CNP

## 2023-03-21 NOTE — ASSESSMENT & PLAN NOTE
denies new myalgias or stomach upset with current therapies   Well-controlled, continue current medications

## 2023-03-21 NOTE — ASSESSMENT & PLAN NOTE
Patient reports dizziness with position changes several episodes of syncope over the past couple months. Was evaluated for this by cardiology yesterday. CTA of chest is pending EKG performed in office yesterday and reviewed by Felton LEE. There is no chest pain shortness of breath or lower extremity edema. No carotid bruits JVD, heart murmurs. Heart sounds normal and regular. Current medication metoprolol.   Patient will continue to follow cardiology for evaluation

## 2023-03-21 NOTE — ASSESSMENT & PLAN NOTE
Currently taking metoprolol. Blood pressures stable. Currently under evaluation by cardiology for syncope episodes.

## 2023-03-21 NOTE — ASSESSMENT & PLAN NOTE
Denies recent change in weight, feelings of thirst, increased urination, blurry vision, neuropathy or tingling in the hands or feet or new fatigue. Manages condition by monitoring carbohydrate intake and staying active. Reports diet has not been well controlled over the last couple of months. Last A1c 6.7.    Borderline controlled, continue current plan pending work up below

## 2023-03-21 NOTE — ASSESSMENT & PLAN NOTE
No  epigastric pain, nausea or difficulty swallowing.  Denies  muscle cramps or twitching, denies   Nausea   Well-controlled, continue current medications

## 2023-03-24 ENCOUNTER — TELEPHONE (OUTPATIENT)
Dept: FAMILY MEDICINE CLINIC | Age: 63
End: 2023-03-24

## 2023-03-24 NOTE — TELEPHONE ENCOUNTER
Received fax from pharmacy stating [facility] CO Q-10 Mg Softgel is on back order. They would like to know if this can be replaced with chewable. Pt agreeable. Please advise. Adult

## 2023-03-27 NOTE — TELEPHONE ENCOUNTER
Called pharmacy, chewable doesn't exist. Softgel in stock. This was a miscommunication.  Pharmacy will fill softgel and contact pt

## 2023-03-29 ENCOUNTER — TELEPHONE (OUTPATIENT)
Dept: CARDIOLOGY CLINIC | Age: 63
End: 2023-03-29

## 2023-03-29 DIAGNOSIS — R94.31 ABNORMAL EKG: Primary | ICD-10-CM

## 2023-03-29 NOTE — TELEPHONE ENCOUNTER
----- Message from [facility] sent at 3/29/2023  2:25 PM EDT -----  Regarding: Insurance Denied [id number] [facility] CCTA  They are looking for notes that show a new blood vessel complaint or new exam findings.   See determination in Media

## 2023-04-19 NOTE — TELEPHONE ENCOUNTER
Appointment For: Freida Nixon ([id number])   Visit Type: [facility] OFFICE VISIT ([id number])      10/24/2022   8:00 AM  15 mins. [facility] CARDIOLOGY      Patient Comments:   Heart Follow-up     Appointment Scheduled  (Newest Message First)  Elke George 38 minutes ago (1:20 PM)     PM  Appointment Information:      Visit Type: Office Visit          Date: 10/24/2022                  Dept: [facility] Cardiology                  Provider: Samanta Montes                  Time: 8:00 AM                  Length: 15 min     Appt Status: Scheduled          This [employer] message has not been read. Saira Askew  Patient Appointment Schedule Request Pool 38 minutes ago (1:20 PM)     Appointment For: Freida Nixon ([id number])  Visit Type: [facility] OFFICE VISIT ([id number])     10/24/2022   8:00 AM  15 mins.   [facility] CARDIOLOGY     Patient Comments:  Heart Follow-up
Render Post-Care Instructions In Note?: no
Show Aperture Variable?: Yes
Medical Necessity Clause: This procedure was medically necessary because the lesions that were treated were:
Detail Level: Zone
Medical Necessity Information: It is in your best interest to select a reason for this procedure from the list below. All of these items fulfill various CMS LCD requirements except the new and changing color options.
Spray Paint Text: The liquid nitrogen was applied to the skin utilizing a spray paint frosting technique.
Post-Care Instructions: I reviewed with the patient in detail post-care instructions. Patient is to wear sunprotection, and avoid picking at any of the treated lesions. Pt may apply Vaseline to crusted or scabbing areas.
Consent: The patient's consent was obtained including but not limited to risks of crusting, scabbing, blistering, scarring, darker or lighter pigmentary change, recurrence, incomplete removal and infection.

## 2023-04-20 ENCOUNTER — HOSPITAL ENCOUNTER (OUTPATIENT)
Dept: NUCLEAR MEDICINE | Age: 63
Discharge: HOME | End: 2023-04-22
Payer: COMMERCIAL

## 2023-04-20 ENCOUNTER — HOSPITAL ENCOUNTER (OUTPATIENT)
Dept: NON INVASIVE DIAGNOSTICS | Age: 63
Discharge: HOME | End: 2023-04-20
Payer: COMMERCIAL

## 2023-04-20 DIAGNOSIS — R94.31 ABNORMAL EKG: ICD-10-CM

## 2023-04-20 PROCEDURE — A9502 TC99M TETROFOSMIN: HCPCS | Performed by: INTERNAL MEDICINE

## 2023-04-20 PROCEDURE — 93017 CV STRESS TEST TRACING ONLY: CPT

## 2023-04-20 PROCEDURE — 2580000003 HC RX 258: Performed by: INTERNAL MEDICINE

## 2023-04-20 PROCEDURE — 78452 HT MUSCLE IMAGE SPECT MULT: CPT

## 2023-04-20 PROCEDURE — 3430000000 HC RX DIAGNOSTIC RADIOPHARMACEUTICAL: Performed by: INTERNAL MEDICINE

## 2023-04-20 PROCEDURE — 6360000002 HC RX W HCPCS: Performed by: INTERNAL MEDICINE

## 2023-04-20 RX ORDER — SODIUM CHLORIDE 0.9 % (FLUSH) 0.9 %
10 SYRINGE (ML) INJECTION PRN
Status: COMPLETED | OUTPATIENT
Start: 2023-04-20 | End: 2023-04-20

## 2023-04-20 RX ADMIN — TETROFOSMIN 35 MILLICURIE: 1.38 INJECTION, POWDER, LYOPHILIZED, FOR SOLUTION INTRAVENOUS at 09:42

## 2023-04-20 RX ADMIN — TETROFOSMIN 11 MILLICURIE: 1.38 INJECTION, POWDER, LYOPHILIZED, FOR SOLUTION INTRAVENOUS at 08:35

## 2023-04-20 RX ADMIN — Medication 10 ML: at 09:43

## 2023-04-20 RX ADMIN — Medication 10 ML: at 09:42

## 2023-04-20 RX ADMIN — REGADENOSON 0.4 MG: 0.08 INJECTION, SOLUTION INTRAVENOUS at 09:42

## 2023-04-20 RX ADMIN — Medication 10 ML: at 08:35

## 2023-04-20 NOTE — PROGRESS NOTES
Reviewed history, allergies, and medications. Patient took his cardiac medications prior to testing. Consent confirmed. Lexiscan exam explained. Placed patient on monitor. @ [address] here to inject [employer]. SOB noted during recovery phase. Denied chest pain. No ectopy noted. Doctor to further review and interpret results. Patient off monitor and instructed to eat, will have last part of exam in 1 hour.

## 2023-04-22 NOTE — PROCEDURES
ischemia in the inferior wall. 4.  This stress test is of low-to-intermediate risk. 5.  Normal EF of 69%. Recommend coronary angiogram to delineate coronary anatomy. Recommend a cardiac monitor to further assess Mobitz type I.    Dr. Aleksandar Castro was notified on the above findings.         Veronika Price MD    D: 04/22/2023 #11:05:21       T: 04/22/2023 11:07:51     [email or web address]  Job#: [id number]     Doc#: [id number]    CC:

## 2023-04-24 ENCOUNTER — TELEPHONE (OUTPATIENT)
Dept: CARDIOLOGY CLINIC | Age: 63
End: 2023-04-24

## 2023-04-25 ENCOUNTER — HOSPITAL ENCOUNTER (OUTPATIENT)
Dept: LAB | Age: 63
Discharge: HOME | End: 2023-04-25
Payer: COMMERCIAL

## 2023-04-25 DIAGNOSIS — I10 PRIMARY HYPERTENSION: Chronic | ICD-10-CM

## 2023-04-25 DIAGNOSIS — I48.19 PERSISTENT ATRIAL FIBRILLATION (HCC): ICD-10-CM

## 2023-04-25 DIAGNOSIS — E11.9 DIABETES MELLITUS TYPE 2, DIET-CONTROLLED (HCC): ICD-10-CM

## 2023-04-25 LAB
ACANTHOCYTES BLD QL SMEAR: ABNORMAL
ALBUMIN SERPL-MCNC: 3.8 G/DL (ref 3.5–4.6)
ALP SERPL-CCNC: 159 U/L (ref 35–104)
ALT SERPL-CCNC: 19 U/L (ref 0–41)
ANION GAP SERPL CALCULATED.3IONS-SCNC: 8 MEQ/L (ref 9–15)
ANISOCYTOSIS BLD QL SMEAR: ABNORMAL
AST SERPL-CCNC: 26 U/L (ref 0–40)
BASOPHILS # BLD: 0.3 K/UL (ref 0–0.2)
BASOPHILS NFR BLD: 3 %
BILIRUB SERPL-MCNC: 0.5 MG/DL (ref 0.2–0.7)
BUN SERPL-MCNC: 13 MG/DL (ref 8–23)
BURR CELLS: ABNORMAL
CALCIUM SERPL-MCNC: 9.5 MG/DL (ref 8.5–9.9)
CHLORIDE SERPL-SCNC: 99 MEQ/L (ref 95–107)
CO2 SERPL-SCNC: 26 MEQ/L (ref 20–31)
CREAT SERPL-MCNC: 0.77 MG/DL (ref 0.7–1.2)
CREAT UR-MCNC: 46.1 MG/DL
EOSINOPHIL # BLD: 0.3 K/UL (ref 0–0.7)
EOSINOPHIL NFR BLD: 3 %
ERYTHROCYTE [DISTWIDTH] IN BLOOD BY AUTOMATED COUNT: 15.6 % (ref 11.5–14.5)
GLOBULIN SER CALC-MCNC: 2.8 G/DL (ref 2.3–3.5)
GLUCOSE SERPL-MCNC: 134 MG/DL (ref 70–99)
HBA1C MFR BLD: 7.1 % (ref 4.8–5.9)
HCT VFR BLD AUTO: 41.5 % (ref 42–52)
HGB BLD-MCNC: 13.2 G/DL (ref 14–18)
LYMPHOCYTES # BLD: 1.1 K/UL (ref 1–4.8)
LYMPHOCYTES NFR BLD: 6 %
MCH RBC QN AUTO: 27.4 PG (ref 27–31.3)
MCHC RBC AUTO-ENTMCNC: 31.9 % (ref 33–37)
MCV RBC AUTO: 86 FL (ref 79–92.2)
MICROALBUMIN UR-MCNC: <1.2 MG/DL
MICROALBUMIN/CREAT UR-RTO: NORMAL MG/G (ref 0–30)
MONOCYTES # BLD: 1.6 K/UL (ref 0.2–0.8)
MONOCYTES NFR BLD: 16 %
NEUTROPHILS # BLD: 6.5 K/UL (ref 1.4–6.5)
NEUTS SEG NFR BLD: 66 %
PLATELET # BLD AUTO: 481 K/UL (ref 130–400)
PLATELET BLD QL SMEAR: ABNORMAL
POIKILOCYTOSIS BLD QL SMEAR: ABNORMAL
POTASSIUM SERPL-SCNC: 4.7 MEQ/L (ref 3.4–4.9)
PROT SERPL-MCNC: 6.6 G/DL (ref 6.3–8)
RBC # BLD AUTO: 4.82 M/UL (ref 4.7–6.1)
SODIUM SERPL-SCNC: 133 MEQ/L (ref 135–144)
VARIANT LYMPHS NFR BLD: 5 %
WBC # BLD AUTO: 9.9 K/UL (ref 4.8–10.8)

## 2023-04-25 PROCEDURE — 85025 COMPLETE CBC W/AUTO DIFF WBC: CPT

## 2023-04-25 PROCEDURE — 82570 ASSAY OF URINE CREATININE: CPT

## 2023-04-25 PROCEDURE — 83036 HEMOGLOBIN GLYCOSYLATED A1C: CPT

## 2023-04-25 PROCEDURE — 80053 COMPREHEN METABOLIC PANEL: CPT

## 2023-04-25 PROCEDURE — 36415 COLL VENOUS BLD VENIPUNCTURE: CPT

## 2023-04-25 PROCEDURE — 82043 UR ALBUMIN QUANTITATIVE: CPT

## 2023-04-27 DIAGNOSIS — E11.65 UNCONTROLLED TYPE 2 DIABETES MELLITUS WITH HYPERGLYCEMIA (HCC): ICD-10-CM

## 2023-04-27 DIAGNOSIS — R79.89 ELEVATED PLATELET COUNT: Primary | ICD-10-CM

## 2023-04-27 RX ORDER — METFORMIN HYDROCHLORIDE 500 MG/1
500 TABLET, EXTENDED RELEASE ORAL
Qty: 30 TABLET | Refills: 1 | Status: SHIPPED | OUTPATIENT
Start: 2023-04-27

## 2023-05-01 ENCOUNTER — OFFICE VISIT (OUTPATIENT)
Dept: CARDIOLOGY CLINIC | Age: 63
End: 2023-05-01
Payer: COMMERCIAL

## 2023-05-01 VITALS
WEIGHT: 158 LBS | SYSTOLIC BLOOD PRESSURE: 120 MMHG | HEART RATE: 63 BPM | DIASTOLIC BLOOD PRESSURE: 80 MMHG | BODY MASS INDEX: 25.5 KG/M2

## 2023-05-01 DIAGNOSIS — I25.10 CAD S/P PERCUTANEOUS CORONARY ANGIOPLASTY: Primary | ICD-10-CM

## 2023-05-01 DIAGNOSIS — Z98.61 CAD S/P PERCUTANEOUS CORONARY ANGIOPLASTY: Primary | ICD-10-CM

## 2023-05-01 PROCEDURE — G8427 DOCREV CUR MEDS BY ELIG CLIN: HCPCS | Performed by: NURSE PRACTITIONER

## 2023-05-01 PROCEDURE — 3074F SYST BP LT 130 MM HG: CPT | Performed by: NURSE PRACTITIONER

## 2023-05-01 PROCEDURE — 99213 OFFICE O/P EST LOW 20 MIN: CPT | Performed by: NURSE PRACTITIONER

## 2023-05-01 PROCEDURE — 93000 ELECTROCARDIOGRAM COMPLETE: CPT | Performed by: NURSE PRACTITIONER

## 2023-05-01 PROCEDURE — 3017F COLORECTAL CA SCREEN DOC REV: CPT | Performed by: NURSE PRACTITIONER

## 2023-05-01 PROCEDURE — 3079F DIAST BP 80-89 MM HG: CPT | Performed by: NURSE PRACTITIONER

## 2023-05-01 PROCEDURE — G8419 CALC BMI OUT NRM PARAM NOF/U: HCPCS | Performed by: NURSE PRACTITIONER

## 2023-05-01 PROCEDURE — 1036F TOBACCO NON-USER: CPT | Performed by: NURSE PRACTITIONER

## 2023-05-01 RX ORDER — DIPHENHYDRAMINE HCL 25 MG
50 TABLET ORAL ONCE
OUTPATIENT
Start: 2023-05-01 | End: 2023-05-01

## 2023-05-01 RX ORDER — PREDNISONE 1 MG/1
50 TABLET ORAL ONCE
OUTPATIENT
Start: 2023-05-01 | End: 2023-05-01

## 2023-05-01 RX ORDER — ASPIRIN 81 MG/1
81 TABLET ORAL ONCE
OUTPATIENT
Start: 2023-05-01 | End: 2023-05-01

## 2023-05-01 RX ORDER — SODIUM CHLORIDE 0.9 % (FLUSH) 0.9 %
5-40 SYRINGE (ML) INJECTION EVERY 12 HOURS SCHEDULED
OUTPATIENT
Start: 2023-05-01

## 2023-05-01 RX ORDER — NITROGLYCERIN 0.4 MG/1
0.4 TABLET SUBLINGUAL EVERY 5 MIN PRN
OUTPATIENT
Start: 2023-05-01

## 2023-05-01 RX ORDER — SODIUM CHLORIDE 0.9 % (FLUSH) 0.9 %
5-40 SYRINGE (ML) INJECTION PRN
OUTPATIENT
Start: 2023-05-01

## 2023-05-01 RX ORDER — SODIUM CHLORIDE 9 MG/ML
INJECTION, SOLUTION INTRAVENOUS PRN
OUTPATIENT
Start: 2023-05-01

## 2023-05-01 RX ORDER — ONDANSETRON 2 MG/ML
4 INJECTION INTRAMUSCULAR; INTRAVENOUS EVERY 6 HOURS PRN
OUTPATIENT
Start: 2023-05-01

## 2023-05-01 NOTE — PROGRESS NOTES
Chief Complaint   Patient presents with    Atrial Fibrillation    Coronary Artery Disease    Hypertension    Hyperlipidemia       Patient presents for initial medical evaluation. Patient is followed on a regular basis by Dr. Carolyn Redman, APRN - CNP. HX OF CAD S/P PCI. HX OF AVR at [facility] 10 yrs ago. Patient with hx of cancer at age 26 and had radiation to chest. Patient with hx of Aflutter since 2019. Not on full dose DOAC. Patient with hx of hemorrhoids. Last echo in 2017 with EF of 60%, AV with mean gradient of 16mmhg. Pt denies chest pain, dyspnea, dyspnea on exertion, change in exercise capacity, fatigue,  nausea, vomiting, diarrhea, constipation, motor weakness, insomnia, weight loss, syncope, dizziness, lightheadedness, palpitations, PND, orthopnea, or claudication. He is active without any angina. 1-13-22: doing well. No issues. Noted to venkatesh in afib last OV, placed on DOAC. HX OF CAD S/P PCI. HX OF AVR at [facility] 10 yrs ago. Patient with hx of cancer at age 26 and had radiation to chest. Patient with hx of Aflutter since 2019. Not on full dose DOAC. Patient with hx of hemorrhoids. Echo with EF of 55%, normal bioprosthetic AV in place. Pt denies chest pain, dyspnea, dyspnea on exertion, change in exercise capacity, fatigue,  nausea, vomiting, diarrhea, constipation, motor weakness, insomnia, weight loss, syncope, dizziness, lightheadedness, palpitations, PND, orthopnea, or claudication. EKG with NSR< no ischemia. EKG with afib/flutter. 1/20/2022:HX OF CAD S/P PCI. HX OF AVR at [facility] 10 yrs ago. Patient with hx of cancer at age 26 and had radiation to chest. Patient with hx of Aflutter since 2019. Not on full dose DOAC. Patient with hx of hemorrhoids. Echo with EF of 55%, normal bioprosthetic AV in place. Patient seen in office today status post cardioversion and sotalol drug loading 1 week ago.   EKG done in office today shows patient in normal sinus rhythm, heart rate 87, QTc

## 2023-05-01 NOTE — PATIENT INSTRUCTIONS
Coronary Angiogram with Possible Treatment: Before Your Procedure  What is a coronary angiogram?     A coronary angiogram is a test to look at the blood vessels of your heart. These are called the coronary arteries. You may have this test to see if any of these arteries are narrowed or blocked. The test may also be used to measure the pressure in your heart's chambers. A doctor will put a thin, flexible tube into a blood vessel in your upper leg or groin. This tube is called a catheter. In some cases, the doctor may insert it in a blood vessel near your elbow or wrist.  During the test, the doctor moves the catheter through the blood vessel and into your heart. Then the doctor puts a dye into the catheter. This makes your coronary arteries show up on a screen. Your doctor can see if the arteries are blocked or narrowed. If you have a narrowed or blocked artery, the doctor may do an angioplasty or a coronary stent procedure. In an angioplasty, the doctor puts a catheter with a tiny balloon at the tip into the blocked area and inflates it. The balloon presses the fatty buildup (plaque) against the walls of the artery. This makes more room for blood to flow. In most cases, the doctor then puts a stent in the artery. A stent is a small, expandable tube. It presses against the walls of the artery. The stent is left in the artery to keep it open. This helps blood flow. The catheter is removed from your body. Follow-up care is a key part of your treatment and safety. Be sure to make and go to all appointments, and call your doctor if you are having problems. It's also a good idea to know your test results and keep a list of the medicines you take. How do you prepare for the procedure? Procedures can be stressful. This information will help you understand what you can expect. And it will help you safely prepare for your procedure. Preparing for the procedure    Be sure you have someone to take you home.  Anesthesia and

## 2023-05-10 ENCOUNTER — HOSPITAL ENCOUNTER (OUTPATIENT)
Dept: CARDIAC CATH/INVASIVE PROCEDURES | Age: 63
Discharge: HOME | End: 2023-05-10
Attending: INTERNAL MEDICINE | Admitting: INTERNAL MEDICINE
Payer: COMMERCIAL

## 2023-05-10 VITALS
BODY MASS INDEX: 15.45 KG/M2 | HEART RATE: 57 BPM | WEIGHT: 96.12 LBS | HEIGHT: 66 IN | OXYGEN SATURATION: 98 % | SYSTOLIC BLOOD PRESSURE: 129 MMHG | RESPIRATION RATE: 14 BRPM | DIASTOLIC BLOOD PRESSURE: 70 MMHG

## 2023-05-10 DIAGNOSIS — R94.39 ABNORMAL STRESS TEST: Primary | ICD-10-CM

## 2023-05-10 LAB
ANION GAP SERPL CALCULATED.3IONS-SCNC: 13 MEQ/L (ref 9–15)
BUN SERPL-MCNC: 9 MG/DL (ref 8–23)
CALCIUM SERPL-MCNC: 9.3 MG/DL (ref 8.5–9.9)
CHLORIDE SERPL-SCNC: 99 MEQ/L (ref 95–107)
CO2 SERPL-SCNC: 24 MEQ/L (ref 20–31)
CREAT SERPL-MCNC: 0.63 MG/DL (ref 0.7–1.2)
ERYTHROCYTE [DISTWIDTH] IN BLOOD BY AUTOMATED COUNT: 15.8 % (ref 11.5–14.5)
GLUCOSE SERPL-MCNC: 159 MG/DL (ref 70–99)
HCT VFR BLD AUTO: 41.3 % (ref 42–52)
HGB BLD-MCNC: 13.4 G/DL (ref 14–18)
LV EF: 55 %
LVEF MODALITY: NORMAL
MCH RBC QN AUTO: 26.9 PG (ref 27–31.3)
MCHC RBC AUTO-ENTMCNC: 32.5 % (ref 33–37)
MCV RBC AUTO: 82.8 FL (ref 79–92.2)
PLATELET # BLD AUTO: 414 K/UL (ref 130–400)
POTASSIUM SERPL-SCNC: 3.6 MEQ/L (ref 3.4–4.9)
RBC # BLD AUTO: 4.99 M/UL (ref 4.7–6.1)
SODIUM SERPL-SCNC: 136 MEQ/L (ref 135–144)
WBC # BLD AUTO: 10.6 K/UL (ref 4.8–10.8)

## 2023-05-10 PROCEDURE — 99152 MOD SED SAME PHYS/QHP 5/>YRS: CPT

## 2023-05-10 PROCEDURE — 6370000000 HC RX 637 (ALT 250 FOR IP): Performed by: NURSE PRACTITIONER

## 2023-05-10 PROCEDURE — 6360000004 HC RX CONTRAST MEDICATION: Performed by: INTERNAL MEDICINE

## 2023-05-10 PROCEDURE — 80048 BASIC METABOLIC PNL TOTAL CA: CPT

## 2023-05-10 PROCEDURE — 2709999900 HC NON-CHARGEABLE SUPPLY

## 2023-05-10 PROCEDURE — 93572 IV DOP VEL&/PRESS C FLO EA: CPT

## 2023-05-10 PROCEDURE — 6360000002 HC RX W HCPCS

## 2023-05-10 PROCEDURE — 2500000003 HC RX 250 WO HCPCS

## 2023-05-10 PROCEDURE — 93458 L HRT ARTERY/VENTRICLE ANGIO: CPT

## 2023-05-10 PROCEDURE — 93571 IV DOP VEL&/PRESS C FLO 1ST: CPT

## 2023-05-10 PROCEDURE — C1769 GUIDE WIRE: HCPCS

## 2023-05-10 PROCEDURE — C1894 INTRO/SHEATH, NON-LASER: HCPCS

## 2023-05-10 PROCEDURE — 85027 COMPLETE CBC AUTOMATED: CPT

## 2023-05-10 PROCEDURE — C1887 CATHETER, GUIDING: HCPCS

## 2023-05-10 RX ORDER — MORPHINE SULFATE 2 MG/ML
2 INJECTION, SOLUTION INTRAMUSCULAR; INTRAVENOUS
Status: DISCONTINUED | OUTPATIENT
Start: 2023-05-10 | End: 2023-05-10 | Stop reason: HOSPADM

## 2023-05-10 RX ORDER — ACETAMINOPHEN 325 MG/1
650 TABLET ORAL EVERY 4 HOURS PRN
Status: DISCONTINUED | OUTPATIENT
Start: 2023-05-10 | End: 2023-05-10 | Stop reason: HOSPADM

## 2023-05-10 RX ORDER — ASPIRIN 81 MG/1
81 TABLET ORAL ONCE
Status: COMPLETED | OUTPATIENT
Start: 2023-05-10 | End: 2023-05-10

## 2023-05-10 RX ORDER — SODIUM CHLORIDE 0.9 % (FLUSH) 0.9 %
5-40 SYRINGE (ML) INJECTION EVERY 12 HOURS SCHEDULED
Status: DISCONTINUED | OUTPATIENT
Start: 2023-05-10 | End: 2023-05-10 | Stop reason: HOSPADM

## 2023-05-10 RX ORDER — MIDAZOLAM HYDROCHLORIDE 2 MG/2ML
2 INJECTION, SOLUTION INTRAMUSCULAR; INTRAVENOUS
Status: DISCONTINUED | OUTPATIENT
Start: 2023-05-10 | End: 2023-05-10 | Stop reason: HOSPADM

## 2023-05-10 RX ORDER — FENTANYL CITRATE 0.05 MG/ML
25 INJECTION, SOLUTION INTRAMUSCULAR; INTRAVENOUS
Status: DISCONTINUED | OUTPATIENT
Start: 2023-05-10 | End: 2023-05-10 | Stop reason: HOSPADM

## 2023-05-10 RX ORDER — SODIUM CHLORIDE 0.9 % (FLUSH) 0.9 %
5-40 SYRINGE (ML) INJECTION PRN
Status: DISCONTINUED | OUTPATIENT
Start: 2023-05-10 | End: 2023-05-10 | Stop reason: HOSPADM

## 2023-05-10 RX ORDER — NITROGLYCERIN 0.4 MG/1
0.4 TABLET SUBLINGUAL EVERY 5 MIN PRN
Status: DISCONTINUED | OUTPATIENT
Start: 2023-05-10 | End: 2023-05-10 | Stop reason: HOSPADM

## 2023-05-10 RX ORDER — ONDANSETRON 2 MG/ML
4 INJECTION INTRAMUSCULAR; INTRAVENOUS EVERY 6 HOURS PRN
Status: DISCONTINUED | OUTPATIENT
Start: 2023-05-10 | End: 2023-05-10 | Stop reason: HOSPADM

## 2023-05-10 RX ORDER — DIPHENHYDRAMINE HCL 25 MG
50 TABLET ORAL ONCE
Status: DISCONTINUED | OUTPATIENT
Start: 2023-05-10 | End: 2023-05-10 | Stop reason: HOSPADM

## 2023-05-10 RX ORDER — LABETALOL HYDROCHLORIDE 5 MG/ML
10 INJECTION, SOLUTION INTRAVENOUS EVERY 30 MIN PRN
Status: DISCONTINUED | OUTPATIENT
Start: 2023-05-10 | End: 2023-05-10 | Stop reason: HOSPADM

## 2023-05-10 RX ORDER — SODIUM CHLORIDE 9 MG/ML
INJECTION, SOLUTION INTRAVENOUS PRN
Status: DISCONTINUED | OUTPATIENT
Start: 2023-05-10 | End: 2023-05-10 | Stop reason: HOSPADM

## 2023-05-10 RX ORDER — SODIUM CHLORIDE 9 MG/ML
INJECTION, SOLUTION INTRAVENOUS CONTINUOUS
Status: DISCONTINUED | OUTPATIENT
Start: 2023-05-10 | End: 2023-05-10 | Stop reason: HOSPADM

## 2023-05-10 RX ORDER — PREDNISONE 50 MG/1
50 TABLET ORAL ONCE
Status: DISCONTINUED | OUTPATIENT
Start: 2023-05-10 | End: 2023-05-10 | Stop reason: HOSPADM

## 2023-05-10 RX ADMIN — ASPIRIN 81 MG: 81 TABLET, COATED ORAL at 08:12

## 2023-05-10 RX ADMIN — IOPAMIDOL 55 ML: 612 INJECTION, SOLUTION INTRAVENOUS at 10:04

## 2023-05-10 NOTE — PROGRESS NOTES
Arrived to pre/post from the cath lab and report from [facility]. Vasc band to right wrist with no bleeding or hematoma. Attached to monitor and vitals are stable. No complaints of chest pain or shortness of breath. Awake and talking.   Will continue to monitor

## 2023-05-10 NOTE — PROGRESS NOTES
Discharge instructions given and patient and family verbalize understanding at this time.   IV dc'd intact and patient was discharged to family via wheelchair

## 2023-05-10 NOTE — DISCHARGE INSTRUCTIONS
May shower and remove right wrist dressing in the morning  No driving for 24 hours  No heavy lifting anything over 5 pounds for 2 days  Any redness, drainage, increased swelling or fever please call Dr. Steward Francesco office  Any bleeding apply pressure and if unable to control the bleeding call 911  Follow up in 6 weeks

## 2023-05-16 ENCOUNTER — TELEPHONE (OUTPATIENT)
Dept: CARDIOLOGY CLINIC | Age: 63
End: 2023-05-16

## 2023-05-16 NOTE — TELEPHONE ENCOUNTER
REFERRAL TO DR Daniel Waite   AND RECORDS FAXED  TO HIS OFFICE    PATIENT AWARE    WILL SCAN REFERRAL AND FAX CONFIRMATION INTO MEDIA

## 2023-06-08 RX ORDER — AMOXICILLIN 500 MG/1
TABLET, FILM COATED ORAL
Qty: 4 TABLET | Refills: 2 | Status: SHIPPED | OUTPATIENT
Start: 2023-06-08

## 2023-06-08 NOTE — TELEPHONE ENCOUNTER
Comments:     Last Office Visit (last PCP visit):   3/21/2023    Next Visit Date:  No future appointments. **If hasn't been seen in over a year OR hasn't followed up according to last diabetes/ADHD visit, make appointment for patient before sending refill to provider. Rx requested:  Requested Prescriptions     Pending Prescriptions Disp Refills    amoxicillin (AMOXIL) 500 MG tablet [Pharmacy Med Name: AMOXICILLIN 500 MG TABLET] 4 tablet 2     Sig: TAKE 2,000 MG (4 TABLETS) BY MOUTH SEE ADMIN INSTRUCTIONS PRIOR TO DENTAL PROCEDURES.

## 2023-07-11 ENCOUNTER — OFFICE VISIT (OUTPATIENT)
Dept: FAMILY MEDICINE CLINIC | Age: 63
End: 2023-07-11
Payer: COMMERCIAL

## 2023-07-11 VITALS
DIASTOLIC BLOOD PRESSURE: 68 MMHG | TEMPERATURE: 97.8 F | WEIGHT: 137 LBS | HEIGHT: 67 IN | OXYGEN SATURATION: 98 % | SYSTOLIC BLOOD PRESSURE: 134 MMHG | BODY MASS INDEX: 21.5 KG/M2 | HEART RATE: 76 BPM

## 2023-07-11 DIAGNOSIS — Z98.61 CAD S/P PERCUTANEOUS CORONARY ANGIOPLASTY: ICD-10-CM

## 2023-07-11 DIAGNOSIS — I25.810 CORONARY ARTERY DISEASE INVOLVING AUTOLOGOUS ARTERY CORONARY BYPASS GRAFT WITHOUT ANGINA PECTORIS: Primary | ICD-10-CM

## 2023-07-11 DIAGNOSIS — E11.65 TYPE 2 DIABETES MELLITUS WITH HYPERGLYCEMIA, WITHOUT LONG-TERM CURRENT USE OF INSULIN (HCC): ICD-10-CM

## 2023-07-11 DIAGNOSIS — I25.10 CAD S/P PERCUTANEOUS CORONARY ANGIOPLASTY: ICD-10-CM

## 2023-07-11 PROCEDURE — 3017F COLORECTAL CA SCREEN DOC REV: CPT

## 2023-07-11 PROCEDURE — G8420 CALC BMI NORM PARAMETERS: HCPCS

## 2023-07-11 PROCEDURE — G8427 DOCREV CUR MEDS BY ELIG CLIN: HCPCS

## 2023-07-11 PROCEDURE — 1036F TOBACCO NON-USER: CPT

## 2023-07-11 PROCEDURE — 3075F SYST BP GE 130 - 139MM HG: CPT

## 2023-07-11 PROCEDURE — 99213 OFFICE O/P EST LOW 20 MIN: CPT

## 2023-07-11 PROCEDURE — 3078F DIAST BP <80 MM HG: CPT

## 2023-07-11 PROCEDURE — 2022F DILAT RTA XM EVC RTNOPTHY: CPT

## 2023-07-11 PROCEDURE — 3051F HG A1C>EQUAL 7.0%<8.0%: CPT

## 2023-07-11 RX ORDER — POTASSIUM CHLORIDE 750 MG/1
TABLET, FILM COATED, EXTENDED RELEASE ORAL
COMMUNITY
Start: 2023-07-08

## 2023-07-11 RX ORDER — ACETAMINOPHEN 325 MG/1
650 TABLET ORAL EVERY 6 HOURS PRN
COMMUNITY
Start: 2023-07-08

## 2023-07-11 RX ORDER — FUROSEMIDE 20 MG/1
TABLET ORAL
COMMUNITY
Start: 2023-07-08

## 2023-07-11 RX ORDER — CHOLECALCIFEROL (VITAMIN D3) 125 MCG
CAPSULE ORAL DAILY
COMMUNITY
Start: 2023-07-09

## 2023-07-11 ASSESSMENT — ENCOUNTER SYMPTOMS
NAUSEA: 0
SHORTNESS OF BREATH: 1
COUGH: 0
DIARRHEA: 0
CHEST TIGHTNESS: 0
COLOR CHANGE: 0

## 2023-07-11 NOTE — PATIENT INSTRUCTIONS
Starting on low-dose of Jardiance for diabetes. Is important for you to maintain good hydration to prevent side effects from this medication especially if used with alcohol.

## 2023-07-12 ENCOUNTER — TELEPHONE (OUTPATIENT)
Dept: CARDIOLOGY CLINIC | Age: 63
End: 2023-07-12

## 2023-07-12 NOTE — TELEPHONE ENCOUNTER
Appointment For: Lurline Ganser ([id number])   Visit Type: Aundrea Escalante ([id number])      7/24/2023    8:00 AM  15 mins. Petey Li CARDIOLOGY      Patient Comments: Follow up after bypass surgery     Appointment Scheduled  (Newest Message First)  Batch Job User Mony  Lurline Ganser 5 days ago     Mychart, Processor  Lurline Ganser 5 days ago     PM  Appointment Information:      Visit Type: Follow Up Appointment          Date: 7/24/2023                  Dept: [address] [facility]                  Provider: Kelsie Graff                  Time: 8:00 AM                  Length: 15 min     Appt Status: Scheduled        Appt Instructions:      Please complete digital registration via the [employer]       This [employer] message has not been read. Lurline Ganser  Patient Appointment Schedule Request Pool 5 days ago       Appointment For: Ethelrline Ganser ([id number])  Visit Type: uAndrea Escalante ([id number])     7/24/2023    8:00 AM  15 mins. Petey Li CARDIOLOGY     Patient Comments:   Follow up after bypass surgery

## 2023-07-18 ENCOUNTER — OFFICE VISIT (OUTPATIENT)
Dept: FAMILY MEDICINE CLINIC | Age: 63
End: 2023-07-18
Payer: COMMERCIAL

## 2023-07-18 VITALS
SYSTOLIC BLOOD PRESSURE: 126 MMHG | DIASTOLIC BLOOD PRESSURE: 70 MMHG | BODY MASS INDEX: 22.32 KG/M2 | TEMPERATURE: 97.2 F | WEIGHT: 142.2 LBS | HEIGHT: 67 IN | HEART RATE: 74 BPM | OXYGEN SATURATION: 96 %

## 2023-07-18 DIAGNOSIS — F51.02 ADJUSTMENT INSOMNIA: Primary | ICD-10-CM

## 2023-07-18 PROCEDURE — 99213 OFFICE O/P EST LOW 20 MIN: CPT

## 2023-07-18 PROCEDURE — 3074F SYST BP LT 130 MM HG: CPT

## 2023-07-18 PROCEDURE — G8420 CALC BMI NORM PARAMETERS: HCPCS

## 2023-07-18 PROCEDURE — 3017F COLORECTAL CA SCREEN DOC REV: CPT

## 2023-07-18 PROCEDURE — 3078F DIAST BP <80 MM HG: CPT

## 2023-07-18 PROCEDURE — 1036F TOBACCO NON-USER: CPT

## 2023-07-18 PROCEDURE — G8427 DOCREV CUR MEDS BY ELIG CLIN: HCPCS

## 2023-07-18 RX ORDER — ZOLPIDEM TARTRATE 5 MG/1
5 TABLET ORAL NIGHTLY PRN
Qty: 14 TABLET | Refills: 0 | Status: SHIPPED | OUTPATIENT
Start: 2023-07-18 | End: 2023-08-01

## 2023-07-18 NOTE — PROGRESS NOTES
suicidal ideas. The patient is not nervous/anxious. Current Outpatient Medications on File Prior to Visit   Medication Sig Dispense Refill    furosemide (LASIX) 20 MG tablet TAKE 1 TABLET BY MOUTH EVERY OTHER DAY FOR 4 DOSES.      potassium chloride (KLOR-CON) 10 MEQ extended release tablet PLEASE SEE ATTACHED FOR DETAILED DIRECTIONS      coenzyme Q-10 100 MG capsule Take by mouth daily      acetaminophen (TYLENOL) 325 MG tablet Take 2 tablets by mouth every 6 hours as needed      Docusate Sodium (COLACE PO) Take by mouth      Magnesium Hydroxide (DULCOLAX PO) Take by mouth      empagliflozin (JARDIANCE) 10 MG tablet Take 1 tablet by mouth daily 30 tablet 2    amoxicillin (AMOXIL) 500 MG tablet TAKE 2,000 MG (4 TABLETS) BY MOUTH SEE ADMIN INSTRUCTIONS PRIOR TO DENTAL PROCEDURES. 4 tablet 2    folic acid (FOLVITE) 554 MCG tablet       nitroGLYCERIN (NITROSTAT) 0.4 MG SL tablet Place 1 tablet under the tongue every 5 minutes as needed for Chest pain (x 3 doses) 25 tablet 3    Coenzyme Q10 100 MG CHEW Take 1 tablet by mouth daily 90 tablet 3    apixaban (ELIQUIS) 2.5 MG TABS tablet Take 1 tablet by mouth 2 times daily 60 tablet 5    Cyanocobalamin (B-12) 1000 MCG SUBL Place 1 tablet under the tongue daily 90 tablet 3    magnesium oxide (MAG-OX) 400 MG tablet Take 1 tablet by mouth daily 90 tablet 2    pantoprazole (PROTONIX) 20 MG tablet TAKE 1 TABLET BY MOUTH EVERY DAY 90 tablet 3    pravastatin (PRAVACHOL) 40 MG tablet TAKE 1 TABLET BY MOUTH EVERY DAY 90 tablet 3    Misc Natural Products (DAILY HERBS BLOOD SUGAR BALANC PO) Take by mouth Pata de michele herb -rainforest herb      NONFORMULARY perda humme caa-rainforrest herb      aspirin 81 MG tablet Take 1 tablet by mouth daily      EPINEPHrine (EPIPEN) 0.3 MG/0.3ML SOAJ injection Inject 0.3 mLs into the muscle once as needed (insect sting) 2 each 2     No current facility-administered medications on file prior to visit.        Vitals:  /70 (Site: Right Upper

## 2023-07-21 ENCOUNTER — HOSPITAL ENCOUNTER (OUTPATIENT)
Dept: LAB | Age: 63
Discharge: HOME | End: 2023-07-21
Payer: COMMERCIAL

## 2023-07-21 DIAGNOSIS — E87.1 HYPONATREMIA: ICD-10-CM

## 2023-07-21 DIAGNOSIS — D64.9 ANEMIA, UNSPECIFIED TYPE: Primary | ICD-10-CM

## 2023-07-21 DIAGNOSIS — D64.9 ANEMIA, UNSPECIFIED TYPE: ICD-10-CM

## 2023-07-21 LAB
ANION GAP SERPL CALCULATED.3IONS-SCNC: 9 MEQ/L (ref 9–15)
BUN SERPL-MCNC: 15 MG/DL (ref 8–23)
CALCIUM SERPL-MCNC: 9.2 MG/DL (ref 8.5–9.9)
CHLORIDE SERPL-SCNC: 104 MEQ/L (ref 95–107)
CO2 SERPL-SCNC: 23 MEQ/L (ref 20–31)
CREAT SERPL-MCNC: 0.6 MG/DL (ref 0.7–1.2)
ERYTHROCYTE [DISTWIDTH] IN BLOOD BY AUTOMATED COUNT: 18 % (ref 11.5–14.5)
GLUCOSE SERPL-MCNC: 113 MG/DL (ref 70–99)
HCT VFR BLD AUTO: 33.9 % (ref 42–52)
HGB BLD-MCNC: 10.4 G/DL (ref 14–18)
MCH RBC QN AUTO: 25.8 PG (ref 27–31.3)
MCHC RBC AUTO-ENTMCNC: 30.7 % (ref 33–37)
MCV RBC AUTO: 84 FL (ref 79–92.2)
PLATELET # BLD AUTO: 677 K/UL (ref 130–400)
POTASSIUM SERPL-SCNC: 4.6 MEQ/L (ref 3.4–4.9)
RBC # BLD AUTO: 4.04 M/UL (ref 4.7–6.1)
SODIUM SERPL-SCNC: 136 MEQ/L (ref 135–144)
WBC # BLD AUTO: 12.5 K/UL (ref 4.8–10.8)

## 2023-07-21 PROCEDURE — 36415 COLL VENOUS BLD VENIPUNCTURE: CPT

## 2023-07-21 PROCEDURE — 85027 COMPLETE CBC AUTOMATED: CPT

## 2023-07-21 PROCEDURE — 80048 BASIC METABOLIC PNL TOTAL CA: CPT

## 2023-07-21 PROCEDURE — 83540 ASSAY OF IRON: CPT

## 2023-07-21 PROCEDURE — 83550 IRON BINDING TEST: CPT

## 2023-07-21 ASSESSMENT — ENCOUNTER SYMPTOMS
SHORTNESS OF BREATH: 0
COUGH: 0
DIARRHEA: 0
COLOR CHANGE: 0
NAUSEA: 0
CHEST TIGHTNESS: 0

## 2023-07-22 LAB
IRON SATURATION: 5 % (ref 20–55)
IRON: 14 UG/DL (ref 59–158)
TOTAL IRON BINDING CAPACITY: 282 UG/DL (ref 250–450)
UNSATURATED IRON BINDING CAPACITY: 268 UG/DL (ref 112–347)

## 2023-07-24 ENCOUNTER — OFFICE VISIT (OUTPATIENT)
Dept: FAMILY MEDICINE CLINIC | Age: 63
End: 2023-07-24
Payer: COMMERCIAL

## 2023-07-24 VITALS
DIASTOLIC BLOOD PRESSURE: 60 MMHG | TEMPERATURE: 97.1 F | WEIGHT: 141.8 LBS | HEART RATE: 76 BPM | SYSTOLIC BLOOD PRESSURE: 124 MMHG | HEIGHT: 67 IN | BODY MASS INDEX: 22.26 KG/M2 | OXYGEN SATURATION: 97 %

## 2023-07-24 DIAGNOSIS — F41.9 ANXIETY: ICD-10-CM

## 2023-07-24 DIAGNOSIS — F51.02 ADJUSTMENT INSOMNIA: ICD-10-CM

## 2023-07-24 DIAGNOSIS — D50.8 IRON DEFICIENCY ANEMIA SECONDARY TO INADEQUATE DIETARY IRON INTAKE: Primary | ICD-10-CM

## 2023-07-24 PROCEDURE — 3078F DIAST BP <80 MM HG: CPT

## 2023-07-24 PROCEDURE — G8427 DOCREV CUR MEDS BY ELIG CLIN: HCPCS

## 2023-07-24 PROCEDURE — 3017F COLORECTAL CA SCREEN DOC REV: CPT

## 2023-07-24 PROCEDURE — G8420 CALC BMI NORM PARAMETERS: HCPCS

## 2023-07-24 PROCEDURE — 99213 OFFICE O/P EST LOW 20 MIN: CPT

## 2023-07-24 PROCEDURE — 1036F TOBACCO NON-USER: CPT

## 2023-07-24 PROCEDURE — 3074F SYST BP LT 130 MM HG: CPT

## 2023-07-24 RX ORDER — ZOLPIDEM TARTRATE 10 MG/1
10 TABLET ORAL NIGHTLY PRN
Qty: 30 TABLET | Refills: 0 | Status: SHIPPED | OUTPATIENT
Start: 2023-07-24 | End: 2023-08-23

## 2023-07-24 RX ORDER — ESCITALOPRAM OXALATE 10 MG/1
10 TABLET ORAL DAILY
Qty: 30 TABLET | Refills: 3 | Status: SHIPPED | OUTPATIENT
Start: 2023-07-24

## 2023-07-24 RX ORDER — FERROUS SULFATE 220 (44)/5
220 ELIXIR ORAL 2 TIMES DAILY
Qty: 300 ML | Refills: 2 | Status: SHIPPED | OUTPATIENT
Start: 2023-07-24 | End: 2023-10-22

## 2023-07-24 NOTE — PROGRESS NOTES
[address] [facility] PRIMARY CARE          ASSESSMENT/PLAN     Staci Desai is a 59 y.o. male who presents with:  Restless legs at night difficulty sleeping traumatic dreams associated with recent surgery for bypass. Episodes of shortness of breath at rest that are improved when he gets up to walk. Feelings of anxiety. 1. Iron deficiency anemia secondary to inadequate dietary iron intake  Recent lab work shows low iron and iron saturation. Patient has been started on oral iron, he is reporting difficulty swallowing. Orders are placed for ferrous sulfate 220 mg per 5 mL solution. He is going to take 5 mL twice daily with acidic foods such as orange juice. History of iron deficiency anemia patient is requesting iron infusion. Referral will be placed for hematology. -     Amb External Referral To Oncology  2. Adjustment insomnia  Patient reports improved symptoms after taking Ambien 5 mg tablets. He is still not sleeping every night. We will increase dose to 10 mg nightly. -     zolpidem (AMBIEN) 10 MG tablet; Take 1 tablet by mouth nightly as needed for Sleep for up to 30 days. Max Daily Amount: 10 mg, Disp-30 tablet, R-0Normal  3. Anxiety  Describes symptoms of anxiety associated with recent surgery for CABG. Symptoms of shortness of breath which are improved if he distracts himself by getting up and performing activity difficulty sleeping nightmares and labile mood. He is well groomed his wife is in the office with him today he has good support system at home. No suicidal ideation. -     escitalopram (LEXAPRO) 10 MG tablet; Take 1 tablet by mouth daily, Disp-30 tablet, R-3Normal           PATIENT REFERRED TO:  Return in about 4 weeks (around 8/21/2023).     DISCHARGE MEDICATIONS:  New Prescriptions    ESCITALOPRAM (LEXAPRO) 10 MG TABLET    Take 1 tablet by mouth daily    FERROUS SULFATE 220 (44 FE) MG/5ML SOLUTION    Take 5 mLs by mouth 2 times daily    ZOLPIDEM (AMBIEN) 10 MG TABLET    Take

## 2023-07-25 ASSESSMENT — ENCOUNTER SYMPTOMS
GASTROINTESTINAL NEGATIVE: 1
COUGH: 0
SHORTNESS OF BREATH: 1

## 2023-07-26 ENCOUNTER — TELEPHONE (OUTPATIENT)
Dept: FAMILY MEDICINE CLINIC | Age: 63
End: 2023-07-26

## 2023-08-03 ENCOUNTER — OFFICE VISIT (OUTPATIENT)
Dept: CARDIOLOGY CLINIC | Age: 63
End: 2023-08-03
Payer: COMMERCIAL

## 2023-08-03 VITALS
BODY MASS INDEX: 22.15 KG/M2 | HEART RATE: 76 BPM | SYSTOLIC BLOOD PRESSURE: 122 MMHG | OXYGEN SATURATION: 95 % | DIASTOLIC BLOOD PRESSURE: 76 MMHG | HEIGHT: 67 IN | RESPIRATION RATE: 15 BRPM | WEIGHT: 141.1 LBS

## 2023-08-03 DIAGNOSIS — Z95.1 STATUS POST CORONARY ARTERY BYPASS GRAFT: Primary | ICD-10-CM

## 2023-08-03 DIAGNOSIS — D50.9 IRON DEFICIENCY ANEMIA, UNSPECIFIED IRON DEFICIENCY ANEMIA TYPE: ICD-10-CM

## 2023-08-03 DIAGNOSIS — Z86.79 HX OF ATRIAL FLUTTER: ICD-10-CM

## 2023-08-03 DIAGNOSIS — Z09 HOSPITAL DISCHARGE FOLLOW-UP: ICD-10-CM

## 2023-08-03 PROCEDURE — 93000 ELECTROCARDIOGRAM COMPLETE: CPT | Performed by: NURSE PRACTITIONER

## 2023-08-03 PROCEDURE — G8420 CALC BMI NORM PARAMETERS: HCPCS | Performed by: NURSE PRACTITIONER

## 2023-08-03 PROCEDURE — 3017F COLORECTAL CA SCREEN DOC REV: CPT | Performed by: NURSE PRACTITIONER

## 2023-08-03 PROCEDURE — 99214 OFFICE O/P EST MOD 30 MIN: CPT | Performed by: NURSE PRACTITIONER

## 2023-08-03 PROCEDURE — 1036F TOBACCO NON-USER: CPT | Performed by: NURSE PRACTITIONER

## 2023-08-03 PROCEDURE — 3074F SYST BP LT 130 MM HG: CPT | Performed by: NURSE PRACTITIONER

## 2023-08-03 PROCEDURE — 3078F DIAST BP <80 MM HG: CPT | Performed by: NURSE PRACTITIONER

## 2023-08-03 PROCEDURE — G8427 DOCREV CUR MEDS BY ELIG CLIN: HCPCS | Performed by: NURSE PRACTITIONER

## 2023-08-03 NOTE — PROGRESS NOTES
Chief Complaint   Patient presents with    Post-Op Check     Post CABG       Patient presents for initial medical evaluation. Patient is followed on a regular basis by Dr. Luanne Larios, APRN - CNP. HX OF CAD S/P PCI. HX OF AVR at [facility] 10 yrs ago. Patient with hx of cancer at age 26 and had radiation to chest. Patient with hx of Aflutter since 2019. Not on full dose DOAC. Patient with hx of hemorrhoids. Last echo in 2017 with EF of 60%, AV with mean gradient of 16mmhg. Pt denies chest pain, dyspnea, dyspnea on exertion, change in exercise capacity, fatigue,  nausea, vomiting, diarrhea, constipation, motor weakness, insomnia, weight loss, syncope, dizziness, lightheadedness, palpitations, PND, orthopnea, or claudication. He is active without any angina. 1-13-22: doing well. No issues. Noted to venkatesh in afib last OV, placed on DOAC. HX OF CAD S/P PCI. HX OF AVR at [facility] 10 yrs ago. Patient with hx of cancer at age 26 and had radiation to chest. Patient with hx of Aflutter since 2019. Not on full dose DOAC. Patient with hx of hemorrhoids. Echo with EF of 55%, normal bioprosthetic AV in place. Pt denies chest pain, dyspnea, dyspnea on exertion, change in exercise capacity, fatigue,  nausea, vomiting, diarrhea, constipation, motor weakness, insomnia, weight loss, syncope, dizziness, lightheadedness, palpitations, PND, orthopnea, or claudication. EKG with NSR< no ischemia. EKG with afib/flutter. 1/20/2022:HX OF CAD S/P PCI. HX OF AVR at [facility] 10 yrs ago. Patient with hx of cancer at age 26 and had radiation to chest. Patient with hx of Aflutter since 2019. Not on full dose DOAC. Patient with hx of hemorrhoids. Echo with EF of 55%, normal bioprosthetic AV in place. Patient seen in office today status post cardioversion and sotalol drug loading 1 week ago. EKG done in office today shows patient in normal sinus rhythm, heart rate 87, QTc 469. Patient states he is feeling well overall.

## 2023-08-07 ENCOUNTER — OFFICE VISIT (OUTPATIENT)
Dept: FAMILY MEDICINE CLINIC | Age: 63
End: 2023-08-07
Payer: COMMERCIAL

## 2023-08-07 ENCOUNTER — HOSPITAL ENCOUNTER (OUTPATIENT)
Dept: LAB | Age: 63
Discharge: HOME | End: 2023-08-07
Payer: COMMERCIAL

## 2023-08-07 VITALS
SYSTOLIC BLOOD PRESSURE: 134 MMHG | HEART RATE: 77 BPM | WEIGHT: 139.6 LBS | HEIGHT: 66 IN | OXYGEN SATURATION: 98 % | DIASTOLIC BLOOD PRESSURE: 86 MMHG | BODY MASS INDEX: 22.43 KG/M2 | TEMPERATURE: 98.8 F

## 2023-08-07 DIAGNOSIS — M79.89 LEG SWELLING: ICD-10-CM

## 2023-08-07 DIAGNOSIS — M79.89 LEG SWELLING: Primary | ICD-10-CM

## 2023-08-07 LAB
ALBUMIN SERPL-MCNC: 3.3 G/DL (ref 3.5–4.6)
ALP SERPL-CCNC: 182 U/L (ref 35–104)
ALT SERPL-CCNC: 12 U/L (ref 0–41)
ANION GAP SERPL CALCULATED.3IONS-SCNC: 14 MEQ/L (ref 9–15)
ANISOCYTOSIS BLD QL SMEAR: ABNORMAL
AST SERPL-CCNC: 22 U/L (ref 0–40)
BASOPHILS # BLD: 0.5 K/UL (ref 0–0.2)
BASOPHILS NFR BLD: 4 %
BILIRUB SERPL-MCNC: 0.3 MG/DL (ref 0.2–0.7)
BUN SERPL-MCNC: 10 MG/DL (ref 8–23)
CALCIUM SERPL-MCNC: 9.9 MG/DL (ref 8.5–9.9)
CHLORIDE SERPL-SCNC: 112 MEQ/L (ref 95–107)
CO2 SERPL-SCNC: 24 MEQ/L (ref 20–31)
CREAT SERPL-MCNC: 0.59 MG/DL (ref 0.7–1.2)
EOSINOPHIL # BLD: 0.1 K/UL (ref 0–0.7)
EOSINOPHIL NFR BLD: 1 %
ERYTHROCYTE [DISTWIDTH] IN BLOOD BY AUTOMATED COUNT: 20.2 % (ref 11.5–14.5)
GLOBULIN SER CALC-MCNC: 3.2 G/DL (ref 2.3–3.5)
GLUCOSE SERPL-MCNC: 128 MG/DL (ref 70–99)
HCT VFR BLD AUTO: 39.9 % (ref 42–52)
HGB BLD-MCNC: 12.1 G/DL (ref 14–18)
HYPOCHROMIA BLD QL SMEAR: ABNORMAL
LYMPHOCYTES # BLD: 0.6 K/UL (ref 1–4.8)
LYMPHOCYTES NFR BLD: 5 %
MCH RBC QN AUTO: 26.2 PG (ref 27–31.3)
MCHC RBC AUTO-ENTMCNC: 30.4 % (ref 33–37)
MCV RBC AUTO: 86.1 FL (ref 79–92.2)
MONOCYTES # BLD: 2.1 K/UL (ref 0.2–0.8)
MONOCYTES NFR BLD: 19 %
NEUTROPHILS # BLD: 8 K/UL (ref 1.4–6.5)
NEUTS SEG NFR BLD: 71 %
PLATELET # BLD AUTO: 713 K/UL (ref 130–400)
PLATELET BLD QL SMEAR: ABNORMAL
POIKILOCYTOSIS BLD QL SMEAR: ABNORMAL
POTASSIUM SERPL-SCNC: 4.6 MEQ/L (ref 3.4–4.9)
PROT SERPL-MCNC: 6.5 G/DL (ref 6.3–8)
RBC # BLD AUTO: 4.63 M/UL (ref 4.7–6.1)
SODIUM SERPL-SCNC: 150 MEQ/L (ref 135–144)
TARGETS BLD QL SMEAR: ABNORMAL
WBC # BLD AUTO: 11.3 K/UL (ref 4.8–10.8)

## 2023-08-07 PROCEDURE — 99213 OFFICE O/P EST LOW 20 MIN: CPT

## 2023-08-07 PROCEDURE — 36415 COLL VENOUS BLD VENIPUNCTURE: CPT

## 2023-08-07 PROCEDURE — 80053 COMPREHEN METABOLIC PANEL: CPT

## 2023-08-07 PROCEDURE — G8427 DOCREV CUR MEDS BY ELIG CLIN: HCPCS

## 2023-08-07 PROCEDURE — 85025 COMPLETE CBC W/AUTO DIFF WBC: CPT

## 2023-08-07 PROCEDURE — 3074F SYST BP LT 130 MM HG: CPT

## 2023-08-07 PROCEDURE — 1036F TOBACCO NON-USER: CPT

## 2023-08-07 PROCEDURE — G8420 CALC BMI NORM PARAMETERS: HCPCS

## 2023-08-07 PROCEDURE — 3017F COLORECTAL CA SCREEN DOC REV: CPT

## 2023-08-07 PROCEDURE — 3078F DIAST BP <80 MM HG: CPT

## 2023-08-07 RX ORDER — FUROSEMIDE 20 MG/1
TABLET ORAL
Qty: 60 TABLET | Refills: 0 | Status: SHIPPED | OUTPATIENT
Start: 2023-08-07

## 2023-08-07 NOTE — PATIENT INSTRUCTIONS
Continue to wear compression socks when up during the day. Monitor for dizziness when taking lasix and change position slowly after taking.   If symptoms make you feel like you will pass out stop taking and call the office

## 2023-08-07 NOTE — PROGRESS NOTES
[address] [facility] PRIMARY CARE          ASSESSMENT/PLAN     Susan Francis is a 59 y.o. male who presents with:  Reports swelling in the bilateral lower extremities has continued since cardiac surgery in June. Gradual improvement but not resolving as quickly as he would like. Patient reports swelling is worse in the late afternoon and evening to the point where he cannot see his ankles. 1. Leg swelling  Currently patient is well-appearing heart sounds are normal and regular no JVD. Lung sounds clear. Compression socks are in place bilaterally. Currently there is no edema noted to either lower extremity. We will provide patient with as needed 10 mg Lasix advised continue use compression socks elevation of legs when swelling is noted. Orders for CBC and CMP for further evaluation.  -     Comprehensive Metabolic Panel; Future  -     CBC with Auto Differential; Future  -     furosemide (LASIX) 20 MG tablet; Take 0.5 mg once daily as needed for swelling of the legs, Disp-60 tablet, R-0Normal           PATIENT REFERRED TO:  No follow-ups on file. DISCHARGE MEDICATIONS:  New Prescriptions    FUROSEMIDE (LASIX) 20 MG TABLET    Take 0.5 mg once daily as needed for swelling of the legs     Cannot display discharge medications since this is not an admission. RADHA Alcantar - CNP    CHIEF COMPLAINT       Chief Complaint   Patient presents with    Leg Swelling     Bilateral leg swelling despite use of compression socks x4 weeks         SUBJECTIVE/REVIEW OF SYSTEMS     Review of Systems   Constitutional:  Negative for fatigue and unexpected weight change. HENT: Negative. Respiratory:  Positive for shortness of breath. Negative for cough and chest tightness. Cardiovascular:  Positive for leg swelling. Negative for chest pain and palpitations. Gastrointestinal:  Negative for abdominal pain. Genitourinary: Negative. Musculoskeletal: Negative. Skin:  Negative for color change.

## 2023-08-08 ENCOUNTER — TELEPHONE (OUTPATIENT)
Dept: FAMILY MEDICINE CLINIC | Age: 63
End: 2023-08-08

## 2023-08-08 DIAGNOSIS — E87.5 HYPERKALEMIA: Primary | ICD-10-CM

## 2023-08-08 DIAGNOSIS — R74.8 ELEVATED ALKALINE PHOSPHATASE LEVEL: ICD-10-CM

## 2023-08-08 ASSESSMENT — ENCOUNTER SYMPTOMS
CHEST TIGHTNESS: 0
COLOR CHANGE: 0
ABDOMINAL PAIN: 0
COUGH: 0
SHORTNESS OF BREATH: 1

## 2023-08-08 NOTE — TELEPHONE ENCOUNTER
Elevated sodium and chloride yesterday. Called patient at home today. He reports he does feel he is dehydrated. He has had difficulty getting adequate intake of foods and fluids. He has been drinking juices such as beet juice and apple juice. Advised patient increase water intake. Advised patient to discontinue Lasix states he has not started. He agrees to increase water intake and have labs redrawn tomorrow. Patient with longstanding history of elevation of ALP normal AST ALT  and GGT. History of alcohol abuse. Previously had declined further investigation he now agrees. Orders for liver ultrasound and referral for hepatology will be placed.

## 2023-08-09 ENCOUNTER — TELEPHONE (OUTPATIENT)
Dept: FAMILY MEDICINE CLINIC | Age: 63
End: 2023-08-09

## 2023-08-09 NOTE — TELEPHONE ENCOUNTER
Patient had came to to get labs drawn. But was advised there were no labs in his chart. Requested those be ordered asap.

## 2023-08-10 DIAGNOSIS — D75.839 THROMBOCYTOSIS: ICD-10-CM

## 2023-08-10 DIAGNOSIS — E87.5 HYPERKALEMIA: Primary | ICD-10-CM

## 2023-08-11 ENCOUNTER — HOSPITAL ENCOUNTER (OUTPATIENT)
Dept: LAB | Age: 63
Discharge: HOME | End: 2023-08-11
Payer: COMMERCIAL

## 2023-08-11 DIAGNOSIS — E87.5 HYPERKALEMIA: ICD-10-CM

## 2023-08-11 DIAGNOSIS — D75.839 THROMBOCYTOSIS: ICD-10-CM

## 2023-08-11 LAB
ALBUMIN SERPL-MCNC: 2.9 G/DL (ref 3.5–4.6)
ALP SERPL-CCNC: 169 U/L (ref 35–104)
ALT SERPL-CCNC: 11 U/L (ref 0–41)
ANION GAP SERPL CALCULATED.3IONS-SCNC: 12 MEQ/L (ref 9–15)
AST SERPL-CCNC: 18 U/L (ref 0–40)
BILIRUB SERPL-MCNC: 0.3 MG/DL (ref 0.2–0.7)
BUN SERPL-MCNC: 9 MG/DL (ref 8–23)
CALCIUM SERPL-MCNC: 9 MG/DL (ref 8.5–9.9)
CHLORIDE SERPL-SCNC: 99 MEQ/L (ref 95–107)
CO2 SERPL-SCNC: 23 MEQ/L (ref 20–31)
CREAT SERPL-MCNC: 0.48 MG/DL (ref 0.7–1.2)
ERYTHROCYTE [DISTWIDTH] IN BLOOD BY AUTOMATED COUNT: 19.8 % (ref 11.5–14.5)
GLOBULIN SER CALC-MCNC: 2.9 G/DL (ref 2.3–3.5)
GLUCOSE SERPL-MCNC: 170 MG/DL (ref 70–99)
HCT VFR BLD AUTO: 41 % (ref 42–52)
HGB BLD-MCNC: 12.6 G/DL (ref 14–18)
MCH RBC QN AUTO: 26.1 PG (ref 27–31.3)
MCHC RBC AUTO-ENTMCNC: 30.8 % (ref 33–37)
MCV RBC AUTO: 84.9 FL (ref 79–92.2)
PLATELET # BLD AUTO: 717 K/UL (ref 130–400)
POTASSIUM SERPL-SCNC: 4.2 MEQ/L (ref 3.4–4.9)
PROT SERPL-MCNC: 5.8 G/DL (ref 6.3–8)
RBC # BLD AUTO: 4.83 M/UL (ref 4.7–6.1)
SODIUM SERPL-SCNC: 134 MEQ/L (ref 135–144)
WBC # BLD AUTO: 9.9 K/UL (ref 4.8–10.8)

## 2023-08-11 PROCEDURE — 36415 COLL VENOUS BLD VENIPUNCTURE: CPT

## 2023-08-11 PROCEDURE — 80053 COMPREHEN METABOLIC PANEL: CPT

## 2023-08-11 PROCEDURE — 85027 COMPLETE CBC AUTOMATED: CPT

## 2023-08-14 ENCOUNTER — OFFICE VISIT (OUTPATIENT)
Dept: FAMILY MEDICINE CLINIC | Age: 63
End: 2023-08-14
Payer: COMMERCIAL

## 2023-08-14 VITALS
BODY MASS INDEX: 21.63 KG/M2 | SYSTOLIC BLOOD PRESSURE: 126 MMHG | HEART RATE: 76 BPM | WEIGHT: 134.6 LBS | OXYGEN SATURATION: 97 % | DIASTOLIC BLOOD PRESSURE: 68 MMHG | TEMPERATURE: 97.7 F | HEIGHT: 66 IN

## 2023-08-14 DIAGNOSIS — R63.4 ABNORMAL WEIGHT LOSS: Primary | ICD-10-CM

## 2023-08-14 DIAGNOSIS — R74.8 ELEVATED ALKALINE PHOSPHATASE LEVEL: ICD-10-CM

## 2023-08-14 DIAGNOSIS — D50.8 IRON DEFICIENCY ANEMIA SECONDARY TO INADEQUATE DIETARY IRON INTAKE: ICD-10-CM

## 2023-08-14 DIAGNOSIS — D75.839 THROMBOCYTOSIS: ICD-10-CM

## 2023-08-14 PROCEDURE — 3074F SYST BP LT 130 MM HG: CPT

## 2023-08-14 PROCEDURE — G8427 DOCREV CUR MEDS BY ELIG CLIN: HCPCS

## 2023-08-14 PROCEDURE — 3078F DIAST BP <80 MM HG: CPT

## 2023-08-14 PROCEDURE — 3017F COLORECTAL CA SCREEN DOC REV: CPT

## 2023-08-14 PROCEDURE — 99212 OFFICE O/P EST SF 10 MIN: CPT

## 2023-08-14 PROCEDURE — 1036F TOBACCO NON-USER: CPT

## 2023-08-14 PROCEDURE — G8420 CALC BMI NORM PARAMETERS: HCPCS

## 2023-08-14 RX ORDER — CHOLECALCIFEROL (VITAMIN D3) 125 MCG
100 CAPSULE ORAL DAILY
Qty: 90 CAPSULE | Refills: 1 | Status: SHIPPED | OUTPATIENT
Start: 2023-08-14

## 2023-08-14 RX ORDER — NUT TX, LACT-REDUCED, IRON 0.09G-2.25
1 LIQUID (ML) ORAL DAILY
Qty: 90 ML | Refills: 1 | Status: SHIPPED | OUTPATIENT
Start: 2023-08-14

## 2023-08-14 NOTE — PROGRESS NOTES
angioplasty (11/24/15); Skin cancer excision (10/21/2016); Cardiac catheterization (07/27/2018); Aortic valve replacement (2010); and Percutaneous Transluminal Coronary Angio (2008/2010). CURRENT MEDICATIONS       Previous Medications    ACETAMINOPHEN (TYLENOL) 325 MG TABLET    Take 2 tablets by mouth every 6 hours as needed    AMOXICILLIN (AMOXIL) 500 MG TABLET    TAKE 2,000 MG (4 TABLETS) BY MOUTH SEE ADMIN INSTRUCTIONS PRIOR TO DENTAL PROCEDURES. APIXABAN (ELIQUIS) 2.5 MG TABS TABLET    Take 1 tablet by mouth 2 times daily    ASPIRIN 81 MG TABLET    Take 1 tablet by mouth daily    CYANOCOBALAMIN (B-12) 1000 MCG SUBL    Place 1 tablet under the tongue daily    EMPAGLIFLOZIN (JARDIANCE) 10 MG TABLET    Take 1 tablet by mouth daily    EPINEPHRINE (EPIPEN) 0.3 MG/0.3ML SOAJ INJECTION    Inject 0.3 mLs into the muscle once as needed (insect sting)    ESCITALOPRAM (LEXAPRO) 10 MG TABLET    Take 1 tablet by mouth daily    FERROUS SULFATE 220 (44 FE) MG/5ML SOLUTION    Take 5 mLs by mouth 2 times daily    FOLIC ACID (FOLVITE) 098 MCG TABLET        FUROSEMIDE (LASIX) 20 MG TABLET    Take 0.5 mg once daily as needed for swelling of the legs    MAGNESIUM HYDROXIDE (DULCOLAX PO)    Take by mouth    MAGNESIUM OXIDE (MAG-OX) 400 MG TABLET    Take 1 tablet by mouth daily    MISC NATURAL PRODUCTS (DAILY HERBS BLOOD SUGAR BALANC PO)    Take by mouth Pata de michele herb -rainforest herb    NITROGLYCERIN (NITROSTAT) 0.4 MG SL TABLET    Place 1 tablet under the tongue every 5 minutes as needed for Chest pain (x 3 doses)    NONFORMULARY    perda humme caa-rainforrest herb    PANTOPRAZOLE (PROTONIX) 20 MG TABLET    TAKE 1 TABLET BY MOUTH EVERY DAY    PRAVASTATIN (PRAVACHOL) 40 MG TABLET    TAKE 1 TABLET BY MOUTH EVERY DAY    ZOLPIDEM (AMBIEN) 10 MG TABLET    Take 1 tablet by mouth nightly as needed for Sleep for up to 30 days.  Max Daily Amount: 10 mg     ALLERGIES     Patient is is allergic to insect extract, lipitor [atorvastatin],

## 2023-08-15 ASSESSMENT — ENCOUNTER SYMPTOMS
DIARRHEA: 0
COLOR CHANGE: 0
TROUBLE SWALLOWING: 1
NAUSEA: 0
SHORTNESS OF BREATH: 1
CHEST TIGHTNESS: 0
COUGH: 0

## 2023-08-21 DIAGNOSIS — G47.00 INSOMNIA, UNSPECIFIED TYPE: Primary | ICD-10-CM

## 2023-08-21 RX ORDER — ZOLPIDEM TARTRATE 10 MG/1
10 TABLET ORAL NIGHTLY PRN
Qty: 90 TABLET | Refills: 1 | Status: SHIPPED | OUTPATIENT
Start: 2023-08-21 | End: 2024-02-17

## 2023-08-23 DIAGNOSIS — F41.9 ANXIETY: ICD-10-CM

## 2023-08-23 RX ORDER — ESCITALOPRAM OXALATE 10 MG/1
10 TABLET ORAL DAILY
Qty: 14 TABLET | Refills: 0 | Status: SHIPPED | OUTPATIENT
Start: 2023-08-23

## 2023-09-18 ENCOUNTER — HOSPITAL ENCOUNTER (OUTPATIENT)
Dept: LAB | Age: 63
Discharge: HOME | End: 2023-09-18
Payer: COMMERCIAL

## 2023-09-18 DIAGNOSIS — I10 PRIMARY HYPERTENSION: Primary | Chronic | ICD-10-CM

## 2023-09-18 DIAGNOSIS — D50.8 OTHER IRON DEFICIENCY ANEMIA: ICD-10-CM

## 2023-09-18 DIAGNOSIS — I10 PRIMARY HYPERTENSION: Chronic | ICD-10-CM

## 2023-09-18 LAB
ANION GAP SERPL CALCULATED.3IONS-SCNC: 10 MEQ/L (ref 9–15)
BASOPHILS # BLD: 0.1 K/UL (ref 0–0.2)
BASOPHILS NFR BLD: 0.8 %
BUN SERPL-MCNC: 18 MG/DL (ref 8–23)
CALCIUM SERPL-MCNC: 9.1 MG/DL (ref 8.5–9.9)
CHLORIDE SERPL-SCNC: 99 MEQ/L (ref 95–107)
CO2 SERPL-SCNC: 25 MEQ/L (ref 20–31)
CREAT SERPL-MCNC: 0.53 MG/DL (ref 0.7–1.2)
EOSINOPHIL # BLD: 0.3 K/UL (ref 0–0.7)
EOSINOPHIL NFR BLD: 2.4 %
ERYTHROCYTE [DISTWIDTH] IN BLOOD BY AUTOMATED COUNT: 18.6 % (ref 11.5–14.5)
GLUCOSE SERPL-MCNC: 164 MG/DL (ref 70–99)
HCT VFR BLD AUTO: 47.1 % (ref 42–52)
HGB BLD-MCNC: 14.6 G/DL (ref 14–18)
LYMPHOCYTES # BLD: 0.7 K/UL (ref 1–4.8)
LYMPHOCYTES NFR BLD: 5.3 %
MCH RBC QN AUTO: 25.9 PG (ref 27–31.3)
MCHC RBC AUTO-ENTMCNC: 31 % (ref 33–37)
MCV RBC AUTO: 83.7 FL (ref 79–92.2)
MONOCYTES # BLD: 1.4 K/UL (ref 0.2–0.8)
MONOCYTES NFR BLD: 11.6 %
NEUTROPHILS # BLD: 9.8 K/UL (ref 1.4–6.5)
NEUTS SEG NFR BLD: 79.5 %
PLATELET # BLD AUTO: 576 K/UL (ref 130–400)
POTASSIUM SERPL-SCNC: 4.2 MEQ/L (ref 3.4–4.9)
RBC # BLD AUTO: 5.63 M/UL (ref 4.7–6.1)
SODIUM SERPL-SCNC: 134 MEQ/L (ref 135–144)
WBC # BLD AUTO: 12.3 K/UL (ref 4.8–10.8)

## 2023-09-18 PROCEDURE — 83540 ASSAY OF IRON: CPT

## 2023-09-18 PROCEDURE — 85025 COMPLETE CBC W/AUTO DIFF WBC: CPT

## 2023-09-18 PROCEDURE — 83550 IRON BINDING TEST: CPT

## 2023-09-18 PROCEDURE — 36415 COLL VENOUS BLD VENIPUNCTURE: CPT

## 2023-09-18 PROCEDURE — 80048 BASIC METABOLIC PNL TOTAL CA: CPT

## 2023-09-19 LAB
IRON SATURATION: 15 % (ref 20–55)
IRON: 45 UG/DL (ref 59–158)
TOTAL IRON BINDING CAPACITY: 298 UG/DL (ref 250–450)
UNSATURATED IRON BINDING CAPACITY: 253 UG/DL (ref 112–347)

## 2023-09-20 ENCOUNTER — OFFICE VISIT (OUTPATIENT)
Dept: FAMILY MEDICINE CLINIC | Age: 63
End: 2023-09-20
Payer: COMMERCIAL

## 2023-09-20 VITALS
SYSTOLIC BLOOD PRESSURE: 128 MMHG | HEIGHT: 66 IN | TEMPERATURE: 97.6 F | OXYGEN SATURATION: 96 % | HEART RATE: 78 BPM | DIASTOLIC BLOOD PRESSURE: 80 MMHG | WEIGHT: 132 LBS | BODY MASS INDEX: 21.21 KG/M2

## 2023-09-20 DIAGNOSIS — D50.8 IRON DEFICIENCY ANEMIA SECONDARY TO INADEQUATE DIETARY IRON INTAKE: Primary | ICD-10-CM

## 2023-09-20 DIAGNOSIS — I48.19 PERSISTENT ATRIAL FIBRILLATION (HCC): ICD-10-CM

## 2023-09-20 DIAGNOSIS — E11.9 DIABETES MELLITUS TYPE 2, DIET-CONTROLLED (HCC): ICD-10-CM

## 2023-09-20 DIAGNOSIS — E11.65 TYPE 2 DIABETES MELLITUS WITH HYPERGLYCEMIA, WITHOUT LONG-TERM CURRENT USE OF INSULIN (HCC): ICD-10-CM

## 2023-09-20 PROCEDURE — G8420 CALC BMI NORM PARAMETERS: HCPCS

## 2023-09-20 PROCEDURE — 1036F TOBACCO NON-USER: CPT

## 2023-09-20 PROCEDURE — 2022F DILAT RTA XM EVC RTNOPTHY: CPT

## 2023-09-20 PROCEDURE — 3051F HG A1C>EQUAL 7.0%<8.0%: CPT

## 2023-09-20 PROCEDURE — 3078F DIAST BP <80 MM HG: CPT

## 2023-09-20 PROCEDURE — 3017F COLORECTAL CA SCREEN DOC REV: CPT

## 2023-09-20 PROCEDURE — G8427 DOCREV CUR MEDS BY ELIG CLIN: HCPCS

## 2023-09-20 PROCEDURE — 99214 OFFICE O/P EST MOD 30 MIN: CPT

## 2023-09-20 PROCEDURE — 3074F SYST BP LT 130 MM HG: CPT

## 2023-09-20 NOTE — PROGRESS NOTES
normal.   Neck:      Vascular: No carotid bruit or JVD. Cardiovascular:      Rate and Rhythm: Normal rate and regular rhythm. No extrasystoles are present. Chest Wall: PMI is not displaced. No thrill. Pulses: Normal pulses. Heart sounds: Normal heart sounds, S1 normal and S2 normal.   Pulmonary:      Effort: Pulmonary effort is normal.      Breath sounds: Normal breath sounds and air entry. No decreased breath sounds or wheezing. Musculoskeletal:      Cervical back: Normal range of motion and neck supple. Right lower leg: No edema. Left lower leg: No edema. Lymphadenopathy:      Cervical:      Right cervical: No superficial or posterior cervical adenopathy. Left cervical: No superficial or posterior cervical adenopathy. Upper Body:      Right upper body: No supraclavicular adenopathy. Left upper body: No supraclavicular adenopathy. Skin:     Capillary Refill: Capillary refill takes less than 2 seconds. Neurological:      Mental Status: He is alert and oriented to person, place, and time. Psychiatric:         Mood and Affect: Mood normal.         Behavior: Behavior normal.                       An electronic signature was used to authenticate this note.      Debbie Pool, APRN - CNP 47.6

## 2023-09-21 RX ORDER — MAGNESIUM OXIDE 400 MG/1
400 TABLET ORAL DAILY
Qty: 90 TABLET | Refills: 2 | Status: SHIPPED | OUTPATIENT
Start: 2023-09-21

## 2023-09-21 ASSESSMENT — ENCOUNTER SYMPTOMS
NAUSEA: 0
CHEST TIGHTNESS: 0
COUGH: 0
COLOR CHANGE: 0
SHORTNESS OF BREATH: 0
DIARRHEA: 0

## 2023-09-21 NOTE — TELEPHONE ENCOUNTER
Comments:     Last Office Visit (last PCP visit):   9/20/2023    Next Visit Date:  Future Appointments   Date Time Provider [address]   11/10/2023  8:30 AM Dayo Rendon [facility]       **If hasn't been seen in over a year OR hasn't followed up according to last diabetes/ADHD visit, make appointment for patient before sending refill to provider.     Rx requested:  Requested Prescriptions     Pending Prescriptions Disp Refills    magnesium oxide (MAG-OX) 400 MG tablet 90 tablet 2     Sig: Take 1 tablet by mouth daily    empagliflozin (JARDIANCE) 10 MG tablet 30 tablet 2     Sig: Take 1 tablet by mouth daily

## 2023-10-10 ENCOUNTER — PATIENT MESSAGE (OUTPATIENT)
Dept: FAMILY MEDICINE CLINIC | Age: 63
End: 2023-10-10

## 2023-10-10 DIAGNOSIS — T63.481S: ICD-10-CM

## 2023-10-11 NOTE — TELEPHONE ENCOUNTER
Pt is requesting medication refill. Please approve or deny this request.    Rx requested:  Requested Prescriptions     Pending Prescriptions Disp Refills    EPINEPHrine (EPIPEN) 0.3 MG/0.3ML SOAJ injection 0.3 mL 0     Sig: Inject 0.3 mLs into the muscle once as needed (insect sting)         Last Office Visit:   9/20/2023      Next Visit Date:  No future appointments.

## 2023-10-11 NOTE — TELEPHONE ENCOUNTER
From: Romario Rivera  To: Wendi Turner  Sent: 10/10/2023 4:41 PM EDT  Subject: Epi pen    Hi Thomas Dickinson;   I hope all is well. Things here are good and I think I am starting to gain weight. I'm writing because I just found out my Epi-pen is  past it's expiration date, and am wondering if you could send in a Rx for a new one. Thanks, and have a great day.    Dorota Solano

## 2023-10-12 RX ORDER — EPINEPHRINE 0.3 MG/.3ML
0.3 INJECTION SUBCUTANEOUS
Qty: 0.3 ML | Refills: 0 | Status: SHIPPED | OUTPATIENT
Start: 2023-10-12 | End: 2023-10-12

## 2023-11-17 DIAGNOSIS — F51.02 ADJUSTMENT INSOMNIA: Primary | ICD-10-CM

## 2023-11-17 RX ORDER — ZOLPIDEM TARTRATE 5 MG/1
5 TABLET ORAL NIGHTLY PRN
Qty: 14 TABLET | Refills: 0 | Status: SHIPPED | OUTPATIENT
Start: 2023-11-17 | End: 2023-12-01

## 2023-11-18 DIAGNOSIS — I48.19 PERSISTENT ATRIAL FIBRILLATION (HCC): ICD-10-CM

## 2023-11-20 RX ORDER — APIXABAN 2.5 MG/1
2.5 TABLET, FILM COATED ORAL 2 TIMES DAILY
Qty: 60 TABLET | Refills: 5 | Status: SHIPPED | OUTPATIENT
Start: 2023-11-20

## 2023-11-20 NOTE — TELEPHONE ENCOUNTER
Comments:     Last Office Visit (last PCP visit):   9/20/2023    Next Visit Date:  Future Appointments   Date Time Provider [address]   1/18/2024  9:30 AM Rosendo Jolly, APRN - [facility]   1/19/2024 10:00 AM Holmilo, Lillie Ramires, [facility]       **If hasn't been seen in over a year OR hasn't followed up according to last diabetes/ADHD visit, make appointment for patient before sending refill to provider.     Rx requested:  Requested Prescriptions     Pending Prescriptions Disp Refills    ELIQUIS 2.5 MG TABS tablet [Pharmacy Med Name: ELIQUIS 2.5 MG TABLET] 60 tablet 5     Sig: TAKE 1 TABLET BY MOUTH TWICE A DAY

## 2023-12-06 ENCOUNTER — TELEPHONE (OUTPATIENT)
Dept: CARDIOLOGY CLINIC | Age: 63
End: 2023-12-06

## 2023-12-06 NOTE — TELEPHONE ENCOUNTER
Medical records from [place] scanned into media manager. Message sent to Dr Damian Cespedes. Please review.

## 2023-12-06 NOTE — TELEPHONE ENCOUNTER
[employer] message was sent to Dr Sailaja Pak. 12/6/2023 2:37 PM  Hi Dr Sailaja Pak. I have a doctors office calling for you from [place]. They are requesting to speak to you about mutual pt. MRN: [id number]. Dr Lora Vega cell # [phone]. States that its regarding troponin levels. If pt needs to go the ER. They will be faxing reports over as well.

## 2023-12-27 PROBLEM — I48.19 PERSISTENT ATRIAL FIBRILLATION (MULTI): Status: ACTIVE | Noted: 2022-01-13

## 2023-12-27 PROBLEM — I10 HTN (HYPERTENSION), BENIGN: Chronic | Status: ACTIVE | Noted: 2021-12-14

## 2023-12-27 PROBLEM — K63.5 COLON POLYP: Status: ACTIVE | Noted: 2023-12-27

## 2023-12-27 PROBLEM — R60.0 PERIPHERAL EDEMA: Status: ACTIVE | Noted: 2023-12-27

## 2023-12-27 PROBLEM — T79.7XXA SUBCUTANEOUS EMPHYSEMA (CMS-HCC): Status: ACTIVE | Noted: 2023-06-30

## 2023-12-27 PROBLEM — K21.9 GERD (GASTROESOPHAGEAL REFLUX DISEASE): Chronic | Status: ACTIVE | Noted: 2021-12-14

## 2023-12-27 PROBLEM — I45.89 AV DISSOCIATION: Status: ACTIVE | Noted: 2023-06-22

## 2023-12-27 PROBLEM — Z98.890 S/P TVR (TRICUSPID VALVE REPAIR): Status: ACTIVE | Noted: 2023-07-14

## 2023-12-27 PROBLEM — K62.5 BRBPR (BRIGHT RED BLOOD PER RECTUM): Status: ACTIVE | Noted: 2023-12-27

## 2023-12-27 PROBLEM — I48.0 PAROXYSMAL ATRIAL FIBRILLATION (MULTI): Status: ACTIVE | Noted: 2023-06-27

## 2023-12-27 PROBLEM — G89.18 PAIN, POSTOPERATIVE, ACUTE: Status: ACTIVE | Noted: 2023-06-21

## 2023-12-27 PROBLEM — E78.2 MIXED HYPERLIPIDEMIA: Status: ACTIVE | Noted: 2023-12-27

## 2023-12-27 PROBLEM — G25.81 RESTLESS LEG: Status: ACTIVE | Noted: 2023-07-14

## 2023-12-27 PROBLEM — E11.9 TYPE 2 DIABETES MELLITUS, WITHOUT LONG-TERM CURRENT USE OF INSULIN (MULTI): Status: ACTIVE | Noted: 2023-06-21

## 2023-12-27 PROBLEM — C81.90: Status: ACTIVE | Noted: 2021-12-14

## 2023-12-27 PROBLEM — J98.11 ATELECTASIS: Status: ACTIVE | Noted: 2023-06-22

## 2023-12-27 PROBLEM — R79.89 ELEVATED TROPONIN I LEVEL: Status: ACTIVE | Noted: 2023-12-27

## 2023-12-27 PROBLEM — I25.810 CORONARY ARTERY DISEASE INVOLVING AUTOLOGOUS ARTERY CORONARY BYPASS GRAFT WITHOUT ANGINA PECTORIS: Status: ACTIVE | Noted: 2023-07-11

## 2023-12-27 PROBLEM — R60.9 PERIPHERAL EDEMA: Status: ACTIVE | Noted: 2023-12-27

## 2023-12-27 PROBLEM — D75.839 THROMBOCYTOSIS: Status: ACTIVE | Noted: 2023-08-10

## 2023-12-27 PROBLEM — I36.1 NONRHEUMATIC TRICUSPID VALVE REGURGITATION: Status: ACTIVE | Noted: 2023-06-21

## 2023-12-27 PROBLEM — N17.9 AKI (ACUTE KIDNEY INJURY) (CMS-HCC): Status: ACTIVE | Noted: 2023-07-01

## 2023-12-27 PROBLEM — R50.9 FEVER: Status: ACTIVE | Noted: 2021-12-14

## 2023-12-27 PROBLEM — R01.1 HEART MURMUR: Chronic | Status: ACTIVE | Noted: 2023-12-27

## 2023-12-27 PROBLEM — Q89.01 ASPLENIA: Status: ACTIVE | Noted: 2021-12-15

## 2023-12-27 PROBLEM — I25.10 ASCVD (ARTERIOSCLEROTIC CARDIOVASCULAR DISEASE): Status: ACTIVE | Noted: 2021-12-14

## 2023-12-27 PROBLEM — I51.9: Status: ACTIVE | Noted: 2023-06-21

## 2023-12-27 RX ORDER — PANTOPRAZOLE SODIUM 20 MG/1
1 TABLET, DELAYED RELEASE ORAL DAILY
COMMUNITY
Start: 2023-03-21

## 2023-12-27 RX ORDER — FOLIC ACID 0.8 MG
1 TABLET ORAL DAILY
COMMUNITY
Start: 2020-02-02

## 2023-12-27 RX ORDER — ATORVASTATIN CALCIUM 40 MG/1
40 TABLET, FILM COATED ORAL
COMMUNITY
Start: 2014-10-09 | End: 2024-02-15

## 2023-12-27 RX ORDER — PRAVASTATIN SODIUM 10 MG/1
10 TABLET ORAL NIGHTLY
COMMUNITY
Start: 2020-09-21

## 2023-12-27 RX ORDER — ACETAMINOPHEN 325 MG/1
2 TABLET ORAL EVERY 6 HOURS PRN
COMMUNITY
Start: 2023-07-08 | End: 2024-02-15

## 2023-12-27 RX ORDER — ASPIRIN 81 MG/1
81 TABLET ORAL
COMMUNITY
Start: 2023-07-08

## 2023-12-27 RX ORDER — EPINEPHRINE 0.3 MG/.3ML
0.3 INJECTION SUBCUTANEOUS ONCE AS NEEDED
COMMUNITY
Start: 2021-06-15

## 2023-12-27 RX ORDER — AMOXICILLIN 500 MG/1
TABLET, FILM COATED ORAL
COMMUNITY
Start: 2023-06-08

## 2023-12-27 RX ORDER — CLOPIDOGREL BISULFATE 75 MG/1
75 TABLET ORAL
COMMUNITY
Start: 2014-10-09 | End: 2024-02-15

## 2023-12-27 RX ORDER — FUROSEMIDE 20 MG/1
3 TABLET ORAL 2 TIMES DAILY
COMMUNITY
Start: 2023-07-10

## 2023-12-27 RX ORDER — LANOLIN ALCOHOL/MO/W.PET/CERES
1000 CREAM (GRAM) TOPICAL
COMMUNITY

## 2023-12-27 RX ORDER — NIACIN (INOSITOL NIACINATE) 400(500MG)
1 CAPSULE ORAL DAILY
COMMUNITY
Start: 2023-08-14

## 2023-12-27 RX ORDER — AMLODIPINE BESYLATE 5 MG/1
5 TABLET ORAL
COMMUNITY
End: 2024-02-15

## 2023-12-27 RX ORDER — METFORMIN HYDROCHLORIDE 500 MG/1
1 TABLET ORAL
COMMUNITY
Start: 2023-07-09 | End: 2024-02-15

## 2023-12-27 RX ORDER — NITROGLYCERIN 0.4 MG/1
0.4 TABLET SUBLINGUAL EVERY 5 MIN PRN
COMMUNITY
Start: 2014-10-09

## 2023-12-27 RX ORDER — ESCITALOPRAM OXALATE 10 MG/1
1 TABLET ORAL DAILY
COMMUNITY
Start: 2023-08-23 | End: 2024-02-15

## 2023-12-27 RX ORDER — FERROUS GLUCONATE 324(38)MG
324 TABLET ORAL
COMMUNITY

## 2023-12-27 RX ORDER — LANOLIN ALCOHOL/MO/W.PET/CERES
400 CREAM (GRAM) TOPICAL
COMMUNITY

## 2024-01-16 DIAGNOSIS — E11.65 TYPE 2 DIABETES MELLITUS WITH HYPERGLYCEMIA, WITHOUT LONG-TERM CURRENT USE OF INSULIN (HCC): ICD-10-CM

## 2024-01-16 NOTE — TELEPHONE ENCOUNTER
Comments:     Last Office Visit (last PCP visit):   9/20/2023    Next Visit Date:  Future Appointments   Date Time Provider Department Center   1/18/2024  9:30 AM Michael Maxwell, APRN - CNP [facility]   1/19/2024 10:00 AM Holiday, DO [facility]       **If hasn't been seen in over a year OR hasn't followed up according to last diabetes/ADHD visit, make appointment for patient before sending refill to provider.    Rx requested:  Requested Prescriptions     Pending Prescriptions Disp Refills    JARDIANCE 10 MG tablet [Pharmacy Med Name: JARDIANCE 10 MG TABLET] 30 tablet 2     Sig: TAKE 1 TABLET BY MOUTH EVERY DAY

## 2024-01-17 RX ORDER — EMPAGLIFLOZIN 10 MG/1
10 TABLET, FILM COATED ORAL DAILY
Qty: 30 TABLET | Refills: 2 | Status: SHIPPED | OUTPATIENT
Start: 2024-01-17

## 2024-01-23 ENCOUNTER — HOSPITAL ENCOUNTER (OUTPATIENT)
Dept: LAB | Age: 64
Discharge: HOME | End: 2024-01-23
Payer: COMMERCIAL

## 2024-01-23 DIAGNOSIS — D50.8 IRON DEFICIENCY ANEMIA SECONDARY TO INADEQUATE DIETARY IRON INTAKE: ICD-10-CM

## 2024-01-23 DIAGNOSIS — E11.9 DIABETES MELLITUS TYPE 2, DIET-CONTROLLED (HCC): ICD-10-CM

## 2024-01-23 LAB
ANION GAP SERPL CALCULATED.3IONS-SCNC: 11 MEQ/L (ref 9–15)
ANISOCYTOSIS BLD QL SMEAR: ABNORMAL
BASOPHILS # BLD: 0.1 K/UL (ref 0–0.2)
BASOPHILS NFR BLD: 1 %
BUN SERPL-MCNC: 17 MG/DL (ref 8–23)
BURR CELLS: ABNORMAL
CALCIUM SERPL-MCNC: 8.8 MG/DL (ref 8.5–9.9)
CHLORIDE SERPL-SCNC: 99 MEQ/L (ref 95–107)
CO2 SERPL-SCNC: 30 MEQ/L (ref 20–31)
CREAT SERPL-MCNC: 0.67 MG/DL (ref 0.7–1.2)
EOSINOPHIL # BLD: 0.1 K/UL (ref 0–0.7)
EOSINOPHIL NFR BLD: 1 %
ERYTHROCYTE [DISTWIDTH] IN BLOOD BY AUTOMATED COUNT: 17 % (ref 11.5–14.5)
GLUCOSE SERPL-MCNC: 137 MG/DL (ref 70–99)
HBA1C MFR BLD: 7.3 % (ref 4.8–5.9)
HCT VFR BLD AUTO: 50.1 % (ref 42–52)
HGB BLD-MCNC: 16.5 G/DL (ref 14–18)
IRON % SATURATION: 18 % (ref 20–55)
IRON: 50 UG/DL (ref 59–158)
LYMPHOCYTES # BLD: 0.3 K/UL (ref 1–4.8)
LYMPHOCYTES NFR BLD: 2 %
MCH RBC QN AUTO: 28.7 PG (ref 27–31.3)
MCHC RBC AUTO-ENTMCNC: 32.9 % (ref 33–37)
MCV RBC AUTO: 87.3 FL (ref 79–92.2)
MONOCYTES # BLD: 1.3 K/UL (ref 0.2–0.8)
MONOCYTES NFR BLD: 9.5 %
NEUTROPHILS # BLD: 11.1 K/UL (ref 1.4–6.5)
NEUTS SEG NFR BLD: 87 %
PLATELET # BLD AUTO: 489 K/UL (ref 130–400)
PLATELET BLD QL SMEAR: ADEQUATE
POIKILOCYTOSIS BLD QL SMEAR: ABNORMAL
POTASSIUM SERPL-SCNC: 3.4 MEQ/L (ref 3.4–4.9)
RBC # BLD AUTO: 5.74 M/UL (ref 4.7–6.1)
SLIDE REVIEW: ABNORMAL
SODIUM SERPL-SCNC: 140 MEQ/L (ref 135–144)
TARGETS BLD QL SMEAR: ABNORMAL
TOTAL IRON BINDING CAPACITY: 276 UG/DL (ref 250–450)
UNSATURATED IRON BINDING CAPACITY: 226 UG/DL (ref 112–347)
WBC # BLD AUTO: 12.8 K/UL (ref 4.8–10.8)

## 2024-01-23 PROCEDURE — 83036 HEMOGLOBIN GLYCOSYLATED A1C: CPT

## 2024-01-23 PROCEDURE — 36415 COLL VENOUS BLD VENIPUNCTURE: CPT

## 2024-01-23 PROCEDURE — 83540 ASSAY OF IRON: CPT

## 2024-01-23 PROCEDURE — 83550 IRON BINDING TEST: CPT

## 2024-01-23 PROCEDURE — 80048 BASIC METABOLIC PNL TOTAL CA: CPT

## 2024-01-23 PROCEDURE — 85025 COMPLETE CBC W/AUTO DIFF WBC: CPT

## 2024-01-25 ENCOUNTER — OFFICE VISIT (OUTPATIENT)
Dept: FAMILY MEDICINE CLINIC | Age: 64
End: 2024-01-25
Payer: COMMERCIAL

## 2024-01-25 VITALS
SYSTOLIC BLOOD PRESSURE: 110 MMHG | DIASTOLIC BLOOD PRESSURE: 70 MMHG | WEIGHT: 138 LBS | HEART RATE: 75 BPM | TEMPERATURE: 98.7 F | OXYGEN SATURATION: 92 % | BODY MASS INDEX: 22.27 KG/M2

## 2024-01-25 DIAGNOSIS — E78.2 MIXED HYPERLIPIDEMIA: Chronic | ICD-10-CM

## 2024-01-25 DIAGNOSIS — I50.9 CHRONIC CONGESTIVE HEART FAILURE, UNSPECIFIED HEART FAILURE TYPE (HCC): ICD-10-CM

## 2024-01-25 DIAGNOSIS — E11.9 DIABETES MELLITUS TYPE 2, DIET-CONTROLLED (HCC): Primary | ICD-10-CM

## 2024-01-25 DIAGNOSIS — D50.8 OTHER IRON DEFICIENCY ANEMIA: ICD-10-CM

## 2024-01-25 PROCEDURE — 3078F DIAST BP <80 MM HG: CPT

## 2024-01-25 PROCEDURE — G8420 CALC BMI NORM PARAMETERS: HCPCS

## 2024-01-25 PROCEDURE — 2022F DILAT RTA XM EVC RTNOPTHY: CPT

## 2024-01-25 PROCEDURE — 3051F HG A1C>EQUAL 7.0%<8.0%: CPT

## 2024-01-25 PROCEDURE — 3017F COLORECTAL CA SCREEN DOC REV: CPT

## 2024-01-25 PROCEDURE — G8484 FLU IMMUNIZE NO ADMIN: HCPCS

## 2024-01-25 PROCEDURE — 3074F SYST BP LT 130 MM HG: CPT

## 2024-01-25 PROCEDURE — 99214 OFFICE O/P EST MOD 30 MIN: CPT

## 2024-01-25 PROCEDURE — 1036F TOBACCO NON-USER: CPT

## 2024-01-25 PROCEDURE — G8427 DOCREV CUR MEDS BY ELIG CLIN: HCPCS

## 2024-01-25 RX ORDER — AMOXICILLIN 500 MG/1
TABLET, FILM COATED ORAL
Qty: 4 TABLET | Refills: 2 | Status: SHIPPED | OUTPATIENT
Start: 2024-01-25

## 2024-01-25 RX ORDER — FUROSEMIDE 40 MG/1
40 TABLET ORAL 2 TIMES DAILY
COMMUNITY

## 2024-01-25 RX ORDER — ZOLPIDEM TARTRATE 5 MG/1
TABLET ORAL
COMMUNITY

## 2024-01-25 ASSESSMENT — PATIENT HEALTH QUESTIONNAIRE - PHQ9
SUM OF ALL RESPONSES TO PHQ QUESTIONS 1-9: 0
SUM OF ALL RESPONSES TO PHQ QUESTIONS 1-9: 0
1. LITTLE INTEREST OR PLEASURE IN DOING THINGS: 0
2. FEELING DOWN, DEPRESSED OR HOPELESS: 0
SUM OF ALL RESPONSES TO PHQ9 QUESTIONS 1 & 2: 0
SUM OF ALL RESPONSES TO PHQ QUESTIONS 1-9: 0
SUM OF ALL RESPONSES TO PHQ QUESTIONS 1-9: 0

## 2024-01-25 ASSESSMENT — ENCOUNTER SYMPTOMS
DIARRHEA: 0
COUGH: 1
SHORTNESS OF BREATH: 0
CHEST TIGHTNESS: 0
COLOR CHANGE: 0
NAUSEA: 0

## 2024-01-25 NOTE — PROGRESS NOTES
Conner Cheung (: 1960) is a 63 y.o. male, Established patient, who presents today for:    Chief Complaint   Patient presents with    Follow-up     Review labs. Has some swelling throughout the body. He is recovering from bypass surgery.        Follow-up of the following chronic conditions.CAD, CHF, and Diabetes reports  generally well.  Best he has felt since his CABG surgery last .  Has returned from stay in [place].  While in [place] he established with an MD Janey Mo in [place], and a cardiologist Conner Reed.  He has been placed on 40 mg twice daily Lasix to manage leg swelling and symptoms of CHF                  ASSESSMENT/PLAN    1. Diabetes mellitus type 2, diet-controlled (HCC)  -     empagliflozin (JARDIANCE) 25 MG tablet; Take 1 tablet by mouth daily, Disp-90 tablet, R-1Normal  -     Hemoglobin A1C; Future  2. Chronic congestive heart failure, unspecified heart failure type (HCC)  -     empagliflozin (JARDIANCE) 25 MG tablet; Take 1 tablet by mouth daily, Disp-90 tablet, R-1Normal    -     CBC with Auto Differential; Future  -     Comprehensive Metabolic Panel; Future  3. Other iron deficiency anemia  Improved, continue on current dosing iron sulfate.  4. Mixed hyperlipidemia          -     Lipid, Fasting; Future    Return in about 3 months (around 2024).    Follow-up with cardiology in February as scheduled    SUBJECTIVE/OBJECTIVE:            Allergies   Allergen Reactions    Insect Extract      Patient is allergic to flying ants    Iodinated Contrast Media Other (See Comments)    Lipitor [Atorvastatin] Other (See Comments)    Seasonal Other (See Comments)        Review of Systems   Constitutional:  Negative for fatigue and unexpected weight change.   HENT: Negative.     Eyes:  Negative for visual disturbance.   Respiratory:  Positive for cough. Negative for chest tightness and shortness of breath.    Cardiovascular:  Positive for leg swelling. Negative for

## 2024-02-12 ENCOUNTER — HOSPITAL ENCOUNTER (OUTPATIENT)
Dept: LAB | Age: 64
Discharge: HOME | End: 2024-02-12
Payer: COMMERCIAL

## 2024-02-12 DIAGNOSIS — E11.9 DIABETES MELLITUS TYPE 2, DIET-CONTROLLED (HCC): ICD-10-CM

## 2024-02-12 DIAGNOSIS — E78.2 MIXED HYPERLIPIDEMIA: Chronic | ICD-10-CM

## 2024-02-12 DIAGNOSIS — I50.9 CHRONIC CONGESTIVE HEART FAILURE, UNSPECIFIED HEART FAILURE TYPE (HCC): ICD-10-CM

## 2024-02-12 LAB
CHOLEST SERPL-MCNC: 170 MG/DL (ref 0–199)
HDLC SERPL-MCNC: 65 MG/DL (ref 40–59)
LDL CHOLESTEROL CALCULATED: 93 MG/DL (ref 0–129)
TRIGLYCERIDE, FASTING: 61 MG/DL (ref 0–150)

## 2024-02-12 PROCEDURE — 80061 LIPID PANEL: CPT

## 2024-02-12 PROCEDURE — 36415 COLL VENOUS BLD VENIPUNCTURE: CPT

## 2024-02-15 ENCOUNTER — OFFICE VISIT (OUTPATIENT)
Dept: CARDIOLOGY | Facility: CLINIC | Age: 64
End: 2024-02-15
Payer: COMMERCIAL

## 2024-02-15 ENCOUNTER — TELEPHONE (OUTPATIENT)
Dept: CARDIOLOGY | Facility: CLINIC | Age: 64
End: 2024-02-15

## 2024-02-15 VITALS
HEART RATE: 75 BPM | DIASTOLIC BLOOD PRESSURE: 68 MMHG | HEIGHT: 66 IN | SYSTOLIC BLOOD PRESSURE: 118 MMHG | BODY MASS INDEX: 23.08 KG/M2 | WEIGHT: 143.6 LBS

## 2024-02-15 DIAGNOSIS — I35.0 AORTIC VALVE STENOSIS, ETIOLOGY OF CARDIAC VALVE DISEASE UNSPECIFIED: Primary | Chronic | ICD-10-CM

## 2024-02-15 DIAGNOSIS — Z78.9 NEVER SMOKED TOBACCO: ICD-10-CM

## 2024-02-15 DIAGNOSIS — Z79.899 HIGH RISK MEDICATION USE: ICD-10-CM

## 2024-02-15 DIAGNOSIS — I10 HTN (HYPERTENSION), BENIGN: Chronic | ICD-10-CM

## 2024-02-15 DIAGNOSIS — I48.92 ATRIAL FLUTTER, UNSPECIFIED TYPE (MULTI): ICD-10-CM

## 2024-02-15 DIAGNOSIS — Z79.01 LONG TERM CURRENT USE OF ANTICOAGULANT THERAPY: ICD-10-CM

## 2024-02-15 PROCEDURE — 1036F TOBACCO NON-USER: CPT | Performed by: INTERNAL MEDICINE

## 2024-02-15 PROCEDURE — 93000 ELECTROCARDIOGRAM COMPLETE: CPT | Performed by: INTERNAL MEDICINE

## 2024-02-15 PROCEDURE — 99205 OFFICE O/P NEW HI 60 MIN: CPT | Performed by: INTERNAL MEDICINE

## 2024-02-15 PROCEDURE — 3074F SYST BP LT 130 MM HG: CPT | Performed by: INTERNAL MEDICINE

## 2024-02-15 PROCEDURE — 3078F DIAST BP <80 MM HG: CPT | Performed by: INTERNAL MEDICINE

## 2024-02-15 PROCEDURE — 3008F BODY MASS INDEX DOCD: CPT | Performed by: INTERNAL MEDICINE

## 2024-02-15 NOTE — H&P (VIEW-ONLY)
CARDIOLOGY OFFICE VISIT      CHIEF COMPLAINT  Chief Complaint   Patient presents with    New Patient Visit       HISTORY OF PRESENT ILLNESS  HPI    63-year-old male with a past medical history of coronary artery disease with percutaneous coronary interventions in the past and history of aortic valve replacement at [facility] approximately 14 years ago.  History of Hodgkin lymphoma cancer at the age of 19 and status post radiation therapy.  He also has been diagnosed of atrial flutter for at least 5 years.  He is not on no full dose anticoagulant therapy due to history of hemorrhoids.  His last echocardiogram in 2017 shows left ventricular action fraction of 60%.    Looking at the records that are a little bit limited during this evaluation, he had an a stress test due to shortness of breath back in April 2023 that was abnormal.  There was evidence of Mobitz type I arrhythmia as well as a small area of ischemia in the inferior wall.  He underwent a cardiac catheterization that showed triple-vessel disease.  He underwent coronary artery bypass graft surgery in August 2023 at [facility] with SVG to obtuse marginal first, right coronary artery with sequential, SVG to LAD of could have graft to obtuse marginal and right coronary artery.  Tricuspid valve repair in.  Complications postprocedure were related to Mobitz 1 and fascicular block during preoperative evaluation.  Also intraoperatively he had junctional rhythm with heart rates in the 20s to 30s.  Post day #1 he was noted to have A-V dissociation follow-up with persisting atrial fibrillation.  Apparently there was a questionable evaluation for pacemaker.  She also had anemia pneumothorax with subcutaneous emphysema requiring bilateral chest tube.    Since then patient states that he was back on Eliquis therapy.  Looking at his records also he was on sotalol therapy at some point but this medication is not available in his medication list during this office  visit.    Patient also states that he has been going to [place] taking care of her mom and he was seen by a cardiology service at that time.  No chest pain or medical therapies were performed.    Patient states that he continues having occasional palpitations during the day.  But overall he can do most of his activities with no problems.    EKG performed today shows atrial flutter with variable AV conduction at a rate of 75 bpm QRS duration 152 ms QT corrected 544 ms.  Rhythm strip shows the same pattern.        Past Medical History  No past medical history on file.    Social History  Social History     Tobacco Use    Smoking status: Never    Smokeless tobacco: Never   Substance Use Topics    Alcohol use: Not on file    Drug use: Not on file       Family History   No family history on file.     Allergies:  Allergies   Allergen Reactions    Pollen Extracts Hives    Atorvastatin Other    Insects Extract Other     Patient is allergic to flying ants    Iodinated Contrast Media Unknown    Ragweed Other        Outpatient Medications:  Current Outpatient Medications   Medication Instructions    amoxicillin (Amoxil) 500 mg tablet TAKE 2,000 MG (4 TABLETS) BY MOUTH SEE ADMIN INSTRUCTIONS PRIOR TO DENTAL PROCEDURES.    apixaban (Eliquis) 2.5 mg tablet 1 tablet, oral, 2 times daily    aspirin 81 mg, oral, Daily RT    coenzyme Q10-vitamin E 100-5 mg-unit capsule 1 capsule, oral, Daily    cyanocobalamin (VITAMIN B-12) 1,000 mcg, oral, Daily RT    empagliflozin (JARDIANCE) 25 mg, oral, 2 times daily (0900,1400)    EPINEPHrine (EPIPEN) 0.3 mg, intramuscular    ferrous gluconate (FERGON) 324 mg, oral, Daily RT    folic acid (Folvite) 800 mcg tablet 1 tablet, oral, Daily    furosemide (LASIX) 20 mg, oral, 3 times daily    magnesium oxide (MAG-OX) 400 mg, oral, Daily RT    nitroglycerin (NITROSTAT) 0.4 mg, sublingual, Every 5 min PRN    pantoprazole (ProtoNix) 20 mg EC tablet 1 tablet, oral, Daily    pravastatin (PRAVACHOL) 10  mg, oral, Nightly          REVIEW OF SYSTEMS  Review of Systems   All other systems reviewed and are negative.        VITALS  Vitals:    02/15/24 0942   BP: 118/68   Pulse: 75       PHYSICAL EXAM  Constitutional:       Appearance: Healthy appearance. Not in distress.   Neck:      Vascular: No JVR. JVD normal.   Pulmonary:      Effort: Pulmonary effort is normal.      Breath sounds: Normal breath sounds. No wheezing. No rhonchi. No rales.   Chest:      Chest wall: Not tender to palpatation.   Cardiovascular:      PMI at left midclavicular line. Normal rate. Irregularly irregular rhythm. Normal S1. Normal S2.       Murmurs: There is no murmur.      No gallop.  No click. No rub.   Pulses:     Intact distal pulses.   Edema:     Peripheral edema absent.   Abdominal:      General: Bowel sounds are normal.      Palpations: Abdomen is soft.      Tenderness: There is no abdominal tenderness.   Musculoskeletal: Normal range of motion.         General: No tenderness. Skin:     General: Skin is warm and dry.   Neurological:      General: No focal deficit present.      Mental Status: Alert and oriented to person, place and time.           ASSESSMENT AND PLAN      Clinical impression    1.  Palpitation  2.  Evidence of atrial flutter since 2018.  Not sure about treatment for his arrhythmia.  At some point he was on sotalol therapy but this medication is not today in his medical list.  3.  Normal left ventricular function per echogram 2017  4.  Severe triple-vessel disease status post coronary artery bypass graft surgery in 2023 as described above with tricuspid valve repair  5.  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  6.  History of Hodgkin lymphoma radiotherapy at the age of 19.  7.  Long-term anticoagulant therapy with Eliquis with suboptimal dose    Plan recommendations    I had an extensive discussion with patient and family members about plan to for for management of atrial  flutter.  So far there is no indication for pacemaker.  Patient asked me many times about this topic.  Most likely patient had some junctional rhythms post bypass surgery but this has been recovered.    Regarding atrial flutter management, we discussed the option of antiarrhythmic therapy versus ablation therapy.  Patient is agreeable with ablation therapy.  He will be scheduled for right-sided atrial flutter.  In case there is evidence of atrial fibrillation or left-sided atrial flutter we will discuss the option of ablation therapy for left-sided arrhythmias versus antiarrhythmic therapy.    Regarding anticoagulant therapy he should be on Eliquis 5 mg 1 tablet twice a day.  He states that he had a history of bleeding hemorrhoids for which he decreased the dose to 2.5 mg twice a day.  Patient will be scheduled for ablation therapy with a EVENS prior to this procedure.    Patient should continue Eliquis 5 mg twice a day for long-term.  Do not stop this medication for procedure.    Follow my office in 4 to 6 weeks postprocedure or sooner if needed.    Risk factor modification and lifestyle modification discussed with patient. Diet , exercise and hydration discussed with patient.    I have personally review with patient during this office visit, laboratory data, echocardiogram results, stress test results, Holter-event monitor results prior and after the last electrophysiology visit. All questions has been answered.    Please excuse any errors in grammar or translation related to this dictation.  Voice recognition software was utilized to prepare this document.

## 2024-02-15 NOTE — PROGRESS NOTES
CARDIOLOGY OFFICE VISIT      CHIEF COMPLAINT  Chief Complaint   Patient presents with    New Patient Visit       HISTORY OF PRESENT ILLNESS  HPI    63-year-old male with a past medical history of coronary artery disease with percutaneous coronary interventions in the past and history of aortic valve replacement at [facility] approximately 14 years ago.  History of Hodgkin lymphoma cancer at the age of 19 and status post radiation therapy.  He also has been diagnosed of atrial flutter for at least 5 years.  He is not on no full dose anticoagulant therapy due to history of hemorrhoids.  His last echocardiogram in 2017 shows left ventricular action fraction of 60%.    Looking at the records that are a little bit limited during this evaluation, he had an a stress test due to shortness of breath back in April 2023 that was abnormal.  There was evidence of Mobitz type I arrhythmia as well as a small area of ischemia in the inferior wall.  He underwent a cardiac catheterization that showed triple-vessel disease.  He underwent coronary artery bypass graft surgery in August 2023 at [facility] with SVG to obtuse marginal first, right coronary artery with sequential, SVG to LAD of could have graft to obtuse marginal and right coronary artery.  Tricuspid valve repair in.  Complications postprocedure were related to Mobitz 1 and fascicular block during preoperative evaluation.  Also intraoperatively he had junctional rhythm with heart rates in the 20s to 30s.  Post day #1 he was noted to have A-V dissociation follow-up with persisting atrial fibrillation.  Apparently there was a questionable evaluation for pacemaker.  She also had anemia pneumothorax with subcutaneous emphysema requiring bilateral chest tube.    Since then patient states that he was back on Eliquis therapy.  Looking at his records also he was on sotalol therapy at some point but this medication is not available in his medication list during this office  visit.    Patient also states that he has been going to [place] taking care of her mom and he was seen by a cardiology service at that time.  No chest pain or medical therapies were performed.    Patient states that he continues having occasional palpitations during the day.  But overall he can do most of his activities with no problems.    EKG performed today shows atrial flutter with variable AV conduction at a rate of 75 bpm QRS duration 152 ms QT corrected 544 ms.  Rhythm strip shows the same pattern.        Past Medical History  No past medical history on file.    Social History  Social History     Tobacco Use    Smoking status: Never    Smokeless tobacco: Never   Substance Use Topics    Alcohol use: Not on file    Drug use: Not on file       Family History   No family history on file.     Allergies:  Allergies   Allergen Reactions    Pollen Extracts Hives    Atorvastatin Other    Insects Extract Other     Patient is allergic to flying ants    Iodinated Contrast Media Unknown    Ragweed Other        Outpatient Medications:  Current Outpatient Medications   Medication Instructions    amoxicillin (Amoxil) 500 mg tablet TAKE 2,000 MG (4 TABLETS) BY MOUTH SEE ADMIN INSTRUCTIONS PRIOR TO DENTAL PROCEDURES.    apixaban (Eliquis) 2.5 mg tablet 1 tablet, oral, 2 times daily    aspirin 81 mg, oral, Daily RT    coenzyme Q10-vitamin E 100-5 mg-unit capsule 1 capsule, oral, Daily    cyanocobalamin (VITAMIN B-12) 1,000 mcg, oral, Daily RT    empagliflozin (JARDIANCE) 25 mg, oral, 2 times daily (0900,1400)    EPINEPHrine (EPIPEN) 0.3 mg, intramuscular    ferrous gluconate (FERGON) 324 mg, oral, Daily RT    folic acid (Folvite) 800 mcg tablet 1 tablet, oral, Daily    furosemide (LASIX) 20 mg, oral, 3 times daily    magnesium oxide (MAG-OX) 400 mg, oral, Daily RT    nitroglycerin (NITROSTAT) 0.4 mg, sublingual, Every 5 min PRN    pantoprazole (ProtoNix) 20 mg EC tablet 1 tablet, oral, Daily    pravastatin (PRAVACHOL) 10  mg, oral, Nightly          REVIEW OF SYSTEMS  Review of Systems   All other systems reviewed and are negative.        VITALS  Vitals:    02/15/24 0942   BP: 118/68   Pulse: 75       PHYSICAL EXAM  Constitutional:       Appearance: Healthy appearance. Not in distress.   Neck:      Vascular: No JVR. JVD normal.   Pulmonary:      Effort: Pulmonary effort is normal.      Breath sounds: Normal breath sounds. No wheezing. No rhonchi. No rales.   Chest:      Chest wall: Not tender to palpatation.   Cardiovascular:      PMI at left midclavicular line. Normal rate. Irregularly irregular rhythm. Normal S1. Normal S2.       Murmurs: There is no murmur.      No gallop.  No click. No rub.   Pulses:     Intact distal pulses.   Edema:     Peripheral edema absent.   Abdominal:      General: Bowel sounds are normal.      Palpations: Abdomen is soft.      Tenderness: There is no abdominal tenderness.   Musculoskeletal: Normal range of motion.         General: No tenderness. Skin:     General: Skin is warm and dry.   Neurological:      General: No focal deficit present.      Mental Status: Alert and oriented to person, place and time.           ASSESSMENT AND PLAN      Clinical impression    1.  Palpitation  2.  Evidence of atrial flutter since 2018.  Not sure about treatment for his arrhythmia.  At some point he was on sotalol therapy but this medication is not today in his medical list.  3.  Normal left ventricular function per echogram 2017  4.  Severe triple-vessel disease status post coronary artery bypass graft surgery in 2023 as described above with tricuspid valve repair  5.  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  6.  History of Hodgkin lymphoma radiotherapy at the age of 19.  7.  Long-term anticoagulant therapy with Eliquis with suboptimal dose    Plan recommendations    I had an extensive discussion with patient and family members about plan to for for management of atrial  flutter.  So far there is no indication for pacemaker.  Patient asked me many times about this topic.  Most likely patient had some junctional rhythms post bypass surgery but this has been recovered.    Regarding atrial flutter management, we discussed the option of antiarrhythmic therapy versus ablation therapy.  Patient is agreeable with ablation therapy.  He will be scheduled for right-sided atrial flutter.  In case there is evidence of atrial fibrillation or left-sided atrial flutter we will discuss the option of ablation therapy for left-sided arrhythmias versus antiarrhythmic therapy.    Regarding anticoagulant therapy he should be on Eliquis 5 mg 1 tablet twice a day.  He states that he had a history of bleeding hemorrhoids for which he decreased the dose to 2.5 mg twice a day.  Patient will be scheduled for ablation therapy with a EVENS prior to this procedure.    Patient should continue Eliquis 5 mg twice a day for long-term.  Do not stop this medication for procedure.    Follow my office in 4 to 6 weeks postprocedure or sooner if needed.    Risk factor modification and lifestyle modification discussed with patient. Diet , exercise and hydration discussed with patient.    I have personally review with patient during this office visit, laboratory data, echocardiogram results, stress test results, Holter-event monitor results prior and after the last electrophysiology visit. All questions has been answered.    Please excuse any errors in grammar or translation related to this dictation.  Voice recognition software was utilized to prepare this document.

## 2024-02-19 ENCOUNTER — HOSPITAL ENCOUNTER (OUTPATIENT)
Dept: LAB | Age: 64
Discharge: HOME | End: 2024-02-19
Payer: COMMERCIAL

## 2024-02-19 ENCOUNTER — TELEPHONE (OUTPATIENT)
Dept: CARDIOLOGY | Facility: CLINIC | Age: 64
End: 2024-02-19
Payer: COMMERCIAL

## 2024-02-19 DIAGNOSIS — E87.6 HYPOKALEMIA: ICD-10-CM

## 2024-02-19 LAB
ANION GAP SERPL CALCULATED.3IONS-SCNC: 12 MEQ/L (ref 9–15)
BUN SERPL-MCNC: 20 MG/DL (ref 8–23)
CALCIUM SERPL-MCNC: 8.7 MG/DL (ref 8.5–9.9)
CHLORIDE SERPL-SCNC: 91 MEQ/L (ref 95–107)
CO2 SERPL-SCNC: 29 MEQ/L (ref 20–31)
CREAT SERPL-MCNC: 0.69 MG/DL (ref 0.7–1.2)
ERYTHROCYTE [DISTWIDTH] IN BLOOD BY AUTOMATED COUNT: 16.4 % (ref 11.5–14.5)
GLUCOSE SERPL-MCNC: 188 MG/DL (ref 70–99)
HCT VFR BLD AUTO: 44.7 % (ref 42–52)
HGB BLD-MCNC: 15.2 G/DL (ref 14–18)
INR PPP: 1.2
MCH RBC QN AUTO: 29.2 PG (ref 27–31.3)
MCHC RBC AUTO-ENTMCNC: 34 % (ref 33–37)
MCV RBC AUTO: 86 FL (ref 79–92.2)
PLATELET # BLD AUTO: 476 K/UL (ref 130–400)
POTASSIUM SERPL-SCNC: 2.9 MEQ/L (ref 3.4–4.9)
PROTHROMBIN TIME: 15.4 SEC (ref 12.3–14.9)
RBC # BLD AUTO: 5.2 M/UL (ref 4.7–6.1)
SODIUM SERPL-SCNC: 132 MEQ/L (ref 135–144)
WBC # BLD AUTO: 13.2 K/UL (ref 4.8–10.8)

## 2024-02-19 PROCEDURE — 80048 BASIC METABOLIC PNL TOTAL CA: CPT

## 2024-02-19 PROCEDURE — 85027 COMPLETE CBC AUTOMATED: CPT

## 2024-02-19 PROCEDURE — 36415 COLL VENOUS BLD VENIPUNCTURE: CPT

## 2024-02-19 PROCEDURE — 85610 PROTHROMBIN TIME: CPT

## 2024-02-19 RX ORDER — POTASSIUM CHLORIDE 750 MG/1
20 TABLET, FILM COATED, EXTENDED RELEASE ORAL 3 TIMES DAILY
Qty: 180 TABLET | Refills: 11 | Status: SHIPPED | OUTPATIENT
Start: 2024-02-19 | End: 2025-02-18

## 2024-02-19 NOTE — TELEPHONE ENCOUNTER
2/19  Dr. Vadim Vance reviewed labwork.  Recommend starting patient on Kcl 10 meq.  Taking 2 tablets TID with food.  Repeat Renal profile on 2/23.  Call placed to patient and advised.  Patient verbalized understanding.  Rx sent to [facility] in [place]

## 2024-02-20 NOTE — TELEPHONE ENCOUNTER
Pt left message that he gets his labs done at [facility].  Pt is asking for lab order to be faxed there.    Lab order faxed to [facility] at [phone].    Pt aware and verbalized an understanding.

## 2024-02-23 ENCOUNTER — TELEPHONE (OUTPATIENT)
Dept: CARDIOLOGY | Facility: HOSPITAL | Age: 64
End: 2024-02-23

## 2024-02-23 ENCOUNTER — HOSPITAL ENCOUNTER (OUTPATIENT)
Dept: LAB | Age: 64
Discharge: HOME | End: 2024-02-23
Payer: COMMERCIAL

## 2024-02-23 ENCOUNTER — OFFICE VISIT (OUTPATIENT)
Dept: FAMILY MEDICINE CLINIC | Age: 64
End: 2024-02-23
Payer: COMMERCIAL

## 2024-02-23 VITALS
SYSTOLIC BLOOD PRESSURE: 126 MMHG | BODY MASS INDEX: 23.78 KG/M2 | HEIGHT: 66 IN | OXYGEN SATURATION: 98 % | HEART RATE: 86 BPM | DIASTOLIC BLOOD PRESSURE: 66 MMHG | WEIGHT: 148 LBS

## 2024-02-23 DIAGNOSIS — I10 PRIMARY HYPERTENSION: Chronic | ICD-10-CM

## 2024-02-23 DIAGNOSIS — R60.0 LOCALIZED EDEMA: Primary | ICD-10-CM

## 2024-02-23 DIAGNOSIS — E87.6 HYPOKALEMIA: ICD-10-CM

## 2024-02-23 LAB
ALBUMIN SERPL-MCNC: 3.2 G/DL (ref 3.5–4.6)
ANION GAP SERPL CALCULATED.3IONS-SCNC: 11 MEQ/L (ref 9–15)
BUN SERPL-MCNC: 22 MG/DL (ref 8–23)
CALCIUM SERPL-MCNC: 9 MG/DL (ref 8.5–9.9)
CHLORIDE SERPL-SCNC: 97 MEQ/L (ref 95–107)
CO2 SERPL-SCNC: 28 MEQ/L (ref 20–31)
CREAT SERPL-MCNC: 0.79 MG/DL (ref 0.7–1.2)
GLUCOSE SERPL-MCNC: 169 MG/DL (ref 70–99)
PHOSPHATE SERPL-MCNC: 3.2 MG/DL (ref 2.3–4.8)
POTASSIUM SERPL-SCNC: 4.4 MEQ/L (ref 3.4–4.9)
SODIUM SERPL-SCNC: 136 MEQ/L (ref 135–144)

## 2024-02-23 PROCEDURE — 1036F TOBACCO NON-USER: CPT

## 2024-02-23 PROCEDURE — G8420 CALC BMI NORM PARAMETERS: HCPCS

## 2024-02-23 PROCEDURE — G8484 FLU IMMUNIZE NO ADMIN: HCPCS

## 2024-02-23 PROCEDURE — 3017F COLORECTAL CA SCREEN DOC REV: CPT

## 2024-02-23 PROCEDURE — 80069 RENAL FUNCTION PANEL: CPT

## 2024-02-23 PROCEDURE — G8427 DOCREV CUR MEDS BY ELIG CLIN: HCPCS

## 2024-02-23 PROCEDURE — 99213 OFFICE O/P EST LOW 20 MIN: CPT

## 2024-02-23 PROCEDURE — 3074F SYST BP LT 130 MM HG: CPT

## 2024-02-23 PROCEDURE — 3078F DIAST BP <80 MM HG: CPT

## 2024-02-23 PROCEDURE — 36415 COLL VENOUS BLD VENIPUNCTURE: CPT

## 2024-02-23 RX ORDER — POTASSIUM CHLORIDE 750 MG/1
20 CAPSULE, EXTENDED RELEASE ORAL 3 TIMES DAILY
COMMUNITY

## 2024-02-23 ASSESSMENT — ENCOUNTER SYMPTOMS
GASTROINTESTINAL NEGATIVE: 1
ALLERGIC/IMMUNOLOGIC NEGATIVE: 1
EYES NEGATIVE: 1
SHORTNESS OF BREATH: 1
COUGH: 0

## 2024-02-23 NOTE — H&P (VIEW-ONLY)
Kit Duncan (: 1960) is a 63 y.o. male, Established patient, who presents today for:    Chief Complaint   Patient presents with   • Follow-up     Follow and update    • Edema     Swelling and sob,follow up        Follow-up of the following chronic conditions.CAD, Diabetes, and Hypertension reports   Fatigue an SOB come on gradually  is with and without activity.  When it is not there he can climb a flight of stairs without issue comes on a couple of days.  Resolves.      ASSESSMENT/PLAN    1. Localized edema  Doses increase of Lasix to 120 mg daily by Dr. Boo.  Was found to have hyperkalemia on labs performed .  Placed on oral potassium chloride 20 mEq 3 times daily.  He had a redraw of a BMP this morning which is still pending.  Currently minimal lower extremity edema he has indentations at sock line no pitting edema.  Lung sounds are clear.  -     Comprehensive Metabolic Panel; Future  2. Primary hypertension  -     Comprehensive Metabolic Panel; Future  3. Hypokalemia  -     Comprehensive Metabolic Panel; Future      Return in about 6 weeks (around 2024).       SUBJECTIVE/OBJECTIVE:            Allergies   Allergen Reactions   • Insect Extract      Patient is allergic to flying ants   • Iodinated Contrast Media Other (See Comments)   • Lipitor [Atorvastatin] Other (See Comments)   • Seasonal Other (See Comments)        Review of Systems   Constitutional:  Positive for fatigue (at rest). Negative for fever.   HENT: Negative.  Negative for congestion.    Eyes: Negative.    Respiratory:  Positive for shortness of breath. Negative for cough. Stridor: comes and goes.   Cardiovascular:  Positive for leg swelling.   Gastrointestinal: Negative.    Endocrine: Negative.    Genitourinary: Negative.    Musculoskeletal: Negative.    Skin: Negative.    Allergic/Immunologic: Negative.    Neurological: Negative.    Hematological: Negative.    Psychiatric/Behavioral: Negative.         Current Outpatient  Medications on File Prior to Visit   Medication Sig Dispense Refill   • potassium chloride (MICRO-K) 10 MEQ extended release capsule Take 2 capsules by mouth 3 times daily     • metFORMIN (GLUCOPHAGE) 500 MG tablet Take 1 tablet by mouth     • zolpidem (AMBIEN) 5 MG tablet TAKE 1 TABLET BY MOUTH NIGHTLY AS NEEDED FOR SLEEP FOR UP TO 14 DAYS. MAX DAILY AMOUNT: 5 MG     • furosemide (LASIX) 40 MG tablet Take 1.5 tablets by mouth 2 times daily     • amoxicillin (AMOXIL) 500 MG tablet TAKE 2,000 MG (4 TABLETS) BY MOUTH SEE ADMIN INSTRUCTIONS PRIOR TO DENTAL PROCEDURES. 4 tablet 2   • empagliflozin (JARDIANCE) 25 MG tablet Take 1 tablet by mouth daily 90 tablet 1   • ELIQUIS 2.5 MG TABS tablet TAKE 1 TABLET BY MOUTH TWICE A DAY 60 tablet 5   • magnesium oxide (MAG-OX) 400 MG tablet Take 1 tablet by mouth daily 90 tablet 2   • coenzyme Q-10 100 MG capsule Take 1 capsule by mouth daily 90 capsule 1   • acetaminophen (TYLENOL) 325 MG tablet Take 2 tablets by mouth every 6 hours as needed     • Magnesium Hydroxide (DULCOLAX PO) Take by mouth     • folic acid (FOLVITE) 800 MCG tablet      • nitroGLYCERIN (NITROSTAT) 0.4 MG SL tablet Place 1 tablet under the tongue every 5 minutes as needed for Chest pain (x 3 doses) 25 tablet 3   • Cyanocobalamin (B-12) 1000 MCG SUBL Place 1 tablet under the tongue daily 90 tablet 3   • pantoprazole (PROTONIX) 20 MG tablet TAKE 1 TABLET BY MOUTH EVERY DAY 90 tablet 3   • pravastatin (PRAVACHOL) 40 MG tablet TAKE 1 TABLET BY MOUTH EVERY DAY 90 tablet 3   • Misc Natural Products (DAILY HERBS BLOOD SUGAR BALANC PO) Take by mouth Pata de srinath herb -rainforest herb     • NONFORMULARY perda humme caa-rainforrest herb     • aspirin 81 MG tablet Take 1 tablet by mouth daily     • EPINEPHrine (EPIPEN) 0.3 MG/0.3ML SOAJ injection Inject 0.3 mLs into the muscle once as needed (insect sting) 0.3 mL 0   • ferrous sulfate 220 (44 Fe) MG/5ML solution Take 5 mLs by mouth 2 times daily 300 mL 2     No current  "facility-administered medications on file prior to visit.       Vitals:  /66 (Site: Right Upper Arm, Position: Sitting, Cuff Size: Medium Adult)   Pulse 86   Ht 1.676 m (5' 6\")   Wt 67.1 kg (148 lb)   SpO2 98%   BMI 23.89 kg/m²     Hemoglobin A1C   Date Value Ref Range Status   01/23/2024 7.3 (H) 4.8 - 5.9 % Final        The 10-year ASCVD risk score (Alessandro SHEA, et al., 2019) is: 15.7%    Values used to calculate the score:      Age: 63 years      Sex: Male      Is Non- : No      Diabetic: Yes      Tobacco smoker: No      Systolic Blood Pressure: 126 mmHg      Is BP treated: Yes      HDL Cholesterol: 65 mg/dL      Total Cholesterol: 170 mg/dL     Physical Exam  Constitutional:       Appearance: Normal appearance.   HENT:      Head: Normocephalic.      Mouth/Throat:      Mouth: Mucous membranes are moist.   Eyes:      Conjunctiva/sclera: Conjunctivae normal.   Neck:      Vascular: No JVD.   Cardiovascular:      Rate and Rhythm: Normal rate and regular rhythm. No extrasystoles are present.     Chest Wall: PMI is not displaced. No thrill.      Pulses: Normal pulses.           Dorsalis pedis pulses are 2+ on the right side and 2+ on the left side.      Heart sounds: Normal heart sounds, S1 normal and S2 normal.      Comments: Nonpitting edema bilaterally.  Mild.  Pedal pulses normal.  Palpable.  Pulmonary:      Effort: Pulmonary effort is normal.      Breath sounds: Normal breath sounds and air entry. No decreased breath sounds or wheezing.   Musculoskeletal:      Cervical back: Normal range of motion and neck supple.      Right lower leg: Edema present.      Left lower leg: Edema present.   Lymphadenopathy:      Cervical:      Right cervical: No superficial or posterior cervical adenopathy.     Left cervical: No superficial or posterior cervical adenopathy.      Upper Body:      Right upper body: No supraclavicular adenopathy.      Left upper body: No supraclavicular adenopathy. "   Skin:     Capillary Refill: Capillary refill takes less than 2 seconds.   Neurological:      Mental Status: He is alert and oriented to person, place, and time.   Psychiatric:         Mood and Affect: Mood normal.         Behavior: Behavior normal.                       An electronic signature was used to authenticate this note.     NAHID Fitzpatrick - CNP

## 2024-02-23 NOTE — PROGRESS NOTES
Skin:     Capillary Refill: Capillary refill takes less than 2 seconds.   Neurological:      Mental Status: He is alert and oriented to person, place, and time.   Psychiatric:         Mood and Affect: Mood normal.         Behavior: Behavior normal.                       An electronic signature was used to authenticate this note.     RADHA Phan - CNP

## 2024-02-26 ENCOUNTER — HOSPITAL ENCOUNTER (OUTPATIENT)
Dept: CARDIOLOGY | Facility: HOSPITAL | Age: 64
Discharge: HOME | End: 2024-02-26
Payer: COMMERCIAL

## 2024-02-26 ENCOUNTER — APPOINTMENT (OUTPATIENT)
Dept: CARDIOLOGY | Facility: HOSPITAL | Age: 64
End: 2024-02-26
Payer: COMMERCIAL

## 2024-02-26 ENCOUNTER — ANESTHESIA EVENT (OUTPATIENT)
Dept: CARDIOLOGY | Facility: HOSPITAL | Age: 64
End: 2024-02-26
Payer: COMMERCIAL

## 2024-02-26 ENCOUNTER — ANESTHESIA (OUTPATIENT)
Dept: CARDIOLOGY | Facility: HOSPITAL | Age: 64
End: 2024-02-26
Payer: COMMERCIAL

## 2024-02-26 VITALS
SYSTOLIC BLOOD PRESSURE: 163 MMHG | DIASTOLIC BLOOD PRESSURE: 89 MMHG | HEART RATE: 77 BPM | OXYGEN SATURATION: 98 % | RESPIRATION RATE: 17 BRPM

## 2024-02-26 VITALS
OXYGEN SATURATION: 94 % | TEMPERATURE: 98.1 F | BODY MASS INDEX: 23.7 KG/M2 | WEIGHT: 147.49 LBS | SYSTOLIC BLOOD PRESSURE: 145 MMHG | RESPIRATION RATE: 21 BRPM | DIASTOLIC BLOOD PRESSURE: 67 MMHG | HEIGHT: 66 IN | HEART RATE: 81 BPM

## 2024-02-26 DIAGNOSIS — I48.92 ATRIAL FLUTTER, UNSPECIFIED TYPE (MULTI): ICD-10-CM

## 2024-02-26 DIAGNOSIS — I48.91 UNSPECIFIED ATRIAL FIBRILLATION (MULTI): ICD-10-CM

## 2024-02-26 DIAGNOSIS — Z86.79 H/O ATRIAL FLUTTER: ICD-10-CM

## 2024-02-26 LAB
ANION GAP SERPL CALC-SCNC: 13 MMOL/L (ref 10–20)
APTT PPP: 33 SECONDS (ref 27–38)
BUN SERPL-MCNC: 21 MG/DL (ref 6–23)
CALCIUM SERPL-MCNC: 8.9 MG/DL (ref 8.6–10.3)
CHLORIDE SERPL-SCNC: 99 MMOL/L (ref 98–107)
CO2 SERPL-SCNC: 27 MMOL/L (ref 21–32)
CREAT SERPL-MCNC: 0.72 MG/DL (ref 0.5–1.3)
EGFRCR SERPLBLD CKD-EPI 2021: >90 ML/MIN/1.73M*2
GLUCOSE SERPL-MCNC: 161 MG/DL (ref 74–99)
INR PPP: 1.1 (ref 0.9–1.1)
POTASSIUM SERPL-SCNC: 4 MMOL/L (ref 3.5–5.3)
PROTHROMBIN TIME: 12.3 SECONDS (ref 9.8–12.8)
SODIUM SERPL-SCNC: 135 MMOL/L (ref 136–145)

## 2024-02-26 PROCEDURE — 2500000004 HC RX 250 GENERAL PHARMACY W/ HCPCS (ALT 636 FOR OP/ED): Performed by: NURSE ANESTHETIST, CERTIFIED REGISTERED

## 2024-02-26 PROCEDURE — 99153 MOD SED SAME PHYS/QHP EA: CPT

## 2024-02-26 PROCEDURE — 7100000010 HC PHASE TWO TIME - EACH INCREMENTAL 1 MINUTE: Performed by: INTERNAL MEDICINE

## 2024-02-26 PROCEDURE — 2500000005 HC RX 250 GENERAL PHARMACY W/O HCPCS: Performed by: INTERNAL MEDICINE

## 2024-02-26 PROCEDURE — 80048 BASIC METABOLIC PNL TOTAL CA: CPT | Performed by: NURSE PRACTITIONER

## 2024-02-26 PROCEDURE — 7100000009 HC PHASE TWO TIME - INITIAL BASE CHARGE: Performed by: INTERNAL MEDICINE

## 2024-02-26 PROCEDURE — 93320 DOPPLER ECHO COMPLETE: CPT

## 2024-02-26 PROCEDURE — 36415 COLL VENOUS BLD VENIPUNCTURE: CPT | Performed by: NURSE PRACTITIONER

## 2024-02-26 PROCEDURE — 93005 ELECTROCARDIOGRAM TRACING: CPT | Mod: 59

## 2024-02-26 PROCEDURE — 2500000004 HC RX 250 GENERAL PHARMACY W/ HCPCS (ALT 636 FOR OP/ED): Performed by: NURSE PRACTITIONER

## 2024-02-26 PROCEDURE — 3700000002 HC GENERAL ANESTHESIA TIME - EACH INCREMENTAL 1 MINUTE: Performed by: INTERNAL MEDICINE

## 2024-02-26 PROCEDURE — 2500000004 HC RX 250 GENERAL PHARMACY W/ HCPCS (ALT 636 FOR OP/ED): Performed by: INTERNAL MEDICINE

## 2024-02-26 PROCEDURE — 3700000001 HC GENERAL ANESTHESIA TIME - INITIAL BASE CHARGE: Performed by: INTERNAL MEDICINE

## 2024-02-26 PROCEDURE — 99152 MOD SED SAME PHYS/QHP 5/>YRS: CPT | Performed by: INTERNAL MEDICINE

## 2024-02-26 PROCEDURE — 93312 ECHO TRANSESOPHAGEAL: CPT | Performed by: INTERNAL MEDICINE

## 2024-02-26 PROCEDURE — 99153 MOD SED SAME PHYS/QHP EA: CPT | Performed by: INTERNAL MEDICINE

## 2024-02-26 PROCEDURE — 85610 PROTHROMBIN TIME: CPT | Performed by: NURSE PRACTITIONER

## 2024-02-26 PROCEDURE — 99152 MOD SED SAME PHYS/QHP 5/>YRS: CPT

## 2024-02-26 RX ORDER — PROPOFOL 10 MG/ML
INJECTION, EMULSION INTRAVENOUS AS NEEDED
Status: DISCONTINUED | OUTPATIENT
Start: 2024-02-26 | End: 2024-02-26

## 2024-02-26 RX ORDER — SODIUM CHLORIDE 9 MG/ML
10 INJECTION, SOLUTION INTRAVENOUS CONTINUOUS
Status: DISCONTINUED | OUTPATIENT
Start: 2024-02-26 | End: 2024-02-26

## 2024-02-26 RX ORDER — MIDAZOLAM HYDROCHLORIDE 1 MG/ML
INJECTION INTRAMUSCULAR; INTRAVENOUS AS NEEDED
Status: DISCONTINUED | OUTPATIENT
Start: 2024-02-26 | End: 2024-02-27 | Stop reason: HOSPADM

## 2024-02-26 RX ORDER — FENTANYL CITRATE 50 UG/ML
INJECTION, SOLUTION INTRAMUSCULAR; INTRAVENOUS AS NEEDED
Status: DISCONTINUED | OUTPATIENT
Start: 2024-02-26 | End: 2024-02-27 | Stop reason: HOSPADM

## 2024-02-26 RX ORDER — SODIUM CHLORIDE 0.9 % (FLUSH) 0.9 %
SYRINGE (ML) INJECTION AS NEEDED
Status: DISCONTINUED | OUTPATIENT
Start: 2024-02-26 | End: 2024-02-26

## 2024-02-26 RX ORDER — LIDOCAINE HYDROCHLORIDE 20 MG/ML
JELLY TOPICAL AS NEEDED
Status: DISCONTINUED | OUTPATIENT
Start: 2024-02-26 | End: 2024-02-27 | Stop reason: HOSPADM

## 2024-02-26 RX ADMIN — MIDAZOLAM HYDROCHLORIDE 2 MG: 1 INJECTION, SOLUTION INTRAMUSCULAR; INTRAVENOUS at 09:01

## 2024-02-26 RX ADMIN — SODIUM CHLORIDE 10 ML/HR: 9 INJECTION, SOLUTION INTRAVENOUS at 07:24

## 2024-02-26 RX ADMIN — Medication 10 ML: at 13:10

## 2024-02-26 RX ADMIN — Medication 3 L/MIN: at 08:45

## 2024-02-26 RX ADMIN — PROPOFOL 50 MG: 10 INJECTION, EMULSION INTRAVENOUS at 13:20

## 2024-02-26 RX ADMIN — BENZOCAINE, BUTAMBEN, AND TETRACAINE HYDROCHLORIDE 2 SPRAY: .028; .004; .004 AEROSOL, SPRAY TOPICAL at 08:43

## 2024-02-26 RX ADMIN — BENZOCAINE, BUTAMBEN, AND TETRACAINE HYDROCHLORIDE 4 SPRAY: .028; .004; .004 AEROSOL, SPRAY TOPICAL at 12:51

## 2024-02-26 RX ADMIN — FENTANYL CITRATE 50 MCG: 50 INJECTION, SOLUTION INTRAMUSCULAR; INTRAVENOUS at 08:52

## 2024-02-26 RX ADMIN — PROPOFOL 100 MG: 10 INJECTION, EMULSION INTRAVENOUS at 13:10

## 2024-02-26 RX ADMIN — LIDOCAINE HYDROCHLORIDE 2 APPLICATION: 20 JELLY TOPICAL at 12:51

## 2024-02-26 RX ADMIN — MIDAZOLAM HYDROCHLORIDE 1 MG: 1 INJECTION, SOLUTION INTRAMUSCULAR; INTRAVENOUS at 09:02

## 2024-02-26 RX ADMIN — Medication 10 ML: at 13:20

## 2024-02-26 RX ADMIN — PROPOFOL 50 MG: 10 INJECTION, EMULSION INTRAVENOUS at 13:15

## 2024-02-26 RX ADMIN — Medication 10 ML: at 13:15

## 2024-02-26 RX ADMIN — FENTANYL CITRATE 50 MCG: 50 INJECTION, SOLUTION INTRAMUSCULAR; INTRAVENOUS at 09:01

## 2024-02-26 RX ADMIN — MIDAZOLAM HYDROCHLORIDE 1 MG: 1 INJECTION, SOLUTION INTRAMUSCULAR; INTRAVENOUS at 08:52

## 2024-02-26 SDOH — HEALTH STABILITY: MENTAL HEALTH: CURRENT SMOKER: 0

## 2024-02-26 ASSESSMENT — PAIN SCALES - GENERAL
PAIN_LEVEL: 0
PAINLEVEL_OUTOF10: 0 - NO PAIN

## 2024-02-26 ASSESSMENT — COLUMBIA-SUICIDE SEVERITY RATING SCALE - C-SSRS
1. IN THE PAST MONTH, HAVE YOU WISHED YOU WERE DEAD OR WISHED YOU COULD GO TO SLEEP AND NOT WAKE UP?: NO
6. HAVE YOU EVER DONE ANYTHING, STARTED TO DO ANYTHING, OR PREPARED TO DO ANYTHING TO END YOUR LIFE?: NO
2. HAVE YOU ACTUALLY HAD ANY THOUGHTS OF KILLING YOURSELF?: NO

## 2024-02-26 ASSESSMENT — PAIN - FUNCTIONAL ASSESSMENT: PAIN_FUNCTIONAL_ASSESSMENT: 0-10

## 2024-02-26 NOTE — PRE-SEDATION DOCUMENTATION
Sedation Plan    ASA 3     Mallampati class: III.    Risks, benefits, and alternatives discussed with patient.

## 2024-02-26 NOTE — NURSING NOTE
"Prior to discharge, patient ambulated in mata and used the bathroom.  After ambulating, patient had no complaints of dizziness or lightheadedness.  Discharge instructions were given via \"teach back\" method.  Instructions included restrictions, discharge medications and when to report tomorrow for his ablation with Dr. Dennis. Patient verbally stated understanding and all follow-up questions were answered correctly. Patient was discharged to car by wheelchair.   "

## 2024-02-26 NOTE — NURSING NOTE
Patient returned to [facility] from Cath Lab.  Per report, Lab was unable to complete the EVENS at this time.  Patient will be taken down later today and case will be done with anesthesia.  On arrival focused assessment completed and WDL.  Will continue to keep patient NPO.  Patient's wife is bedside with patient. Patient has no needs at this time.

## 2024-02-26 NOTE — NURSING NOTE
Bedside Swallow Assessment:  Patient's gag reflex has returned.  In addition, patient able to tolerate small sips of water.

## 2024-02-26 NOTE — POST-PROCEDURE NOTE
Physician Transition of Care Summary  Invasive Cardiovascular Lab    Procedure Date: 2/26/2024  Attending:    * Maru Mclain  Resident/Fellow/Other Assistant: Surgeon(s) and Role:     * Maru Mclain MD    Indications:   Atrial fibrillation    Post-procedure diagnosis:   Atrial fibrillation calcified mitral valve normal left ventricular function    Procedure(s):   Transesophageal echo with color Doppler      Procedure Findings:   Severe calcification mitral valve and aortic valve no thrombus    Description of the Procedure:   Transesophageal echo with color Doppler    Complications:   None however it was somewhat difficult intubation therefore deep anesthesia was done via anesthesiologist    Stents/Implants:       Anticoagulation/Antiplatelet Plan:   None    Estimated Blood Loss:   * No values recorded between 2/26/2024 12:45 PM and 2/26/2024  1:26 PM *    Anesthesia: * No anesthesia type entered * Anesthesia Staff: Anesthesiologist: Jah Brown MD  CRNA: ALBERT Timmons    Any Specimen(s) Removed:   No specimens collected during this procedure.    Disposition:   To  tomorrow for cardioversion      Electronically signed by: Maru Mclain MD, 2/26/2024 1:26 PM

## 2024-02-26 NOTE — DISCHARGE INSTRUCTIONS
**Please return to [facility] on 2/27/2024 at 8:30AM for your ablation procedure under the care of Dr. Dennis.  Nothing to eat or drink after midnight tonight in preparation for your procedure.  You may take your medications as previously instructed with small sips of water.**              TRANSESOPHAGEAL ECHOCARDIOGRAM DISCHARGE INSTRUCTIONS     FOR SUDDEN AND SEVERE CHEST PAIN, SHORTNESS OF BREATH, EXCESSIVE BLEEDING, SIGNS OF STROKE, OR CHANGES IN MENTAL STATUS YOU SHOULD CALL 911 IMMEDIATELY.         FOR NEXT 24 HOURS    - Upon discharge, you should return home and rest for the remainder of the day and evening.  It is recommended a responsible adult be with you for the first 24 hours after the procedure.    - No driving for 24 hours after procedure. Please arrange for someone to drive you home from the hospital today.     - Do not drive operate machinery or use power tools for 24 hours after your procedure.     - Do not make any legal decisions for 24 hours after your procedure.     - Do not drink alcoholic beverages for 24 hours after your procedure.

## 2024-02-26 NOTE — DISCHARGE INSTRUCTIONS
TRANSESOPHAGEAL ECHOCARDIOGRAM DISCHARGE INSTRUCTIONS     FOR SUDDEN AND SEVERE CHEST PAIN, SHORTNESS OF BREATH, EXCESSIVE BLEEDING, SIGNS OF STROKE, OR CHANGES IN MENTAL STATUS YOU SHOULD CALL 911 IMMEDIATELY.       FOR NEXT 24 HOURS    - Upon discharge, you should return home and rest for the remainder of the day and evening. It is recommended a responsible adult be with you for the first 24 hours after the procedure.    - No driving for 24 hours after procedure. Please arrange for someone to drive you home from the hospital today.     - Do not operate machinery or use power tools for 24 hours after your procedure.     - Do not make any legal decisions for 24 hours after your procedure.     - Do not drink alcoholic beverages for 24 hours after your procedure.

## 2024-02-26 NOTE — NURSING NOTE
Patient returned to [facility] from Cath Lab.  Per report, EVENS was completed and patient is okay to proceed with ablation tomorrow.  On arrival focused assessment was completed and WDL.  Patient was noted to have a lot of phlegm and Cath Lab staff gave patient a yanker suction to use bedside.  Given that patient was given numbing stuff in his throat, I was worried patient may not realize how hard he was suctioning, I removed yanker and gave patient kleenex to spit into.  Patient's wife is bedside with patient and has no needs this time.  Patient will remain NPO until gag reflex is checked in one hour.

## 2024-02-26 NOTE — ANESTHESIA POSTPROCEDURE EVALUATION
Patient: Kit Duncan    Procedure Summary       Date: 02/26/24 Room / Location: [facility]    Anesthesia Start: 1309 Anesthesia Stop: 1330    Procedure: TRANSESOPHAGEAL ECHO (EVENS) Diagnosis:       H/O atrial flutter      (A-flutter)    Scheduled Providers: Maru Mclain MD Responsible Provider: Jah Brown MD    Anesthesia Type: MAC ASA Status: 3            Anesthesia Type: MAC    Vitals Value Taken Time   /64 02/26/24 1757   Temp - 02/26/24 1757   Pulse 66 02/26/24 1757   Resp 20 02/26/24 1757   SpO2 99 02/26/24 1757       Anesthesia Post Evaluation    Patient location during evaluation: bedside  Patient participation: complete - patient participated  Level of consciousness: awake  Pain score: 0  Pain management: adequate  Airway patency: patent  Cardiovascular status: acceptable  Respiratory status: acceptable, nasal cannula and nonlabored ventilation  Hydration status: acceptable  Postoperative Nausea and Vomiting: none        No notable events documented.

## 2024-02-26 NOTE — ANESTHESIA PREPROCEDURE EVALUATION
Patient: Kit Duncan    Procedure Information       Date/Time: 02/26/24 1330    Scheduled providers: Maru Mclain MD    Procedure: TRANSESOPHAGEAL ECHO (EVENS)    Location: [facility]            Relevant Problems   Cardiovascular   (+) AV dissociation   (+) Aortic stenosis   (+) Atrial flutter (CMS/HCC)   (+) CAD S/P percutaneous coronary angioplasty   (+) Coronary artery disease involving autologous artery coronary bypass graft without angina pectoris   (+) Coronary atherosclerosis   (+) HTN (hypertension), benign   (+) Heart murmur   (+) Hyperlipidemia   (+) Mixed hyperlipidemia   (+) Nonrheumatic tricuspid valve regurgitation   (+) Paroxysmal atrial fibrillation (CMS/HCC)   (+) Persistent atrial fibrillation (CMS/HCC)      Endocrine   (+) Type 2 diabetes mellitus, without long-term current use of insulin (CMS/HCC)      GI   (+) BRBPR (bright red blood per rectum)   (+) GERD (gastroesophageal reflux disease)   (+) Hiatal hernia      /Renal   (+) YANIRA (acute kidney injury) (CMS/HCC)      Hematology   (+) Anemia   (+) Hodgkin disease (CMS/HCC)   (+) Iron deficiency anemia      Other   (+) Asplenia       Clinical information reviewed:                   NPO Detail:  NPO/Void Status  Carbohydrate Drink Given Prior to Surgery? : N  Date of Last Liquid: 02/25/24  Time of Last Liquid: 2200  Date of Last Solid: 02/25/24  Time of Last Solid: 2200  Last Intake Type: Clear fluids  Time of Last Void: 0600         Physical Exam    Airway  Mallampati: II     Cardiovascular    Dental    Pulmonary    Abdominal            Anesthesia Plan    History of general anesthesia?: yes  History of complications of general anesthesia?: no    ASA 3     MAC     The patient is not a current smoker.    intravenous induction   Anesthetic plan and risks discussed with patient.    Plan discussed with attending.      
967

## 2024-02-26 NOTE — Clinical Note
Anesthesia is here, they are following for sedation, see anesthesia charting for sedation information

## 2024-02-27 ENCOUNTER — HOSPITAL ENCOUNTER (OUTPATIENT)
Facility: HOSPITAL | Age: 64
Discharge: HOME | End: 2024-02-28
Attending: INTERNAL MEDICINE | Admitting: INTERNAL MEDICINE
Payer: COMMERCIAL

## 2024-02-27 ENCOUNTER — APPOINTMENT (OUTPATIENT)
Dept: CARDIOLOGY | Facility: HOSPITAL | Age: 64
End: 2024-02-27
Payer: COMMERCIAL

## 2024-02-27 DIAGNOSIS — I48.92 ATRIAL FLUTTER (MULTI): ICD-10-CM

## 2024-02-27 DIAGNOSIS — I25.810 CORONARY ARTERY DISEASE INVOLVING AUTOLOGOUS ARTERY CORONARY BYPASS GRAFT WITHOUT ANGINA PECTORIS: ICD-10-CM

## 2024-02-27 DIAGNOSIS — I49.5 SINUS NODE DYSFUNCTION (MULTI): ICD-10-CM

## 2024-02-27 DIAGNOSIS — I48.92 ATRIAL FLUTTER, UNSPECIFIED TYPE (MULTI): Primary | ICD-10-CM

## 2024-02-27 DIAGNOSIS — R00.1 JUNCTIONAL BRADYCARDIA: ICD-10-CM

## 2024-02-27 DIAGNOSIS — Z95.0 PACEMAKER: ICD-10-CM

## 2024-02-27 LAB
ATRIAL RATE: 300 BPM
ATRIAL RATE: 308 BPM
P AXIS: -76 DEGREES
P AXIS: -81 DEGREES
P OFFSET: 153 MS
P OFFSET: 171 MS
P ONSET: 100 MS
P ONSET: 117 MS
Q ONSET: 203 MS
Q ONSET: 221 MS
QRS COUNT: 12 BEATS
QRS COUNT: 8 BEATS
QRS DURATION: 112 MS
QRS DURATION: 116 MS
QT INTERVAL: 396 MS
QT INTERVAL: 458 MS
QTC CALCULATION(BAZETT): 413 MS
QTC CALCULATION(BAZETT): 448 MS
QTC FREDERICIA: 428 MS
QTC FREDERICIA: 430 MS
R AXIS: -52 DEGREES
R AXIS: -55 DEGREES
T AXIS: -1 DEGREES
T AXIS: 19 DEGREES
T OFFSET: 401 MS
T OFFSET: 450 MS
VENTRICULAR RATE: 49 BPM
VENTRICULAR RATE: 77 BPM

## 2024-02-27 PROCEDURE — 93653 COMPRE EP EVAL TX SVT: CPT | Performed by: INTERNAL MEDICINE

## 2024-02-27 PROCEDURE — 2720000007 HC OR 272 NO HCPCS: Performed by: INTERNAL MEDICINE

## 2024-02-27 PROCEDURE — 99153 MOD SED SAME PHYS/QHP EA: CPT | Performed by: INTERNAL MEDICINE

## 2024-02-27 PROCEDURE — C1731 CATH, EP, 20 OR MORE ELEC: HCPCS | Performed by: INTERNAL MEDICINE

## 2024-02-27 PROCEDURE — 7100000011 HC EXTENDED STAY RECOVERY HOURLY - NURSING UNIT

## 2024-02-27 PROCEDURE — 93623 PRGRMD STIMJ&PACG IV RX NFS: CPT | Performed by: INTERNAL MEDICINE

## 2024-02-27 PROCEDURE — C1730 CATH, EP, 19 OR FEW ELECT: HCPCS | Performed by: INTERNAL MEDICINE

## 2024-02-27 PROCEDURE — G0269 OCCLUSIVE DEVICE IN VEIN ART: HCPCS | Mod: TC | Performed by: INTERNAL MEDICINE

## 2024-02-27 PROCEDURE — 2780000003 HC OR 278 NO HCPCS: Performed by: INTERNAL MEDICINE

## 2024-02-27 PROCEDURE — 93010 ELECTROCARDIOGRAM REPORT: CPT | Performed by: INTERNAL MEDICINE

## 2024-02-27 PROCEDURE — C1732 CATH, EP, DIAG/ABL, 3D/VECT: HCPCS | Performed by: INTERNAL MEDICINE

## 2024-02-27 PROCEDURE — 7100000009 HC PHASE TWO TIME - INITIAL BASE CHARGE: Performed by: INTERNAL MEDICINE

## 2024-02-27 PROCEDURE — 93005 ELECTROCARDIOGRAM TRACING: CPT

## 2024-02-27 PROCEDURE — 7100000010 HC PHASE TWO TIME - EACH INCREMENTAL 1 MINUTE: Performed by: INTERNAL MEDICINE

## 2024-02-27 PROCEDURE — C1760 CLOSURE DEV, VASC: HCPCS | Performed by: INTERNAL MEDICINE

## 2024-02-27 PROCEDURE — 99152 MOD SED SAME PHYS/QHP 5/>YRS: CPT | Performed by: INTERNAL MEDICINE

## 2024-02-27 PROCEDURE — 2500000005 HC RX 250 GENERAL PHARMACY W/O HCPCS: Performed by: INTERNAL MEDICINE

## 2024-02-27 PROCEDURE — 2500000001 HC RX 250 WO HCPCS SELF ADMINISTERED DRUGS (ALT 637 FOR MEDICARE OP): Performed by: NURSE PRACTITIONER

## 2024-02-27 PROCEDURE — 2500000002 HC RX 250 W HCPCS SELF ADMINISTERED DRUGS (ALT 637 FOR MEDICARE OP, ALT 636 FOR OP/ED): Performed by: NURSE PRACTITIONER

## 2024-02-27 PROCEDURE — 2500000004 HC RX 250 GENERAL PHARMACY W/ HCPCS (ALT 636 FOR OP/ED): Performed by: INTERNAL MEDICINE

## 2024-02-27 PROCEDURE — 2500000004 HC RX 250 GENERAL PHARMACY W/ HCPCS (ALT 636 FOR OP/ED): Performed by: NURSE PRACTITIONER

## 2024-02-27 RX ORDER — SODIUM CHLORIDE 9 MG/ML
20 INJECTION, SOLUTION INTRAVENOUS CONTINUOUS
Status: DISCONTINUED | OUTPATIENT
Start: 2024-02-27 | End: 2024-02-27

## 2024-02-27 RX ORDER — CHLORHEXIDINE GLUCONATE 40 MG/ML
SOLUTION TOPICAL ONCE
Status: COMPLETED | OUTPATIENT
Start: 2024-02-28 | End: 2024-02-28

## 2024-02-27 RX ORDER — SODIUM CHLORIDE 9 MG/ML
20 INJECTION, SOLUTION INTRAVENOUS CONTINUOUS
Status: DISCONTINUED | OUTPATIENT
Start: 2024-02-28 | End: 2024-02-28 | Stop reason: HOSPADM

## 2024-02-27 RX ORDER — CHLORHEXIDINE GLUCONATE 40 MG/ML
SOLUTION TOPICAL ONCE
Status: COMPLETED | OUTPATIENT
Start: 2024-02-27 | End: 2024-02-27

## 2024-02-27 RX ORDER — PRAVASTATIN SODIUM 10 MG/1
10 TABLET ORAL NIGHTLY
Status: DISCONTINUED | OUTPATIENT
Start: 2024-02-27 | End: 2024-02-28 | Stop reason: HOSPADM

## 2024-02-27 RX ORDER — FENTANYL CITRATE 50 UG/ML
INJECTION, SOLUTION INTRAMUSCULAR; INTRAVENOUS AS NEEDED
Status: DISCONTINUED | OUTPATIENT
Start: 2024-02-27 | End: 2024-02-27 | Stop reason: HOSPADM

## 2024-02-27 RX ORDER — MIDAZOLAM HYDROCHLORIDE 1 MG/ML
INJECTION INTRAMUSCULAR; INTRAVENOUS AS NEEDED
Status: DISCONTINUED | OUTPATIENT
Start: 2024-02-27 | End: 2024-02-27 | Stop reason: HOSPADM

## 2024-02-27 RX ORDER — LIDOCAINE HYDROCHLORIDE 10 MG/ML
INJECTION, SOLUTION EPIDURAL; INFILTRATION; INTRACAUDAL; PERINEURAL AS NEEDED
Status: DISCONTINUED | OUTPATIENT
Start: 2024-02-27 | End: 2024-02-27 | Stop reason: HOSPADM

## 2024-02-27 RX ORDER — POTASSIUM CHLORIDE 20 MEQ/1
20 TABLET, EXTENDED RELEASE ORAL 3 TIMES DAILY
Status: DISCONTINUED | OUTPATIENT
Start: 2024-02-27 | End: 2024-02-28 | Stop reason: HOSPADM

## 2024-02-27 RX ORDER — ASPIRIN 81 MG/1
81 TABLET ORAL
Status: DISCONTINUED | OUTPATIENT
Start: 2024-02-27 | End: 2024-02-28 | Stop reason: HOSPADM

## 2024-02-27 RX ORDER — FUROSEMIDE 40 MG/1
60 TABLET ORAL 2 TIMES DAILY
Status: DISCONTINUED | OUTPATIENT
Start: 2024-02-27 | End: 2024-02-28 | Stop reason: HOSPADM

## 2024-02-27 RX ORDER — MUPIROCIN 20 MG/G
1 OINTMENT TOPICAL ONCE
Status: COMPLETED | OUTPATIENT
Start: 2024-02-28 | End: 2024-02-28

## 2024-02-27 RX ORDER — VANCOMYCIN HYDROCHLORIDE 1 G/200ML
1000 INJECTION, SOLUTION INTRAVENOUS ONCE
Status: COMPLETED | OUTPATIENT
Start: 2024-02-28 | End: 2024-02-28

## 2024-02-27 RX ORDER — ACETAMINOPHEN 325 MG/1
650 TABLET ORAL EVERY 4 HOURS PRN
Status: DISCONTINUED | OUTPATIENT
Start: 2024-02-27 | End: 2024-02-28 | Stop reason: HOSPADM

## 2024-02-27 RX ORDER — NITROGLYCERIN 0.4 MG/1
0.4 TABLET SUBLINGUAL EVERY 5 MIN PRN
Status: DISCONTINUED | OUTPATIENT
Start: 2024-02-27 | End: 2024-02-28 | Stop reason: HOSPADM

## 2024-02-27 RX ORDER — LANOLIN ALCOHOL/MO/W.PET/CERES
400 CREAM (GRAM) TOPICAL DAILY
Status: DISCONTINUED | OUTPATIENT
Start: 2024-02-28 | End: 2024-02-28 | Stop reason: HOSPADM

## 2024-02-27 RX ORDER — PANTOPRAZOLE SODIUM 20 MG/1
20 TABLET, DELAYED RELEASE ORAL DAILY
Status: DISCONTINUED | OUTPATIENT
Start: 2024-02-28 | End: 2024-02-28 | Stop reason: HOSPADM

## 2024-02-27 RX ORDER — TALC
3 POWDER (GRAM) TOPICAL NIGHTLY PRN
Status: DISCONTINUED | OUTPATIENT
Start: 2024-02-27 | End: 2024-02-28 | Stop reason: HOSPADM

## 2024-02-27 RX ORDER — ONDANSETRON HYDROCHLORIDE 2 MG/ML
4 INJECTION, SOLUTION INTRAVENOUS EVERY 8 HOURS PRN
Status: DISCONTINUED | OUTPATIENT
Start: 2024-02-27 | End: 2024-02-28 | Stop reason: HOSPADM

## 2024-02-27 RX ADMIN — Medication 3 MG: at 21:07

## 2024-02-27 RX ADMIN — POTASSIUM CHLORIDE 20 MEQ: 1500 TABLET, EXTENDED RELEASE ORAL at 21:07

## 2024-02-27 RX ADMIN — SODIUM CHLORIDE 20 ML/HR: 9 INJECTION, SOLUTION INTRAVENOUS at 09:21

## 2024-02-27 RX ADMIN — PRAVASTATIN SODIUM 10 MG: 10 TABLET ORAL at 21:07

## 2024-02-27 RX ADMIN — FUROSEMIDE 60 MG: 40 TABLET ORAL at 21:07

## 2024-02-27 RX ADMIN — Medication 1 APPLICATION: at 09:21

## 2024-02-27 ASSESSMENT — PAIN - FUNCTIONAL ASSESSMENT: PAIN_FUNCTIONAL_ASSESSMENT: 0-10

## 2024-02-27 ASSESSMENT — COLUMBIA-SUICIDE SEVERITY RATING SCALE - C-SSRS
6. HAVE YOU EVER DONE ANYTHING, STARTED TO DO ANYTHING, OR PREPARED TO DO ANYTHING TO END YOUR LIFE?: NO
2. HAVE YOU ACTUALLY HAD ANY THOUGHTS OF KILLING YOURSELF?: NO
1. IN THE PAST MONTH, HAVE YOU WISHED YOU WERE DEAD OR WISHED YOU COULD GO TO SLEEP AND NOT WAKE UP?: NO

## 2024-02-27 ASSESSMENT — PAIN SCALES - GENERAL: PAINLEVEL_OUTOF10: 0 - NO PAIN

## 2024-02-27 NOTE — NURSING NOTE
HOB increased 30 degrees.  No change in groin site after position change.  Patient denies needs at this time.

## 2024-02-27 NOTE — Clinical Note
The ECG shows a paced rhythm. Pacer inserted. The patient has permanent pacemaker. Pacer on demand mode. ECG rate  = 60 bpm.

## 2024-02-27 NOTE — NURSING NOTE
Patient ambulated in mata and used bathroom.  Groin site remained stable after ambulation with no oozing or hematoma noted.  No complaints of dizziness or lightheadedness after ambulating.

## 2024-02-27 NOTE — Clinical Note
Sheath was exchanged in the left cephalic vein with INTRODUCER SYSTEM, PRELUDE SNAP, SPLITTABLE, 9 FR X 25 CM, BLACK. Over wire

## 2024-02-27 NOTE — Clinical Note
The PACEMAKER, DUAL CHAMBER, ROLANDO MRI XT DR - [id number] device was inserted. The leads were placed into the connector and visually verified to be in correct position. Injury current obtained.

## 2024-02-27 NOTE — NURSING NOTE
Patient arrived back to [facility] from EP Lab s/p Ablation.  Focused assessment completed and WDL.  Right Groin site is stable.  Dressing is clean/dry/intact; site is soft; no oozing or hematoma noted.  Patient's wife is bedside.  Patient denies needs at this time.

## 2024-02-27 NOTE — NURSING NOTE
Patient transferring to 8S.  Report given to Mustapha DEGROOT.  Transport here to transfer patient to floor by wheelchair.  On transfer, right groin was stable. Dressing was clean/dry/intact; site was soft with no oozing or hematoma.

## 2024-02-28 ENCOUNTER — APPOINTMENT (OUTPATIENT)
Dept: CARDIOLOGY | Facility: HOSPITAL | Age: 64
End: 2024-02-28
Payer: COMMERCIAL

## 2024-02-28 ENCOUNTER — APPOINTMENT (OUTPATIENT)
Dept: RADIOLOGY | Facility: HOSPITAL | Age: 64
End: 2024-02-28
Payer: COMMERCIAL

## 2024-02-28 VITALS
HEIGHT: 66 IN | RESPIRATION RATE: 18 BRPM | DIASTOLIC BLOOD PRESSURE: 69 MMHG | WEIGHT: 147.49 LBS | BODY MASS INDEX: 23.7 KG/M2 | OXYGEN SATURATION: 92 % | SYSTOLIC BLOOD PRESSURE: 129 MMHG | HEART RATE: 90 BPM | TEMPERATURE: 98.4 F

## 2024-02-28 LAB
ATRIAL RATE: 111 BPM
ATRIAL RATE: 98 BPM
P AXIS: 35 DEGREES
Q ONSET: 199 MS
Q ONSET: 206 MS
QRS COUNT: 11 BEATS
QRS COUNT: 12 BEATS
QRS DURATION: 132 MS
QRS DURATION: 152 MS
QT INTERVAL: 446 MS
QT INTERVAL: 456 MS
QTC CALCULATION(BAZETT): 466 MS
QTC CALCULATION(BAZETT): 504 MS
QTC FREDERICIA: 463 MS
QTC FREDERICIA: 484 MS
R AXIS: -56 DEGREES
R AXIS: 158 DEGREES
T AXIS: -46 DEGREES
T AXIS: 76 DEGREES
T OFFSET: 422 MS
T OFFSET: 434 MS
VENTRICULAR RATE: 63 BPM
VENTRICULAR RATE: 77 BPM

## 2024-02-28 PROCEDURE — 93290 INTERROG DEV EVAL ICPMS IP: CPT | Performed by: INTERNAL MEDICINE

## 2024-02-28 PROCEDURE — 99153 MOD SED SAME PHYS/QHP EA: CPT | Performed by: INTERNAL MEDICINE

## 2024-02-28 PROCEDURE — 2500000004 HC RX 250 GENERAL PHARMACY W/ HCPCS (ALT 636 FOR OP/ED): Performed by: NURSE PRACTITIONER

## 2024-02-28 PROCEDURE — 7100000011 HC EXTENDED STAY RECOVERY HOURLY - NURSING UNIT

## 2024-02-28 PROCEDURE — C1898 LEAD, PMKR, OTHER THAN TRANS: HCPCS | Performed by: INTERNAL MEDICINE

## 2024-02-28 PROCEDURE — 2780000003 HC OR 278 NO HCPCS: Performed by: INTERNAL MEDICINE

## 2024-02-28 PROCEDURE — 93005 ELECTROCARDIOGRAM TRACING: CPT | Mod: 59

## 2024-02-28 PROCEDURE — 2500000005 HC RX 250 GENERAL PHARMACY W/O HCPCS: Performed by: INTERNAL MEDICINE

## 2024-02-28 PROCEDURE — 87081 CULTURE SCREEN ONLY: CPT | Mod: ELYLAB | Performed by: INTERNAL MEDICINE

## 2024-02-28 PROCEDURE — 2500000001 HC RX 250 WO HCPCS SELF ADMINISTERED DRUGS (ALT 637 FOR MEDICARE OP): Performed by: NURSE PRACTITIONER

## 2024-02-28 PROCEDURE — 71045 X-RAY EXAM CHEST 1 VIEW: CPT

## 2024-02-28 PROCEDURE — 2500000004 HC RX 250 GENERAL PHARMACY W/ HCPCS (ALT 636 FOR OP/ED): Performed by: INTERNAL MEDICINE

## 2024-02-28 PROCEDURE — 33208 INSRT HEART PM ATRIAL & VENT: CPT | Mod: KX | Performed by: INTERNAL MEDICINE

## 2024-02-28 PROCEDURE — 71045 X-RAY EXAM CHEST 1 VIEW: CPT | Mod: FOREIGN READ | Performed by: RADIOLOGY

## 2024-02-28 PROCEDURE — 2750000001 HC OR 275 NO HCPCS: Performed by: INTERNAL MEDICINE

## 2024-02-28 PROCEDURE — C1892 INTRO/SHEATH,FIXED,PEEL-AWAY: HCPCS | Performed by: INTERNAL MEDICINE

## 2024-02-28 PROCEDURE — 99232 SBSQ HOSP IP/OBS MODERATE 35: CPT | Performed by: NURSE PRACTITIONER

## 2024-02-28 PROCEDURE — 33208 INSRT HEART PM ATRIAL & VENT: CPT | Performed by: INTERNAL MEDICINE

## 2024-02-28 PROCEDURE — 2500000002 HC RX 250 W HCPCS SELF ADMINISTERED DRUGS (ALT 637 FOR MEDICARE OP, ALT 636 FOR OP/ED): Performed by: NURSE PRACTITIONER

## 2024-02-28 PROCEDURE — 99152 MOD SED SAME PHYS/QHP 5/>YRS: CPT | Performed by: INTERNAL MEDICINE

## 2024-02-28 PROCEDURE — 2720000007 HC OR 272 NO HCPCS: Performed by: INTERNAL MEDICINE

## 2024-02-28 PROCEDURE — 93280 PM DEVICE PROGR EVAL DUAL: CPT | Performed by: INTERNAL MEDICINE

## 2024-02-28 PROCEDURE — 93010 ELECTROCARDIOGRAM REPORT: CPT | Performed by: INTERNAL MEDICINE

## 2024-02-28 PROCEDURE — C1785 PMKR, DUAL, RATE-RESP: HCPCS | Performed by: INTERNAL MEDICINE

## 2024-02-28 PROCEDURE — 93280 PM DEVICE PROGR EVAL DUAL: CPT

## 2024-02-28 DEVICE — IPG W1DR01 AZURE XT DR MRI WL USA BCP
Type: IMPLANTABLE DEVICE | Site: CHEST | Status: FUNCTIONAL
Brand: AZURE™ XT DR MRI SURESCAN™

## 2024-02-28 DEVICE — STYLET, LEAD, PACEMAKER, EXTENDED TAPER, BALL-TIP, 52 CM: Type: IMPLANTABLE DEVICE | Site: CHEST | Status: FUNCTIONAL

## 2024-02-28 DEVICE — LEAD 5076-45 CAPSUREFIX NOVUS US EN
Type: IMPLANTABLE DEVICE | Site: CHEST | Status: FUNCTIONAL
Brand: CAPSUREFIX® NOVUS

## 2024-02-28 DEVICE — LEAD 5076-52 CAPSUREFIX NOVUS US EN
Type: IMPLANTABLE DEVICE | Site: CHEST | Status: FUNCTIONAL
Brand: CAPSUREFIX® NOVUS

## 2024-02-28 RX ORDER — TRAMADOL HYDROCHLORIDE 50 MG/1
50 TABLET ORAL EVERY 8 HOURS PRN
Status: DISCONTINUED | OUTPATIENT
Start: 2024-02-28 | End: 2024-02-28 | Stop reason: HOSPADM

## 2024-02-28 RX ORDER — MIDAZOLAM HYDROCHLORIDE 1 MG/ML
INJECTION, SOLUTION INTRAMUSCULAR; INTRAVENOUS AS NEEDED
Status: DISCONTINUED | OUTPATIENT
Start: 2024-02-28 | End: 2024-02-28 | Stop reason: HOSPADM

## 2024-02-28 RX ORDER — ACETAMINOPHEN 325 MG/1
650 TABLET ORAL EVERY 4 HOURS PRN
Refills: 0
Start: 2024-02-28

## 2024-02-28 RX ORDER — METOPROLOL TARTRATE 25 MG/1
25 TABLET, FILM COATED ORAL 2 TIMES DAILY
Status: DISCONTINUED | OUTPATIENT
Start: 2024-02-28 | End: 2024-02-28 | Stop reason: HOSPADM

## 2024-02-28 RX ORDER — FENTANYL CITRATE 50 UG/ML
INJECTION, SOLUTION INTRAMUSCULAR; INTRAVENOUS AS NEEDED
Status: DISCONTINUED | OUTPATIENT
Start: 2024-02-28 | End: 2024-02-28 | Stop reason: HOSPADM

## 2024-02-28 RX ORDER — METOPROLOL TARTRATE 25 MG/1
25 TABLET, FILM COATED ORAL 2 TIMES DAILY
Qty: 60 TABLET | Refills: 5 | Status: SHIPPED | OUTPATIENT
Start: 2024-02-28

## 2024-02-28 RX ORDER — LIDOCAINE HYDROCHLORIDE 10 MG/ML
INJECTION, SOLUTION EPIDURAL; INFILTRATION; INTRACAUDAL; PERINEURAL AS NEEDED
Status: DISCONTINUED | OUTPATIENT
Start: 2024-02-28 | End: 2024-02-28 | Stop reason: HOSPADM

## 2024-02-28 RX ORDER — TRAMADOL HYDROCHLORIDE 50 MG/1
50 TABLET ORAL EVERY 8 HOURS PRN
Qty: 10 TABLET | Refills: 0 | Status: SHIPPED | OUTPATIENT
Start: 2024-02-28

## 2024-02-28 RX ADMIN — POTASSIUM CHLORIDE 20 MEQ: 1500 TABLET, EXTENDED RELEASE ORAL at 10:07

## 2024-02-28 RX ADMIN — MUPIROCIN 1 APPLICATION: 20 OINTMENT TOPICAL at 06:00

## 2024-02-28 RX ADMIN — ASPIRIN 81 MG: 81 TABLET, COATED ORAL at 07:00

## 2024-02-28 RX ADMIN — PANTOPRAZOLE SODIUM 20 MG: 20 TABLET, DELAYED RELEASE ORAL at 05:51

## 2024-02-28 RX ADMIN — EMPAGLIFLOZIN 25 MG: 25 TABLET, FILM COATED ORAL at 10:06

## 2024-02-28 RX ADMIN — Medication: at 05:54

## 2024-02-28 RX ADMIN — EMPAGLIFLOZIN 25 MG: 25 TABLET, FILM COATED ORAL at 13:18

## 2024-02-28 RX ADMIN — VANCOMYCIN HYDROCHLORIDE 1000 MG: 1 INJECTION, SOLUTION INTRAVENOUS at 07:26

## 2024-02-28 RX ADMIN — FUROSEMIDE 60 MG: 40 TABLET ORAL at 10:06

## 2024-02-28 RX ADMIN — MAGNESIUM OXIDE 400 MG (241.3 MG MAGNESIUM) TABLET 400 MG: TABLET at 05:51

## 2024-02-28 RX ADMIN — METOPROLOL TARTRATE 25 MG: 25 TABLET, FILM COATED ORAL at 10:07

## 2024-02-28 NOTE — PROGRESS NOTES
02/28/24 1100   Discharge Planning   Living Arrangements Spouse/significant other   Support Systems Spouse/significant other   Assistance Needed reprots none PTA   Type of Residence Private residence   Home or Post Acute Services None   Patient expects to be discharged to: HOME   Does the patient need discharge transport arranged? No     Pt planned for PPM placement today. Is currently off the unit for procedure.

## 2024-02-28 NOTE — PROGRESS NOTES
Cardiology Progress Note  Patient: Kit Duncan  Unit/Bed: 808/808-A  YOB: 1960  MRN: [id number]  Acct: [id number]   Admitting Diagnosis:   Atrial flutter, unspecified type (CMS/HCC) [I48.92]  Sinus node dysfunction (CMS/HCC) [I49.5]  Date:  2/27/2024  Hospital Day: 0  Attending: Chepe Dennis MD   Rounding ADAM/Cardiologist:  NAHID Bailey-CNP,        Primary Cardiologist: Dr. Chepe Dennis      Complaint:  No chief complaint on file.       SUBJECTIVE    Status post dual-chamber permanent pacemaker implant  Denies palpitations, dizziness, lightheadedness, shortness of breath        VITALS   Visit Vitals  /69   Pulse 90   Temp 36.9 °C (98.4 °F)   Resp 18        I/O:    Intake/Output Summary (Last 24 hours) at 2/28/2024 1320  Last data filed at 2/28/2024 0841  Gross per 24 hour   Intake 500 ml   Output 1795 ml   Net -1295 ml        Allergies:  Allergies   Allergen Reactions    Pollen Extracts Hives    Atorvastatin Other    Insects Extract Other     Patient is allergic to flying ants    Iodinated Contrast Media Unknown    Ragweed Other        PHYSICAL EXAM   Physical Exam  Constitutional:       Appearance: Normal appearance.   HENT:      Head: Normocephalic.   Eyes:      Conjunctiva/sclera: Conjunctivae normal.   Cardiovascular:      Rate and Rhythm: Normal rate and regular rhythm.      Pulses: Normal pulses.      Heart sounds: Normal heart sounds.      Comments: Left prepectoral PPM site dressing dry and intact without hematoma or bleeding at site    Pulmonary:      Effort: Pulmonary effort is normal.   Musculoskeletal:         General: Normal range of motion.   Skin:     General: Skin is warm and dry.   Neurological:      Mental Status: He is alert and oriented to person, place, and time.           LABS     Results for orders placed or performed during the hospital encounter of 02/27/24 (from the past 24 hour(s))   Electrocardiogram - Post Ablation   Result Value Ref Range     Ventricular Rate 63 BPM    Atrial Rate 111 BPM    QRS Duration 132 ms    QT Interval 456 ms    QTC Calculation(Bazett) 466 ms    R Axis -56 degrees    T Axis 76 degrees    QRS Count 11 beats    Q Onset 206 ms    T Offset 434 ms    QTC Fredericia 463 ms   ECG 12 lead STAT   Result Value Ref Range    Ventricular Rate 77 BPM    Atrial Rate 98 BPM    QRS Duration 152 ms    QT Interval 446 ms    QTC Calculation(Bazett) 504 ms    P Axis 35 degrees    R Axis 158 degrees    T Axis -46 degrees    QRS Count 12 beats    Q Onset 199 ms    T Offset 422 ms    QTC Fredericia 484 ms   Cardiac device check - Inpatient   Result Value Ref Range    BSA 1.76 m2        Scheduled medications  [Held by provider] apixaban, 5 mg, oral, BID  aspirin, 81 mg, oral, Daily  empagliflozin, 25 mg, oral, BID  furosemide, 60 mg, oral, BID  magnesium oxide, 400 mg, oral, Daily  metoprolol tartrate, 25 mg, oral, BID  pantoprazole, 20 mg, oral, Daily  potassium chloride CR, 20 mEq, oral, TID  pravastatin, 10 mg, oral, Nightly      Continuous medications  sodium chloride 0.9%, 20 mL/hr  sodium chloride 0.9%, 20 mL/hr      PRN medications  PRN medications: acetaminophen, melatonin, nitroglycerin, ondansetron, traMADol     XR chest 1 view    Result Date: 2/28/2024  STUDY: Chest Radiograph;  02/28/2024, 09:50AM INDICATION: Post permanent pacemaker. COMPARISON: 01/25/2022 XR chest ACCESSION NUMBER(S): [id number] ORDERING CLINICIAN: NEIL KU TECHNIQUE:  Frontal chest was obtained at 09:50 hours. FINDINGS: CARDIOMEDIASTINAL SILHOUETTE: Cardiomediastinal silhouette is normal in size and configuration. Left-sided cardiac pacer device is seen.  Median sternotomy wires are seen.   LUNGS: Chronic interstitial lung changes are identified.  There is a moderate size right pleural effusion.  Small left pleural effusion is seen. There is bibasilar dependent atelectasis.   ABDOMEN: No remarkable upper abdominal findings.  BONES: No acute osseous  changes.    Bilateral pleural effusions with bibasilar dependent atelectasis, right greater than left. Signed by Abigail Stanley MD    ECG 12 lead STAT    Result Date: 2/28/2024  Ventricular-paced rhythm with frequent AV dual-paced complexes Abnormal ECG When compared with ECG of 27-FEB-2024 15:01, (unconfirmed) Electronic ventricular pacemaker has replaced Atrial fibrillation    Electrophysiology procedure    Addendum Date: 2/27/2024    Procedure Details: Electrophysiology Study With Ablation of Atrial flutter Summary:·      Normal SA natalie function.·      Abnormal AV natalie function.·      Abnormal His-Purkinje conduction.·      There was hemodynamically stable atrial flutter.·      Radiofrequency lesion summary: 2 applications delivered. Lesions were applied at the tricuspid annulus (atrial), at the IVC tricuspid annulusisthmus, and on the ventricular aspect of the tricuspid annulus to produce acontinuous line of RF lesions across the cavotricuspid isthmus.·      Successful ablation to prevent atrial flutter. Discharge: 1.     The patient left the EP laboratory in stable condition. Follow up: 1.     The patient will remain in the hospital overnight for telemetry monitoringand observation, with anticipated discharge on the following day. The patientshould be alert for bleeding, swelling, or signs of infection. The patientshould call the electrophysiologist immediately if symptoms recur, or for anyproblems. The patient has been instructed accordingly. ________________________________________ Procedures: Electrophysiologic testing. HIS Recording. CARTO 3D mapping. Ablation performed.  Electrophysiology study with left atrial recording.  Pacing with IVdrug infusion ________________________________________ Patient history: Please refer to the detailed history and physical on the patient's medicalchart.  Recurrence of atrial flutter.  Patient is a scheduled forelectrophysiology study and atrial flutter ablation  ________________________________________ Procedure narrative: The risks, benefits, and alternatives to the procedure and sedation wereexplained to the patient, and informed consent was obtained. The patient was inthe fasting state. A baseline ECG was recorded. RF grounding pads were placed.Self-adhesive anterior-posterior defibrillation pads were applied. A ZOLL defibrillator was used for monitoring and the defibrillator waveform was set tobiphasic. The patient was set up for continuous monitoring of surface 12 leadECG and pulse oximetry. Blood pressure was monitored with automatic cuffmeasurements. The procedure was performed under IV conscious sedation. A 16 FrFoley catheter was inserted. Bilateral groins were clipped, prepped withchlorhexidine, and draped in the usual sterile fashion. Local anesthesia:Subcutaneous tissues were infiltrated with Lidocaine 1 % ( 20 ml) to the rightgroin for local anesthesia. 1.     The right femoral vein was accessed x 3 using the modified Seldingertechnique. Three sheaths were inserted. 2.     Electrophysiologic testing was performed with a multiple catheter technique.The catheters were placed under fluoroscopic guidance. A catheter was placedacross the tricuspid valve to record the His bundle. Testing was done atbaseline, with and without provocation.  Measurements of basic intervals and refractory periods wereobtained. Protocols included decremental pacing, burst pacing, and programmedstimulation. Stimuli were delivered at high right atrial, low lateral rightatrial, and ventricular sites and left atrial sites (coronary sinus).Initially rhythm was atrial flutter with variable AV block.  Atrial flutter was terminated with successful conversion to sinus rhythm. 3.     The superior vena cava, tricuspid annulus, triangle of Feliciano, right atrial,and right interatrial septum sites were mapped with point-by-pointelectroanatomical activation mapping and entrainment mapping from both  theright atrial catheter and the ablation catheter. The CARTO mapping system wasused to create a 3D image of the right atrium and cavotricuspid isthmus.Mapping was performed during atrial flutter, coronary sinus pacing, and RApacing. 4.     Catheter RF ablation. Applications were delivered via a generator ( [employer] irrigated catheter) to the cavotricuspid isthmus. The location of the ablation catheter inrelation to the His bundle was also monitored using CARTO and fluoroscopy. Acontinuous line of RF lesions were placed across the cavotricuspid isthmus.Bidirectional isthmus block was demonstrated post-ablation. 5.     Sheaths were removed and hemostasis was obtained at the right femoral venousaccess sites after deployment of vascular device was applied. Pressure dressingwas applied. Vascular access and catheter properties: Entry site                                 Sheath                                        Locations                            Catheter                                                                   Type Right femoral vein                     7 Fr                                           His-bundle (right side)       Tip Wallace                                                     new access Right femoral vein                     8 Fr                                                                                      dual decapolar, [employer], Bidirectional      new access Right femoral vein                     8 Fr                                                                                      FJ 8 mm [employer] Nunez, EZ Steer [employer]        new access _______________________________________ Complications: The patient tolerated the procedure without any complications or incident. ________________________________________ Prepared and signed by     Result Date: 2/27/2024  Procedure Details: Electrophysiology Study With Ablation of Atrial flutter Summary:·      Normal SA natalie  function.·      Normal AV natalie function.·      Normal His-Purkinje conduction.·      There was hemodynamically stable atrial flutter.·      Radiofrequency lesion summary: 2 applications delivered. Lesions were applied at the tricuspid annulus (atrial), at the IVC tricuspid annulusisthmus, and on the ventricular aspect of the tricuspid annulus to produce acontinuous line of RF lesions across the cavotricuspid isthmus.·      Successful ablation to prevent atrial flutter. Discharge: 1.     The patient left the EP laboratory in stable condition. Follow up: 1.     The patient will remain in the hospital overnight for telemetry monitoringand observation, with anticipated discharge on the following day. The patientshould be alert for bleeding, swelling, or signs of infection. The patientshould call the electrophysiologist immediately if symptoms recur, or for anyproblems. The patient has been instructed accordingly. ________________________________________ Procedures: Electrophysiologic testing. HIS Recording. CARTO 3D mapping. Ablation performed.  Electrophysiology study with left atrial recording.  Pacing with IVdrug infusion ________________________________________ Patient history: Please refer to the detailed history and physical on the patient's medicalchart.  Recurrence of atrial flutter.  Patient is a scheduled forelectrophysiology study and atrial flutter ablation ________________________________________ Procedure narrative: The risks, benefits, and alternatives to the procedure and sedation wereexplained to the patient, and informed consent was obtained. The patient was inthe fasting state. A baseline ECG was recorded. RF grounding pads were placed.Self-adhesive anterior-posterior defibrillation pads were applied. A ZOLL defibrillator was used for monitoring and the defibrillator waveform was set tobiphasic. The patient was set up for continuous monitoring of surface 12 leadECG and pulse oximetry. Blood pressure  was monitored with automatic cuffmeasurements. The procedure was performed under IV conscious sedation. A 16 FrFoley catheter was inserted. Bilateral groins were clipped, prepped withchlorhexidine, and draped in the usual sterile fashion. Local anesthesia:Subcutaneous tissues were infiltrated with Lidocaine 1 % ( 20 ml) to the rightgroin for local anesthesia. 1.     The right femoral vein was accessed x 3 using the modified Seldingertechnique. Three sheaths were inserted. 2.     Electrophysiologic testing was performed with a multiple catheter technique.The catheters were placed under fluoroscopic guidance. A catheter was placedacross the tricuspid valve to record the His bundle. Testing was done atbaseline, with and without provocation.  Measurements of basic intervals and refractory periods wereobtained. Protocols included decremental pacing, burst pacing, and programmedstimulation. Stimuli were delivered at high right atrial, low lateral rightatrial, and ventricular sites and left atrial sites (coronary sinus).Initially rhythm was atrial flutter with variable AV block.  Atrial flutter was terminated with successful conversion to sinus rhythm. 3.     The superior vena cava, tricuspid annulus, triangle of Feliciano, right atrial,and right interatrial septum sites were mapped with point-by-pointelectroanatomical activation mapping and entrainment mapping from both theright atrial catheter and the ablation catheter. The CARTO mapping system wasused to create a 3D image of the right atrium and cavotricuspid isthmus.Mapping was performed during atrial flutter, coronary sinus pacing, and RApacing. 4.     Catheter RF ablation. Applications were delivered via a generator ( [employer] irrigated catheter) to the cavotricuspid isthmus. The location of the ablation catheter inrelation to the His bundle was also monitored using CARTO and fluoroscopy. Acontinuous line of RF lesions were placed across the cavotricuspid  isthmus.Bidirectional isthmus block was demonstrated post-ablation. 5.     Sheaths were removed and hemostasis was obtained at the right femoral venousaccess sites after deployment of vascular device was applied. Pressure dressingwas applied. Vascular access and catheter properties: Entry site                                 Sheath                                        Locations                            Catheter                                                                   Type Right femoral vein                     7 Fr                                           His-bundle (right side)       Tip Wallace                                                     new access Right femoral vein                     8 Fr                                                                                      dual decapolar, [employer], Bidirectional      new access Right femoral vein                     8 Fr                                                                                      FJ 8 mm [employer] Nunez, EZ Steer [employer]        new access _______________________________________ Complications: The patient tolerated the procedure without any complications or incident. ________________________________________ Prepared and signed by    Electrocardiogram - Post Ablation    Result Date: 2/27/2024  Atrial fibrillation with a competing junctional pacemaker Left axis deviation Nonspecific intraventricular block Inferior infarct (cited on or before 25-JAN-2022) Abnormal ECG When compared with ECG of 27-FEB-2024 09:17, Atrial fibrillation has replaced Atrial flutter Criteria for Anterior infarct are no longer Present Criteria for Anterolateral infarct are no longer Present    ECG 12 lead STAT    Result Date: 2/27/2024  Atrial flutter with 4:1 AV conduction Left axis deviation Incomplete right bundle branch block Inferior infarct (cited on or before 25-JAN-2022) Anterolateral infarct (cited on or before  16-APR-2010) Abnormal ECG When compared with ECG of 26-FEB-2024 07:08, (unconfirmed) Vent. rate has increased BY  28 BPM T wave inversion less evident in Anterolateral leads Confirmed by Chepe Ku ([id number]) on 2/27/2024 2:33:38 PM    ECG 12 lead    Result Date: 2/27/2024  Atrial flutter Left axis deviation Incomplete right bundle branch block Inferior infarct (cited on or before 25-JAN-2022) Anteroseptal infarct , age undetermined T wave abnormality, consider lateral ischemia Abnormal ECG When compared with ECG of 25-JAN-2022 21:54, Atrial flutter has replaced Sinus rhythm Incomplete right bundle branch block has replaced Right bundle branch block Anteroseptal infarct is now Present Confirmed by Chepe Ku ([id number]) on 2/27/2024 2:28:39 PM    Transesophageal Echo ([facility])    Result Date: 2/26/2024          [place] [id number]  Tel [phone] Fax [phone] TRANSESOPHAGEAL ECHOCARDIOGRAM REPORT  Patient Name:      JOE Vieira Physician:    [id number] Maru FITCH MD Study Date:        2/26/2024            Ordering Provider:    [id number] CHEPE KU MRN/PID:           [id number]             Fellow: Accession#:        [id number]         Nurse:                Trice Karimi RN Date of Birth/Age: 1960 / 63 years  Sonographer:          Aby Mendoza RDCS Gender:            M                    Additional Staff: Height:            167.64 cm            Admit Date:           2/25/2024 Weight:            64.86 kg             Admission Status:     Outpatient BSA / BMI:         1.73 m2 / 23.08      Department Location:  [address] [place] [address]                    kg/m2 Blood Pressure: 117 /57 mmHg Study Type:    TRANSESOPHAGEAL ECHO ([facility]) Diagnosis/ICD: Unspecified atrial fibrillation-I48.91  Indication:    AF CPT Codes:     [facility] Complete-[id number]  Study Detail: The following Echo studies were performed: 2D and Doppler.  PHYSICIAN INTERPRETATION: [facility] Details: The [facility] probe used was [id number]. [facility] Medication: The patient was sedated by Anesthesia; please refer to anesthesia flow sheet for medications used. Fentanyl and Propofol was used to sedate the patient for this exam. [facility] Procedure: The probe was passed with difficulty. Left Ventricle: Left ventricular systolic function is normal. There are no regional wall motion abnormalities. The left ventricular cavity size is normal. Spectral Doppler shows a normal pattern of left ventricular diastolic filling. Left Atrium: The left atrium is moderate to severely dilated. There is no definite left atrial thrombus present. There is a normal sized left atrial appendage. There is no definite left atrial mass present. Right Ventricle: The right ventricle is normal in size. There is normal right ventricular global systolic function. A device is visualized in the right ventricle. Right Atrium: The right atrium is normal in size. There is a device visualized in the right atrium. Aortic Valve: The aortic valve appears abnormal. There is no evidence of aortic valve regurgitation. Status post aortic valve replacement, severity of aortic stenosis could not be assessed. Mitral Valve: The mitral valve is abnormal. There is mild mitral valve regurgitation. Mild degree of mitral stenosis with calcified mitral valve. Tricuspid Valve: The tricuspid valve was not assessed. Tricuspid regurgitation was not assessed. Pulmonic Valve: The pulmonic valve was not assessed. There is no indication of pulmonic valve regurgitation. Pericardium: There is no pericardial effusion noted. Aorta: The aortic root is normal.  CONCLUSIONS:  1. Left ventricular systolic function is normal.  2. The left atrium is moderate to severely dilated.  3. Mild degree of mitral stenosis with calcified mitral valve.   4. Aortic valve appears abnormal.  5. Status post aortic valve replacement, severity of aortic stenosis could not be assessed.  6. No left atrial mass.  7. No left atrial thrombus. QUANTITATIVE DATA SUMMARY: MITRAL VALVE:                       Normal Ranges: MV Vmax:    2.36 m/s  (<=1.3m/s) MV peak P.3 mmHg (<5mmHg) MV mean P.0 mmHg  (<48mmHg)  [id number] Maru Mclain MD Electronically signed on 2024 at 2:31:14 PM  ** Final **     ECG 12 Lead    Result Date: 2/15/2024  EKG performed today shows atrial flutter with variable AV conduction at a rate of 75 bpm QRS duration 152 ms QT corrected 544 ms.  Rhythm strip shows the same pattern.       Encounter Date: 24   ECG 12 lead STAT   Result Value    Ventricular Rate 77    Atrial Rate 98    QRS Duration 152    QT Interval 446    QTC Calculation(Bazett) 504    P Axis 35    R Axis 158    T Axis -46    QRS Count 12    Q Onset 199    T Offset 422    QTC Fredericia 484    Narrative    Ventricular-paced rhythm with frequent AV dual-paced complexes  Abnormal ECG  When compared with ECG of 2024 15:01, (unconfirmed)  Electronic ventricular pacemaker has replaced Atrial fibrillation        Tele Monitoring: Sinus rhythm V paced    Assessment     Patient Active Problem List   Diagnosis    YANIRA (acute kidney injury) (CMS/HCC)    Anemia    Aortic stenosis    ASCVD (arteriosclerotic cardiovascular disease)    Asplenia    Atelectasis    AV dissociation    AVM (arteriovenous malformation)    Basal cell carcinoma, eyelid    Basal cell carcinoma, trunk    Borderline diabetes    BRBPR (bright red blood per rectum)    CAD S/P percutaneous coronary angioplasty    Colon polyp    Coronary artery disease involving autologous artery coronary bypass graft without angina pectoris    Coronary atherosclerosis    Diabetes mellitus type 2, diet-controlled (CMS/HCC)    Diverticulosis    Elevated troponin I level    ETOH abuse    Facial droop    Fever    GERD (gastroesophageal reflux  disease)    Hiatal hernia    Heart murmur    History of aortic valve replacement    History of stroke    Hodgkin disease (CMS/HCC)    HTN (hypertension), benign    Hyperglycemia    Internal hemorrhoid    Iron deficiency anemia    Left leg weakness    Leukocytosis    Hyperlipidemia    Mixed hyperlipidemia    Nonrheumatic tricuspid valve regurgitation    Pain, postoperative, acute    Paroxysmal atrial fibrillation (CMS/HCC)    Persistent atrial fibrillation (CMS/HCC)    Peripheral edema    Radiation-induced heart disease    Reactive thrombocytosis    Thrombocytosis    Restless leg    S/P TVR (tricuspid valve repair)    Subcutaneous emphysema (CMS/HCC)    Type 2 diabetes mellitus, without long-term current use of insulin (CMS/HCC)    Atrial flutter (CMS/HCC)    Sinus node dysfunction (CMS/HCC)    Junctional bradycardia   Atrial flutters status post RFA for prevention of atrial flutter with evidence of underlying AV node dysfunction with 2-1 AV block      Plan:  Post procedure potential complications, activity restrictions, medications, and follow-up reviewed with patient and family.  Device interrogation shows normal lead and device function  Chest x-ray shows normal device and lead placement, no pneumothorax  Okay to discharge home, outpatient follow-up in 1 week for incision check in in 3 months for follow-up with Dr. Dennis, chest x-ray, and device check.          Electronically signed by LEONARDO Bailey on 2/28/2024 at 1:20 PM

## 2024-02-28 NOTE — DISCHARGE INSTRUCTIONS
**Please HOLD Eliquis.  You may resume Eliquis on 3/2/2024 AM dose.**          Home going instructions after a Pacemaker/ Defibrillator Implant    After a procedure using sedation  You should return home and rest for the remainder of the day and evening.   It is recommended a responsible adult be with you for the first 24 hours after the procedure.  Do not make any legal decisions for 24 hours after your procedure.  Do not drink alcoholic beverages for 24 hours after your procedure.    Wound care  Leave the surgical dressing in place for 7 days post implant  The dressing will be removed at the 1 week follow up appointment.    Please keep your incision dry, the dressing is water resistant.    You may shower avoid water directly hitting the dressing.     Inspect your incisional site/ dressing each day  Apply ice to the site 3-4 times per day in 20 minute intervals for at least 2 days after surgery  It is normal for the area around the incision to be tender for a few weeks following surgery.     Pain relievers such as Tylenol or Motrin are usually sufficient for pain relief.    You may be prescribed Tramadol in addition.  Take as prescribed alternating with Tylenol or Motrin.    Activity  Avoid driving for 1 week.  If you have had passing out spells or previously restricted from driving, discuss driving restrictions with your doctor.  For the first 4 to 6 weeks after implant avoid excessive/repetitive arm movement on the side of your new implant.    Do not raise, push, pull, or stretch your arm above shoulder level.    Do not  items that weigh greater than 10 lbs with the device arm.    Wear your binder/sling for the first 48 hours then at night to remind you not to move the arm over your head and during the day as needed.    Report to your provider   Increased redness, swelling, drainage or gaping of your incisional site.  Increased pain at site unrelieved by pain medication.  Fever or chills prior to the 1  week appointment.  Bright red bleeding from the incisional site or complete saturation of the dressing.  Dizziness, lightheadedness, passing out, or defibrillator shocks.    Remote monitoring/ Device ID card  You have been instructed by the device company representative regarding remote home monitoring.   There are multiple types of home monitoring units, please follow the instructions given  If you have questions please contact the device clinic for further instruction  After implant you will receive a temporary card.    Permanent card will come in the mail in the next few weeks.  It is important that you carry your pacemaker ID card with you at all times.    Inform all doctors and healthcare providers that you have a pacemaker.      Electromagnetic Interference:    Microwave ovens are safe to use.    Please inform any physicians of your pacemaker implant prior to MRI for appropriate scheduling.    You should be prepared to show your pacemaker identification card to the security guards at metal detectors.    Hand wand detector should be kept away from the pacemaker.    Read the patient booklet for more information.      Follow up appointments  Incision (wound) check in 1 week.  Appointment for Chest xray, device check, and provider in 3 months.  These appointments will be scheduled and appear on your After Visit Summary.

## 2024-02-28 NOTE — DISCHARGE SUMMARY
Discharge Diagnosis  Atrial flutter (CMS/[facility])    Issues Requiring Follow-Up  Atrial flutter ablation  Pacemaker implant    Test Results Pending At Discharge  Pending Labs       Order Current Status    Staphylococcus aureus/MRSA colonization, Culture In process            Hospital Course   Patient underwent successful atrial flutter ablation per Dr. Dennis.  See full operative report per Dr. Dennis.  Post procedure patient was noted to be in 2-1 AV block with heart rate in the 90s.  Was recommended patient undergo dual-chamber permanent pacemaker implant.  This a.m. patient received dual-chamber Medtronic device.  At this time patient has normal lead and device function, chest x-ray shows normal lead and device placement.  Post procedure potential complications, activity restrictions, medications, and follow-up reviewed with patient and family.    Pertinent Physical Exam At Time of Discharge  Physical Exam  Constitutional:       Appearance: Normal appearance.   HENT:      Head: Normocephalic.   Eyes:      Conjunctiva/sclera: Conjunctivae normal.   Cardiovascular:      Rate and Rhythm: Normal rate and regular rhythm.      Pulses: Normal pulses.      Heart sounds: Normal heart sounds.      Comments: Left prepectoral pacemaker site dressing dry and intact without hematoma or bleeding at site  Pulmonary:      Effort: Pulmonary effort is normal.   Musculoskeletal:         General: Normal range of motion.   Skin:     General: Skin is warm and dry.   Neurological:      Mental Status: He is alert and oriented to person, place, and time.         Home Medications     Medication List      START taking these medications     acetaminophen 325 mg tablet; Commonly known as: Tylenol; Take 2 tablets   (650 mg) by mouth every 4 hours if needed for mild pain (1 - 3).   metoprolol tartrate 25 mg tablet; Commonly known as: Lopressor; Take 1   tablet (25 mg) by mouth 2 times a day.   traMADol 50 mg tablet; Commonly known as: Ultram; Take 1  tablet (50 mg)   by mouth every 8 hours if needed for moderate pain (4 - 6).     CONTINUE taking these medications     amoxicillin 500 mg tablet; Commonly known as: Amoxil   apixaban 5 mg tablet; Commonly known as: Eliquis; Take 1 tablet (5 mg)   by mouth 2 times a day.   aspirin 81 mg EC tablet   coenzyme Q10-vitamin E 100-5 mg-unit capsule   cyanocobalamin 1,000 mcg tablet; Commonly known as: Vitamin B-12   empagliflozin 25 mg; Commonly known as: Jardiance   EPINEPHrine 0.3 mg/0.3 mL injection syringe; Commonly known as: Epipen   ferrous gluconate 324 (38 Fe) mg tablet; Commonly known as: Fergon   folic acid 800 mcg tablet; Commonly known as: Folvite   furosemide 20 mg tablet; Commonly known as: Lasix   magnesium oxide 400 mg (241.3 mg magnesium) tablet; Commonly known as:   Mag-Ox   nitroglycerin 0.4 mg SL tablet; Commonly known as: Nitrostat   pantoprazole 20 mg EC tablet; Commonly known as: ProtoNix   potassium chloride CR 10 mEq ER tablet; Commonly known as: Klor-Con;   Take 2 tablets (20 mEq) by mouth 3 times a day. Do not crush, chew, or   split.  Take with food   pravastatin 10 mg tablet; Commonly known as: Pravachol       Outpatient Follow-Up  Future Appointments   Date Time Provider Department Center   3/6/2024  8:30 AM JESSICA [facility]OLOG[facility] RN 1 [id number] [place]   6/5/2024  8:00 AM [facility] 3 [id number] [facility]   6/5/2024  8:30 AM Chepe Dennis MD [id number] [place]       Ruthie King, APRN-CNP

## 2024-02-29 DIAGNOSIS — R63.4 ABNORMAL WEIGHT LOSS: ICD-10-CM

## 2024-02-29 DIAGNOSIS — E78.2 MIXED HYPERLIPIDEMIA: Chronic | ICD-10-CM

## 2024-02-29 RX ORDER — UBIDECARENONE 30 MG
CAPSULE ORAL DAILY
Qty: 30 CAPSULE | Refills: 5 | Status: SHIPPED | OUTPATIENT
Start: 2024-02-29

## 2024-02-29 RX ORDER — PRAVASTATIN SODIUM 40 MG
TABLET ORAL
Qty: 90 TABLET | Refills: 3 | Status: SHIPPED | OUTPATIENT
Start: 2024-02-29

## 2024-02-29 NOTE — TELEPHONE ENCOUNTER
Comments:     Last Office Visit (last PCP visit):   2/23/2024    Next Visit Date:  No future appointments.    **If hasn't been seen in over a year OR hasn't followed up according to last diabetes/ADHD visit, make appointment for patient before sending refill to provider.    Rx requested:  Requested Prescriptions     Pending Prescriptions Disp Refills    coenzyme Q-10 100 MG capsule [Pharmacy Med Name: COENZYME Q-10 100 MG SOFTGEL] 30 capsule 5     Sig: TAKE 1 CAPSULE BY MOUTH EVERY DAY    pravastatin (PRAVACHOL) 40 MG tablet [Pharmacy Med Name: PRAVASTATIN SODIUM 40 MG TAB] 90 tablet 3     Sig: TAKE 1 TABLET BY MOUTH EVERY DAY

## 2024-03-01 ENCOUNTER — HOSPITAL ENCOUNTER (OUTPATIENT)
Dept: ULTRASOUND IMAGING | Age: 64
End: 2024-03-01
Payer: COMMERCIAL

## 2024-03-01 ENCOUNTER — OFFICE VISIT (OUTPATIENT)
Dept: FAMILY MEDICINE CLINIC | Age: 64
End: 2024-03-01
Payer: COMMERCIAL

## 2024-03-01 VITALS
OXYGEN SATURATION: 95 % | BODY MASS INDEX: 24.11 KG/M2 | DIASTOLIC BLOOD PRESSURE: 72 MMHG | HEART RATE: 86 BPM | HEIGHT: 66 IN | WEIGHT: 150 LBS | SYSTOLIC BLOOD PRESSURE: 128 MMHG

## 2024-03-01 DIAGNOSIS — I49.9 IRREGULAR HEART RATE: ICD-10-CM

## 2024-03-01 DIAGNOSIS — R60.0 EDEMA OF LEFT UPPER ARM: ICD-10-CM

## 2024-03-01 DIAGNOSIS — R60.0 EDEMA OF LEFT UPPER ARM: Primary | ICD-10-CM

## 2024-03-01 LAB
BODY SURFACE AREA: 1.76 M2
STAPHYLOCOCCUS SPEC CULT: NORMAL

## 2024-03-01 PROCEDURE — 99213 OFFICE O/P EST LOW 20 MIN: CPT

## 2024-03-01 PROCEDURE — G8420 CALC BMI NORM PARAMETERS: HCPCS

## 2024-03-01 PROCEDURE — 93971 EXTREMITY STUDY: CPT

## 2024-03-01 PROCEDURE — G8427 DOCREV CUR MEDS BY ELIG CLIN: HCPCS

## 2024-03-01 PROCEDURE — 3074F SYST BP LT 130 MM HG: CPT

## 2024-03-01 PROCEDURE — 93000 ELECTROCARDIOGRAM COMPLETE: CPT

## 2024-03-01 PROCEDURE — 3017F COLORECTAL CA SCREEN DOC REV: CPT

## 2024-03-01 PROCEDURE — G8484 FLU IMMUNIZE NO ADMIN: HCPCS

## 2024-03-01 PROCEDURE — 3078F DIAST BP <80 MM HG: CPT

## 2024-03-01 PROCEDURE — 1036F TOBACCO NON-USER: CPT

## 2024-03-01 RX ORDER — APIXABAN 5 MG/1
5 TABLET, FILM COATED ORAL 2 TIMES DAILY
COMMUNITY
Start: 2024-02-15

## 2024-03-01 RX ORDER — TRAMADOL HYDROCHLORIDE 50 MG/1
50 TABLET ORAL EVERY 8 HOURS PRN
COMMUNITY
Start: 2024-02-28

## 2024-03-01 NOTE — PROGRESS NOTES
benign-result of AVR    History of basal cell cancer 2010    face    History of stroke 10/13/2014    History of tobacco abuse 08/17/2016    Hodgkin disease (HCC)     1979    Hodgkin disease (HCC)     1979     Hyperlipidemia 12/03/2014    Hypertension     Persistent atrial fibrillation (HCC) 01/13/2022    Pre-diabetes 04/08/2015    S/P AVR     bovine    Tachycardia, unspecified 01/04/2017     SURGICAL HISTORY     Patient  has a past surgical history that includes Ventricular ablation surgery; Splenectomy; hernia repair; Sigmoidoscopy; [place] tooth extraction; Tooth Extraction; angioplasty (11/24/2015); Skin cancer excision (10/21/2016); Cardiac catheterization (07/27/2018); Aortic valve replacement (2010); Percutaneous Transluminal Coronary Angio (2008/2010); and Cardiac valve replacement.  CURRENT MEDICATIONS       Previous Medications    ACETAMINOPHEN (TYLENOL) 325 MG TABLET    Take 2 tablets by mouth every 6 hours as needed    AMOXICILLIN (AMOXIL) 500 MG TABLET    TAKE 2,000 MG (4 TABLETS) BY MOUTH SEE ADMIN INSTRUCTIONS PRIOR TO DENTAL PROCEDURES.    ASPIRIN 81 MG TABLET    Take 1 tablet by mouth daily    COENZYME Q-10 100 MG CAPSULE    TAKE 1 CAPSULE BY MOUTH EVERY DAY    CYANOCOBALAMIN (B-12) 1000 MCG SUBL    Place 1 tablet under the tongue daily    ELIQUIS 5 MG TABS TABLET    Take 1 tablet by mouth 2 times daily    EMPAGLIFLOZIN (JARDIANCE) 25 MG TABLET    Take 1 tablet by mouth daily    EPINEPHRINE (EPIPEN) 0.3 MG/0.3ML SOAJ INJECTION    Inject 0.3 mLs into the muscle once as needed (insect sting)    FERROUS SULFATE 220 (44 FE) MG/5ML SOLUTION    Take 5 mLs by mouth 2 times daily    FOLIC ACID (FOLVITE) 800 MCG TABLET        FUROSEMIDE (LASIX) 40 MG TABLET    Take 1.5 tablets by mouth 2 times daily    MAGNESIUM HYDROXIDE (DULCOLAX PO)    Take by mouth    MAGNESIUM OXIDE (MAG-OX) 400 MG TABLET    Take 1 tablet by mouth daily    METFORMIN (GLUCOPHAGE) 500 MG TABLET    Take 1 tablet by mouth    METOPROLOL

## 2024-03-04 ASSESSMENT — ENCOUNTER SYMPTOMS
EYES NEGATIVE: 1
ALLERGIC/IMMUNOLOGIC NEGATIVE: 1
COUGH: 0
GASTROINTESTINAL NEGATIVE: 1

## 2024-03-05 ENCOUNTER — PATIENT MESSAGE (OUTPATIENT)
Dept: FAMILY MEDICINE CLINIC | Age: 64
End: 2024-03-05

## 2024-03-05 RX ORDER — POTASSIUM CHLORIDE 750 MG/1
20 CAPSULE, EXTENDED RELEASE ORAL 3 TIMES DAILY
Qty: 180 CAPSULE | Refills: 2 | Status: SHIPPED | OUTPATIENT
Start: 2024-03-05 | End: 2024-06-03

## 2024-03-05 RX ORDER — FUROSEMIDE 40 MG/1
60 TABLET ORAL 2 TIMES DAILY
Qty: 90 TABLET | Refills: 2 | Status: SHIPPED | OUTPATIENT
Start: 2024-03-05 | End: 2024-06-03

## 2024-03-05 NOTE — TELEPHONE ENCOUNTER
Comments:     Last Office Visit (last PCP visit):   3/1/2024    Next Visit Date:  No future appointments.    **If hasn't been seen in over a year OR hasn't followed up according to last diabetes/ADHD visit, make appointment for patient before sending refill to provider.    Rx requested:  Requested Prescriptions     Pending Prescriptions Disp Refills    furosemide (LASIX) 40 MG tablet 60 tablet      Sig: Take 1.5 tablets by mouth 2 times daily

## 2024-03-05 NOTE — TELEPHONE ENCOUNTER
From: Conner Cheung  To: Michael Maxwell  Sent: 3/5/2024 11:02 AM EST  Subject: Lasix    Hi Fernando;  Hope all is going well.  I'm doing fine, and am writing to ask you to write me a Rx for Lasix, 20mg, three tablets, twice a day (120mg/qd). That's how I've been taking it, and the last Rx came  from Dr. Das ([facility]).  Thanks,   And have a great day!   Conner

## 2024-03-06 ENCOUNTER — CLINICAL SUPPORT (OUTPATIENT)
Dept: CARDIOLOGY | Facility: CLINIC | Age: 64
End: 2024-03-06
Payer: COMMERCIAL

## 2024-03-06 DIAGNOSIS — Z95.0 PACEMAKER: ICD-10-CM

## 2024-03-06 DIAGNOSIS — I49.5 SINUS NODE DYSFUNCTION (MULTI): ICD-10-CM

## 2024-03-06 DIAGNOSIS — R00.1 JUNCTIONAL BRADYCARDIA: ICD-10-CM

## 2024-03-06 NOTE — PROGRESS NOTES
Pt. here for wound check of pacemaker implant by Dr. Dennis on 2/28/24 at [facility]. L clavicular area is well approximated with no erythema or drainage. Pt. denies any fever or chills. Temp 98.6

## 2024-03-11 ENCOUNTER — OFFICE VISIT (OUTPATIENT)
Dept: FAMILY MEDICINE CLINIC | Age: 64
End: 2024-03-11
Payer: COMMERCIAL

## 2024-03-11 VITALS
HEIGHT: 66 IN | HEART RATE: 82 BPM | SYSTOLIC BLOOD PRESSURE: 120 MMHG | OXYGEN SATURATION: 98 % | WEIGHT: 142 LBS | BODY MASS INDEX: 22.82 KG/M2 | DIASTOLIC BLOOD PRESSURE: 68 MMHG

## 2024-03-11 DIAGNOSIS — I48.19 PERSISTENT ATRIAL FIBRILLATION (HCC): Primary | ICD-10-CM

## 2024-03-11 DIAGNOSIS — R60.9 CHRONIC EDEMA: ICD-10-CM

## 2024-03-11 PROCEDURE — 99213 OFFICE O/P EST LOW 20 MIN: CPT

## 2024-03-11 PROCEDURE — 1036F TOBACCO NON-USER: CPT

## 2024-03-11 PROCEDURE — G8420 CALC BMI NORM PARAMETERS: HCPCS

## 2024-03-11 PROCEDURE — 3017F COLORECTAL CA SCREEN DOC REV: CPT

## 2024-03-11 PROCEDURE — 3078F DIAST BP <80 MM HG: CPT

## 2024-03-11 PROCEDURE — G8427 DOCREV CUR MEDS BY ELIG CLIN: HCPCS

## 2024-03-11 PROCEDURE — 3074F SYST BP LT 130 MM HG: CPT

## 2024-03-11 PROCEDURE — G8484 FLU IMMUNIZE NO ADMIN: HCPCS

## 2024-03-11 ASSESSMENT — ENCOUNTER SYMPTOMS
GASTROINTESTINAL NEGATIVE: 1
SHORTNESS OF BREATH: 0
RESPIRATORY NEGATIVE: 1
ALLERGIC/IMMUNOLOGIC NEGATIVE: 1
COUGH: 0
EYES NEGATIVE: 1
COLOR CHANGE: 0

## 2024-03-11 NOTE — PATIENT INSTRUCTIONS
Reduce lasix to 40mg twice daily and will adjust potassium after blood work is resulted   
08-Jun-2020 11:27

## 2024-03-11 NOTE — PROGRESS NOTES
[facility] PRIMARY CARE          ASSESSMENT/PLAN     Conner Cheung is a 63 y.o. male who presents with:  Chief Complaint   Patient presents with    Discuss Medications     Pt would like to discuss coming off the lasix and potassium. He is feeling a lot better and has more energy. No SOB.      Reports feeling generally well no chest pain or shortness of breath no lower extremity edema.  Questioning if he can discontinue Lasix.      1. Persistent atrial fibrillation (HCC)  On examination heart sounds are normal and regular.  Left upper extremity edema significantly improved there is mild edema to the dorsal left hand.  No lower extremity edema.  2. Chronic edema  Symptoms well-controlled at this time.  Heart sounds are normal and regular lung sounds clear throughout.  Advised patient after labs are reviewed we can trial reducing dose of Lasix to 40 mg twice daily, monitor weight and lower legs for signs of fluid retention.  Please notify the office immediately if he notes any increase in weight that occurs rapidly or lower extremity edema      PATIENT EDUCATION:    Monitor your weight closely rapid increase in weight or lower extremity edema notify the office immediately.    Have lab work previously ordered performed as soon as possible to evaluate potassium levels.    Reduce lasix to 40mg twice daily and will adjust potassium after blood work is resulted          PATIENT REFERRED TO:    Return if symptoms worsen or fail to improve.    DISCHARGE MEDICATIONS:  New Prescriptions    No medications on file     Cannot display discharge medications since this is not an admission.       Michael Maxwell APRN - CNP      SUBJECTIVE/REVIEW OF SYSTEMS     Review of Systems   Constitutional: Negative.  Negative for fatigue and fever.   HENT: Negative.  Negative for congestion.    Eyes: Negative.    Respiratory: Negative.  Negative for cough and shortness of breath.    Cardiovascular: Negative.  Negative for chest 
(JARDIANCE) 25 MG tablet Take 1 tablet by mouth daily 90 tablet 1    EPINEPHrine (EPIPEN) 0.3 MG/0.3ML SOAJ injection Inject 0.3 mLs into the muscle once as needed (insect sting) 0.3 mL 0    magnesium oxide (MAG-OX) 400 MG tablet Take 1 tablet by mouth daily 90 tablet 2    ferrous sulfate 220 (44 Fe) MG/5ML solution Take 5 mLs by mouth 2 times daily 300 mL 2    acetaminophen (TYLENOL) 325 MG tablet Take 2 tablets by mouth every 6 hours as needed      Magnesium Hydroxide (DULCOLAX PO) Take by mouth      folic acid (FOLVITE) 800 MCG tablet       nitroGLYCERIN (NITROSTAT) 0.4 MG SL tablet Place 1 tablet under the tongue every 5 minutes as needed for Chest pain (x 3 doses) 25 tablet 3    Cyanocobalamin (B-12) 1000 MCG SUBL Place 1 tablet under the tongue daily 90 tablet 3    pantoprazole (PROTONIX) 20 MG tablet TAKE 1 TABLET BY MOUTH EVERY DAY 90 tablet 3    Misc Natural Products (DAILY HERBS BLOOD SUGAR BALANC PO) Take by mouth Pata de michele herb -rainforest herb      NONFORMULARY perda humme caa-rainforrest herb      aspirin 81 MG tablet Take 1 tablet by mouth daily       No current facility-administered medications on file prior to visit.       Vitals:  /68   Pulse 82   Ht 1.676 m (5' 6\")   Wt 64.4 kg (142 lb)   SpO2 98%   BMI 22.92 kg/m²     Hemoglobin A1C   Date Value Ref Range Status   01/23/2024 7.3 (H) 4.8 - 5.9 % Final        The ASCVD Risk score (Gwendolyn DK, et al., 2019) failed to calculate for the following reasons:    The patient has a prior MI or stroke diagnosis     Physical Exam             {Time Documentation Optional:[id number]}     An electronic signature was used to authenticate this note.     RADHA Phan - CNP

## 2024-03-13 ENCOUNTER — HOSPITAL ENCOUNTER (OUTPATIENT)
Dept: LAB | Age: 64
Discharge: HOME | End: 2024-03-13
Payer: COMMERCIAL

## 2024-03-13 DIAGNOSIS — R60.0 LOCALIZED EDEMA: ICD-10-CM

## 2024-03-13 DIAGNOSIS — E87.6 HYPOKALEMIA: ICD-10-CM

## 2024-03-13 DIAGNOSIS — I10 PRIMARY HYPERTENSION: Chronic | ICD-10-CM

## 2024-03-13 LAB
ALBUMIN SERPL-MCNC: 3 G/DL (ref 3.5–4.6)
ALP SERPL-CCNC: 186 U/L (ref 35–104)
ALT SERPL-CCNC: 10 U/L (ref 0–41)
ANION GAP SERPL CALCULATED.3IONS-SCNC: 12 MEQ/L (ref 9–15)
AST SERPL-CCNC: 28 U/L (ref 0–40)
BILIRUB SERPL-MCNC: 0.3 MG/DL (ref 0.2–0.7)
BUN SERPL-MCNC: 20 MG/DL (ref 8–23)
CALCIUM SERPL-MCNC: 8.9 MG/DL (ref 8.5–9.9)
CHLORIDE SERPL-SCNC: 99 MEQ/L (ref 95–107)
CO2 SERPL-SCNC: 26 MEQ/L (ref 20–31)
CREAT SERPL-MCNC: 0.71 MG/DL (ref 0.7–1.2)
GLOBULIN SER CALC-MCNC: 3 G/DL (ref 2.3–3.5)
GLUCOSE SERPL-MCNC: 134 MG/DL (ref 70–99)
POTASSIUM SERPL-SCNC: 4.7 MEQ/L (ref 3.4–4.9)
PROT SERPL-MCNC: 6 G/DL (ref 6.3–8)
SODIUM SERPL-SCNC: 137 MEQ/L (ref 135–144)

## 2024-03-13 PROCEDURE — 80053 COMPREHEN METABOLIC PANEL: CPT

## 2024-03-13 PROCEDURE — 36415 COLL VENOUS BLD VENIPUNCTURE: CPT

## 2024-03-21 ENCOUNTER — TELEPHONE (OUTPATIENT)
Dept: CARDIOLOGY | Facility: CLINIC | Age: 64
End: 2024-03-21
Payer: COMMERCIAL

## 2024-03-21 NOTE — TELEPHONE ENCOUNTER
Pt called office stating that he had ppm implanted last month and has c/o of feeling very fatigued.  Pt states that he thinks that he is dehydrated from the amount of lasix he was on.  Per pt he is also tachycardiac.  Per pt he is currently in [place] and his physician down there decreased his lasix to 40 mg bid.  Per pt his weight is back down to his normal weight of 132 lbs.      Advised pt to do device check today.  Pt verbalized an understanding.      Routed to Thuy MEEHAN RN

## 2024-03-22 NOTE — TELEPHONE ENCOUNTER
Returned call to patient. Patient was unable to send a remote transmission yesterday even with the help of [employer]tr[employer]. Patient is going to see his PCP in [place] today and will have labs done. He is also going to see if he can go to a device clinic in [place].  [employer] is sending him a box to [place] as well. I still requested that patient attempts to send a remote transmission to us one more time today since our physicians are familiar with him. Patient is agreeable.

## 2024-03-27 ENCOUNTER — TELEPHONE (OUTPATIENT)
Dept: CARDIOLOGY CLINIC | Age: 64
End: 2024-03-27

## 2024-03-27 RX ORDER — METOPROLOL TARTRATE 50 MG/1
50 TABLET, FILM COATED ORAL 2 TIMES DAILY
Qty: 60 TABLET | Refills: 5 | Status: SHIPPED | OUTPATIENT
Start: 2024-03-27

## 2024-03-27 NOTE — TELEPHONE ENCOUNTER
----- Message from Conner Cheung sent at 3/27/2024 10:26 AM EDT -----  Regarding: Heart rate  Contact: [phone]  Tomy Simons;  I hope life is treating you well.  I am now in [place], but will be seeing you in a couple weeks.  I saw my doctor (Dr. Janey Stallworth) on Monday and she should have sent you copies of the EKG she took and my recent labs.  I am writing because, over the past week, I have been having some periods of tachycardia (120 bpm), and my pulse is generally in the 80s or 90. I don't have any pain, dizziness, shortness of breath or sweating with the tachycardia.  I did some breathing exercises and they brought it down to 86 bpm.  This morning, after taking my metoprolol two hours ago, and my bp is 114 over 78 and my pulse is 100 bpm.  I am wondering if there is anything I should do before I see you on April 11th, (such as increase my metoprolo)?  Thanks for your attention to this.  Conner Cheung [phone]  Wondering what I should do,

## 2024-03-27 NOTE — TELEPHONE ENCOUNTER
Called patient to notify him that he can increase his metoprolol to 50mg PO BID per Dr. Simons. New prescription sent to [facility] in [place] per patient request. Medication list updated.

## 2024-03-28 DIAGNOSIS — I48.19 PERSISTENT ATRIAL FIBRILLATION (HCC): Primary | ICD-10-CM

## 2024-03-29 DIAGNOSIS — I25.10 ATHEROSCLEROTIC HEART DISEASE OF NATIVE CORONARY ARTERY WITHOUT ANGINA PECTORIS: ICD-10-CM

## 2024-03-29 DIAGNOSIS — K21.9 GASTROESOPHAGEAL REFLUX DISEASE WITHOUT ESOPHAGITIS: ICD-10-CM

## 2024-03-29 DIAGNOSIS — E53.8 DEFICIENCY OF OTHER SPECIFIED B GROUP VITAMINS: ICD-10-CM

## 2024-03-29 RX ORDER — PANTOPRAZOLE SODIUM 20 MG/1
TABLET, DELAYED RELEASE ORAL
Qty: 90 TABLET | Refills: 3 | Status: SHIPPED | OUTPATIENT
Start: 2024-03-29

## 2024-03-29 RX ORDER — MAGNESIUM 200 MG
1 TABLET ORAL DAILY
Qty: 90 TABLET | Refills: 3 | Status: SHIPPED | OUTPATIENT
Start: 2024-03-29

## 2024-03-29 RX ORDER — NITROGLYCERIN 0.4 MG/1
0.4 TABLET SUBLINGUAL EVERY 5 MIN PRN
Qty: 25 TABLET | Refills: 3 | Status: SHIPPED | OUTPATIENT
Start: 2024-03-29

## 2024-03-29 NOTE — TELEPHONE ENCOUNTER
Pharmacy is requesting medication refill. Please approve or deny this request.    Rx requested:  Requested Prescriptions     Pending Prescriptions Disp Refills    Cyanocobalamin (B-12) 1000 MCG SUBL [Pharmacy Med Name: VITAMIN B12 1,000 MCG SUBL TAB] 90 tablet 3     Sig: PLACE 1 TABLET UNDER THE TONGUE DAILY.    pantoprazole (PROTONIX) 20 MG tablet [Pharmacy Med Name: PANTOPRAZOLE SOD DR 20 MG TAB] 90 tablet 3     Sig: TAKE 1 TABLET BY MOUTH EVERY DAY         Last Office Visit:   3/11/2024      Next Visit Date:  Future Appointments   Date Time Provider Department Center   4/4/2024  9:00 AM Michael Maxwell, APRN - CNP [facility]   4/11/2024 12:45 PM Holiday, DO [facility]

## 2024-03-29 NOTE — TELEPHONE ENCOUNTER
Comments:     Last Office Visit (last PCP visit):   3/11/2024    Next Visit Date:  Future Appointments   Date Time Provider Department Center   4/4/2024  9:00 AM Michael Maxwell, APRN - CNP [facility]   4/11/2024 12:45 PM Holiday, DO [facility]       **If hasn't been seen in over a year OR hasn't followed up according to last diabetes/ADHD visit, make appointment for patient before sending refill to provider.    Rx requested:  Requested Prescriptions     Pending Prescriptions Disp Refills    nitroGLYCERIN (NITROSTAT) 0.4 MG SL tablet [Pharmacy Med Name: NITROGLYCERIN 0.4 MG TABLET SL] 25 tablet 3     Sig: PLACE 1 TABLET UNDER THE TONGUE EVERY 5 MINUTES AS NEEDED FOR CHEST PAIN (X 3 DOSES)

## 2024-04-01 ENCOUNTER — HOSPITAL ENCOUNTER (OUTPATIENT)
Dept: LAB | Age: 64
Discharge: HOME | End: 2024-04-01
Payer: COMMERCIAL

## 2024-04-01 DIAGNOSIS — I48.19 PERSISTENT ATRIAL FIBRILLATION (HCC): ICD-10-CM

## 2024-04-01 LAB
ANION GAP SERPL CALCULATED.3IONS-SCNC: 8 MEQ/L (ref 9–15)
BNP BLD-MCNC: 1145 PG/ML
BUN SERPL-MCNC: 24 MG/DL (ref 8–23)
CALCIUM SERPL-MCNC: 9.5 MG/DL (ref 8.5–9.9)
CHLORIDE SERPL-SCNC: 97 MEQ/L (ref 95–107)
CO2 SERPL-SCNC: 28 MEQ/L (ref 20–31)
CREAT SERPL-MCNC: 0.78 MG/DL (ref 0.7–1.2)
GLUCOSE SERPL-MCNC: 180 MG/DL (ref 70–99)
MAGNESIUM SERPL-MCNC: 2.4 MG/DL (ref 1.7–2.4)
POTASSIUM SERPL-SCNC: 4.4 MEQ/L (ref 3.4–4.9)
SODIUM SERPL-SCNC: 133 MEQ/L (ref 135–144)

## 2024-04-01 PROCEDURE — 80048 BASIC METABOLIC PNL TOTAL CA: CPT

## 2024-04-01 PROCEDURE — 83735 ASSAY OF MAGNESIUM: CPT

## 2024-04-01 PROCEDURE — 83880 ASSAY OF NATRIURETIC PEPTIDE: CPT

## 2024-04-01 PROCEDURE — 36415 COLL VENOUS BLD VENIPUNCTURE: CPT

## 2024-04-04 ENCOUNTER — OFFICE VISIT (OUTPATIENT)
Dept: FAMILY MEDICINE CLINIC | Age: 64
End: 2024-04-04
Payer: COMMERCIAL

## 2024-04-04 VITALS
DIASTOLIC BLOOD PRESSURE: 78 MMHG | WEIGHT: 138 LBS | BODY MASS INDEX: 22.27 KG/M2 | HEART RATE: 96 BPM | SYSTOLIC BLOOD PRESSURE: 122 MMHG | OXYGEN SATURATION: 94 %

## 2024-04-04 DIAGNOSIS — R74.8 ELEVATED LIVER ENZYMES: ICD-10-CM

## 2024-04-04 DIAGNOSIS — E11.9 DIABETES MELLITUS TYPE 2, DIET-CONTROLLED (HCC): ICD-10-CM

## 2024-04-04 DIAGNOSIS — I10 PRIMARY HYPERTENSION: Chronic | ICD-10-CM

## 2024-04-04 DIAGNOSIS — I25.810 CORONARY ARTERY DISEASE INVOLVING AUTOLOGOUS ARTERY CORONARY BYPASS GRAFT WITHOUT ANGINA PECTORIS: Primary | ICD-10-CM

## 2024-04-04 PROCEDURE — 2022F DILAT RTA XM EVC RTNOPTHY: CPT

## 2024-04-04 PROCEDURE — 1036F TOBACCO NON-USER: CPT

## 2024-04-04 PROCEDURE — 3017F COLORECTAL CA SCREEN DOC REV: CPT

## 2024-04-04 PROCEDURE — 99213 OFFICE O/P EST LOW 20 MIN: CPT

## 2024-04-04 PROCEDURE — 3078F DIAST BP <80 MM HG: CPT

## 2024-04-04 PROCEDURE — G8427 DOCREV CUR MEDS BY ELIG CLIN: HCPCS

## 2024-04-04 PROCEDURE — G8420 CALC BMI NORM PARAMETERS: HCPCS

## 2024-04-04 PROCEDURE — 3074F SYST BP LT 130 MM HG: CPT

## 2024-04-04 PROCEDURE — 3051F HG A1C>EQUAL 7.0%<8.0%: CPT

## 2024-04-04 SDOH — ECONOMIC STABILITY: FOOD INSECURITY: WITHIN THE PAST 12 MONTHS, THE FOOD YOU BOUGHT JUST DIDN'T LAST AND YOU DIDN'T HAVE MONEY TO GET MORE.: NEVER TRUE

## 2024-04-04 SDOH — ECONOMIC STABILITY: FOOD INSECURITY: WITHIN THE PAST 12 MONTHS, YOU WORRIED THAT YOUR FOOD WOULD RUN OUT BEFORE YOU GOT MONEY TO BUY MORE.: NEVER TRUE

## 2024-04-04 SDOH — ECONOMIC STABILITY: INCOME INSECURITY: HOW HARD IS IT FOR YOU TO PAY FOR THE VERY BASICS LIKE FOOD, HOUSING, MEDICAL CARE, AND HEATING?: NOT VERY HARD

## 2024-04-04 ASSESSMENT — ENCOUNTER SYMPTOMS
COUGH: 0
GASTROINTESTINAL NEGATIVE: 1
ALLERGIC/IMMUNOLOGIC NEGATIVE: 1
EYES NEGATIVE: 1

## 2024-04-04 NOTE — PROGRESS NOTES
Conner Cheung (: 1960) is a 63 y.o. male, Established patient, who presents today for:    Chief Complaint   Patient presents with    Follow-up     Pt says he is feeling better than he has in a long time.        Follow-up of the following chronic conditions.coronary artery disease.  Recent pacemaker implant following cardiac ablation for atrial fibrillation.  reports  feeling increased energy and tolerating light exercise such as light weight lifting and walking after increase of metoprolol by cardiology.  He is still having elevation in heart rate in the 90s.    ASSESSMENT/PLAN      1. Coronary artery disease involving autologous artery coronary bypass graft without angina pectoris  Reports heart rate elevation occasionally over 100 metoprolol was increased to 50 mg twice daily by cardiology which has improved this.  He remains in the high 80s to 90s.  Reports increase in energy level and activity tolerance since increase in metoprolol.  Currently taking Lasix 40 mg twice daily potassium 20 mg twice daily.  There is no lower extremity edema his weight has remained stable.  Last BNP on  was 1145.  He has follow-up with cardiology on .  Advised him to continue on current medications.  2. Diabetes mellitus type 2, diet-controlled (HCC)  Fasting glucose elevated 180 on last check.  Patient reports he was drinking a lot of milk to help increase weight at that time and he has discontinued this practice he is monitoring his carbohydrate intake and returning to his normal activity level after cardiac surgeries.  A1c previously 7.3 in January.  We will recheck labs and A1c in 8 weeks consider restarting diabetic medication at that time if levels have not improved.  -     Hemoglobin A1C; Future  3. Primary hypertension  -     CBC with Auto Differential; Future  -     Basic Metabolic Panel; Future  4. Elevated liver enzymes  Patient continues to decline testing if additional lab work or imaging needed

## 2024-04-09 NOTE — BRIEF OP NOTE
Section of Cardiology  Adult Brief Cardiac Cath Procedure Note        Procedure(s):  LHC, b/l coronary angio    Pre-operative Diagnosis:  abn stress test, SOB    H&P Status: Completed and reviewed. Post-operative Diagnosis:      LV EF of 55%, normal LVEDP  LM distal 30%  LAD proximal hazy 50%, patent stents in prox to mid with mild to mod ISR- IFR =0.86  CX moderate caliber, 90% ostial stenosis and 70% mid  RCA large caliber, patent stent in prox to mid. 80% post stents in mid and 80-90% distal RCA    Findings:  See full report    Complications:  none    Primary Proceduralist:   Dr.Wes Simons DO    Plan    Refer for CABG evaluation given + IFR of LM/LAD =0.86 along with severe CX and RCA stenosis.      Max med rx   RFM  Full procedure note to follow
no

## 2024-04-10 ENCOUNTER — HOSPITAL ENCOUNTER (OUTPATIENT)
Dept: CARDIOLOGY | Facility: HOSPITAL | Age: 64
Discharge: HOME | End: 2024-04-10
Payer: COMMERCIAL

## 2024-04-10 DIAGNOSIS — Z95.0 PACEMAKER: Primary | ICD-10-CM

## 2024-04-10 DIAGNOSIS — I49.5 SICK SINUS SYNDROME (MULTI): ICD-10-CM

## 2024-04-10 DIAGNOSIS — Z95.0 PACEMAKER: ICD-10-CM

## 2024-04-11 ENCOUNTER — OFFICE VISIT (OUTPATIENT)
Dept: CARDIOLOGY CLINIC | Age: 64
End: 2024-04-11
Payer: COMMERCIAL

## 2024-04-11 VITALS
WEIGHT: 140 LBS | SYSTOLIC BLOOD PRESSURE: 120 MMHG | BODY MASS INDEX: 22.6 KG/M2 | DIASTOLIC BLOOD PRESSURE: 60 MMHG | HEART RATE: 83 BPM

## 2024-04-11 DIAGNOSIS — Z98.61 CAD S/P PERCUTANEOUS CORONARY ANGIOPLASTY: ICD-10-CM

## 2024-04-11 DIAGNOSIS — Z95.1 STATUS POST CORONARY ARTERY BYPASS GRAFT: ICD-10-CM

## 2024-04-11 DIAGNOSIS — R94.31 ABNORMAL EKG: ICD-10-CM

## 2024-04-11 DIAGNOSIS — E78.2 MIXED HYPERLIPIDEMIA: ICD-10-CM

## 2024-04-11 DIAGNOSIS — Z86.79 HX OF ATRIAL FLUTTER: ICD-10-CM

## 2024-04-11 DIAGNOSIS — I25.10 CAD S/P PERCUTANEOUS CORONARY ANGIOPLASTY: ICD-10-CM

## 2024-04-11 DIAGNOSIS — R00.2 PALPITATIONS: ICD-10-CM

## 2024-04-11 DIAGNOSIS — I10 PRIMARY HYPERTENSION: ICD-10-CM

## 2024-04-11 DIAGNOSIS — I48.19 PERSISTENT ATRIAL FIBRILLATION (HCC): Primary | ICD-10-CM

## 2024-04-11 PROCEDURE — G8427 DOCREV CUR MEDS BY ELIG CLIN: HCPCS | Performed by: INTERNAL MEDICINE

## 2024-04-11 PROCEDURE — G8420 CALC BMI NORM PARAMETERS: HCPCS | Performed by: INTERNAL MEDICINE

## 2024-04-11 PROCEDURE — 1036F TOBACCO NON-USER: CPT | Performed by: INTERNAL MEDICINE

## 2024-04-11 PROCEDURE — 3078F DIAST BP <80 MM HG: CPT | Performed by: INTERNAL MEDICINE

## 2024-04-11 PROCEDURE — 3074F SYST BP LT 130 MM HG: CPT | Performed by: INTERNAL MEDICINE

## 2024-04-11 PROCEDURE — 93000 ELECTROCARDIOGRAM COMPLETE: CPT | Performed by: INTERNAL MEDICINE

## 2024-04-11 PROCEDURE — 3017F COLORECTAL CA SCREEN DOC REV: CPT | Performed by: INTERNAL MEDICINE

## 2024-04-11 PROCEDURE — 99214 OFFICE O/P EST MOD 30 MIN: CPT | Performed by: INTERNAL MEDICINE

## 2024-04-11 NOTE — PROGRESS NOTES
facility-administered medications for this visit.       Insect extract, Iodinated contrast media, Lipitor [atorvastatin], and Seasonal    Review of Systems:  General ROS: negative  Psychological ROS: negative  Hematological and Lymphatic ROS: No history of blood clots or bleeding disorder.   Respiratory ROS: no cough, shortness of breath, or wheezing  Cardiovascular ROS: no chest pain or dyspnea on exertion  Gastrointestinal ROS: no abdominal pain, change in bowel habits, or black or bloody stools  Genito-Urinary ROS: no dysuria, trouble voiding, or hematuria  Musculoskeletal ROS: negative  Neurological ROS: no TIA or stroke symptoms  Dermatological ROS: negative    VITALS:  Blood pressure 120/60, pulse 83, weight 63.5 kg (140 lb).  Body mass index is 22.6 kg/m².    Physical Examination:  General appearance - alert, well appearing, and in no distress and normal appearing weight  Mental status - alert, oriented to person, place, and time  Neck - Neck is supple, no JVD or carotid bruits.  No thyromegaly or adenopathy.   Chest - clear to auscultation, no wheezes, rales or rhonchi, symmetric air entry  Heart - normal rate, regular rhythm, normal S1, S2, no murmurs, rubs, clicks or gallops  Abdomen - soft, nontender, nondistended, no masses or organomegaly  Neurological - alert, oriented, normal speech, no focal findings or movement disorder noted  Extremities - peripheral pulses normal, no pedal edema, no clubbing or cyanosis  Skin - normal coloration and turgor, no rashes, no suspicious skin lesions noted      EKG: aflutter    Orders Placed This Encounter   Procedures    EKG 12 Lead       ASSESSMENT:     Diagnosis Orders   1. Persistent atrial fibrillation (HCC)  EKG 12 Lead      2. Status post coronary artery bypass graft        3. Hx of atrial flutter        4. Primary hypertension        5. CAD S/P percutaneous coronary angioplasty        6. Abnormal EKG        7. Palpitations        8. Mixed hyperlipidemia

## 2024-04-18 ENCOUNTER — OFFICE VISIT (OUTPATIENT)
Dept: FAMILY MEDICINE CLINIC | Age: 64
End: 2024-04-18
Payer: COMMERCIAL

## 2024-04-18 ENCOUNTER — HOSPITAL ENCOUNTER (OUTPATIENT)
Dept: LAB | Age: 64
Discharge: HOME | End: 2024-04-18
Payer: COMMERCIAL

## 2024-04-18 VITALS
HEART RATE: 89 BPM | WEIGHT: 138.89 LBS | BODY MASS INDEX: 22.42 KG/M2 | SYSTOLIC BLOOD PRESSURE: 92 MMHG | OXYGEN SATURATION: 96 % | DIASTOLIC BLOOD PRESSURE: 60 MMHG

## 2024-04-18 DIAGNOSIS — R60.9 EDEMA, UNSPECIFIED TYPE: Primary | ICD-10-CM

## 2024-04-18 DIAGNOSIS — R60.0 LOCALIZED EDEMA: Primary | ICD-10-CM

## 2024-04-18 DIAGNOSIS — R60.0 LOCALIZED EDEMA: ICD-10-CM

## 2024-04-18 LAB
ANION GAP SERPL CALCULATED.3IONS-SCNC: 12 MEQ/L (ref 9–15)
BASOPHILS # BLD: 0.1 K/UL (ref 0–0.2)
BASOPHILS NFR BLD: 0.8 %
BNP BLD-MCNC: 1294 PG/ML
BUN SERPL-MCNC: 28 MG/DL (ref 8–23)
CALCIUM SERPL-MCNC: 9.1 MG/DL (ref 8.5–9.9)
CHLORIDE SERPL-SCNC: 96 MEQ/L (ref 95–107)
CO2 SERPL-SCNC: 28 MEQ/L (ref 20–31)
CREAT SERPL-MCNC: 0.67 MG/DL (ref 0.7–1.2)
EOSINOPHIL # BLD: 0.2 K/UL (ref 0–0.7)
EOSINOPHIL NFR BLD: 1.7 %
ERYTHROCYTE [DISTWIDTH] IN BLOOD BY AUTOMATED COUNT: 15.9 % (ref 11.5–14.5)
GLUCOSE SERPL-MCNC: 171 MG/DL (ref 70–99)
HCT VFR BLD AUTO: 46.2 % (ref 42–52)
HGB BLD-MCNC: 15.3 G/DL (ref 14–18)
LYMPHOCYTES # BLD: 0.5 K/UL (ref 1–4.8)
LYMPHOCYTES NFR BLD: 4 %
MCH RBC QN AUTO: 30.4 PG (ref 27–31.3)
MCHC RBC AUTO-ENTMCNC: 33.1 % (ref 33–37)
MCV RBC AUTO: 91.7 FL (ref 79–92.2)
MONOCYTES # BLD: 1.4 K/UL (ref 0.2–0.8)
MONOCYTES NFR BLD: 12.5 %
NEUTROPHILS # BLD: 9.2 K/UL (ref 1.4–6.5)
NEUTS SEG NFR BLD: 80.4 %
PLATELET # BLD AUTO: 606 K/UL (ref 130–400)
POTASSIUM SERPL-SCNC: 3.8 MEQ/L (ref 3.4–4.9)
RBC # BLD AUTO: 5.04 M/UL (ref 4.7–6.1)
SODIUM SERPL-SCNC: 136 MEQ/L (ref 135–144)
WBC # BLD AUTO: 11.4 K/UL (ref 4.8–10.8)

## 2024-04-18 PROCEDURE — 83880 ASSAY OF NATRIURETIC PEPTIDE: CPT

## 2024-04-18 PROCEDURE — 36415 COLL VENOUS BLD VENIPUNCTURE: CPT

## 2024-04-18 PROCEDURE — 3074F SYST BP LT 130 MM HG: CPT

## 2024-04-18 PROCEDURE — 3078F DIAST BP <80 MM HG: CPT

## 2024-04-18 PROCEDURE — 99214 OFFICE O/P EST MOD 30 MIN: CPT

## 2024-04-18 PROCEDURE — G8420 CALC BMI NORM PARAMETERS: HCPCS

## 2024-04-18 PROCEDURE — 85025 COMPLETE CBC W/AUTO DIFF WBC: CPT

## 2024-04-18 PROCEDURE — G8427 DOCREV CUR MEDS BY ELIG CLIN: HCPCS

## 2024-04-18 PROCEDURE — 80048 BASIC METABOLIC PNL TOTAL CA: CPT

## 2024-04-18 PROCEDURE — 3017F COLORECTAL CA SCREEN DOC REV: CPT

## 2024-04-18 PROCEDURE — 1036F TOBACCO NON-USER: CPT

## 2024-04-18 NOTE — PROGRESS NOTES
10/13/2014    History of tobacco abuse 08/17/2016    Hodgkin disease (HCC)     1979    Hodgkin disease (HCC)     1979     Hyperlipidemia 12/03/2014    Hypertension     Persistent atrial fibrillation (HCC) 01/13/2022    Pre-diabetes 04/08/2015    S/P AVR     bovine    Tachycardia, unspecified 01/04/2017     SURGICAL HISTORY     Patient  has a past surgical history that includes Ventricular ablation surgery; Splenectomy; hernia repair; Sigmoidoscopy; [place] tooth extraction; Tooth Extraction; angioplasty (11/24/2015); Skin cancer excision (10/21/2016); Cardiac catheterization (07/27/2018); Aortic valve replacement (2010); Percutaneous Transluminal Coronary Angio (2008/2010); and Cardiac valve replacement.  CURRENT MEDICATIONS       Previous Medications    ACETAMINOPHEN (TYLENOL) 325 MG TABLET    Take 2 tablets by mouth every 6 hours as needed    AMOXICILLIN (AMOXIL) 500 MG TABLET    TAKE 2,000 MG (4 TABLETS) BY MOUTH SEE ADMIN INSTRUCTIONS PRIOR TO DENTAL PROCEDURES.    ASPIRIN 81 MG TABLET    Take 1 tablet by mouth daily    COENZYME Q-10 100 MG CAPSULE    TAKE 1 CAPSULE BY MOUTH EVERY DAY    CYANOCOBALAMIN (B-12) 1000 MCG SUBL    PLACE 1 TABLET UNDER THE TONGUE DAILY.    ELIQUIS 5 MG TABS TABLET    Take 1 tablet by mouth 2 times daily    EMPAGLIFLOZIN (JARDIANCE) 25 MG TABLET    Take 1 tablet by mouth daily    EPINEPHRINE (EPIPEN) 0.3 MG/0.3ML SOAJ INJECTION    Inject 0.3 mLs into the muscle once as needed (insect sting)    FERROUS SULFATE 220 (44 FE) MG/5ML SOLUTION    Take 5 mLs by mouth 2 times daily    FOLIC ACID (FOLVITE) 800 MCG TABLET        FUROSEMIDE (LASIX) 40 MG TABLET    Take 1.5 tablets by mouth 2 times daily    MAGNESIUM OXIDE (MAG-OX) 400 MG TABLET    Take 1 tablet by mouth daily    METFORMIN (GLUCOPHAGE) 500 MG TABLET    Take 1 tablet by mouth    METOPROLOL TARTRATE (LOPRESSOR) 50 MG TABLET    Take 1 tablet by mouth 2 times daily    MISC NATURAL PRODUCTS (DAILY HERBS BLOOD SUGAR BALANC PO)    Take by

## 2024-04-19 ASSESSMENT — ENCOUNTER SYMPTOMS
DIARRHEA: 0
SHORTNESS OF BREATH: 0
ALLERGIC/IMMUNOLOGIC NEGATIVE: 1
EYES NEGATIVE: 1
ABDOMINAL PAIN: 0
RESPIRATORY NEGATIVE: 1
ABDOMINAL DISTENTION: 1
NAUSEA: 0
COUGH: 0

## 2024-04-22 ENCOUNTER — APPOINTMENT (OUTPATIENT)
Dept: CARDIOLOGY | Facility: CLINIC | Age: 64
End: 2024-04-22
Payer: COMMERCIAL

## 2024-05-01 ENCOUNTER — TELEPHONE (OUTPATIENT)
Dept: CARDIOLOGY CLINIC | Age: 64
End: 2024-05-01

## 2024-05-01 NOTE — TELEPHONE ENCOUNTER
----- Message from Janette Vines RN sent at 3/27/2024 12:47 PM EDT -----  Regarding: FW: Heart rate  Contact: [phone]  Please advise.     ----- Message -----  From: Conner Cheung  Sent: 3/27/2024  10:26 AM EDT  To: [facility]  Subject: Heart rate                                       Hi Dr. Simons;  I hope life is treating you well.  I am now in [place], but will be seeing you in a couple weeks.  I saw my doctor (Dr. Janey Stallworth) on Monday and she should have sent you copies of the EKG she took and my recent labs.  I am writing because, over the past week, I have been having some periods of tachycardia (120 bpm), and my pulse is generally in the 80s or 90. I don't have any pain, dizziness, shortness of breath or sweating with the tachycardia.  I did some breathing exercises and they brought it down to 86 bpm.  This morning, after taking my metoprolol two hours ago, and my bp is 114 over 78 and my pulse is 100 bpm.  I am wondering if there is anything I should do before I see you on April 11th, (such as increase my metoprolo)?  Thanks for your attention to this.  Conner Cheung [phone]  Wondering what I should do,

## 2024-05-09 NOTE — TELEPHONE ENCOUNTER
Pt called back and was made aware that he needed to keep his f/u appointment which he had on 4/11/2024 and he is supposed to f/u with the doctor in 6 months but that appointment has not been scheduled yet.  Is there any further instructions? Please advise.

## 2024-05-10 ENCOUNTER — PATIENT MESSAGE (OUTPATIENT)
Dept: FAMILY MEDICINE CLINIC | Age: 64
End: 2024-05-10

## 2024-05-10 RX ORDER — METOPROLOL TARTRATE 50 MG/1
100 TABLET, FILM COATED ORAL 2 TIMES DAILY
Qty: 120 TABLET | Refills: 5 | Status: SHIPPED | OUTPATIENT
Start: 2024-05-10

## 2024-05-10 NOTE — TELEPHONE ENCOUNTER
From: Conner Cheung  To: Michael CORNELIO Maxwell  Sent: 5/10/2024 10:35 AM EDT  Subject: Update and Rx refill    Tomy King;  I hope your Spring is going well.  I'm writing first to let you know things are going well for me. I'm eating more and have more energy, and am doing more activities. So, no complaints.  The other reason I am writing is because I will be running out of my Metoprolol next week, and as you know, I would like you overseeing, and so prescribing, all my medications (my last script was sent in by Dr. Simons).   Per his instructions, I have increased what I take since he wrote the prescription last; in order to deal with the tachycardia I was having. I am currently taking  50mg, two tablets, twice a day, for a total of 200mg daily.  I'll be back in town at the end of next month, and will get whatever lab work you've ordered done and come in to see you to catch up.  Have great days, and thanks.   Conner

## 2024-05-31 DIAGNOSIS — D50.8 IRON DEFICIENCY ANEMIA SECONDARY TO INADEQUATE DIETARY IRON INTAKE: ICD-10-CM

## 2024-05-31 NOTE — TELEPHONE ENCOUNTER
Comments:     Last Office Visit (last PCP visit):   4/18/2024    Next Visit Date:  Future Appointments   Date Time Provider Department Center   6/27/2024  8:30 AM Michael Maxwell APRN - CNP [facility]   6/27/2024  1:00 PM Ruth Mcmullen APRN - CNP [facility]       **If hasn't been seen in over a year OR hasn't followed up according to last diabetes/ADHD visit, make appointment for patient before sending refill to provider.    Rx requested:  Requested Prescriptions     Pending Prescriptions Disp Refills    ferrous sulfate 220 (44 Fe) MG/5ML solution 300 mL 2     Sig: Take 5 mLs by mouth 2 times daily                      PATIENT NAME: Caroline Sun  PATIENT MRN: [id number]  DATE OF PROCEDURE: 01/14/21     TITLE OF PROCEDURE:  Cervical& Thoracic Trigger Point Injections with local anesthetic (1-2 muscle groups)    SIDE: bilateral    PREOPERATIVE DIAGNOSES: Myofascial Pain, Muscle Spasm    POSTOPERATIVE DIAGNOSES:  Same    LOCATION: Clinic    SURGEON: Moreno Ravi MD    ASSISTANTS: None    ANESTHESIA:  None    DESCRIPTION OF OPERATIVE PROCEDURE:  The risks and benefits were discussed and informed consent was obtained. The patient was positioned seated on the table in the patient room. Time-out was conducted with all members of the care team. Standard preparation was performed with chloraprep and clean technique was used throughout.     The area overlying the trigger points were marked with a sterile marking pen. The area was cleaned with chlorhexidine gluconate. Next, a 25-gauge 1.5 inch hypodermic needle was then guided into each trigger point. After a negative aspiration, 1.5 ml of 2% lidocaine and 40mg of Kenalog was injected into each site trigger point as noted below (total volume 3 ml, 20mg Kenalog injected). Finally, needling of the same trigger points was performed at varying angles.    The surgical site preparation was washed off of the patient. Band-Aids were applied as needed. The patient recovered in the clinic room.  The patient did very well and the procedure results were discussed.  Standard discharge instructions were given to the patient.  The patient knows how to contact the clinic should they have any questions or problems.      Muscles treated:   Injection:                                  Right                                          Left  Trapezium  0 cc at 0 sites  0 cc at 0 sites   Levator scapulae  0 cc at 0 sites  0 cc at 0 sites   Cervical Paraspinals  1.5 cc at 1 sites  1.5 cc at 1 sites   Rhomboids  0 cc at 0 sites  0 cc at 0 sites                         Total number of injections: 2    COMPLICATIONS:  None    EBL: Minimal    FLUIDS: None    PLAN: Per clinic note    Moreno Ravi MD  Anesthesiology/Pain Management  [facility]  01/14/21 3:22 PM

## 2024-05-31 NOTE — TELEPHONE ENCOUNTER
Comments:     Last Office Visit (last PCP visit):   4/18/2024    Next Visit Date:  Future Appointments   Date Time Provider Department Center   6/27/2024  8:30 AM Michael Maxwell APRN - CNP [facility]   6/27/2024  1:00 PM Ruth Mcmullen APRN - CNP [facility]       **If hasn't been seen in over a year OR hasn't followed up according to last diabetes/ADHD visit, make appointment for patient before sending refill to provider.    Rx requested:  Requested Prescriptions     Pending Prescriptions Disp Refills    amoxicillin (AMOXIL) 500 MG tablet 4 tablet 2     Sig: TAKE 2,000 MG (4 TABLETS) BY MOUTH SEE ADMIN INSTRUCTIONS PRIOR TO DENTAL PROCEDURES.

## 2024-06-04 DIAGNOSIS — D50.9 IRON DEFICIENCY ANEMIA, UNSPECIFIED IRON DEFICIENCY ANEMIA TYPE: Primary | ICD-10-CM

## 2024-06-04 RX ORDER — AMOXICILLIN 500 MG/1
TABLET, FILM COATED ORAL
Qty: 4 TABLET | Refills: 2 | Status: SHIPPED | OUTPATIENT
Start: 2024-06-04

## 2024-06-05 ENCOUNTER — APPOINTMENT (OUTPATIENT)
Dept: CARDIOLOGY | Facility: CLINIC | Age: 64
End: 2024-06-05
Payer: COMMERCIAL

## 2024-06-07 DIAGNOSIS — E53.8 B12 DEFICIENCY: ICD-10-CM

## 2024-06-07 DIAGNOSIS — R60.0 LOWER EXTREMITY EDEMA: Primary | ICD-10-CM

## 2024-06-07 RX ORDER — FUROSEMIDE 40 MG/1
80 TABLET ORAL 2 TIMES DAILY
Qty: 120 TABLET | Refills: 2 | Status: SHIPPED | OUTPATIENT
Start: 2024-06-07 | End: 2024-09-05

## 2024-06-26 ENCOUNTER — HOSPITAL ENCOUNTER (OUTPATIENT)
Dept: CARDIOLOGY | Facility: HOSPITAL | Age: 64
Discharge: HOME | End: 2024-06-26
Payer: COMMERCIAL

## 2024-06-26 ENCOUNTER — HOSPITAL ENCOUNTER (OUTPATIENT)
Dept: RADIOLOGY | Facility: HOSPITAL | Age: 64
Discharge: HOME | End: 2024-06-26
Payer: COMMERCIAL

## 2024-06-26 ENCOUNTER — APPOINTMENT (OUTPATIENT)
Dept: CARDIOLOGY | Facility: CLINIC | Age: 64
End: 2024-06-26
Payer: COMMERCIAL

## 2024-06-26 VITALS
WEIGHT: 141 LBS | DIASTOLIC BLOOD PRESSURE: 60 MMHG | HEART RATE: 63 BPM | BODY MASS INDEX: 22.66 KG/M2 | SYSTOLIC BLOOD PRESSURE: 118 MMHG | HEIGHT: 66 IN

## 2024-06-26 DIAGNOSIS — Z95.0 PACEMAKER: Primary | ICD-10-CM

## 2024-06-26 DIAGNOSIS — I49.5 SICK SINUS SYNDROME (MULTI): ICD-10-CM

## 2024-06-26 DIAGNOSIS — I45.89 AV DISSOCIATION: ICD-10-CM

## 2024-06-26 DIAGNOSIS — I49.5 SINUS NODE DYSFUNCTION (MULTI): ICD-10-CM

## 2024-06-26 DIAGNOSIS — Z79.899 HIGH RISK MEDICATION USE: ICD-10-CM

## 2024-06-26 DIAGNOSIS — I48.3 TYPICAL ATRIAL FLUTTER (MULTI): ICD-10-CM

## 2024-06-26 DIAGNOSIS — Z95.0 PACEMAKER: ICD-10-CM

## 2024-06-26 DIAGNOSIS — Z78.9 NEVER SMOKED TOBACCO: ICD-10-CM

## 2024-06-26 DIAGNOSIS — I48.0 PAROXYSMAL ATRIAL FIBRILLATION (MULTI): ICD-10-CM

## 2024-06-26 DIAGNOSIS — I10 HTN (HYPERTENSION), BENIGN: Chronic | ICD-10-CM

## 2024-06-26 PROCEDURE — 93280 PM DEVICE PROGR EVAL DUAL: CPT | Performed by: INTERNAL MEDICINE

## 2024-06-26 PROCEDURE — 93280 PM DEVICE PROGR EVAL DUAL: CPT

## 2024-06-26 PROCEDURE — 71046 X-RAY EXAM CHEST 2 VIEWS: CPT

## 2024-06-26 PROCEDURE — 3074F SYST BP LT 130 MM HG: CPT | Performed by: INTERNAL MEDICINE

## 2024-06-26 PROCEDURE — 3078F DIAST BP <80 MM HG: CPT | Performed by: INTERNAL MEDICINE

## 2024-06-26 PROCEDURE — 3008F BODY MASS INDEX DOCD: CPT | Performed by: INTERNAL MEDICINE

## 2024-06-26 PROCEDURE — 71046 X-RAY EXAM CHEST 2 VIEWS: CPT | Performed by: RADIOLOGY

## 2024-06-26 PROCEDURE — 99215 OFFICE O/P EST HI 40 MIN: CPT | Performed by: INTERNAL MEDICINE

## 2024-06-26 PROCEDURE — 93000 ELECTROCARDIOGRAM COMPLETE: CPT | Mod: DISTINCT PROCEDURAL SERVICE | Performed by: INTERNAL MEDICINE

## 2024-06-26 PROCEDURE — 1036F TOBACCO NON-USER: CPT | Performed by: INTERNAL MEDICINE

## 2024-06-26 RX ORDER — PRAVASTATIN SODIUM 40 MG/1
40 TABLET ORAL NIGHTLY
COMMUNITY

## 2024-06-26 ASSESSMENT — ENCOUNTER SYMPTOMS
PALPITATIONS: 0
DYSPNEA ON EXERTION: 0

## 2024-06-26 NOTE — PATIENT INSTRUCTIONS
Continue same medications/treatment.  Patient educated on proper medication use.  Patient educated on risk factor modification.  Please bring any lab results from other providers/physicians to your next appointment.    Please bring all medicines, vitamins, and herbal supplements with you when you come to the office.    Prescriptions will not be filled unless you are compliant with your follow up appointments or have a follow up appointment scheduled as per instruction of your physician. Refills should be requested at the time of your visit.    Follow up with [facility] in 6 months with device check  Continue remote checks at 3 and 9 months    Thuy BASHIR RN, AM SCRIBING FOR, AND IN THE PRESENCE OF DR. NEIL KU MD

## 2024-06-26 NOTE — PROGRESS NOTES
CARDIOLOGY OFFICE VISIT      CHIEF COMPLAINT  Chief Complaint   Patient presents with    Follow-up     [facility] with device check       HISTORY OF PRESENT ILLNESS  HPI  64-year-old male with a past medical history of coronary artery disease with percutaneous coronary interventions in the past and history of aortic valve replacement at [facility] approximately 14 years ago.  History of Hodgkin lymphoma cancer at the age of 19 and status post radiation therapy.  He also has been diagnosed of atrial flutter for at least 5 years.  He is not on no full dose anticoagulant therapy due to history of hemorrhoids.  His last echocardiogram in 2017 shows left ventricular action fraction of 60%.     Looking at the records that are a little bit limited during this evaluation, he had an a stress test due to shortness of breath back in April 2023 that was abnormal.  There was evidence of Mobitz type I arrhythmia as well as a small area of ischemia in the inferior wall.  He underwent a cardiac catheterization that showed triple-vessel disease.  He underwent coronary artery bypass graft surgery in August 2023 at [facility] with SVG to obtuse marginal first, right coronary artery with sequential, SVG to LAD of could have graft to obtuse marginal and right coronary artery.  Tricuspid valve repair in.  Complications postprocedure were related to Mobitz 1 and fascicular block during preoperative evaluation.  Also intraoperatively he had junctional rhythm with heart rates in the 20s to 30s.  Post day #1 he was noted to have A-V dissociation follow-up with persisting atrial fibrillation.  Apparently there was a questionable evaluation for pacemaker.  She also had anemia pneumothorax with subcutaneous emphysema requiring bilateral chest tube.     Since then patient states that he was back on Eliquis therapy.  Looking at his records also he was on sotalol therapy at some point but this medication is not available in his medication list  during this office visit.     Patient also states that he has been going to [place] taking care of her mom and he was seen by a cardiology service at that time.  No chest pain or medical therapies were performed.     Patient states that he continues having occasional palpitations during the day.  But overall he can do most of his activities with no problems.  During the last office visit in February 2024, patient was in atrial flutter.    Patient underwent atrial flutter ablation in February 2024 with no complications.  Prior to ablation therapy, he had a transesophageal echocardiogram on 2/26/2024  showing normal LV function, mild mitral stenosis, no evidence of any mass or thrombus. Left atrium was moderate to severely dilated.    After ablation therapy, underlying rhythm was sinus rhythm with 2-1 AV block.  Patient underwent dual-chamber pacemaker implantation with successful results.    Since then he states that he is feeling much better.  Denies any symptoms of chest pain or shortness of breath or palpitations.    EKG performed today shows atrial ventricular paced rhythm at a rate of 63 bpm QRS ration 170 ms QT corrected 511 ms.  Rhythm strip shows the same pattern.    Patient had a device interrogation today at the device clinic and it shows battery longevity 12 years.  Adequate sensing, capture and impedances of all leads.  No atrial or ventricular events noted.         Past Medical History  Past Medical History:   Diagnosis Date    Arrhythmia     Cancer (Multi)        Social History  Social History     Tobacco Use    Smoking status: Never    Smokeless tobacco: Never   Vaping Use    Vaping status: Never Used   Substance Use Topics    Alcohol use: Yes     Comment: 2 harpal daily    Drug use: Yes     Types: Marijuana     Comment: daily       Family History   No family history on file.     Allergies:  Allergies   Allergen Reactions    Pollen Extracts Hives    Atorvastatin Other    Insects Extract Other      Patient is allergic to flying ants    Iodinated Contrast Media Unknown    Ragweed Other        Outpatient Medications:  Current Outpatient Medications   Medication Instructions    acetaminophen (TYLENOL) 650 mg, oral, Every 4 hours PRN    amoxicillin (Amoxil) 500 mg tablet TAKE 2,000 MG (4 TABLETS) BY MOUTH SEE ADMIN INSTRUCTIONS PRIOR TO DENTAL PROCEDURES.    apixaban (ELIQUIS) 5 mg, oral, 2 times daily    aspirin 81 mg, oral, Daily RT    coenzyme Q10-vitamin E 100-5 mg-unit capsule 1 capsule, oral, Daily    cyanocobalamin (VITAMIN B-12) 1,000 mcg, oral, Daily RT    empagliflozin (JARDIANCE) 25 mg, oral, Daily    EPINEPHrine (EPIPEN) 0.3 mg, intramuscular, Once as needed    folic acid (Folvite) 800 mcg tablet 1 tablet, oral, Daily    furosemide (Lasix) 20 mg tablet 3 tablets, oral, 2 times daily    magnesium oxide (MAG-OX) 400 mg, oral, Every other day    metoprolol tartrate (LOPRESSOR) 25 mg, oral, 2 times daily    nitroglycerin (NITROSTAT) 0.4 mg, sublingual, Every 5 min PRN    pantoprazole (ProtoNix) 20 mg EC tablet 1 tablet, oral, Daily    potassium chloride CR (Klor-Con) 10 mEq ER tablet 20 mEq, oral, 3 times daily, Do not crush, chew, or split.  Take with food    pravastatin (PRAVACHOL) 40 mg, oral, Nightly    traMADol (ULTRAM) 50 mg, oral, Every 8 hours PRN          REVIEW OF SYSTEMS  Review of Systems   Cardiovascular:  Negative for chest pain, dyspnea on exertion and palpitations.   All other systems reviewed and are negative.        VITALS  Vitals:    06/26/24 1043   BP: 118/60   Pulse: 63       PHYSICAL EXAM  Constitutional:       General: Awake.      Appearance: Normal and healthy appearance. Well-developed and not in distress.   Neck:      Vascular: No JVR. JVD normal.   Pulmonary:      Effort: Pulmonary effort is normal.      Breath sounds: Normal breath sounds. No wheezing. No rhonchi. No rales.   Chest:      Chest wall: Not tender to palpatation.      Comments: Left sided device pocket- healed  and well approximated. No swelling or hematoma      Cardiovascular:      PMI at left midclavicular line. Normal rate. Regular rhythm. Normal S1. Normal S2.       Murmurs: There is no murmur.      No gallop.  No click. No rub.   Pulses:     Intact distal pulses.   Edema:     Peripheral edema absent.   Abdominal:      Tenderness: There is no abdominal tenderness.   Musculoskeletal: Normal range of motion.         General: No tenderness. Skin:     General: Skin is warm and dry.   Neurological:      General: No focal deficit present.      Mental Status: Alert and oriented to person, place and time.           ASSESSMENT AND PLAN  Clinical impression     1.  Palpitation  2.  Evidence of atrial flutter since 2018.  Not sure about treatment for his arrhythmia.  At some point he was on sotalol therapy but this medication is not today in his medical list.  Status post atrial flutter ablation in February 2024 with no recurrence of this arrhythmia  3.  Normal left ventricular function per echogram 2017  4.  Severe triple-vessel disease status post coronary artery bypass graft surgery in 2023 as described above with tricuspid valve repair  5.  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  6.  History of Hodgkin lymphoma radiotherapy at the age of 19.  7.  Long-term anticoagulant therapy with Eliquis with suboptimal dose  8.  Evidence of second-degree block type II status post pacemaker implanted in February 2024 with no complications    Plan recommendations    From the electrophysiologist on point he is doing well.  No recurrence of atrial flutter.  Continue with beta-blocker therapy and Eliquis therapy.    Follow my office every 6 months or sooner needed.    Follow with device clinic as scheduled.    Risk factor modification and lifestyle modification discussed with patient. Diet , exercise and hydration discussed with patient.      I have personally review with patient during this office  visit, laboratory data, echocardiogram results, stress test results, Holter-event monitor results prior and after the last electrophysiology visit. All questions has been answered.    Please excuse any errors in grammar or translation related to this dictation.  Voice recognition software was utilized to prepare this document.  .

## 2024-06-27 ENCOUNTER — HOSPITAL ENCOUNTER (OUTPATIENT)
Dept: LAB | Age: 64
Discharge: HOME | End: 2024-06-27
Payer: COMMERCIAL

## 2024-06-27 ENCOUNTER — OFFICE VISIT (OUTPATIENT)
Dept: CARDIOLOGY CLINIC | Age: 64
End: 2024-06-27
Payer: COMMERCIAL

## 2024-06-27 VITALS
BODY MASS INDEX: 22.79 KG/M2 | RESPIRATION RATE: 16 BRPM | SYSTOLIC BLOOD PRESSURE: 126 MMHG | DIASTOLIC BLOOD PRESSURE: 82 MMHG | HEART RATE: 71 BPM | OXYGEN SATURATION: 98 % | WEIGHT: 141.2 LBS

## 2024-06-27 DIAGNOSIS — I10 PRIMARY HYPERTENSION: Chronic | ICD-10-CM

## 2024-06-27 DIAGNOSIS — R01.1 HEART MURMUR: Chronic | ICD-10-CM

## 2024-06-27 DIAGNOSIS — R42 DIZZINESS: ICD-10-CM

## 2024-06-27 DIAGNOSIS — I10 PRIMARY HYPERTENSION: Primary | Chronic | ICD-10-CM

## 2024-06-27 DIAGNOSIS — I25.810 CORONARY ARTERY DISEASE INVOLVING AUTOLOGOUS ARTERY CORONARY BYPASS GRAFT WITHOUT ANGINA PECTORIS: ICD-10-CM

## 2024-06-27 DIAGNOSIS — R60.0 LOWER EXTREMITY EDEMA: ICD-10-CM

## 2024-06-27 DIAGNOSIS — E11.9 DIABETES MELLITUS TYPE 2, DIET-CONTROLLED (HCC): ICD-10-CM

## 2024-06-27 DIAGNOSIS — I48.19 PERSISTENT ATRIAL FIBRILLATION (HCC): ICD-10-CM

## 2024-06-27 DIAGNOSIS — E53.8 B12 DEFICIENCY: ICD-10-CM

## 2024-06-27 DIAGNOSIS — D50.9 IRON DEFICIENCY ANEMIA, UNSPECIFIED IRON DEFICIENCY ANEMIA TYPE: ICD-10-CM

## 2024-06-27 LAB
ACANTHOCYTES BLD QL SMEAR: ABNORMAL
ANION GAP SERPL CALCULATED.3IONS-SCNC: 10 MEQ/L (ref 9–15)
ANISOCYTOSIS BLD QL SMEAR: ABNORMAL
BASOPHILS # BLD: 0.1 K/UL (ref 0–0.2)
BASOPHILS NFR BLD: 0.9 %
BNP BLD-MCNC: 955 PG/ML
BUN SERPL-MCNC: 25 MG/DL (ref 8–23)
BURR CELLS: ABNORMAL
CALCIUM SERPL-MCNC: 8.9 MG/DL (ref 8.5–9.9)
CHLORIDE SERPL-SCNC: 96 MEQ/L (ref 95–107)
CO2 SERPL-SCNC: 31 MEQ/L (ref 20–31)
CREAT SERPL-MCNC: 0.8 MG/DL (ref 0.7–1.2)
EOSINOPHIL # BLD: 0.4 K/UL (ref 0–0.7)
EOSINOPHIL NFR BLD: 3.6 %
ERYTHROCYTE [DISTWIDTH] IN BLOOD BY AUTOMATED COUNT: 15.4 % (ref 11.5–14.5)
GLUCOSE SERPL-MCNC: 155 MG/DL (ref 70–99)
HCT VFR BLD AUTO: 42.6 % (ref 42–52)
HGB BLD-MCNC: 13.8 G/DL (ref 14–18)
IRON % SATURATION: 7 % (ref 20–55)
IRON: 23 UG/DL (ref 61–157)
LYMPHOCYTES # BLD: 0.6 K/UL (ref 1–4.8)
LYMPHOCYTES NFR BLD: 5.6 %
MCH RBC QN AUTO: 29 PG (ref 27–31.3)
MCHC RBC AUTO-ENTMCNC: 32.4 % (ref 33–37)
MCV RBC AUTO: 89.5 FL (ref 79–92.2)
MONOCYTES # BLD: 1.8 K/UL (ref 0.2–0.8)
MONOCYTES NFR BLD: 16.2 %
NEUTROPHILS # BLD: 8.3 K/UL (ref 1.4–6.5)
NEUTS SEG NFR BLD: 73.3 %
PLATELET # BLD AUTO: 424 K/UL (ref 130–400)
PLATELET BLD QL SMEAR: ABNORMAL
POIKILOCYTOSIS BLD QL SMEAR: ABNORMAL
POLYCHROMASIA BLD QL SMEAR: ABNORMAL
POTASSIUM SERPL-SCNC: 3.6 MEQ/L (ref 3.4–4.9)
RBC # BLD AUTO: 4.76 M/UL (ref 4.7–6.1)
SODIUM SERPL-SCNC: 137 MEQ/L (ref 135–144)
TARGETS BLD QL SMEAR: ABNORMAL
TOTAL IRON BINDING CAPACITY: 332 UG/DL (ref 250–450)
UNSATURATED IRON BINDING CAPACITY: 309 UG/DL (ref 112–347)
WBC # BLD AUTO: 11.3 K/UL (ref 4.8–10.8)

## 2024-06-27 PROCEDURE — G8427 DOCREV CUR MEDS BY ELIG CLIN: HCPCS

## 2024-06-27 PROCEDURE — G8420 CALC BMI NORM PARAMETERS: HCPCS

## 2024-06-27 PROCEDURE — 3017F COLORECTAL CA SCREEN DOC REV: CPT

## 2024-06-27 PROCEDURE — 83540 ASSAY OF IRON: CPT

## 2024-06-27 PROCEDURE — 83550 IRON BINDING TEST: CPT

## 2024-06-27 PROCEDURE — 80048 BASIC METABOLIC PNL TOTAL CA: CPT

## 2024-06-27 PROCEDURE — 83880 ASSAY OF NATRIURETIC PEPTIDE: CPT

## 2024-06-27 PROCEDURE — 85025 COMPLETE CBC W/AUTO DIFF WBC: CPT

## 2024-06-27 PROCEDURE — 36415 COLL VENOUS BLD VENIPUNCTURE: CPT

## 2024-06-27 PROCEDURE — 99213 OFFICE O/P EST LOW 20 MIN: CPT

## 2024-06-27 PROCEDURE — 3079F DIAST BP 80-89 MM HG: CPT

## 2024-06-27 PROCEDURE — 3074F SYST BP LT 130 MM HG: CPT

## 2024-06-27 PROCEDURE — 83036 HEMOGLOBIN GLYCOSYLATED A1C: CPT

## 2024-06-27 PROCEDURE — 93000 ELECTROCARDIOGRAM COMPLETE: CPT

## 2024-06-27 PROCEDURE — 82746 ASSAY OF FOLIC ACID SERUM: CPT

## 2024-06-27 PROCEDURE — 1036F TOBACCO NON-USER: CPT

## 2024-06-27 PROCEDURE — 82607 VITAMIN B-12: CPT

## 2024-06-27 NOTE — PROGRESS NOTES
JVD or carotid bruits.  No thyromegaly or adenopathy.   Chest - clear to auscultation, no wheezes, rales or rhonchi, symmetric air entry  Heart - normal rate, regular rhythm, normal S1, S2, no murmurs, rubs, clicks or gallops  Abdomen - soft, nontender, nondistended, no masses or organomegaly  Neurological - alert, oriented, normal speech, no focal findings or movement disorder noted  Extremities - peripheral pulses normal, no pedal edema, no clubbing or cyanosis  Skin - normal coloration and turgor, no rashes, no suspicious skin lesions noted      EKG: aflutter    Orders Placed This Encounter   Procedures    EKG 12 lead       ASSESSMENT:     Diagnosis Orders   1. Primary hypertension        2. Heart murmur  EKG 12 lead      3. Coronary artery disease involving autologous artery coronary bypass graft without angina pectoris  EKG 12 lead      4. Persistent atrial fibrillation (HCC)  EKG 12 lead                PLAN:     Dr Good for afib/PPM hx.     Check CUS    As always, aggressive risk factor modification is strongly recommended. We should adhere to the JNC VIII guidelines for HTN management and the NCEP ATP III guidelines for LDL-C management.    Cardiac diet is always recommended with low fat, cholesterol, calories and sodium.    Continue medications at current doses.     Check EKG    Continue Eliquis 5mg po BID    Discussed addition of more magnesium in his diet to help with palpitations.    Will continue to monitor patient clinically, if symptoms develop or worsen, they are to let me know ASAP or head to the nearest emergeny room.    Patient was advised and encouraged to check blood pressure at home or at a pharmacy, maintain a logbook, and also call us back if blood pressure are above the target ranges or if it is low. Patient clearly understands and agrees to the instructions.     We will need to continue to monitor muscle and liver enzymes, BUN, CR, and electrolytes.

## 2024-06-28 LAB
ESTIMATED AVERAGE GLUCOSE: 163 MG/DL
FOLATE: 12.9 NG/ML (ref 4.8–24.2)
HBA1C MFR BLD: 7.3 % (ref 4–6)
VITAMIN B-12: >2000 PG/ML (ref 232–1245)

## 2024-07-05 ENCOUNTER — OFFICE VISIT (OUTPATIENT)
Dept: FAMILY MEDICINE CLINIC | Age: 64
End: 2024-07-05
Payer: COMMERCIAL

## 2024-07-05 VITALS
HEART RATE: 83 BPM | SYSTOLIC BLOOD PRESSURE: 88 MMHG | BODY MASS INDEX: 22.85 KG/M2 | WEIGHT: 141.6 LBS | DIASTOLIC BLOOD PRESSURE: 64 MMHG | OXYGEN SATURATION: 94 %

## 2024-07-05 DIAGNOSIS — R63.4 ABNORMAL WEIGHT LOSS: ICD-10-CM

## 2024-07-05 DIAGNOSIS — I10 PRIMARY HYPERTENSION: Chronic | ICD-10-CM

## 2024-07-05 DIAGNOSIS — D50.9 IRON DEFICIENCY ANEMIA, UNSPECIFIED IRON DEFICIENCY ANEMIA TYPE: ICD-10-CM

## 2024-07-05 DIAGNOSIS — E11.9 CONTROLLED TYPE 2 DIABETES MELLITUS WITHOUT COMPLICATION, WITHOUT LONG-TERM CURRENT USE OF INSULIN (HCC): ICD-10-CM

## 2024-07-05 DIAGNOSIS — R60.0 LOWER EXTREMITY EDEMA: Primary | ICD-10-CM

## 2024-07-05 DIAGNOSIS — E78.2 MIXED HYPERLIPIDEMIA: Chronic | ICD-10-CM

## 2024-07-05 PROCEDURE — 3074F SYST BP LT 130 MM HG: CPT

## 2024-07-05 PROCEDURE — 3078F DIAST BP <80 MM HG: CPT

## 2024-07-05 PROCEDURE — 2022F DILAT RTA XM EVC RTNOPTHY: CPT

## 2024-07-05 PROCEDURE — 99214 OFFICE O/P EST MOD 30 MIN: CPT

## 2024-07-05 PROCEDURE — 3051F HG A1C>EQUAL 7.0%<8.0%: CPT

## 2024-07-05 PROCEDURE — 1036F TOBACCO NON-USER: CPT

## 2024-07-05 PROCEDURE — G8420 CALC BMI NORM PARAMETERS: HCPCS

## 2024-07-05 PROCEDURE — 3017F COLORECTAL CA SCREEN DOC REV: CPT

## 2024-07-05 PROCEDURE — G8427 DOCREV CUR MEDS BY ELIG CLIN: HCPCS

## 2024-07-05 RX ORDER — UBIDECARENONE 30 MG
100 CAPSULE ORAL DAILY
Qty: 30 CAPSULE | Refills: 5 | Status: SHIPPED | OUTPATIENT
Start: 2024-07-05

## 2024-07-05 RX ORDER — METOPROLOL TARTRATE 50 MG/1
100 TABLET, FILM COATED ORAL 2 TIMES DAILY
Qty: 120 TABLET | Refills: 5 | Status: SHIPPED | OUTPATIENT
Start: 2024-07-05

## 2024-07-05 RX ORDER — CHOLECALCIFEROL (VITAMIN D3) 10(400)/ML
160 DROPS ORAL 2 TIMES DAILY
Qty: 30 TABLET | Refills: 2 | Status: SHIPPED | OUTPATIENT
Start: 2024-07-05

## 2024-07-05 ASSESSMENT — ENCOUNTER SYMPTOMS
RESPIRATORY NEGATIVE: 1
SHORTNESS OF BREATH: 0
GASTROINTESTINAL NEGATIVE: 1
ALLERGIC/IMMUNOLOGIC NEGATIVE: 1
EYES NEGATIVE: 1
COUGH: 0

## 2024-07-05 NOTE — PROGRESS NOTES
Conner Cheung (: 1960) is a 64 y.o. male, Established patient, who presents today for:    Chief Complaint   Patient presents with    Edema     Pts weight has been fluctuating a lot. Usually 1 pound a day or more. He feels okay today but when he weighs a few pounds more he is SOB, fatigued. Taking three 40 mg tablets of lasix BID. Is having some tachycardia.       Follow-up of the following chronic conditions.Diabetes, Hypertension, and iron deficiency  denies shortness of breath, chest pain, reports increased heart rate in the mornings, has discussed metoprolol dosing and low blood pressures with cardiology     ASSESSMENT/PLAN    1. Lower extremity edema  Currently well-controlled patient is continued on Lasix  2. Iron deficiency anemia, unspecified iron deficiency anemia type  Patient reports he has not been taking iron supplementation.  He continues to use alcohol daily.  New orders placed for iron sulfate.  Will reevaluate blood work in 10 to 12 weeks.  -     ferrous sulfate dried (SLOW IRON) 160 (50 Fe) MG TBCR extended release tablet; Take 160 mg by mouth 2 times daily, Disp-30 tablet, R-2Normal  3. Primary hypertension  Blood pressures low this morning.  Patient reports irregular levels.  Occasional tachycardia in the mornings.  He has discussed the symptoms with cardiology.  He is not symptomatic there is no dizziness.  Advised patient to continue monitoring continue on current dose of metoprolol follow-up with cardiology.  -     metoprolol tartrate (LOPRESSOR) 50 MG tablet; Take 2 tablets by mouth 2 times daily, Disp-120 tablet, R-5Normal  -     Copper, Serum; Future  -     Basic Metabolic Panel; Future  -     CBC with Auto Differential; Future  4. Controlled type 2 diabetes mellitus without complication, without long-term current use of insulin ([facility])  A1c remains 7.3.  He is compliant with Jardiance medication.  He has not been eating sweets does continue to drink alcohol daily.  Will add

## 2024-07-07 DIAGNOSIS — E11.9 DIABETES MELLITUS TYPE 2, DIET-CONTROLLED (HCC): ICD-10-CM

## 2024-07-07 DIAGNOSIS — I50.9 CHRONIC CONGESTIVE HEART FAILURE, UNSPECIFIED HEART FAILURE TYPE (HCC): ICD-10-CM

## 2024-07-07 NOTE — TELEPHONE ENCOUNTER
Comments:     Last Office Visit (last PCP visit):   7/5/2024    Next Visit Date:  Future Appointments   Date Time Provider Department Center   7/9/2024  8:30 AM [facility]         Rx requested:  Requested Prescriptions     Pending Prescriptions Disp Refills    JARDIANCE 25 MG tablet [Pharmacy Med Name: JARDIANCE 25 MG TABLET] 90 tablet 1     Sig: TAKE 1 TABLET BY MOUTH EVERY DAY

## 2024-07-08 RX ORDER — EMPAGLIFLOZIN 25 MG/1
25 TABLET, FILM COATED ORAL DAILY
Qty: 90 TABLET | Refills: 1 | Status: SHIPPED | OUTPATIENT
Start: 2024-07-08

## 2024-07-09 ENCOUNTER — HOSPITAL ENCOUNTER (OUTPATIENT)
Dept: ULTRASOUND IMAGING | Age: 64
Discharge: HOME | End: 2024-07-11
Payer: COMMERCIAL

## 2024-07-09 DIAGNOSIS — R42 DIZZINESS: ICD-10-CM

## 2024-07-09 PROCEDURE — 93880 EXTRACRANIAL BILAT STUDY: CPT

## 2024-07-16 ENCOUNTER — TELEPHONE (OUTPATIENT)
Dept: CARDIOLOGY CLINIC | Age: 64
End: 2024-07-16

## 2024-07-16 NOTE — TELEPHONE ENCOUNTER
Called patient to notify him of Ruth's message. Patient verbalized understanding. No additional questions or concerns.     ----- Message from RADHA Curiel CNP sent at 7/9/2024  3:56 PM EDT -----  IMPRESSION:  The right internal carotid artery demonstrates 0-50% stenosis.     The left internal carotid artery demonstrates 0-50% stenosis.     Bilateral vertebral arteries are patent with flow in the normal direction.       Please notify patient of the above results and that the ultrasound checked out ok

## 2024-08-26 DIAGNOSIS — R60.0 LOWER EXTREMITY EDEMA: ICD-10-CM

## 2024-08-26 RX ORDER — FUROSEMIDE 40 MG
80 TABLET ORAL 2 TIMES DAILY
Qty: 60 TABLET | Refills: 0 | Status: SHIPPED | OUTPATIENT
Start: 2024-08-26 | End: 2024-09-25

## 2024-09-20 ENCOUNTER — HOSPITAL ENCOUNTER (OUTPATIENT)
Dept: LAB | Age: 64
Discharge: HOME | End: 2024-09-20
Payer: COMMERCIAL

## 2024-09-20 DIAGNOSIS — I10 PRIMARY HYPERTENSION: Chronic | ICD-10-CM

## 2024-09-20 DIAGNOSIS — E11.9 CONTROLLED TYPE 2 DIABETES MELLITUS WITHOUT COMPLICATION, WITHOUT LONG-TERM CURRENT USE OF INSULIN (HCC): ICD-10-CM

## 2024-09-20 DIAGNOSIS — E78.2 MIXED HYPERLIPIDEMIA: Chronic | ICD-10-CM

## 2024-09-20 LAB
ANION GAP SERPL CALCULATED.3IONS-SCNC: 9 MEQ/L (ref 9–15)
ANISOCYTOSIS BLD QL SMEAR: ABNORMAL
BASOPHILS # BLD: 0.2 K/UL (ref 0–0.2)
BASOPHILS NFR BLD: 2 %
BUN SERPL-MCNC: 34 MG/DL (ref 8–23)
BURR CELLS: ABNORMAL
CALCIUM SERPL-MCNC: 9.7 MG/DL (ref 8.5–9.9)
CHLORIDE SERPL-SCNC: 93 MEQ/L (ref 95–107)
CHOLEST SERPL-MCNC: 146 MG/DL (ref 0–199)
CO2 SERPL-SCNC: 32 MEQ/L (ref 20–31)
CREAT SERPL-MCNC: 0.84 MG/DL (ref 0.7–1.2)
EOSINOPHIL # BLD: 0.4 K/UL (ref 0–0.7)
EOSINOPHIL NFR BLD: 3 %
ERYTHROCYTE [DISTWIDTH] IN BLOOD BY AUTOMATED COUNT: 16.4 % (ref 11.5–14.5)
ESTIMATED AVERAGE GLUCOSE: 163 MG/DL
GLUCOSE SERPL-MCNC: 170 MG/DL (ref 70–99)
HBA1C MFR BLD: 7.3 % (ref 4–6)
HCT VFR BLD AUTO: 44.6 % (ref 42–52)
HDLC SERPL-MCNC: 50 MG/DL (ref 40–59)
HGB BLD-MCNC: 15 G/DL (ref 14–18)
LDL CHOLESTEROL: 77 MG/DL (ref 0–129)
LYMPHOCYTES # BLD: 0.9 K/UL (ref 1–4.8)
LYMPHOCYTES NFR BLD: 8 %
MACROCYTES BLD QL SMEAR: ABNORMAL
MCH RBC QN AUTO: 30.2 PG (ref 27–31.3)
MCHC RBC AUTO-ENTMCNC: 33.6 % (ref 33–37)
MCV RBC AUTO: 89.9 FL (ref 79–92.2)
MONOCYTES # BLD: 1.7 K/UL (ref 0.2–0.8)
MONOCYTES NFR BLD: 14.3 %
NEUTROPHILS # BLD: 8.6 K/UL (ref 1.4–6.5)
NEUTS SEG NFR BLD: 73 %
PLATELET # BLD AUTO: 387 K/UL (ref 130–400)
PLATELET BLD QL SMEAR: ADEQUATE
POIKILOCYTOSIS BLD QL SMEAR: ABNORMAL
POTASSIUM SERPL-SCNC: 4.1 MEQ/L (ref 3.4–4.9)
RBC # BLD AUTO: 4.96 M/UL (ref 4.7–6.1)
SLIDE REVIEW: ABNORMAL
SMUDGE CELLS BLD QL SMEAR: 2.9
SODIUM SERPL-SCNC: 134 MEQ/L (ref 135–144)
TRIGLYCERIDE, FASTING: 96 MG/DL (ref 0–150)
WBC # BLD AUTO: 11.8 K/UL (ref 4.8–10.8)

## 2024-09-20 PROCEDURE — 80048 BASIC METABOLIC PNL TOTAL CA: CPT

## 2024-09-20 PROCEDURE — 80061 LIPID PANEL: CPT

## 2024-09-20 PROCEDURE — 85025 COMPLETE CBC W/AUTO DIFF WBC: CPT

## 2024-09-20 PROCEDURE — 83036 HEMOGLOBIN GLYCOSYLATED A1C: CPT

## 2024-09-20 PROCEDURE — 82525 ASSAY OF COPPER: CPT

## 2024-09-20 PROCEDURE — 36415 COLL VENOUS BLD VENIPUNCTURE: CPT

## 2024-09-22 LAB — COPPER SERPL-MCNC: 99.5 UG/DL (ref 70–140)

## 2024-09-23 ENCOUNTER — OFFICE VISIT (OUTPATIENT)
Dept: FAMILY MEDICINE CLINIC | Age: 64
End: 2024-09-23
Payer: COMMERCIAL

## 2024-09-23 VITALS
HEIGHT: 66 IN | SYSTOLIC BLOOD PRESSURE: 116 MMHG | BODY MASS INDEX: 23.53 KG/M2 | WEIGHT: 146.4 LBS | TEMPERATURE: 98 F | OXYGEN SATURATION: 98 % | HEART RATE: 66 BPM | DIASTOLIC BLOOD PRESSURE: 74 MMHG

## 2024-09-23 DIAGNOSIS — E11.69 HYPERLIPIDEMIA ASSOCIATED WITH TYPE 2 DIABETES MELLITUS (HCC): ICD-10-CM

## 2024-09-23 DIAGNOSIS — Z90.81 ACQUIRED ABSENCE OF SPLEEN: ICD-10-CM

## 2024-09-23 DIAGNOSIS — K21.9 GASTROESOPHAGEAL REFLUX DISEASE WITHOUT ESOPHAGITIS: ICD-10-CM

## 2024-09-23 DIAGNOSIS — I48.19 PERSISTENT ATRIAL FIBRILLATION (HCC): ICD-10-CM

## 2024-09-23 DIAGNOSIS — E11.59 HYPERTENSION ASSOCIATED WITH DIABETES (HCC): ICD-10-CM

## 2024-09-23 DIAGNOSIS — I50.9 CHRONIC CONGESTIVE HEART FAILURE, UNSPECIFIED HEART FAILURE TYPE (HCC): ICD-10-CM

## 2024-09-23 DIAGNOSIS — E11.9 CONTROLLED TYPE 2 DIABETES MELLITUS WITHOUT COMPLICATION, WITHOUT LONG-TERM CURRENT USE OF INSULIN (HCC): Primary | ICD-10-CM

## 2024-09-23 DIAGNOSIS — E78.5 HYPERLIPIDEMIA ASSOCIATED WITH TYPE 2 DIABETES MELLITUS (HCC): ICD-10-CM

## 2024-09-23 DIAGNOSIS — Z95.2 HISTORY OF AORTIC VALVE REPLACEMENT: Chronic | ICD-10-CM

## 2024-09-23 DIAGNOSIS — Z76.89 ENCOUNTER TO ESTABLISH CARE: ICD-10-CM

## 2024-09-23 DIAGNOSIS — Q27.30 AVM (ARTERIOVENOUS MALFORMATION): ICD-10-CM

## 2024-09-23 DIAGNOSIS — I25.810 CORONARY ARTERY DISEASE INVOLVING AUTOLOGOUS ARTERY CORONARY BYPASS GRAFT WITHOUT ANGINA PECTORIS: ICD-10-CM

## 2024-09-23 DIAGNOSIS — I15.2 HYPERTENSION ASSOCIATED WITH DIABETES (HCC): ICD-10-CM

## 2024-09-23 DIAGNOSIS — D75.839 THROMBOCYTOSIS: ICD-10-CM

## 2024-09-23 PROBLEM — R74.8 ELEVATED LIVER ENZYMES: Status: RESOLVED | Noted: 2024-04-04 | Resolved: 2024-09-23

## 2024-09-23 PROCEDURE — 1036F TOBACCO NON-USER: CPT | Performed by: INTERNAL MEDICINE

## 2024-09-23 PROCEDURE — 3051F HG A1C>EQUAL 7.0%<8.0%: CPT | Performed by: INTERNAL MEDICINE

## 2024-09-23 PROCEDURE — 3074F SYST BP LT 130 MM HG: CPT | Performed by: INTERNAL MEDICINE

## 2024-09-23 PROCEDURE — 3078F DIAST BP <80 MM HG: CPT | Performed by: INTERNAL MEDICINE

## 2024-09-23 PROCEDURE — G8427 DOCREV CUR MEDS BY ELIG CLIN: HCPCS | Performed by: INTERNAL MEDICINE

## 2024-09-23 PROCEDURE — G8420 CALC BMI NORM PARAMETERS: HCPCS | Performed by: INTERNAL MEDICINE

## 2024-09-23 PROCEDURE — 2022F DILAT RTA XM EVC RTNOPTHY: CPT | Performed by: INTERNAL MEDICINE

## 2024-09-23 PROCEDURE — 99214 OFFICE O/P EST MOD 30 MIN: CPT | Performed by: INTERNAL MEDICINE

## 2024-09-23 PROCEDURE — 3017F COLORECTAL CA SCREEN DOC REV: CPT | Performed by: INTERNAL MEDICINE

## 2024-09-23 RX ORDER — PANTOPRAZOLE SODIUM 20 MG/1
TABLET, DELAYED RELEASE ORAL
Qty: 90 TABLET | Refills: 3 | Status: SHIPPED | OUTPATIENT
Start: 2024-09-23

## 2024-09-23 RX ORDER — CHOLECALCIFEROL (VITAMIN D3) 10(400)/ML
160 DROPS ORAL 2 TIMES DAILY
Qty: 30 TABLET | Refills: 2 | Status: SHIPPED | OUTPATIENT
Start: 2024-09-23

## 2024-09-23 RX ORDER — NITROGLYCERIN 0.4 MG/1
0.4 TABLET SUBLINGUAL EVERY 5 MIN PRN
Qty: 25 TABLET | Refills: 3 | Status: SHIPPED | OUTPATIENT
Start: 2024-09-23

## 2024-09-23 RX ORDER — AMOXICILLIN 500 MG/1
TABLET, FILM COATED ORAL
Qty: 4 TABLET | Refills: 2 | Status: SHIPPED | OUTPATIENT
Start: 2024-09-23

## 2024-09-23 RX ORDER — AMOXICILLIN 500 MG/1
TABLET, FILM COATED ORAL
Qty: 4 TABLET | Refills: 2 | Status: CANCELLED | OUTPATIENT
Start: 2024-09-23

## 2024-09-23 RX ORDER — FUROSEMIDE 40 MG
80 TABLET ORAL 2 TIMES DAILY
Qty: 60 TABLET | Refills: 0 | Status: SHIPPED | OUTPATIENT
Start: 2024-09-23 | End: 2024-10-23

## 2024-09-23 ASSESSMENT — ENCOUNTER SYMPTOMS
CONSTIPATION: 0
PHOTOPHOBIA: 0
COLOR CHANGE: 0
NAUSEA: 0
EYE PAIN: 0
BLOOD IN STOOL: 0
ABDOMINAL PAIN: 0
VOICE CHANGE: 0

## 2024-10-01 ENCOUNTER — HOSPITAL ENCOUNTER (OUTPATIENT)
Dept: CARDIOLOGY | Facility: HOSPITAL | Age: 64
Discharge: HOME | End: 2024-10-01
Payer: COMMERCIAL

## 2024-10-01 DIAGNOSIS — Z95.0 PACEMAKER: ICD-10-CM

## 2024-10-01 PROCEDURE — 93296 REM INTERROG EVL PM/IDS: CPT

## 2024-10-01 PROCEDURE — 93294 REM INTERROG EVL PM/LDLS PM: CPT | Performed by: INTERNAL MEDICINE

## 2024-10-01 RX ORDER — FUROSEMIDE 40 MG
80 TABLET ORAL 2 TIMES DAILY
Qty: 360 TABLET | Refills: 0 | Status: SHIPPED | OUTPATIENT
Start: 2024-10-01

## 2024-10-01 NOTE — TELEPHONE ENCOUNTER
Pharmacy is requesting medication refill. Please approve or deny this request.    Rx requested:  Requested Prescriptions     Pending Prescriptions Disp Refills    furosemide (LASIX) 40 MG tablet [Pharmacy Med Name: FUROSEMIDE 40 MG TABLET] 360 tablet 0     Sig: TAKE 2 TABLETS BY MOUTH TWICE A DAY         Last Office Visit:   9/23/2024      Next Visit Date:  Future Appointments   Date Time Provider Department Center   1/6/2025  8:45 AM Chelo Pal MD [facility]

## 2024-10-09 ENCOUNTER — CARE COORDINATION (OUTPATIENT)
Dept: CARE COORDINATION | Age: 64
End: 2024-10-09

## 2024-10-09 NOTE — CARE COORDINATION
Ambulatory Care Coordination Note     10/9/2024 2:24 PM     Patient Current Location:  Home: [address] [place] [id number]     This patient was received as a referral from Provider.    ACM contacted the patient by telephone. Verified name and  with patient as identifiers. Provided introduction to self, and explanation of the ACM role.   Patient declined care management services at this time.          ACM: Juani Estrada RN     Challenges to be reviewed by the provider   Additional needs identified to be addressed with provider No  none               Method of communication with provider: none.    Has the patient been seen in the ED since your last call? no    Care Summary Note: ACM spoke to patient.  Introduced ACC program role of ACM goals and benefits patient was referral from BPA /provider.  Patient has controlled diabetes A-fib CHF and hypertension.  Patient lives most of his time in [place] taking care of his mother.  Patient is currently in [place].  Patient has PCP and cardiologist in [place] if he has any additional needs.  Patient does keep his local [facility] providers for when he is in this area visiting his home and wife.  Patient has declined any needs for care coordination or remote patient monitoring.    Offered patient enrollment in the Remote Patient Monitoring (RPM) program for in-home monitoring: Yes, but did not enroll at this time: controlled chronic disease management.     Assessments Completed:   PCP/Specialist follow up:   Future Appointments         Provider Specialty Dept Phone    2025 8:45 AM Chelo Pal MD Family Medicine [phone]

## 2024-11-08 NOTE — TELEPHONE ENCOUNTER
Comments:     Last Office Visit (last PCP visit):   9/23/2024    Next Visit Date:  Future Appointments   Date Time Provider Department Center   11/20/2024  1:00 PM Chelo Pal MD [facility] DEP   1/6/2025  8:45 AM Chelo Pal MD [facility] DEP       **If hasn't been seen in over a year OR hasn't followed up according to last diabetes/ADHD visit, make appointment for patient before sending refill to provider.    Rx requested:  Requested Prescriptions     Pending Prescriptions Disp Refills    Ferrous Sulfate ER 50 MG TBCR [Pharmacy Med Name: SLOW RELEASE IRON 160 MG TAB] 90 tablet 1     Sig: TAKE 160 MG BY MOUTH 2 TIMES DAILY                    From: Radha Miranda  To:  Anthony Lopez MD  Sent: 12/8/2020 9:47 AM CST  Subject: Prescription Question    I have a question about TSH+FREE T4 resulted on 12/7/20 at 5:44 PM.  Could you please recommend a refill , as I don't have any medicine at

## 2024-11-19 ENCOUNTER — HOSPITAL ENCOUNTER (OUTPATIENT)
Dept: LAB | Age: 64
Discharge: HOME | End: 2024-11-19
Payer: COMMERCIAL

## 2024-11-19 DIAGNOSIS — E11.9 CONTROLLED TYPE 2 DIABETES MELLITUS WITHOUT COMPLICATION, WITHOUT LONG-TERM CURRENT USE OF INSULIN (HCC): ICD-10-CM

## 2024-11-19 LAB
ALBUMIN SERPL-MCNC: 3.6 G/DL (ref 3.5–4.6)
ALP SERPL-CCNC: 202 U/L (ref 35–104)
ALT SERPL-CCNC: 18 U/L (ref 0–41)
ANION GAP SERPL CALCULATED.3IONS-SCNC: 11 MEQ/L (ref 9–15)
AST SERPL-CCNC: 27 U/L (ref 0–40)
BILIRUB SERPL-MCNC: 0.3 MG/DL (ref 0.2–0.7)
BUN SERPL-MCNC: 26 MG/DL (ref 8–23)
CALCIUM SERPL-MCNC: 9.4 MG/DL (ref 8.5–9.9)
CHLORIDE SERPL-SCNC: 98 MEQ/L (ref 95–107)
CO2 SERPL-SCNC: 28 MEQ/L (ref 20–31)
CREAT SERPL-MCNC: 0.75 MG/DL (ref 0.7–1.2)
CREAT UR-MCNC: 55.5 MG/DL
ESTIMATED AVERAGE GLUCOSE: 166 MG/DL
GLOBULIN SER CALC-MCNC: 2.6 G/DL (ref 2.3–3.5)
GLUCOSE FASTING: 158 MG/DL (ref 70–99)
HBA1C MFR BLD: 7.4 % (ref 4–6)
MICROALBUMIN UR-MCNC: <1.2 MG/DL
MICROALBUMIN/CREAT UR-RTO: NORMAL MG/G (ref 0–30)
POTASSIUM SERPL-SCNC: 3.6 MEQ/L (ref 3.4–4.9)
PROT SERPL-MCNC: 6.2 G/DL (ref 6.3–8)
SODIUM SERPL-SCNC: 137 MEQ/L (ref 135–144)

## 2024-11-19 PROCEDURE — 82043 UR ALBUMIN QUANTITATIVE: CPT

## 2024-11-19 PROCEDURE — 80053 COMPREHEN METABOLIC PANEL: CPT

## 2024-11-19 PROCEDURE — 83036 HEMOGLOBIN GLYCOSYLATED A1C: CPT

## 2024-11-19 PROCEDURE — 36415 COLL VENOUS BLD VENIPUNCTURE: CPT

## 2024-11-19 PROCEDURE — 82570 ASSAY OF URINE CREATININE: CPT

## 2024-11-20 ENCOUNTER — OFFICE VISIT (OUTPATIENT)
Dept: FAMILY MEDICINE CLINIC | Age: 64
End: 2024-11-20
Payer: COMMERCIAL

## 2024-11-20 VITALS
BODY MASS INDEX: 23.85 KG/M2 | WEIGHT: 148.4 LBS | OXYGEN SATURATION: 97 % | SYSTOLIC BLOOD PRESSURE: 116 MMHG | DIASTOLIC BLOOD PRESSURE: 68 MMHG | HEIGHT: 66 IN | HEART RATE: 79 BPM | TEMPERATURE: 98 F

## 2024-11-20 DIAGNOSIS — E11.59 HYPERTENSION ASSOCIATED WITH DIABETES (HCC): ICD-10-CM

## 2024-11-20 DIAGNOSIS — D75.839 THROMBOCYTOSIS: ICD-10-CM

## 2024-11-20 DIAGNOSIS — I50.22 CHRONIC SYSTOLIC CONGESTIVE HEART FAILURE (HCC): ICD-10-CM

## 2024-11-20 DIAGNOSIS — E11.69 HYPERLIPIDEMIA ASSOCIATED WITH TYPE 2 DIABETES MELLITUS (HCC): ICD-10-CM

## 2024-11-20 DIAGNOSIS — E11.9 CONTROLLED TYPE 2 DIABETES MELLITUS WITHOUT COMPLICATION, WITHOUT LONG-TERM CURRENT USE OF INSULIN (HCC): ICD-10-CM

## 2024-11-20 DIAGNOSIS — I25.810 CORONARY ARTERY DISEASE INVOLVING AUTOLOGOUS ARTERY CORONARY BYPASS GRAFT WITHOUT ANGINA PECTORIS: ICD-10-CM

## 2024-11-20 DIAGNOSIS — Z90.81 ACQUIRED ABSENCE OF SPLEEN: Primary | ICD-10-CM

## 2024-11-20 DIAGNOSIS — I48.19 PERSISTENT ATRIAL FIBRILLATION (HCC): ICD-10-CM

## 2024-11-20 DIAGNOSIS — I15.2 HYPERTENSION ASSOCIATED WITH DIABETES (HCC): ICD-10-CM

## 2024-11-20 DIAGNOSIS — E78.5 HYPERLIPIDEMIA ASSOCIATED WITH TYPE 2 DIABETES MELLITUS (HCC): ICD-10-CM

## 2024-11-20 DIAGNOSIS — L98.9 SCALP LESION: ICD-10-CM

## 2024-11-20 PROBLEM — Z76.89 ENCOUNTER TO ESTABLISH CARE: Status: RESOLVED | Noted: 2024-09-23 | Resolved: 2024-11-20

## 2024-11-20 PROCEDURE — G8484 FLU IMMUNIZE NO ADMIN: HCPCS | Performed by: INTERNAL MEDICINE

## 2024-11-20 PROCEDURE — 2022F DILAT RTA XM EVC RTNOPTHY: CPT | Performed by: INTERNAL MEDICINE

## 2024-11-20 PROCEDURE — 3051F HG A1C>EQUAL 7.0%<8.0%: CPT | Performed by: INTERNAL MEDICINE

## 2024-11-20 PROCEDURE — 1036F TOBACCO NON-USER: CPT | Performed by: INTERNAL MEDICINE

## 2024-11-20 PROCEDURE — 3074F SYST BP LT 130 MM HG: CPT | Performed by: INTERNAL MEDICINE

## 2024-11-20 PROCEDURE — G8427 DOCREV CUR MEDS BY ELIG CLIN: HCPCS | Performed by: INTERNAL MEDICINE

## 2024-11-20 PROCEDURE — 3017F COLORECTAL CA SCREEN DOC REV: CPT | Performed by: INTERNAL MEDICINE

## 2024-11-20 PROCEDURE — 3078F DIAST BP <80 MM HG: CPT | Performed by: INTERNAL MEDICINE

## 2024-11-20 PROCEDURE — G8420 CALC BMI NORM PARAMETERS: HCPCS | Performed by: INTERNAL MEDICINE

## 2024-11-20 RX ORDER — UBIDECARENONE 30 MG
CAPSULE ORAL DAILY
COMMUNITY
Start: 2024-10-07

## 2024-11-20 ASSESSMENT — ENCOUNTER SYMPTOMS
ABDOMINAL PAIN: 0
VOICE CHANGE: 0
EYE PAIN: 0
BLOOD IN STOOL: 0
PHOTOPHOBIA: 0
CONSTIPATION: 0
NAUSEA: 0
COLOR CHANGE: 0

## 2024-11-20 NOTE — ASSESSMENT & PLAN NOTE
Read the labels of the foods you buy from the market to find out how much fat is present. Under 5% of total fat on a label means it is \"low fat\". Over 20% of total fat on a label means it is \"high fat\".  Of total calories that you eat you should only be obtaining 10% or less of these calories from saturated fats.    Eat high fiber foods including green leafy vegetables, fruits, legum's and  whole grain breads. .This type of fiber helps lower cholesterol in the body. Choose oatmeal, fruits (like apples), beans, and nuts to get the most soluble fiber.    Stay away from butter, stick margarine, shortening, lard, palm and coconut oil.  Choose plant-based spreads instead.    Do not eat high-fat processed foods like hot dogs, major, sausage, ham and other luncheon meats high in fat, and some frozen foods.  Routinely choose fish, chicken, turkey, and lean meats instead.    As a rule of thumb stay away from deep fried foods.

## 2024-11-20 NOTE — ASSESSMENT & PLAN NOTE
POCT glycosylated hemoglobin (Hb A1C)  -  A1C stable at 7.4  -  Diabetes Counseling : Discussed disease risks, adopting healthy behaviors,  monitoring glucose and bringing logs to visits  - Discussed most recent labs in detail, foot care, and compliance with all medications, and yearly eye exam  - Diet: Addressed ADA, low sodium and low cholesterol diet.  - Exercise: discussed need for aerobic exercise 3 times weekly  - Medications:   Continued current med dose

## 2024-11-20 NOTE — PROGRESS NOTES
[facility] PRIMARY CARE  [address]   [address]  [facility] [address]  Dept: [phone]  Dept Fax: [phone]  Loc: [phone]     11/20/2024    Visit type: Follow up    Reason for Visit: Skin Problem (Patient presents today with concerns of spot on top of head x2 months. Would like evaluated ) and Discuss Medications (Patient would like to know if he should be on COQ10. Would like to discuss potassium medication. Prescribed 2 tablets TID. Would like to know if he should take this BID. Would like to discuss magnesium dose and lasix dosing. )       ASSESSMENT/PLAN   1. Hypertension associated with diabetes (HCC)  Assessment & Plan:   Blood pressure within normal limits today in the office. Continue current medication.  Advised to continue DASH diet.   2. Persistent atrial fibrillation (HCC)  Assessment & Plan:   Monitored by specialist- no acute findings meriting change in the plan symptomatic at this time  3. Chronic systolic congestive heart failure (HCC)  4. Controlled type 2 diabetes mellitus without complication, without long-term current use of insulin (HCC)  Assessment & Plan:    POCT glycosylated hemoglobin (Hb A1C)  -  A1C stable at 7.4  -  Diabetes Counseling : Discussed disease risks, adopting healthy behaviors,  monitoring glucose and bringing logs to visits  - Discussed most recent labs in detail, foot care, and compliance with all medications, and yearly eye exam  - Diet: Addressed ADA, low sodium and low cholesterol diet.  - Exercise: discussed need for aerobic exercise 3 times weekly  - Medications:   Continued current med dose          Orders:  -     Hemoglobin A1C; Future  -     Microalbumin / Creatinine Urine Ratio; Future  -     Comprehensive Metabolic Panel, Fasting; Future  5. Hyperlipidemia associated with type 2 diabetes mellitus (HCC)  Assessment & Plan:   Read the labels of the foods you buy from the market to find out how much fat is present.

## 2024-12-31 ENCOUNTER — HOSPITAL ENCOUNTER (EMERGENCY)
Facility: HOSPITAL | Age: 64
Discharge: HOME | End: 2024-12-31
Attending: EMERGENCY MEDICINE
Payer: COMMERCIAL

## 2024-12-31 VITALS
DIASTOLIC BLOOD PRESSURE: 85 MMHG | HEIGHT: 66 IN | WEIGHT: 132 LBS | RESPIRATION RATE: 16 BRPM | SYSTOLIC BLOOD PRESSURE: 141 MMHG | TEMPERATURE: 97.3 F | HEART RATE: 87 BPM | BODY MASS INDEX: 21.21 KG/M2 | OXYGEN SATURATION: 96 %

## 2024-12-31 DIAGNOSIS — R04.0 EPISTAXIS: Primary | ICD-10-CM

## 2024-12-31 PROCEDURE — 2500000005 HC RX 250 GENERAL PHARMACY W/O HCPCS: Performed by: EMERGENCY MEDICINE

## 2024-12-31 PROCEDURE — 2500000001 HC RX 250 WO HCPCS SELF ADMINISTERED DRUGS (ALT 637 FOR MEDICARE OP): Performed by: EMERGENCY MEDICINE

## 2024-12-31 PROCEDURE — 99283 EMERGENCY DEPT VISIT LOW MDM: CPT | Performed by: EMERGENCY MEDICINE

## 2024-12-31 PROCEDURE — 99282 EMERGENCY DEPT VISIT SF MDM: CPT

## 2024-12-31 RX ORDER — LIDOCAINE HYDROCHLORIDE 40 MG/ML
1 SOLUTION TOPICAL ONCE
Status: COMPLETED | OUTPATIENT
Start: 2024-12-31 | End: 2024-12-31

## 2024-12-31 RX ORDER — OXYMETAZOLINE HCL 0.05 %
2 SPRAY, NON-AEROSOL (ML) NASAL EVERY 12 HOURS PRN
Status: DISCONTINUED | OUTPATIENT
Start: 2024-12-31 | End: 2024-12-31 | Stop reason: HOSPADM

## 2024-12-31 RX ADMIN — LIDOCAINE HYDROCHLORIDE 1 APPLICATION: 40 SOLUTION TOPICAL at 10:44

## 2024-12-31 RX ADMIN — PHENYLEPHRINE HYDROCHLORIDE 2 SPRAY: 0.25 SPRAY NASAL at 10:18

## 2024-12-31 RX ADMIN — OXYMETAZOLINE HCL 2 SPRAY: 0.05 SPRAY NASAL at 10:18

## 2024-12-31 ASSESSMENT — LIFESTYLE VARIABLES
HAVE YOU EVER FELT YOU SHOULD CUT DOWN ON YOUR DRINKING: NO
TOTAL SCORE: 0
EVER FELT BAD OR GUILTY ABOUT YOUR DRINKING: NO
HAVE PEOPLE ANNOYED YOU BY CRITICIZING YOUR DRINKING: NO
EVER HAD A DRINK FIRST THING IN THE MORNING TO STEADY YOUR NERVES TO GET RID OF A HANGOVER: NO

## 2024-12-31 ASSESSMENT — PAIN SCALES - GENERAL: PAINLEVEL_OUTOF10: 0 - NO PAIN

## 2024-12-31 ASSESSMENT — PAIN - FUNCTIONAL ASSESSMENT: PAIN_FUNCTIONAL_ASSESSMENT: 0-10

## 2024-12-31 NOTE — ED PROVIDER NOTES
HPI   Chief Complaint   Patient presents with    Epistaxis (Nose Bleed)     Patient was on a walk and started getting a nose bleed lasting over 15 mins, draining out his eye, both nostrils and down back of his throat       HPI: 64-year-old male presents via EMS for a nosebleed.  Patient states that he typically gets nosebleeds when it is dry.  He is on Eliquis.  He states that normally it is just 1 side and today it seemed to be both sides.  He states that he did try to apply pressure but it seemed to be bleeding more than 15 minutes so he called 911.  He did not fall.  No trauma.  No chest pain no shortness of breath no dizziness.    Family HX: Denies any significant/pertinent family history.  Social Hx: Denies ETOH or drug use.  Review of systems:  Gen.: No weight loss, fatigue, anorexia, insomnia, fever.   Eyes: No vision loss, double vision, drainage, eye pain.   ENT: No pharyngitis, dry mouth.   Cardiac: No chest pain, palpitations, syncope, near syncope.   Pulmonary: No shortness of breath, cough, hemoptysis.   Heme/lymph: No swollen glands, fever, bleeding.   GI: No abdominal pain, change in bowel habits, melena, hematemesis, hematochezia, nausea, vomiting, diarrhea.   : No discharge, dysuria, frequency, urgency, hematuria.   Musculoskeletal: No limb pain, joint pain, joint swelling.   Skin: No rashes.   Psych: No depression, anxiety, suicidality, homicidality.   Review of systems is otherwise negative unless stated above or in history of present illness.    Physical Exam:    Appearance: Alert, oriented , cooperative,  in no acute distress. Well nourished & well hydrated.    Skin: Intact,  dry skin, no lesions, rash, petechiae or purpura.     Eyes: PERRLA, EOMs intact,  Conjunctiva pink with no redness or exudates. Eyelids without lesions. No scleral icterus.     ENT: Hearing grossly intact. External auditory canals patent, tympanic membranes intact with visible landmarks. Nares patent, mucus membranes  moist. Dentition without lesions. Pharynx clear, uvula midline.  No active bleeding at this time.  There is dried blood in bilateral naris right greater than left.    Neck: Supple, without meningismus. Thyroid not palpable. Trachea at midline. No lymphadenopathy.    Pulmonary: Clear bilaterally with good chest wall excursion. No rales, rhonchi or wheezing. No accessory muscle use or stridor.    Cardiac: Normal S1, S2 without murmur, rub, gallop or extrasystole. No JVD, Carotids without bruits.    Abdomen: Soft, nontender, active bowel sounds.  No palpable organomegaly.  No rebound or guarding.  No CVA tenderness.    Genitourinary: Exam deferred.    Musculoskeletal: Full range of motion. no pain, edema, or deformity. Pulses full and equal. No cyanosis, clubbing, or edema.    Neurological:  Cranial nerves II through XII are grossly intact, finger-nose touch is normal, normal sensation, no weakness, no focal findings identified.    Psychiatric: Appropriate mood and affect.     Medical Decision-Making:  Testing: I did not see any clots when I looked into evacuate.  I did apply Afrin.  I did watch the patient.  He had no further rebleeding.  He is hemodynamically stable.  Therefore at this time patient will be discharged home with a follow-up with ENT.  Return for worsening symptoms, bleeding, headache, vision changes, any other concerns  Treatment:   Reevaluation:   Plan: Homegoing. Discussed differential. Will follow-up with the primary physician in the next 2-3 days. Return if worse. They understand return precautions and discharge instructions. Patient and family/friend/caregiver are in agreement with this plan.   Impression:   1.  Epistaxis  2.                  Patient History   Past Medical History:   Diagnosis Date    Arrhythmia     Cancer (Multi)      Past Surgical History:   Procedure Laterality Date    AORTIC VALVE REPLACEMENT      CARDIAC ELECTROPHYSIOLOGY PROCEDURE N/A 2/27/2024    Procedure: Ablation SVT;   Surgeon: Chepe Dennis MD;  Location: [facility];  Service: Electrophysiology;  Laterality: N/A;    CARDIAC ELECTROPHYSIOLOGY PROCEDURE N/A 2/28/2024    Procedure: PPM IMPLANT DUAL;  Surgeon: Chepe Dennis MD;  Location: [facility];  Service: Electrophysiology;  Laterality: N/A;    CORONARY ARTERY BYPASS GRAFT       No family history on file.  Social History     Tobacco Use    Smoking status: Never    Smokeless tobacco: Never   Vaping Use    Vaping status: Never Used   Substance Use Topics    Alcohol use: Yes     Comment: 2 harpal daily    Drug use: Yes     Types: Marijuana     Comment: daily       Physical Exam   ED Triage Vitals [12/31/24 0955]   Temperature Heart Rate Respirations BP   36.3 °C (97.3 °F) (!) 113 18 (!) 177/108      Pulse Ox Temp Source Heart Rate Source Patient Position   95 % Temporal Monitor Sitting      BP Location FiO2 (%)     Right arm --       Physical Exam      ED Course & MDM   Diagnoses as of 12/31/24 1252   Epistaxis                 No data recorded                                 Medical Decision Making      Procedure  Procedures     Twyla Kay MD  12/31/24 1252

## 2025-01-02 ENCOUNTER — HOSPITAL ENCOUNTER (OUTPATIENT)
Dept: CARDIOLOGY | Facility: HOSPITAL | Age: 65
Discharge: HOME | End: 2025-01-02
Payer: COMMERCIAL

## 2025-01-02 ENCOUNTER — APPOINTMENT (OUTPATIENT)
Dept: CARDIOLOGY | Facility: CLINIC | Age: 65
End: 2025-01-02
Payer: COMMERCIAL

## 2025-01-02 VITALS
SYSTOLIC BLOOD PRESSURE: 120 MMHG | BODY MASS INDEX: 21.49 KG/M2 | DIASTOLIC BLOOD PRESSURE: 62 MMHG | HEART RATE: 76 BPM | HEIGHT: 66 IN | WEIGHT: 133.7 LBS

## 2025-01-02 DIAGNOSIS — R94.31 ABNORMAL EKG: ICD-10-CM

## 2025-01-02 DIAGNOSIS — Z95.0 PACEMAKER: Primary | ICD-10-CM

## 2025-01-02 DIAGNOSIS — Z51.81 ANTICOAGULATION MANAGEMENT ENCOUNTER: ICD-10-CM

## 2025-01-02 DIAGNOSIS — Z79.01 ANTICOAGULATION MANAGEMENT ENCOUNTER: ICD-10-CM

## 2025-01-02 DIAGNOSIS — I49.5 SINUS NODE DYSFUNCTION (MULTI): ICD-10-CM

## 2025-01-02 DIAGNOSIS — Z78.9 NEVER SMOKED TOBACCO: ICD-10-CM

## 2025-01-02 DIAGNOSIS — Z79.899 HIGH RISK MEDICATION USE: ICD-10-CM

## 2025-01-02 DIAGNOSIS — I45.89 AV DISSOCIATION: ICD-10-CM

## 2025-01-02 DIAGNOSIS — I10 HTN (HYPERTENSION), BENIGN: ICD-10-CM

## 2025-01-02 DIAGNOSIS — Z95.0 PACEMAKER: ICD-10-CM

## 2025-01-02 DIAGNOSIS — I48.0 PAROXYSMAL ATRIAL FIBRILLATION (MULTI): ICD-10-CM

## 2025-01-02 PROCEDURE — 1036F TOBACCO NON-USER: CPT | Performed by: INTERNAL MEDICINE

## 2025-01-02 PROCEDURE — 93280 PM DEVICE PROGR EVAL DUAL: CPT | Performed by: INTERNAL MEDICINE

## 2025-01-02 PROCEDURE — 93280 PM DEVICE PROGR EVAL DUAL: CPT

## 2025-01-02 PROCEDURE — 93000 ELECTROCARDIOGRAM COMPLETE: CPT | Mod: DISTINCT PROCEDURAL SERVICE | Performed by: INTERNAL MEDICINE

## 2025-01-02 PROCEDURE — 3008F BODY MASS INDEX DOCD: CPT | Performed by: INTERNAL MEDICINE

## 2025-01-02 PROCEDURE — 3074F SYST BP LT 130 MM HG: CPT | Performed by: INTERNAL MEDICINE

## 2025-01-02 PROCEDURE — 3078F DIAST BP <80 MM HG: CPT | Performed by: INTERNAL MEDICINE

## 2025-01-02 PROCEDURE — 99214 OFFICE O/P EST MOD 30 MIN: CPT | Performed by: INTERNAL MEDICINE

## 2025-01-02 ASSESSMENT — ENCOUNTER SYMPTOMS
PALPITATIONS: 0
DYSPNEA ON EXERTION: 0

## 2025-01-02 NOTE — PROGRESS NOTES
CARDIOLOGY OFFICE VISIT      CHIEF COMPLAINT  Chief Complaint   Patient presents with    Follow-up     6 month with [facility]        HISTORY OF PRESENT ILLNESS  HPI  64-year-old male with a past medical history of coronary artery disease with percutaneous coronary interventions in the past and history of aortic valve replacement at [facility] approximately 14 years ago.  History of Hodgkin lymphoma cancer at the age of 19 and status post radiation therapy.  He also has been diagnosed of atrial flutter for at least 5 years.  He is not on no full dose anticoagulant therapy due to history of hemorrhoids.  His last echocardiogram in 2017 shows left ventricular action fraction of 60%.     Looking at the records that are a little bit limited during this evaluation, he had an a stress test due to shortness of breath back in April 2023 that was abnormal.  There was evidence of Mobitz type I arrhythmia as well as a small area of ischemia in the inferior wall.  He underwent a cardiac catheterization that showed triple-vessel disease.  He underwent coronary artery bypass graft surgery in August 2023 at [facility] with SVG to obtuse marginal first, right coronary artery with sequential, SVG to LAD of could have graft to obtuse marginal and right coronary artery.  Tricuspid valve repair in.  Complications postprocedure were related to Mobitz 1 and fascicular block during preoperative evaluation.  Also intraoperatively he had junctional rhythm with heart rates in the 20s to 30s.  Post day #1 he was noted to have A-V dissociation follow-up with persisting atrial fibrillation.  Apparently there was a questionable evaluation for pacemaker.  She also had anemia pneumothorax with subcutaneous emphysema requiring bilateral chest tube.     Since then patient states that he was back on Eliquis therapy.  Looking at his records also he was on sotalol therapy at some point but this medication is not available in his medication list during  this office visit.     Patient also states that he has been going to [place] taking care of her mom and he was seen by a cardiology service at that time.  No chest pain or medical therapies were performed.     Patient states that he continues having occasional palpitations during the day.  But overall he can do most of his activities with no problems.  During the last office visit in February 2024, patient was in atrial flutter.     Patient underwent atrial flutter ablation in February 2024 with no complications.  Prior to ablation therapy, he had a transesophageal echocardiogram on 2/26/2024  showing normal LV function, mild mitral stenosis, no evidence of any mass or thrombus. Left atrium was moderate to severely dilated.     After ablation therapy, underlying rhythm was sinus rhythm with 2-1 AV block.  Patient underwent dual-chamber pacemaker implantation with successful results.    Patient states that so far he has been doing well.  He has occasional palpitations but once he takes extra Lopressor his symptoms are much better controlled.  Sometimes he has been noticing heart rates around 110-220 bpm.    Patient had a emergency department visit in December 31, 2024 due to nosebleed.    Device interrogation today at the device clinic shows dual-chamber pacemaker Medtronic with battery Ingevity 11 years 8 months.  No evidence of atrial fibrillation or high ventricular events noted.  EKG performed today shows atrial sensed ventricular paced rhythm at rate of 76 bpm.  There is 272 ms QT corrected 422 ms.  Rhythm strip shows the same pattern.      Past Medical History  Past Medical History:   Diagnosis Date    Arrhythmia     Cancer (Multi)        Social History  Social History     Tobacco Use    Smoking status: Never    Smokeless tobacco: Never   Vaping Use    Vaping status: Never Used   Substance Use Topics    Alcohol use: Yes     Comment: 2 harpal daily    Drug use: Yes     Types: Marijuana     Comment:  daily       Family History   No family history on file.     Allergies:  Allergies   Allergen Reactions    Pollen Extracts Hives    Atorvastatin Other    Insects Extract Other     Patient is allergic to flying ants    Iodinated Contrast Media Unknown    Ragweed Other        Outpatient Medications:  Current Outpatient Medications   Medication Instructions    acetaminophen (TYLENOL) 650 mg, oral, Every 4 hours PRN    amoxicillin (Amoxil) 500 mg tablet TAKE 2,000 MG (4 TABLETS) BY MOUTH SEE ADMIN INSTRUCTIONS PRIOR TO DENTAL PROCEDURES.    apixaban (ELIQUIS) 5 mg, oral, 2 times daily    aspirin 81 mg, Daily RT    coenzyme Q10-vitamin E 100-5 mg-unit capsule 1 capsule, Daily    cyanocobalamin (VITAMIN B-12) 1,000 mcg, Daily RT    empagliflozin (JARDIANCE) 25 mg, Daily    EPINEPHrine (EPIPEN) 0.3 mg, Once as needed    ferrous sulfate, dried 160 mg (50 mg iron) ER tablet TAKE 160 MG BY MOUTH 2 TIMES DAILY    folic acid (Folvite) 800 mcg tablet 1 tablet, Daily    furosemide (Lasix) 20 mg tablet 3 tablets, 2 times daily    magnesium oxide (MAG-OX) 400 mg, Every other day    metoprolol tartrate (LOPRESSOR) 25 mg, oral, 2 times daily    nitroglycerin (NITROSTAT) 0.4 mg, Every 5 min PRN    pantoprazole (ProtoNix) 20 mg EC tablet 1 tablet, Daily    potassium chloride CR (Klor-Con) 10 mEq ER tablet 20 mEq, oral, 3 times daily, Do not crush, chew, or split.  Take with food    pravastatin (PRAVACHOL) 40 mg, Nightly          REVIEW OF SYSTEMS  Review of Systems   HENT:  Positive for nosebleeds.    Cardiovascular:  Negative for chest pain, dyspnea on exertion and palpitations.   All other systems reviewed and are negative.        VITALS  Vitals:    01/02/25 1544   BP: 120/62   Pulse: 76       PHYSICAL EXAM  Constitutional:       General: Awake.      Appearance: Normal and healthy appearance. Well-developed and not in distress.   Neck:      Vascular: No JVR. JVD normal.   Pulmonary:      Effort: Pulmonary effort is normal.      Breath  sounds: Normal breath sounds. No wheezing. No rhonchi. No rales.   Chest:      Chest wall: Not tender to palpatation.      Comments: Left sided device pocket- healed and well approximated. No swelling or hematoma      Cardiovascular:      PMI at left midclavicular line. Normal rate. Regular rhythm. Normal S1. Normal S2.       Murmurs: There is no murmur.      No gallop.  No click. No rub.   Pulses:     Intact distal pulses.   Edema:     Peripheral edema absent.   Abdominal:      Tenderness: There is no abdominal tenderness.   Musculoskeletal: Normal range of motion.         General: No tenderness. Skin:     General: Skin is warm and dry.   Neurological:      General: No focal deficit present.      Mental Status: Alert and oriented to person, place and time.           ASSESSMENT AND PLAN  Clinical impression     1.  Palpitation  2.  Evidence of atrial flutter since 2018.  Not sure about treatment for his arrhythmia.  At some point he was on sotalol therapy but this medication is not today in his medical list.  Status post atrial flutter ablation in February 2024 with no recurrence of this arrhythmia  3.  Normal left ventricular function per echogram 2017  4.  Severe triple-vessel disease status post coronary artery bypass graft surgery in 2023 as described above with tricuspid valve repair  5.  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  6.  History of Hodgkin lymphoma radiotherapy at the age of 19.  7.  Long-term anticoagulant therapy with Eliquis with suboptimal dose  8.  Evidence of second-degree block type II status post pacemaker implanted in February 2024 with no complications    Plan recommendation    From the electrophysiologist on point he is stable.  Episodes of tachycardia and patient is feeling not related with any significant atrial or ventricular arrhythmias.  Would prefer to continue with current dose of beta-blocker therapy.    Follow my office every 6 months  or sooner needed.    Follow device clinic as scheduled.    Patient is to have a close follow-up with ENT for evaluation of nosebleed.  So far will continue with anticoagulant therapy with Eliquis.    Risk factor modification and lifestyle modification discussed with patient. Diet , exercise and hydration discussed with patient.    I have personally review with patient during this office visit, laboratory data, echocardiogram results, stress test results, Holter-event monitor results prior and after the last electrophysiology visit. All questions has been answered.    Please excuse any errors in grammar or translation related to this dictation.  Voice recognition software was utilized to prepare this document.

## 2025-01-02 NOTE — PATIENT INSTRUCTIONS
Continue same medications/treatment.  Patient educated on proper medication use.  Patient educated on risk factor modification.  Please bring any lab results from other providers/physicians to your next appointment.    Please bring all medicines, vitamins, and herbal supplements with you when you come to the office.    Prescriptions will not be filled unless you are compliant with your follow up appointments or have a follow up appointment scheduled as per instruction of your physician. Refills should be requested at the time of your visit.    RESTART Eliquis next Tuesday per Dr. Ku  Follow up with Dr. Ku in 6 months with device check   Continue remote checks at 3 and 9 months    Thuy BASHIR RN, AM SCRIBING FOR, AND IN THE PRESENCE OF DR. NEIL KU MD

## 2025-01-03 RX ORDER — FUROSEMIDE 40 MG/1
80 TABLET ORAL 2 TIMES DAILY
Qty: 360 TABLET | Refills: 0 | Status: SHIPPED | OUTPATIENT
Start: 2025-01-03

## 2025-01-03 NOTE — TELEPHONE ENCOUNTER
Comments:     Last Office Visit (last PCP visit):   11/20/2024    Next Visit Date:  Future Appointments   Date Time Provider Department Center   1/9/2025  3:20 PM Avramovic, Maja, APRN - CNP [facility]   2/20/2025  8:30 AM Chelo Pal MD [facility] DEP       **If hasn't been seen in over a year OR hasn't followed up according to last diabetes/ADHD visit, make appointment for patient before sending refill to provider.    Rx requested:  Requested Prescriptions     Pending Prescriptions Disp Refills    furosemide (LASIX) 40 MG tablet [Pharmacy Med Name: FUROSEMIDE 40 MG TABLET] 360 tablet 0     Sig: TAKE 2 TABLETS BY MOUTH TWICE A DAY

## 2025-01-09 ENCOUNTER — APPOINTMENT (OUTPATIENT)
Dept: CARDIOLOGY | Facility: CLINIC | Age: 65
End: 2025-01-09
Payer: COMMERCIAL

## 2025-01-09 ENCOUNTER — OFFICE VISIT (OUTPATIENT)
Dept: CARDIOLOGY CLINIC | Age: 65
End: 2025-01-09
Payer: COMMERCIAL

## 2025-01-09 ENCOUNTER — APPOINTMENT (OUTPATIENT)
Dept: CARDIOLOGY | Facility: HOSPITAL | Age: 65
End: 2025-01-09
Payer: COMMERCIAL

## 2025-01-09 VITALS
OXYGEN SATURATION: 95 % | SYSTOLIC BLOOD PRESSURE: 116 MMHG | HEART RATE: 71 BPM | DIASTOLIC BLOOD PRESSURE: 78 MMHG | BODY MASS INDEX: 22.6 KG/M2 | WEIGHT: 140 LBS

## 2025-01-09 DIAGNOSIS — I15.2 HYPERTENSION ASSOCIATED WITH DIABETES (HCC): ICD-10-CM

## 2025-01-09 DIAGNOSIS — E11.59 HYPERTENSION ASSOCIATED WITH DIABETES (HCC): ICD-10-CM

## 2025-01-09 DIAGNOSIS — I25.810 CORONARY ARTERY DISEASE INVOLVING AUTOLOGOUS ARTERY CORONARY BYPASS GRAFT WITHOUT ANGINA PECTORIS: Primary | ICD-10-CM

## 2025-01-09 PROCEDURE — 1036F TOBACCO NON-USER: CPT

## 2025-01-09 PROCEDURE — 99213 OFFICE O/P EST LOW 20 MIN: CPT

## 2025-01-09 PROCEDURE — G8427 DOCREV CUR MEDS BY ELIG CLIN: HCPCS

## 2025-01-09 PROCEDURE — 2022F DILAT RTA XM EVC RTNOPTHY: CPT

## 2025-01-09 PROCEDURE — 3074F SYST BP LT 130 MM HG: CPT

## 2025-01-09 PROCEDURE — G8420 CALC BMI NORM PARAMETERS: HCPCS

## 2025-01-09 PROCEDURE — 3078F DIAST BP <80 MM HG: CPT

## 2025-01-09 PROCEDURE — 3046F HEMOGLOBIN A1C LEVEL >9.0%: CPT

## 2025-01-09 PROCEDURE — 93000 ELECTROCARDIOGRAM COMPLETE: CPT

## 2025-01-09 PROCEDURE — 3017F COLORECTAL CA SCREEN DOC REV: CPT

## 2025-01-09 NOTE — PROGRESS NOTES
Patient also does report occasional dizziness with position changes or sometimes just at rest. No lightheadedness.     1-9-2025: Patient here today for general check up. Doing ok overall.   S/p CABG x 3 in June 2023.   Follows up with Dr. Good for dual chamber PPM   EKG today showing v paced 77bpm  BP stable today.   Carotid US on 7/2024 showing bilateral ICA less than 50% stenosis    Pt denies chest pain, dyspnea, dyspnea on exertion, change in exercise capacity, fatigue,  nausea, vomiting, diarrhea, constipation, motor weakness, insomnia, weight loss, syncope, dizziness, lightheadedness, palpitations, PND, orthopnea, or claudication.                                                      Patient Active Problem List   Diagnosis    Heart murmur    GERD (gastroesophageal reflux disease)    CAD (coronary artery disease)    History of stroke    Controlled type 2 diabetes mellitus without complication, without long-term current use of insulin (HCC)    History of aortic valve replacement    Hiatal hernia    Diverticulosis    AVM (arteriovenous malformation)    Internal hemorrhoid    History of tobacco abuse    Persistent atrial fibrillation (HCC)    Coronary artery disease involving autologous artery coronary bypass graft without angina pectoris    Thrombocytosis    Acquired absence of spleen    Hyperlipidemia associated with type 2 diabetes mellitus (HCC)    Hypertension associated with diabetes (HCC)    Chronic congestive heart failure (HCC)    Scalp lesion       Past Surgical History:   Procedure Laterality Date    ANGIOPLASTY  11/24/2015    [facility]  PCI PROCEDURE    AORTIC VALVE REPLACEMENT  2010    Bovine-MODEL# [id number]    CARDIAC CATHETERIZATION  07/27/2018    CARDIAC VALVE REPLACEMENT      HERNIA REPAIR      3 times    PTCA  2008/2010    RESOLUTE/MEDTRONICS     SIGMOIDOSCOPY      SKIN CANCER EXCISION  10/21/2016    SPLENECTOMY      TOOTH EXTRACTION      VENTRICULAR ABLATION SURGERY      WISDOM TOOTH EXTRACTION

## 2025-01-14 DIAGNOSIS — I10 PRIMARY HYPERTENSION: Chronic | ICD-10-CM

## 2025-01-14 RX ORDER — METOPROLOL TARTRATE 50 MG
100 TABLET ORAL 2 TIMES DAILY
Qty: 120 TABLET | Refills: 5 | Status: SHIPPED | OUTPATIENT
Start: 2025-01-14

## 2025-01-14 NOTE — TELEPHONE ENCOUNTER
Patient is requesting medication refill. Please approve or deny this request.    Rx requested:  Requested Prescriptions     Pending Prescriptions Disp Refills    metoprolol tartrate (LOPRESSOR) 50 MG tablet 120 tablet 5     Sig: Take 2 tablets by mouth 2 times daily         Last Office Visit:   Visit date not found      Next Visit Date:  Future Appointments   Date Time Provider Department Center   2/20/2025  8:30 AM Chelo Pal MD [facility]   7/10/2025  8:30 AM Ruth Mcmullen APRN - CNP [facility]

## 2025-02-25 ENCOUNTER — APPOINTMENT (OUTPATIENT)
Dept: RADIOLOGY | Facility: HOSPITAL | Age: 65
End: 2025-02-25
Payer: COMMERCIAL

## 2025-02-25 ENCOUNTER — HOSPITAL ENCOUNTER (OUTPATIENT)
Dept: MRI IMAGING | Age: 65
Discharge: HOME | End: 2025-02-27
Attending: INTERNAL MEDICINE
Payer: COMMERCIAL

## 2025-02-25 ENCOUNTER — HOSPITAL ENCOUNTER (INPATIENT)
Facility: HOSPITAL | Age: 65
End: 2025-02-25
Attending: EMERGENCY MEDICINE | Admitting: STUDENT IN AN ORGANIZED HEALTH CARE EDUCATION/TRAINING PROGRAM
Payer: COMMERCIAL

## 2025-02-25 ENCOUNTER — OFFICE VISIT (OUTPATIENT)
Dept: FAMILY MEDICINE CLINIC | Age: 65
End: 2025-02-25
Payer: COMMERCIAL

## 2025-02-25 ENCOUNTER — APPOINTMENT (OUTPATIENT)
Dept: CARDIOLOGY | Facility: HOSPITAL | Age: 65
End: 2025-02-25
Payer: COMMERCIAL

## 2025-02-25 VITALS
HEART RATE: 87 BPM | SYSTOLIC BLOOD PRESSURE: 126 MMHG | HEIGHT: 66 IN | WEIGHT: 140 LBS | OXYGEN SATURATION: 97 % | BODY MASS INDEX: 22.5 KG/M2 | DIASTOLIC BLOOD PRESSURE: 72 MMHG | TEMPERATURE: 98.9 F

## 2025-02-25 VITALS — SYSTOLIC BLOOD PRESSURE: 115 MMHG | OXYGEN SATURATION: 92 % | DIASTOLIC BLOOD PRESSURE: 66 MMHG | HEART RATE: 100 BPM

## 2025-02-25 DIAGNOSIS — D72.829 LEUKOCYTOSIS, UNSPECIFIED TYPE: ICD-10-CM

## 2025-02-25 DIAGNOSIS — I38 ENDOCARDITIS, VALVE UNSPECIFIED: ICD-10-CM

## 2025-02-25 DIAGNOSIS — M46.46 DISCITIS OF LUMBAR REGION: ICD-10-CM

## 2025-02-25 DIAGNOSIS — R50.9 FEVER, UNSPECIFIED FEVER CAUSE: ICD-10-CM

## 2025-02-25 DIAGNOSIS — F10.10 ETOH ABUSE: ICD-10-CM

## 2025-02-25 DIAGNOSIS — M54.40 ACUTE BILATERAL LOW BACK PAIN WITH SCIATICA, SCIATICA LATERALITY UNSPECIFIED: ICD-10-CM

## 2025-02-25 DIAGNOSIS — E87.6 HYPOKALEMIA: ICD-10-CM

## 2025-02-25 DIAGNOSIS — M46.46 DISCITIS OF LUMBAR REGION: Primary | ICD-10-CM

## 2025-02-25 DIAGNOSIS — M47.896 OTHER OSTEOARTHRITIS OF SPINE, LUMBAR REGION: ICD-10-CM

## 2025-02-25 DIAGNOSIS — E78.2 MIXED HYPERLIPIDEMIA: Chronic | ICD-10-CM

## 2025-02-25 DIAGNOSIS — K21.9 GASTROESOPHAGEAL REFLUX DISEASE WITHOUT ESOPHAGITIS: ICD-10-CM

## 2025-02-25 DIAGNOSIS — E11.9 CONTROLLED TYPE 2 DIABETES MELLITUS WITHOUT COMPLICATION, WITHOUT LONG-TERM CURRENT USE OF INSULIN (HCC): ICD-10-CM

## 2025-02-25 DIAGNOSIS — Z85.72 HISTORY OF LYMPHOMA: ICD-10-CM

## 2025-02-25 DIAGNOSIS — G06.1: ICD-10-CM

## 2025-02-25 DIAGNOSIS — M46.26 OSTEOMYELITIS OF LUMBAR SPINE (MULTI): ICD-10-CM

## 2025-02-25 DIAGNOSIS — R93.7 ABNORMAL MRI, LUMBAR SPINE: Primary | ICD-10-CM

## 2025-02-25 DIAGNOSIS — I50.9 CHRONIC CONGESTIVE HEART FAILURE, UNSPECIFIED HEART FAILURE TYPE (HCC): ICD-10-CM

## 2025-02-25 DIAGNOSIS — Z90.81 ACQUIRED ABSENCE OF SPLEEN: ICD-10-CM

## 2025-02-25 DIAGNOSIS — I48.19 PERSISTENT ATRIAL FIBRILLATION (HCC): ICD-10-CM

## 2025-02-25 DIAGNOSIS — G06.2 EPIDURAL ABSCESS (HHS-HCC): Primary | ICD-10-CM

## 2025-02-25 DIAGNOSIS — E87.1 HYPONATREMIA: ICD-10-CM

## 2025-02-25 DIAGNOSIS — J18.9 PNEUMONIA DUE TO INFECTIOUS ORGANISM, UNSPECIFIED LATERALITY, UNSPECIFIED PART OF LUNG: ICD-10-CM

## 2025-02-25 PROBLEM — M47.9 SPINAL OSTEOARTHRITIS: Status: ACTIVE | Noted: 2025-02-25

## 2025-02-25 LAB
ALBUMIN SERPL BCP-MCNC: 3.7 G/DL (ref 3.4–5)
ALP SERPL-CCNC: 170 U/L (ref 33–136)
ALT SERPL W P-5'-P-CCNC: 19 U/L (ref 10–52)
ANION GAP SERPL CALC-SCNC: 17 MMOL/L (ref 10–20)
APPEARANCE UR: CLEAR
AST SERPL W P-5'-P-CCNC: 21 U/L (ref 9–39)
BILIRUB SERPL-MCNC: 0.4 MG/DL (ref 0–1.2)
BILIRUB UR STRIP.AUTO-MCNC: NEGATIVE MG/DL
BUN SERPL-MCNC: 30 MG/DL (ref 6–23)
CALCIUM SERPL-MCNC: 10 MG/DL (ref 8.6–10.3)
CARDIAC TROPONIN I PNL SERPL HS: 14 NG/L (ref 0–20)
CHLORIDE SERPL-SCNC: 90 MMOL/L (ref 98–107)
CO2 SERPL-SCNC: 25 MMOL/L (ref 21–32)
COLOR UR: COLORLESS
CREAT SERPL-MCNC: 0.84 MG/DL (ref 0.5–1.3)
EGFRCR SERPLBLD CKD-EPI 2021: >90 ML/MIN/1.73M*2
ERYTHROCYTE [DISTWIDTH] IN BLOOD BY AUTOMATED COUNT: 13.9 % (ref 11.5–14.5)
FLUAV RNA RESP QL NAA+PROBE: NOT DETECTED
FLUBV RNA RESP QL NAA+PROBE: NOT DETECTED
GLUCOSE SERPL-MCNC: 122 MG/DL (ref 74–99)
GLUCOSE UR STRIP.AUTO-MCNC: ABNORMAL MG/DL
HCT VFR BLD AUTO: 39.9 % (ref 41–52)
HGB BLD-MCNC: 13.8 G/DL (ref 13.5–17.5)
KETONES UR STRIP.AUTO-MCNC: NEGATIVE MG/DL
LACTATE SERPL-SCNC: 1.3 MMOL/L (ref 0.4–2)
LEUKOCYTE ESTERASE UR QL STRIP.AUTO: NEGATIVE
LIPASE SERPL-CCNC: 7 U/L (ref 9–82)
MCH RBC QN AUTO: 31.6 PG (ref 26–34)
MCHC RBC AUTO-ENTMCNC: 34.6 G/DL (ref 32–36)
MCV RBC AUTO: 91 FL (ref 80–100)
NITRITE UR QL STRIP.AUTO: NEGATIVE
NRBC BLD-RTO: 0 /100 WBCS (ref 0–0)
PH UR STRIP.AUTO: 5 [PH]
PLATELET # BLD AUTO: 607 X10*3/UL (ref 150–450)
POTASSIUM SERPL-SCNC: 3.1 MMOL/L (ref 3.5–5.3)
PROT SERPL-MCNC: 7.5 G/DL (ref 6.4–8.2)
PROT UR STRIP.AUTO-MCNC: NEGATIVE MG/DL
RBC # BLD AUTO: 4.37 X10*6/UL (ref 4.5–5.9)
RBC # UR STRIP.AUTO: NEGATIVE MG/DL
SARS-COV-2 RNA RESP QL NAA+PROBE: NOT DETECTED
SODIUM SERPL-SCNC: 129 MMOL/L (ref 136–145)
SP GR UR STRIP.AUTO: 1.01
UROBILINOGEN UR STRIP.AUTO-MCNC: NORMAL MG/DL
WBC # BLD AUTO: 16.1 X10*3/UL (ref 4.4–11.3)

## 2025-02-25 PROCEDURE — 1036F TOBACCO NON-USER: CPT | Performed by: INTERNAL MEDICINE

## 2025-02-25 PROCEDURE — 2500000004 HC RX 250 GENERAL PHARMACY W/ HCPCS (ALT 636 FOR OP/ED): Performed by: PHYSICIAN ASSISTANT

## 2025-02-25 PROCEDURE — 87636 SARSCOV2 & INF A&B AMP PRB: CPT | Performed by: PHYSICIAN ASSISTANT

## 2025-02-25 PROCEDURE — 00JU3ZZ INSPECTION OF SPINAL CANAL, PERCUTANEOUS APPROACH: ICD-10-PCS | Performed by: INTERNAL MEDICINE

## 2025-02-25 PROCEDURE — 71045 X-RAY EXAM CHEST 1 VIEW: CPT

## 2025-02-25 PROCEDURE — 81003 URINALYSIS AUTO W/O SCOPE: CPT | Performed by: PHYSICIAN ASSISTANT

## 2025-02-25 PROCEDURE — 96365 THER/PROPH/DIAG IV INF INIT: CPT

## 2025-02-25 PROCEDURE — G8420 CALC BMI NORM PARAMETERS: HCPCS | Performed by: INTERNAL MEDICINE

## 2025-02-25 PROCEDURE — 02HV33Z INSERTION OF INFUSION DEVICE INTO SUPERIOR VENA CAVA, PERCUTANEOUS APPROACH: ICD-10-PCS | Performed by: INTERNAL MEDICINE

## 2025-02-25 PROCEDURE — 3074F SYST BP LT 130 MM HG: CPT | Performed by: INTERNAL MEDICINE

## 2025-02-25 PROCEDURE — 99285 EMERGENCY DEPT VISIT HI MDM: CPT | Performed by: EMERGENCY MEDICINE

## 2025-02-25 PROCEDURE — 2500000001 HC RX 250 WO HCPCS SELF ADMINISTERED DRUGS (ALT 637 FOR MEDICARE OP): Performed by: NURSE PRACTITIONER

## 2025-02-25 PROCEDURE — 99215 OFFICE O/P EST HI 40 MIN: CPT | Performed by: INTERNAL MEDICINE

## 2025-02-25 PROCEDURE — 2022F DILAT RTA XM EVC RTNOPTHY: CPT | Performed by: INTERNAL MEDICINE

## 2025-02-25 PROCEDURE — 99223 1ST HOSP IP/OBS HIGH 75: CPT | Performed by: NURSE PRACTITIONER

## 2025-02-25 PROCEDURE — 96375 TX/PRO/DX INJ NEW DRUG ADDON: CPT

## 2025-02-25 PROCEDURE — 3078F DIAST BP <80 MM HG: CPT | Performed by: INTERNAL MEDICINE

## 2025-02-25 PROCEDURE — 85027 COMPLETE CBC AUTOMATED: CPT | Performed by: PHYSICIAN ASSISTANT

## 2025-02-25 PROCEDURE — 96367 TX/PROPH/DG ADDL SEQ IV INF: CPT

## 2025-02-25 PROCEDURE — 72148 MRI LUMBAR SPINE W/O DYE: CPT

## 2025-02-25 PROCEDURE — 83605 ASSAY OF LACTIC ACID: CPT | Performed by: PHYSICIAN ASSISTANT

## 2025-02-25 PROCEDURE — 96366 THER/PROPH/DIAG IV INF ADDON: CPT

## 2025-02-25 PROCEDURE — 2500000004 HC RX 250 GENERAL PHARMACY W/ HCPCS (ALT 636 FOR OP/ED): Performed by: NURSE PRACTITIONER

## 2025-02-25 PROCEDURE — 80053 COMPREHEN METABOLIC PANEL: CPT | Performed by: PHYSICIAN ASSISTANT

## 2025-02-25 PROCEDURE — 3017F COLORECTAL CA SCREEN DOC REV: CPT | Performed by: INTERNAL MEDICINE

## 2025-02-25 PROCEDURE — G8427 DOCREV CUR MEDS BY ELIG CLIN: HCPCS | Performed by: INTERNAL MEDICINE

## 2025-02-25 PROCEDURE — 93005 ELECTROCARDIOGRAM TRACING: CPT

## 2025-02-25 PROCEDURE — 3046F HEMOGLOBIN A1C LEVEL >9.0%: CPT | Performed by: INTERNAL MEDICINE

## 2025-02-25 PROCEDURE — 36415 COLL VENOUS BLD VENIPUNCTURE: CPT | Performed by: PHYSICIAN ASSISTANT

## 2025-02-25 PROCEDURE — 71045 X-RAY EXAM CHEST 1 VIEW: CPT | Performed by: RADIOLOGY

## 2025-02-25 PROCEDURE — 83690 ASSAY OF LIPASE: CPT | Performed by: PHYSICIAN ASSISTANT

## 2025-02-25 PROCEDURE — 2500000001 HC RX 250 WO HCPCS SELF ADMINISTERED DRUGS (ALT 637 FOR MEDICARE OP): Performed by: PHYSICIAN ASSISTANT

## 2025-02-25 PROCEDURE — 84484 ASSAY OF TROPONIN QUANT: CPT | Performed by: PHYSICIAN ASSISTANT

## 2025-02-25 PROCEDURE — 1210000001 HC SEMI-PRIVATE ROOM DAILY

## 2025-02-25 PROCEDURE — 96361 HYDRATE IV INFUSION ADD-ON: CPT

## 2025-02-25 RX ORDER — FUROSEMIDE 40 MG/1
80 TABLET ORAL 2 TIMES DAILY
Qty: 360 TABLET | Refills: 0 | Status: SHIPPED | OUTPATIENT
Start: 2025-02-25

## 2025-02-25 RX ORDER — OXYCODONE HYDROCHLORIDE 5 MG/1
5 CAPSULE ORAL 3 TIMES DAILY
COMMUNITY

## 2025-02-25 RX ORDER — VANCOMYCIN HYDROCHLORIDE 1 G/200ML
1000 INJECTION, SOLUTION INTRAVENOUS ONCE
Status: COMPLETED | OUTPATIENT
Start: 2025-02-25 | End: 2025-02-25

## 2025-02-25 RX ORDER — KETOROLAC TROMETHAMINE 30 MG/ML
15 INJECTION, SOLUTION INTRAMUSCULAR; INTRAVENOUS ONCE
Status: COMPLETED | OUTPATIENT
Start: 2025-02-25 | End: 2025-02-25

## 2025-02-25 RX ORDER — ACETAMINOPHEN 650 MG/1
650 SUPPOSITORY RECTAL EVERY 4 HOURS PRN
Status: ACTIVE | OUTPATIENT
Start: 2025-02-25

## 2025-02-25 RX ORDER — PANTOPRAZOLE SODIUM 40 MG/1
40 TABLET, DELAYED RELEASE ORAL
Status: DISCONTINUED | OUTPATIENT
Start: 2025-02-25 | End: 2025-02-26

## 2025-02-25 RX ORDER — POLYETHYLENE GLYCOL 3350 17 G/17G
17 POWDER, FOR SOLUTION ORAL DAILY PRN
Status: DISPENSED | OUTPATIENT
Start: 2025-02-25

## 2025-02-25 RX ORDER — PHENOBARBITAL 32.4 MG/1
64.8 TABLET ORAL 3 TIMES DAILY
Status: COMPLETED | OUTPATIENT
Start: 2025-02-25 | End: 2025-02-27

## 2025-02-25 RX ORDER — MAGNESIUM OXIDE 400 MG/1
400 TABLET ORAL DAILY
Qty: 90 TABLET | Refills: 2 | Status: SHIPPED | OUTPATIENT
Start: 2025-02-25

## 2025-02-25 RX ORDER — VANCOMYCIN HYDROCHLORIDE 1 G/20ML
INJECTION, POWDER, LYOPHILIZED, FOR SOLUTION INTRAVENOUS DAILY PRN
Status: DISPENSED | OUTPATIENT
Start: 2025-02-25

## 2025-02-25 RX ORDER — PHENOBARBITAL 32.4 MG/1
32.4 TABLET ORAL 3 TIMES DAILY
Status: COMPLETED | OUTPATIENT
Start: 2025-02-27 | End: 2025-03-01

## 2025-02-25 RX ORDER — PRAVASTATIN SODIUM 40 MG
TABLET ORAL
Qty: 90 TABLET | Refills: 3 | Status: SHIPPED | OUTPATIENT
Start: 2025-02-25

## 2025-02-25 RX ORDER — ACETAMINOPHEN 325 MG/1
650 TABLET ORAL EVERY 4 HOURS PRN
Status: ACTIVE | OUTPATIENT
Start: 2025-02-25

## 2025-02-25 RX ORDER — ACETAMINOPHEN 500 MG
10 TABLET ORAL NIGHTLY PRN
Status: DISPENSED | OUTPATIENT
Start: 2025-02-25

## 2025-02-25 RX ORDER — KETOROLAC TROMETHAMINE 30 MG/ML
30 INJECTION, SOLUTION INTRAMUSCULAR; INTRAVENOUS EVERY 6 HOURS PRN
Status: DISPENSED | OUTPATIENT
Start: 2025-02-25 | End: 2025-03-02

## 2025-02-25 RX ORDER — OXYCODONE AND ACETAMINOPHEN 5; 325 MG/1; MG/1
1 TABLET ORAL EVERY 8 HOURS PRN
Qty: 21 TABLET | Refills: 0 | Status: SHIPPED | OUTPATIENT
Start: 2025-02-25 | End: 2025-03-04

## 2025-02-25 RX ORDER — OXYCODONE AND ACETAMINOPHEN 5; 325 MG/1; MG/1
1 TABLET ORAL EVERY 6 HOURS PRN
Status: DISPENSED | OUTPATIENT
Start: 2025-02-25

## 2025-02-25 RX ORDER — LANOLIN ALCOHOL/MO/W.PET/CERES
100 CREAM (GRAM) TOPICAL DAILY
Status: DISPENSED | OUTPATIENT
Start: 2025-02-25

## 2025-02-25 RX ORDER — OXYCODONE HYDROCHLORIDE 5 MG/1
5 CAPSULE ORAL 3 TIMES DAILY
Status: CANCELLED | OUTPATIENT
Start: 2025-02-25

## 2025-02-25 RX ORDER — POTASSIUM CHLORIDE 1.5 G/1.58G
20 POWDER, FOR SOLUTION ORAL ONCE
Status: COMPLETED | OUTPATIENT
Start: 2025-02-25 | End: 2025-02-25

## 2025-02-25 RX ORDER — CHOLECALCIFEROL (VITAMIN D3) 10(400)/ML
DROPS ORAL
COMMUNITY
Start: 2025-01-07

## 2025-02-25 RX ORDER — ONDANSETRON HYDROCHLORIDE 2 MG/ML
4 INJECTION, SOLUTION INTRAVENOUS ONCE
Status: COMPLETED | OUTPATIENT
Start: 2025-02-25 | End: 2025-02-25

## 2025-02-25 RX ORDER — FOLIC ACID 1 MG/1
1 TABLET ORAL DAILY
Status: DISCONTINUED | OUTPATIENT
Start: 2025-02-25 | End: 2025-02-26

## 2025-02-25 RX ORDER — MULTIVIT-MIN/IRON FUM/FOLIC AC 7.5 MG-4
1 TABLET ORAL DAILY
Status: DISPENSED | OUTPATIENT
Start: 2025-02-25

## 2025-02-25 RX ORDER — PANTOPRAZOLE SODIUM 20 MG/1
TABLET, DELAYED RELEASE ORAL
Qty: 90 TABLET | Refills: 3 | Status: SHIPPED | OUTPATIENT
Start: 2025-02-25

## 2025-02-25 RX ORDER — ACETAMINOPHEN 160 MG/5ML
650 SOLUTION ORAL EVERY 4 HOURS PRN
Status: ACTIVE | OUTPATIENT
Start: 2025-02-25

## 2025-02-25 RX ADMIN — Medication 1 TABLET: at 20:32

## 2025-02-25 RX ADMIN — PHENOBARBITAL 64.8 MG: 32.4 TABLET ORAL at 20:31

## 2025-02-25 RX ADMIN — FOLIC ACID 1 MG: 1 TABLET ORAL at 20:32

## 2025-02-25 RX ADMIN — ONDANSETRON 4 MG: 2 INJECTION INTRAMUSCULAR; INTRAVENOUS at 17:40

## 2025-02-25 RX ADMIN — SODIUM CHLORIDE 500 ML: 9 INJECTION, SOLUTION INTRAVENOUS at 17:14

## 2025-02-25 RX ADMIN — Medication 100 MG: at 20:32

## 2025-02-25 RX ADMIN — VANCOMYCIN HYDROCHLORIDE 1000 MG: 1 INJECTION, SOLUTION INTRAVENOUS at 18:51

## 2025-02-25 RX ADMIN — OXYCODONE HYDROCHLORIDE AND ACETAMINOPHEN 1 TABLET: 5; 325 TABLET ORAL at 20:31

## 2025-02-25 RX ADMIN — KETOROLAC TROMETHAMINE 30 MG: 30 INJECTION, SOLUTION INTRAMUSCULAR at 23:33

## 2025-02-25 RX ADMIN — POTASSIUM CHLORIDE 20 MEQ: 1.5 POWDER, FOR SOLUTION ORAL at 18:54

## 2025-02-25 RX ADMIN — KETOROLAC TROMETHAMINE 15 MG: 30 INJECTION, SOLUTION INTRAMUSCULAR at 17:41

## 2025-02-25 RX ADMIN — PIPERACILLIN AND TAZOBACTAM 4.5 G: 4; .5 INJECTION, POWDER, FOR SOLUTION INTRAVENOUS at 18:48

## 2025-02-25 SDOH — ECONOMIC STABILITY: FOOD INSECURITY: WITHIN THE PAST 12 MONTHS, YOU WORRIED THAT YOUR FOOD WOULD RUN OUT BEFORE YOU GOT MONEY TO BUY MORE.: NEVER TRUE

## 2025-02-25 SDOH — ECONOMIC STABILITY: FOOD INSECURITY: WITHIN THE PAST 12 MONTHS, THE FOOD YOU BOUGHT JUST DIDN'T LAST AND YOU DIDN'T HAVE MONEY TO GET MORE.: NEVER TRUE

## 2025-02-25 ASSESSMENT — PAIN DESCRIPTION - ORIENTATION
ORIENTATION: RIGHT;LEFT
ORIENTATION: LEFT;RIGHT

## 2025-02-25 ASSESSMENT — PAIN - FUNCTIONAL ASSESSMENT
PAIN_FUNCTIONAL_ASSESSMENT: 0-10
PAIN_FUNCTIONAL_ASSESSMENT: 0-10

## 2025-02-25 ASSESSMENT — PATIENT HEALTH QUESTIONNAIRE - PHQ9
1. LITTLE INTEREST OR PLEASURE IN DOING THINGS: NOT AT ALL
10. IF YOU CHECKED OFF ANY PROBLEMS, HOW DIFFICULT HAVE THESE PROBLEMS MADE IT FOR YOU TO DO YOUR WORK, TAKE CARE OF THINGS AT HOME, OR GET ALONG WITH OTHER PEOPLE: NOT DIFFICULT AT ALL
4. FEELING TIRED OR HAVING LITTLE ENERGY: NOT AT ALL
SUM OF ALL RESPONSES TO PHQ QUESTIONS 1-9: 0
SUM OF ALL RESPONSES TO PHQ9 QUESTIONS 1 & 2: 0
7. TROUBLE CONCENTRATING ON THINGS, SUCH AS READING THE NEWSPAPER OR WATCHING TELEVISION: NOT AT ALL
9. THOUGHTS THAT YOU WOULD BE BETTER OFF DEAD, OR OF HURTING YOURSELF: NOT AT ALL
SUM OF ALL RESPONSES TO PHQ QUESTIONS 1-9: 0
8. MOVING OR SPEAKING SO SLOWLY THAT OTHER PEOPLE COULD HAVE NOTICED. OR THE OPPOSITE, BEING SO FIGETY OR RESTLESS THAT YOU HAVE BEEN MOVING AROUND A LOT MORE THAN USUAL: NOT AT ALL
6. FEELING BAD ABOUT YOURSELF - OR THAT YOU ARE A FAILURE OR HAVE LET YOURSELF OR YOUR FAMILY DOWN: NOT AT ALL
3. TROUBLE FALLING OR STAYING ASLEEP: NOT AT ALL
2. FEELING DOWN, DEPRESSED OR HOPELESS: NOT AT ALL
SUM OF ALL RESPONSES TO PHQ QUESTIONS 1-9: 0
5. POOR APPETITE OR OVEREATING: NOT AT ALL
SUM OF ALL RESPONSES TO PHQ QUESTIONS 1-9: 0

## 2025-02-25 ASSESSMENT — LIFESTYLE VARIABLES
VISUAL DISTURBANCES: NOT PRESENT
AUDITORY DISTURBANCES: NOT PRESENT
AGITATION: SOMEWHAT MORE THAN NORMAL ACTIVITY
HEADACHE, FULLNESS IN HEAD: NOT PRESENT
ANXIETY: NO ANXIETY, AT EASE
TACTILE DISTURBANCES: VERY MILD ITCHING, PINS AND NEEDLES, BURNING OR NUMBNESS
TOTAL SCORE: 5
EVER FELT BAD OR GUILTY ABOUT YOUR DRINKING: NO
TOTAL SCORE: 0
PAROXYSMAL SWEATS: NO SWEAT VISIBLE
NAUSEA AND VOMITING: 2
TREMOR: NOT VISIBLE, BUT CAN BE FELT FINGERTIP TO FINGERTIP
ORIENTATION AND CLOUDING OF SENSORIUM: ORIENTED AND CAN DO SERIAL ADDITIONS
HAVE YOU EVER FELT YOU SHOULD CUT DOWN ON YOUR DRINKING: NO
EVER HAD A DRINK FIRST THING IN THE MORNING TO STEADY YOUR NERVES TO GET RID OF A HANGOVER: NO
HAVE PEOPLE ANNOYED YOU BY CRITICIZING YOUR DRINKING: NO

## 2025-02-25 ASSESSMENT — ENCOUNTER SYMPTOMS
SHORTNESS OF BREATH: 0
PHOTOPHOBIA: 0
BACK PAIN: 1
COLOR CHANGE: 0
FEVER: 1
CONSTIPATION: 0
BACK PAIN: 1
CHILLS: 0
ABDOMINAL PAIN: 0
CONSTIPATION: 0
NAUSEA: 0
FLANK PAIN: 0
HEMATURIA: 0
VOMITING: 0
VOICE CHANGE: 0
PALPITATIONS: 0
ABDOMINAL PAIN: 0
DYSURIA: 0
FREQUENCY: 0
DIARRHEA: 0
NAUSEA: 0
EYE PAIN: 0
BLOOD IN STOOL: 0

## 2025-02-25 ASSESSMENT — PAIN SCALES - GENERAL
PAINLEVEL_OUTOF10: 6
PAINLEVEL_OUTOF10: 3
PAINLEVEL_OUTOF10: 6
PAINLEVEL_OUTOF10: 6

## 2025-02-25 ASSESSMENT — PAIN DESCRIPTION - LOCATION
LOCATION: BACK
LOCATION: BACK

## 2025-02-25 ASSESSMENT — PAIN DESCRIPTION - PAIN TYPE
TYPE: ACUTE PAIN

## 2025-02-25 ASSESSMENT — PAIN DESCRIPTION - FREQUENCY: FREQUENCY: CONSTANT/CONTINUOUS

## 2025-02-25 ASSESSMENT — PAIN DESCRIPTION - DESCRIPTORS: DESCRIPTORS: ACHING

## 2025-02-25 NOTE — Clinical Note
Report was give by SHELIA CALLAWAY in [facility]. Telephone Encounter by Traci Ku RMA at 11/03/17 05:54 PM     Author:  Traci Ku RMA Service:  (none) Author Type:  Certified Medical Assistant     Filed:  11/03/17 05:55 PM Encounter Date:  11/1/2017 Status:  Signed     :  Traci Ku RMA (Certified Medical Assistant)            Rx was sent via e-prescribing to the requested pharmacy.[PR1.1T]      Revision History        User Key Date/Time User Provider Type Action    > PR1.1 11/03/17 05:55 PM Traci Ku RMA Certified Medical Assistant Sign    T - Template

## 2025-02-25 NOTE — FLOWSHEET NOTE
1200 Pt placed into Sure Scan mode remotely via [employer] iPad  device and MRI tech. (Mode )  Pt transferred onto MRI table and placed onto monitor.  Pt alert and oriented.  VSS    1208 Exam started, pt given call ball.     1215 Tolerating exam without any issues.     1230 Exam complete, pt transferred off of MRI table.  Remotely taken out of Sure Scan mode and into original setting via [employer] and MRI tech.     1235 Pt walked to changing room with a steady gait for d/c to home.

## 2025-02-25 NOTE — PROGRESS NOTES
[facility]  [address]  [address]  [place] [facility] [place] [address]  Dept: [phone]  Dept Fax: [phone]  Loc: [phone]     2/25/2025    Visit type: Acute care    The patient (or guardian, if applicable) and other individuals in attendance with the patient were advised that Artificial Intelligence will be utilized during this visit to record, process the conversation to generate a clinical note, and support improvement of the AI technology. The patient (or guardian, if applicable) and other individuals in attendance at the appointment consented to the use of AI, including the recording.      Reason for Visit: 3 Month Follow-Up       ASSESSMENT/PLAN     Assessment & Plan  1. Back pain.,acute  The patient's back pain, coupled with fever, raises concerns about potential underlying conditions. His elevated white blood cell count and C-reactive protein levels suggest an inflammatory process. Given his history of Hodgkin's lymphoma and diabetes, it is crucial to rule out serious causes such as infection or malignancy. 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 A previous x-ray in 2021 revealed significant degenerative arthritic changes in the lumbar spine, which could be contributing to his current symptoms. An MRI of the lumbar spine without contrast will be ordered to further investigate the cause of his back pain. A prescription for Percocet 5/325 mg, to be taken as needed every 8 hours, has been provided. He is advised to take ibuprofen 800 mg every 8 hours as needed, but not concurrently with the muscle relaxant. The use of Tylenol is not recommended at this time due to its presence in Percocet. He is instructed to continue his antibiotic regimen. If his condition worsens, he should seek immediate medical attention at the ER.    Addendum the MRI results came shows possible discitis versus osteomyelitis of L4-L5 S1-S2 given the

## 2025-02-26 LAB
ANION GAP SERPL CALC-SCNC: 13 MMOL/L (ref 10–20)
BUN SERPL-MCNC: 25 MG/DL (ref 6–23)
CALCIUM SERPL-MCNC: 9.6 MG/DL (ref 8.6–10.3)
CHLORIDE SERPL-SCNC: 95 MMOL/L (ref 98–107)
CO2 SERPL-SCNC: 24 MMOL/L (ref 21–32)
CREAT SERPL-MCNC: 0.77 MG/DL (ref 0.5–1.3)
EGFRCR SERPLBLD CKD-EPI 2021: >90 ML/MIN/1.73M*2
ERYTHROCYTE [DISTWIDTH] IN BLOOD BY AUTOMATED COUNT: 13.8 % (ref 11.5–14.5)
GLUCOSE SERPL-MCNC: 177 MG/DL (ref 74–99)
HCT VFR BLD AUTO: 35.6 % (ref 41–52)
HGB BLD-MCNC: 12.4 G/DL (ref 13.5–17.5)
HOLD SPECIMEN: NORMAL
HOLD SPECIMEN: NORMAL
MCH RBC QN AUTO: 31.2 PG (ref 26–34)
MCHC RBC AUTO-ENTMCNC: 34.8 G/DL (ref 32–36)
MCV RBC AUTO: 90 FL (ref 80–100)
NRBC BLD-RTO: 0 /100 WBCS (ref 0–0)
PLATELET # BLD AUTO: 606 X10*3/UL (ref 150–450)
POTASSIUM SERPL-SCNC: 3.8 MMOL/L (ref 3.5–5.3)
RBC # BLD AUTO: 3.97 X10*6/UL (ref 4.5–5.9)
SODIUM SERPL-SCNC: 128 MMOL/L (ref 136–145)
VANCOMYCIN SERPL-MCNC: 8.4 UG/ML (ref 5–20)
VANCOMYCIN SERPL-MCNC: 9 UG/ML (ref 5–20)
WBC # BLD AUTO: 13.9 X10*3/UL (ref 4.4–11.3)

## 2025-02-26 PROCEDURE — 80048 BASIC METABOLIC PNL TOTAL CA: CPT | Performed by: NURSE PRACTITIONER

## 2025-02-26 PROCEDURE — 2500000001 HC RX 250 WO HCPCS SELF ADMINISTERED DRUGS (ALT 637 FOR MEDICARE OP): Performed by: FAMILY MEDICINE

## 2025-02-26 PROCEDURE — 2500000002 HC RX 250 W HCPCS SELF ADMINISTERED DRUGS (ALT 637 FOR MEDICARE OP, ALT 636 FOR OP/ED): Performed by: FAMILY MEDICINE

## 2025-02-26 PROCEDURE — 85027 COMPLETE CBC AUTOMATED: CPT | Performed by: NURSE PRACTITIONER

## 2025-02-26 PROCEDURE — 2500000004 HC RX 250 GENERAL PHARMACY W/ HCPCS (ALT 636 FOR OP/ED): Performed by: NURSE PRACTITIONER

## 2025-02-26 PROCEDURE — 99233 SBSQ HOSP IP/OBS HIGH 50: CPT | Performed by: FAMILY MEDICINE

## 2025-02-26 PROCEDURE — 99222 1ST HOSP IP/OBS MODERATE 55: CPT | Performed by: INTERNAL MEDICINE

## 2025-02-26 PROCEDURE — 36415 COLL VENOUS BLD VENIPUNCTURE: CPT | Performed by: NURSE PRACTITIONER

## 2025-02-26 PROCEDURE — 2500000004 HC RX 250 GENERAL PHARMACY W/ HCPCS (ALT 636 FOR OP/ED): Performed by: FAMILY MEDICINE

## 2025-02-26 PROCEDURE — 2500000001 HC RX 250 WO HCPCS SELF ADMINISTERED DRUGS (ALT 637 FOR MEDICARE OP): Performed by: NURSE PRACTITIONER

## 2025-02-26 PROCEDURE — 87040 BLOOD CULTURE FOR BACTERIA: CPT | Mod: ELYLAB | Performed by: INTERNAL MEDICINE

## 2025-02-26 PROCEDURE — 80202 ASSAY OF VANCOMYCIN: CPT | Performed by: FAMILY MEDICINE

## 2025-02-26 PROCEDURE — 80202 ASSAY OF VANCOMYCIN: CPT | Performed by: PHARMACIST

## 2025-02-26 PROCEDURE — 1210000001 HC SEMI-PRIVATE ROOM DAILY

## 2025-02-26 PROCEDURE — 36415 COLL VENOUS BLD VENIPUNCTURE: CPT | Performed by: FAMILY MEDICINE

## 2025-02-26 PROCEDURE — 87075 CULTR BACTERIA EXCEPT BLOOD: CPT | Mod: ELYLAB | Performed by: INTERNAL MEDICINE

## 2025-02-26 RX ORDER — LANOLIN ALCOHOL/MO/W.PET/CERES
400 CREAM (GRAM) TOPICAL EVERY OTHER DAY
Status: DISPENSED | OUTPATIENT
Start: 2025-02-27

## 2025-02-26 RX ORDER — PANTOPRAZOLE SODIUM 20 MG/1
20 TABLET, DELAYED RELEASE ORAL DAILY
Status: DISPENSED | OUTPATIENT
Start: 2025-02-27

## 2025-02-26 RX ORDER — PRAVASTATIN SODIUM 20 MG/1
40 TABLET ORAL NIGHTLY
Status: DISPENSED | OUTPATIENT
Start: 2025-02-26

## 2025-02-26 RX ORDER — METOPROLOL TARTRATE 25 MG/1
25 TABLET, FILM COATED ORAL 2 TIMES DAILY
Status: DISPENSED | OUTPATIENT
Start: 2025-02-26

## 2025-02-26 RX ORDER — ADHESIVE BANDAGE
30 BANDAGE TOPICAL DAILY PRN
Status: DISPENSED | OUTPATIENT
Start: 2025-02-26

## 2025-02-26 RX ORDER — POTASSIUM CHLORIDE 20 MEQ/1
20 TABLET, EXTENDED RELEASE ORAL 3 TIMES DAILY
Status: DISPENSED | OUTPATIENT
Start: 2025-02-26

## 2025-02-26 RX ORDER — FUROSEMIDE 40 MG/1
60 TABLET ORAL 2 TIMES DAILY
Status: DISPENSED | OUTPATIENT
Start: 2025-02-26

## 2025-02-26 RX ORDER — FOLIC ACID 1 MG/1
1 TABLET ORAL DAILY
Status: DISPENSED | OUTPATIENT
Start: 2025-02-27

## 2025-02-26 RX ORDER — ASPIRIN 81 MG/1
81 TABLET ORAL
Status: DISCONTINUED | OUTPATIENT
Start: 2025-02-26 | End: 2025-02-27

## 2025-02-26 RX ORDER — VANCOMYCIN/0.9 % SOD CHLORIDE 1.5G/250ML
1.5 PLASTIC BAG, INJECTION (ML) INTRAVENOUS EVERY 24 HOURS
Status: DISCONTINUED | OUTPATIENT
Start: 2025-02-26 | End: 2025-03-01

## 2025-02-26 RX ORDER — ONDANSETRON HYDROCHLORIDE 2 MG/ML
4 INJECTION, SOLUTION INTRAVENOUS EVERY 8 HOURS PRN
Status: DISPENSED | OUTPATIENT
Start: 2025-02-26

## 2025-02-26 RX ORDER — NITROGLYCERIN 0.4 MG/1
0.4 TABLET SUBLINGUAL EVERY 5 MIN PRN
Status: ACTIVE | OUTPATIENT
Start: 2025-02-26

## 2025-02-26 RX ORDER — FOLIC ACID 0.8 MG
800 TABLET ORAL DAILY
Status: DISCONTINUED | OUTPATIENT
Start: 2025-02-27 | End: 2025-02-26

## 2025-02-26 RX ADMIN — EMPAGLIFLOZIN 25 MG: 25 TABLET, FILM COATED ORAL at 11:57

## 2025-02-26 RX ADMIN — METOPROLOL TARTRATE 25 MG: 25 TABLET, FILM COATED ORAL at 11:57

## 2025-02-26 RX ADMIN — Medication 100 MG: at 08:44

## 2025-02-26 RX ADMIN — PIPERACILLIN SODIUM AND TAZOBACTAM SODIUM 3.38 G: 3; .375 INJECTION, SOLUTION INTRAVENOUS at 15:13

## 2025-02-26 RX ADMIN — POLYETHYLENE GLYCOL 3350 17 G: 17 POWDER, FOR SOLUTION ORAL at 15:19

## 2025-02-26 RX ADMIN — MAGNESIUM HYDROXIDE 30 ML: 400 SUSPENSION ORAL at 18:57

## 2025-02-26 RX ADMIN — PRAVASTATIN SODIUM 40 MG: 20 TABLET ORAL at 21:31

## 2025-02-26 RX ADMIN — FUROSEMIDE 60 MG: 40 TABLET ORAL at 21:31

## 2025-02-26 RX ADMIN — PHENOBARBITAL 64.8 MG: 32.4 TABLET ORAL at 21:31

## 2025-02-26 RX ADMIN — FUROSEMIDE 60 MG: 40 TABLET ORAL at 11:56

## 2025-02-26 RX ADMIN — POTASSIUM CHLORIDE 20 MEQ: 1500 TABLET, EXTENDED RELEASE ORAL at 15:13

## 2025-02-26 RX ADMIN — Medication 1 TABLET: at 08:44

## 2025-02-26 RX ADMIN — PIPERACILLIN SODIUM AND TAZOBACTAM SODIUM 3.38 G: 3; .375 INJECTION, SOLUTION INTRAVENOUS at 23:44

## 2025-02-26 RX ADMIN — PIPERACILLIN SODIUM AND TAZOBACTAM SODIUM 3.38 G: 3; .375 INJECTION, SOLUTION INTRAVENOUS at 01:35

## 2025-02-26 RX ADMIN — POTASSIUM CHLORIDE 20 MEQ: 1500 TABLET, EXTENDED RELEASE ORAL at 11:57

## 2025-02-26 RX ADMIN — POTASSIUM CHLORIDE 20 MEQ: 1500 TABLET, EXTENDED RELEASE ORAL at 21:31

## 2025-02-26 RX ADMIN — KETOROLAC TROMETHAMINE 30 MG: 30 INJECTION, SOLUTION INTRAMUSCULAR at 11:57

## 2025-02-26 RX ADMIN — PHENOBARBITAL 64.8 MG: 32.4 TABLET ORAL at 15:13

## 2025-02-26 RX ADMIN — PIPERACILLIN SODIUM AND TAZOBACTAM SODIUM 3.38 G: 3; .375 INJECTION, SOLUTION INTRAVENOUS at 08:44

## 2025-02-26 RX ADMIN — ONDANSETRON 4 MG: 2 INJECTION INTRAMUSCULAR; INTRAVENOUS at 20:06

## 2025-02-26 RX ADMIN — FOLIC ACID 1 MG: 1 TABLET ORAL at 08:44

## 2025-02-26 RX ADMIN — PHENOBARBITAL 64.8 MG: 32.4 TABLET ORAL at 08:44

## 2025-02-26 RX ADMIN — METOPROLOL TARTRATE 25 MG: 25 TABLET, FILM COATED ORAL at 21:31

## 2025-02-26 RX ADMIN — Medication 1.5 G: at 18:48

## 2025-02-26 RX ADMIN — PANTOPRAZOLE SODIUM 40 MG: 40 TABLET, DELAYED RELEASE ORAL at 05:50

## 2025-02-26 RX ADMIN — KETOROLAC TROMETHAMINE 30 MG: 30 INJECTION, SOLUTION INTRAMUSCULAR at 05:48

## 2025-02-26 RX ADMIN — KETOROLAC TROMETHAMINE 30 MG: 30 INJECTION, SOLUTION INTRAMUSCULAR at 18:40

## 2025-02-26 SDOH — ECONOMIC STABILITY: FOOD INSECURITY: WITHIN THE PAST 12 MONTHS, YOU WORRIED THAT YOUR FOOD WOULD RUN OUT BEFORE YOU GOT THE MONEY TO BUY MORE.: NEVER TRUE

## 2025-02-26 SDOH — SOCIAL STABILITY: SOCIAL NETWORK: HOW OFTEN DO YOU GET TOGETHER WITH FRIENDS OR RELATIVES?: THREE TIMES A WEEK

## 2025-02-26 SDOH — HEALTH STABILITY: PHYSICAL HEALTH
HOW OFTEN DO YOU NEED TO HAVE SOMEONE HELP YOU WHEN YOU READ INSTRUCTIONS, PAMPHLETS, OR OTHER WRITTEN MATERIAL FROM YOUR DOCTOR OR PHARMACY?: NEVER

## 2025-02-26 SDOH — SOCIAL STABILITY: SOCIAL INSECURITY: DO YOU FEEL ANYONE HAS EXPLOITED OR TAKEN ADVANTAGE OF YOU FINANCIALLY OR OF YOUR PERSONAL PROPERTY?: NO

## 2025-02-26 SDOH — SOCIAL STABILITY: SOCIAL NETWORK: HOW OFTEN DO YOU ATTEND MEETINGS OF THE CLUBS OR ORGANIZATIONS YOU BELONG TO?: NEVER

## 2025-02-26 SDOH — SOCIAL STABILITY: SOCIAL NETWORK
DO YOU BELONG TO ANY CLUBS OR ORGANIZATIONS SUCH AS CHURCH GROUPS, UNIONS, FRATERNAL OR ATHLETIC GROUPS, OR SCHOOL GROUPS?: NO

## 2025-02-26 SDOH — SOCIAL STABILITY: SOCIAL INSECURITY: HAVE YOU HAD ANY THOUGHTS OF HARMING ANYONE ELSE?: NO

## 2025-02-26 SDOH — SOCIAL STABILITY: SOCIAL INSECURITY
WITHIN THE LAST YEAR, HAVE YOU BEEN KICKED, HIT, SLAPPED, OR OTHERWISE PHYSICALLY HURT BY YOUR PARTNER OR EX-PARTNER?: NO

## 2025-02-26 SDOH — SOCIAL STABILITY: SOCIAL NETWORK: HOW OFTEN DO YOU ATTEND CHURCH OR RELIGIOUS SERVICES?: NEVER

## 2025-02-26 SDOH — ECONOMIC STABILITY: FOOD INSECURITY: WITHIN THE PAST 12 MONTHS, THE FOOD YOU BOUGHT JUST DIDN'T LAST AND YOU DIDN'T HAVE MONEY TO GET MORE.: NEVER TRUE

## 2025-02-26 SDOH — ECONOMIC STABILITY: INCOME INSECURITY: IN THE PAST 12 MONTHS HAS THE ELECTRIC, GAS, OIL, OR WATER COMPANY THREATENED TO SHUT OFF SERVICES IN YOUR HOME?: NO

## 2025-02-26 SDOH — SOCIAL STABILITY: SOCIAL INSECURITY: ARE YOU MARRIED, WIDOWED, DIVORCED, SEPARATED, NEVER MARRIED, OR LIVING WITH A PARTNER?: MARRIED

## 2025-02-26 SDOH — SOCIAL STABILITY: SOCIAL NETWORK: IN A TYPICAL WEEK, HOW MANY TIMES DO YOU TALK ON THE PHONE WITH FAMILY, FRIENDS, OR NEIGHBORS?: THREE TIMES A WEEK

## 2025-02-26 SDOH — SOCIAL STABILITY: SOCIAL INSECURITY: ARE YOU OR HAVE YOU BEEN THREATENED OR ABUSED PHYSICALLY, EMOTIONALLY, OR SEXUALLY BY ANYONE?: NO

## 2025-02-26 SDOH — SOCIAL STABILITY: SOCIAL INSECURITY: WITHIN THE LAST YEAR, HAVE YOU BEEN HUMILIATED OR EMOTIONALLY ABUSED IN OTHER WAYS BY YOUR PARTNER OR EX-PARTNER?: NO

## 2025-02-26 SDOH — SOCIAL STABILITY: SOCIAL INSECURITY: ABUSE: ADULT

## 2025-02-26 SDOH — SOCIAL STABILITY: SOCIAL INSECURITY
WITHIN THE LAST YEAR, HAVE YOU BEEN RAPED OR FORCED TO HAVE ANY KIND OF SEXUAL ACTIVITY BY YOUR PARTNER OR EX-PARTNER?: NO

## 2025-02-26 SDOH — SOCIAL STABILITY: SOCIAL INSECURITY: DOES ANYONE TRY TO KEEP YOU FROM HAVING/CONTACTING OTHER FRIENDS OR DOING THINGS OUTSIDE YOUR HOME?: NO

## 2025-02-26 SDOH — SOCIAL STABILITY: SOCIAL INSECURITY: WITHIN THE LAST YEAR, HAVE YOU BEEN AFRAID OF YOUR PARTNER OR EX-PARTNER?: NO

## 2025-02-26 SDOH — SOCIAL STABILITY: SOCIAL INSECURITY: HAS ANYONE EVER THREATENED TO HURT YOUR FAMILY OR YOUR PETS?: NO

## 2025-02-26 SDOH — SOCIAL STABILITY: SOCIAL INSECURITY: HAVE YOU HAD THOUGHTS OF HARMING ANYONE ELSE?: NO

## 2025-02-26 SDOH — HEALTH STABILITY: PHYSICAL HEALTH: ON AVERAGE, HOW MANY DAYS PER WEEK DO YOU ENGAGE IN MODERATE TO STRENUOUS EXERCISE (LIKE A BRISK WALK)?: 3 DAYS

## 2025-02-26 SDOH — SOCIAL STABILITY: SOCIAL INSECURITY: DO YOU FEEL UNSAFE GOING BACK TO THE PLACE WHERE YOU ARE LIVING?: NO

## 2025-02-26 SDOH — HEALTH STABILITY: PHYSICAL HEALTH: ON AVERAGE, HOW MANY MINUTES DO YOU ENGAGE IN EXERCISE AT THIS LEVEL?: 30 MIN

## 2025-02-26 SDOH — SOCIAL STABILITY: SOCIAL INSECURITY: WERE YOU ABLE TO COMPLETE ALL THE BEHAVIORAL HEALTH SCREENINGS?: YES

## 2025-02-26 SDOH — SOCIAL STABILITY: SOCIAL INSECURITY: ARE THERE ANY APPARENT SIGNS OF INJURIES/BEHAVIORS THAT COULD BE RELATED TO ABUSE/NEGLECT?: NO

## 2025-02-26 ASSESSMENT — LIFESTYLE VARIABLES
NAUSEA AND VOMITING: NO NAUSEA AND NO VOMITING
AUDITORY DISTURBANCES: NOT PRESENT
AUDITORY DISTURBANCES: NOT PRESENT
TOTAL SCORE: 0
ORIENTATION AND CLOUDING OF SENSORIUM: ORIENTED AND CAN DO SERIAL ADDITIONS
AUDITORY DISTURBANCES: NOT PRESENT
ANXIETY: NO ANXIETY, AT EASE
AUDITORY DISTURBANCES: NOT PRESENT
TREMOR: NO TREMOR
AGITATION: NORMAL ACTIVITY
HOW OFTEN DO YOU HAVE 6 OR MORE DRINKS ON ONE OCCASION: DAILY OR ALMOST DAILY
PAROXYSMAL SWEATS: NO SWEAT VISIBLE
ANXIETY: NO ANXIETY, AT EASE
NAUSEA AND VOMITING: NO NAUSEA AND NO VOMITING
PAROXYSMAL SWEATS: NO SWEAT VISIBLE
TREMOR: NO TREMOR
AGITATION: NORMAL ACTIVITY
PAROXYSMAL SWEATS: NO SWEAT VISIBLE
TREMOR: NO TREMOR
TOTAL SCORE: 0
HOW OFTEN DURING THE LAST YEAR HAVE YOU FOUND THAT YOU WERE NOT ABLE TO STOP DRINKING ONCE YOU HAD STARTED: NEVER
NAUSEA AND VOMITING: NO NAUSEA AND NO VOMITING
TOTAL SCORE: 0
AUDIT-C TOTAL SCORE: 10
ORIENTATION AND CLOUDING OF SENSORIUM: ORIENTED AND CAN DO SERIAL ADDITIONS
TREMOR: NO TREMOR
VISUAL DISTURBANCES: NOT PRESENT
TOTAL SCORE: 0
NAUSEA AND VOMITING: NO NAUSEA AND NO VOMITING
NAUSEA AND VOMITING: NO NAUSEA AND NO VOMITING
AGITATION: NORMAL ACTIVITY
AUDIT TOTAL SCORE: 10
VISUAL DISTURBANCES: NOT PRESENT
TREMOR: NO TREMOR
ANXIETY: NO ANXIETY, AT EASE
VISUAL DISTURBANCES: NOT PRESENT
HEADACHE, FULLNESS IN HEAD: NOT PRESENT
ORIENTATION AND CLOUDING OF SENSORIUM: ORIENTED AND CAN DO SERIAL ADDITIONS
NAUSEA AND VOMITING: NO NAUSEA AND NO VOMITING
TOTAL SCORE: 0
TREMOR: NO TREMOR
AUDIT TOTAL SCORE: 0
HOW OFTEN DURING THE LAST YEAR HAVE YOU NEEDED AN ALCOHOLIC DRINK FIRST THING IN THE MORNING TO GET YOURSELF GOING AFTER A NIGHT OF HEAVY DRINKING: NEVER
AGITATION: NORMAL ACTIVITY
PRESCIPTION_ABUSE_PAST_12_MONTHS: NO
TOTAL SCORE: 1
VISUAL DISTURBANCES: NOT PRESENT
HOW MANY STANDARD DRINKS CONTAINING ALCOHOL DO YOU HAVE ON A TYPICAL DAY: 5 OR 6
VISUAL DISTURBANCES: NOT PRESENT
ORIENTATION AND CLOUDING OF SENSORIUM: ORIENTED AND CAN DO SERIAL ADDITIONS
HEADACHE, FULLNESS IN HEAD: NOT PRESENT
HEADACHE, FULLNESS IN HEAD: NOT PRESENT
ANXIETY: NO ANXIETY, AT EASE
HEADACHE, FULLNESS IN HEAD: NOT PRESENT
HAS A RELATIVE, FRIEND, DOCTOR, OR ANOTHER HEALTH PROFESSIONAL EXPRESSED CONCERN ABOUT YOUR DRINKING OR SUGGESTED YOU CUT DOWN: NO
TOTAL SCORE: 0
ORIENTATION AND CLOUDING OF SENSORIUM: ORIENTED AND CAN DO SERIAL ADDITIONS
AGITATION: NORMAL ACTIVITY
HOW OFTEN DURING THE LAST YEAR HAVE YOU FAILED TO DO WHAT WAS NORMALLY EXPECTED FROM YOU BECAUSE OF DRINKING: NEVER
PAROXYSMAL SWEATS: NO SWEAT VISIBLE
PAROXYSMAL SWEATS: NO SWEAT VISIBLE
TREMOR: NO TREMOR
AGITATION: NORMAL ACTIVITY
PAROXYSMAL SWEATS: NO SWEAT VISIBLE
ANXIETY: NO ANXIETY, AT EASE
ORIENTATION AND CLOUDING OF SENSORIUM: ORIENTED AND CAN DO SERIAL ADDITIONS
SUBSTANCE_ABUSE_PAST_12_MONTHS: YES
AUDITORY DISTURBANCES: NOT PRESENT
AUDIT-C TOTAL SCORE: 10
ORIENTATION AND CLOUDING OF SENSORIUM: ORIENTED AND CAN DO SERIAL ADDITIONS
ANXIETY: NO ANXIETY, AT EASE
NAUSEA AND VOMITING: NO NAUSEA AND NO VOMITING
ANXIETY: NO ANXIETY, AT EASE
VISUAL DISTURBANCES: NOT PRESENT
HEADACHE, FULLNESS IN HEAD: NOT PRESENT
VISUAL DISTURBANCES: NOT PRESENT
HOW OFTEN DURING THE LAST YEAR HAVE YOU HAD A FEELING OF GUILT OR REMORSE AFTER DRINKING: NEVER
HEADACHE, FULLNESS IN HEAD: VERY MILD
SKIP TO QUESTIONS 9-10: 0
HAVE YOU OR SOMEONE ELSE BEEN INJURED AS A RESULT OF YOUR DRINKING: NO
HEADACHE, FULLNESS IN HEAD: NOT PRESENT
HOW OFTEN DURING THE LAST YEAR HAVE YOU BEEN UNABLE TO REMEMBER WHAT HAPPENED THE NIGHT BEFORE BECAUSE YOU HAD BEEN DRINKING: NEVER
TREMOR: NO TREMOR
AUDITORY DISTURBANCES: NOT PRESENT
ANXIETY: NO ANXIETY, AT EASE
AUDITORY DISTURBANCES: NOT PRESENT
NAUSEA AND VOMITING: NO NAUSEA AND NO VOMITING
AGITATION: NORMAL ACTIVITY
PAROXYSMAL SWEATS: NO SWEAT VISIBLE
TOTAL SCORE: 0
ORIENTATION AND CLOUDING OF SENSORIUM: ORIENTED AND CAN DO SERIAL ADDITIONS
AUDITORY DISTURBANCES: NOT PRESENT
PAROXYSMAL SWEATS: NO SWEAT VISIBLE
HOW OFTEN DO YOU HAVE A DRINK CONTAINING ALCOHOL: 4 OR MORE TIMES A WEEK
VISUAL DISTURBANCES: NOT PRESENT
AGITATION: NORMAL ACTIVITY
HEADACHE, FULLNESS IN HEAD: NOT PRESENT

## 2025-02-26 ASSESSMENT — PATIENT HEALTH QUESTIONNAIRE - PHQ9
SUM OF ALL RESPONSES TO PHQ9 QUESTIONS 1 & 2: 0
1. LITTLE INTEREST OR PLEASURE IN DOING THINGS: NOT AT ALL
2. FEELING DOWN, DEPRESSED OR HOPELESS: NOT AT ALL

## 2025-02-26 ASSESSMENT — COGNITIVE AND FUNCTIONAL STATUS - GENERAL
DAILY ACTIVITIY SCORE: 24
MOBILITY SCORE: 24
MOBILITY SCORE: 24
PATIENT BASELINE BEDBOUND: NO
DAILY ACTIVITIY SCORE: 24

## 2025-02-26 ASSESSMENT — PAIN DESCRIPTION - ORIENTATION: ORIENTATION: LOWER

## 2025-02-26 ASSESSMENT — ACTIVITIES OF DAILY LIVING (ADL)
ADEQUATE_TO_COMPLETE_ADL: YES
TOILETING: INDEPENDENT
GROOMING: INDEPENDENT
WALKS IN HOME: INDEPENDENT
JUDGMENT_ADEQUATE_SAFELY_COMPLETE_DAILY_ACTIVITIES: YES
FEEDING YOURSELF: INDEPENDENT
BATHING: INDEPENDENT
LACK_OF_TRANSPORTATION: NO
HEARING - LEFT EAR: FUNCTIONAL
DRESSING YOURSELF: INDEPENDENT
PATIENT'S MEMORY ADEQUATE TO SAFELY COMPLETE DAILY ACTIVITIES?: YES
HEARING - RIGHT EAR: FUNCTIONAL

## 2025-02-26 ASSESSMENT — COLUMBIA-SUICIDE SEVERITY RATING SCALE - C-SSRS

## 2025-02-26 ASSESSMENT — PAIN SCALES - GENERAL
PAINLEVEL_OUTOF10: 4
PAINLEVEL_OUTOF10: 6
PAINLEVEL_OUTOF10: 4
PAINLEVEL_OUTOF10: 3
PAINLEVEL_OUTOF10: 5 - MODERATE PAIN
PAINLEVEL_OUTOF10: 3

## 2025-02-26 ASSESSMENT — PAIN DESCRIPTION - DESCRIPTORS: DESCRIPTORS: SHARP

## 2025-02-26 ASSESSMENT — PAIN DESCRIPTION - LOCATION: LOCATION: BACK

## 2025-02-26 NOTE — ED PROVIDER NOTES
HPI   No chief complaint on file.      Patient sent to the emergency department for abnormal MRI.  Patient states has had worsening chronic back pain.  He has been using Percocet and ibuprofen with improvement of his symptoms.  Denies any loss of bowel or bladder function.  No dropfoot.  No loss of sensation.  Pain is in the same location as his typical chronic pain.  He had an MRI due to the fact that he was having fevers and he was noted to have some leukocytosis they were concerned for infection.  MRI is inconclusive but concern for osteomyelitis versus discitis.  Patient was therefore sent in for further management of this concern.      History provided by:  Patient          Patient History   Past Medical History:   Diagnosis Date    Arrhythmia     Cancer (Multi)      Past Surgical History:   Procedure Laterality Date    AORTIC VALVE REPLACEMENT      CARDIAC ELECTROPHYSIOLOGY PROCEDURE N/A 02/27/2024    Procedure: Ablation SVT;  Surgeon: Chepe Dennis MD;  Location: [facility];  Service: Electrophysiology;  Laterality: N/A;    CARDIAC ELECTROPHYSIOLOGY PROCEDURE N/A 02/28/2024    Procedure: PPM IMPLANT DUAL;  Surgeon: Chepe Dennis MD;  Location: [facility];  Service: Electrophysiology;  Laterality: N/A;    CORONARY ARTERY BYPASS GRAFT  06/2024     No family history on file.  Social History     Tobacco Use    Smoking status: Never    Smokeless tobacco: Never   Vaping Use    Vaping status: Never Used   Substance Use Topics    Alcohol use: Yes     Comment: 2 martinis daily    Drug use: Yes     Types: Marijuana     Comment: daily       Physical Exam   ED Triage Vitals [02/25/25 1710]   Temperature Heart Rate Respirations BP   37.3 °C (99.1 °F) 98 18 148/77      Pulse Ox Temp Source Heart Rate Source Patient Position   97 % Temporal Monitor Sitting      BP Location FiO2 (%)     Left arm --       Physical Exam  Vitals and nursing note reviewed.   Constitutional:       General: He is not in  acute distress.     Appearance: Normal appearance. He is well-developed, well-groomed and normal weight. He is not ill-appearing or toxic-appearing.   HENT:      Head: Normocephalic.      Right Ear: External ear normal.      Left Ear: External ear normal.      Nose: Nose normal.      Mouth/Throat:      Lips: Pink. No lesions.      Mouth: Mucous membranes are moist.      Pharynx: Oropharynx is clear. No oropharyngeal exudate.   Eyes:      General: No scleral icterus.     Conjunctiva/sclera: Conjunctivae normal.   Cardiovascular:      Rate and Rhythm: Normal rate and regular rhythm.      Pulses:           Dorsalis pedis pulses are 2+ on the right side and 2+ on the left side.        Posterior tibial pulses are 2+ on the right side and 2+ on the left side.      Heart sounds: Normal heart sounds.   Pulmonary:      Effort: Pulmonary effort is normal.      Breath sounds: Normal breath sounds and air entry.   Abdominal:      General: Bowel sounds are normal. There is no distension.      Palpations: Abdomen is soft.      Tenderness: There is no abdominal tenderness. There is no right CVA tenderness, left CVA tenderness or guarding.   Musculoskeletal:      Cervical back: No spinous process tenderness or muscular tenderness.      Right lower leg: No edema.      Left lower leg: No edema.   Skin:     General: Skin is warm.      Capillary Refill: Capillary refill takes less than 2 seconds.      Findings: No rash.   Neurological:      General: No focal deficit present.      Mental Status: He is alert and oriented to person, place, and time.      Cranial Nerves: No cranial nerve deficit or facial asymmetry.      Sensory: No sensory deficit.      Motor: No weakness.   Psychiatric:         Attention and Perception: Attention and perception normal.         Mood and Affect: Mood and affect normal.         Speech: Speech normal.         Behavior: Behavior normal. Behavior is cooperative.         Thought Content: Thought content normal.          Cognition and Memory: Cognition and memory normal.         Judgment: Judgment normal.           ED Course & MDM   Diagnoses as of 02/25/25 1957   Abnormal MRI, lumbar spine   Hyponatremia   Hypokalemia   Leukocytosis, unspecified type   Pneumonia due to infectious organism, unspecified laterality, unspecified part of lung                 No data recorded                                 Medical Decision Making  Patient sent to the emergency department for abnormal MRI.  Patient states has had worsening chronic back pain.  He has been using Percocet and ibuprofen with improvement of his symptoms.  Denies any loss of bowel or bladder function.  No dropfoot.  No loss of sensation.  Pain is in the same location as his typical chronic pain.  He had an MRI due to the fact that he was having fevers and he was noted to have some leukocytosis they were concerned for infection.  MRI is inconclusive but concern for osteomyelitis versus discitis.  Patient was therefore sent in for further management of this concern.    Ddx: Established prior to arrival    Will obtain labs and start patient on broad-spectrum antibiotics.  Patient does have a leukocytosis without an elevation in his lactic acid.  Patient also has some hyponatremia as well as some hypokalemia.  Influenza and COVID swabs were negative.  Troponin was within normal limits.  Urine does not show any infection.  Chest x-ray was concerning for possible early pneumonia.  This could also be a source of patient's fever.  At this point consultation was placed to speak with neuro surgery concerning this patient whether he could be managed at this local hospital or needs tertiary care center.  Patient given Toradol for pain as he states that the ibuprofen was working very well for him at home.  I did speak with neurosurgery through the transfer center. Dr webster stated that for the most part patient can be medically managed here at this local hospital.  He recommended  potentially further studies if needed.  And he would be happy to consult if needed.  Consulted the hospitalist for admission with acceptance.    Amount and/or Complexity of Data Reviewed  Labs: ordered. Decision-making details documented in ED Course.  Radiology: ordered and independent interpretation performed. Decision-making details documented in ED Course.  ECG/medicine tests: ordered and independent interpretation performed. Decision-making details documented in ED Course.     Details: Read by myself and attending showing paced rhythm with a ventricular rate of 91 bpm.  MD interval of 226 with a QT of 426 indeterminate axis.        Procedure  Procedures     Susi Walton PA-C  02/25/25 1957

## 2025-02-26 NOTE — CARE PLAN
Problem: Fall/Injury  Goal: Not fall by end of shift  Outcome: Progressing  Goal: Be free from injury by end of the shift  Outcome: Progressing  Goal: Verbalize understanding of personal risk factors for fall in the hospital  Outcome: Progressing  Goal: Verbalize understanding of risk factor reduction measures to prevent injury from fall in the home  Outcome: Progressing  Goal: Use assistive devices by end of the shift  Outcome: Progressing  Goal: Pace activities to prevent fatigue by end of the shift  Outcome: Progressing     Problem: Diabetes  Goal: Achieve decreasing blood glucose levels by end of shift  Outcome: Progressing  Goal: Increase stability of blood glucose readings by end of shift  Outcome: Progressing  Goal: Decrease in ketones present in urine by end of shift  Outcome: Progressing  Goal: Maintain electrolyte levels within acceptable range throughout shift  Outcome: Progressing  Goal: Maintain glucose levels >70mg/dl to <250mg/dl throughout shift  Outcome: Progressing  Goal: No changes in neurological exam by end of shift  Outcome: Progressing  Goal: Learn about and adhere to nutrition recommendations by end of shift  Outcome: Progressing  Goal: Vital signs within normal range for age by end of shift  Outcome: Progressing  Goal: Increase self care and/or family involovement by end of shift  Outcome: Progressing  Goal: Receive DSME education by end of shift  Outcome: Progressing     Problem: Pain  Goal: Takes deep breaths with improved pain control throughout the shift  Outcome: Progressing  Goal: Turns in bed with improved pain control throughout the shift  Outcome: Progressing  Goal: Walks with improved pain control throughout the shift  Outcome: Progressing  Goal: Performs ADL's with improved pain control throughout shift  Outcome: Progressing  Goal: Participates in PT with improved pain control throughout the shift  Outcome: Progressing  Goal: Free from opioid side effects throughout the  shift  Outcome: Progressing  Goal: Free from acute confusion related to pain meds throughout the shift  Outcome: Progressing   The patient's goals for the shift include pain control    The clinical goals for the shift include pain control

## 2025-02-26 NOTE — PROGRESS NOTES
Vancomycin Dosing by Pharmacy- FOLLOW UP    Kit Duncan is a 64 y.o. year old male who Pharmacy has been consulted for vancomycin dosing for osteomyelitis/septic arthritis. Based on the patient's indication and renal status this patient is being dosed based on a goal AUC of 400-600.     Renal function is currently stable.    Current vancomycin dose: 1500 mg given every 24 hours    Estimated vancomycin AUC on current dose: 560 mg/L.hr     Visit Vitals  /75   Pulse 106   Temp 36.5 °C (97.7 °F)   Resp 17        Lab Results   Component Value Date    CREATININE 0.77 2025    CREATININE 0.84 2025    CREATININE 0.72 2024    CREATININE 0.68 2022        Patient weight is as follows:   Vitals:    25 1710   Weight: 61.2 kg (135 lb)       Cultures:  No results found for the encounter in last 14 days.       I/O last 3 completed shifts:  In: 290 (4.7 mL/kg) [P.O.:240; IV Piggyback:50]  Out: - (0 mL/kg)   Weight: 61.2 kg   I/O during current shift:  No intake/output data recorded.    Temp (24hrs), Av.1 °C (98.8 °F), Min:36.5 °C (97.7 °F), Max:37.4 °C (99.3 °F)      Assessment/Plan    Within goal AUC range. Continue current vancomycin regimen.    This dosing regimen is predicted by [employer] to result in the following pharmacokinetic parameters:  Loading dose: N/A  Regimen: 1500 mg IV every 24 hours.  Start time: 18:51 on 2025  Exposure target: AUC24 (range)400-600 mg/L.hr   [id number]: 520 mg/L.hr  AUC24,ss: 560 mg/L.hr  Probability of AUC24 > 400: 99 %  Ctrough,ss: 13.9 mg/L  Probability of Ctrough,ss > 20: 10 %    The next level will be obtained on 25 at 0500. May be obtained sooner if clinically indicated.   Will continue to monitor renal function daily while on vancomycin and order serum creatinine at least every 48 hours if not already ordered.  Follow for continued vancomycin needs, clinical response, and signs/symptoms of toxicity.       Karissa Barrientos, PharmD

## 2025-02-26 NOTE — PROGRESS NOTES
02/26/25 1605   Discharge Planning   Living Arrangements Spouse/significant other   Support Systems Spouse/significant other   Assistance Needed None   Type of Residence Private residence   Number of Stairs to Enter Residence 21   Number of Stairs Within Residence 0   Home or Post Acute Services None   Expected Discharge Disposition Home   Does the patient need discharge transport arranged? No   Financial Resource Strain   How hard is it for you to pay for the very basics like food, housing, medical care, and heating? Not hard   Stroke Family Assessment   Stroke Family Assessment Needed No   Intensity of Service   Intensity of Service 0-30 min     Spoke with pt regarding his dc plan. Lives at home with his wife and is independent. Has been using a cane due to his back pain. Owns a walker but does not use. Has twenty one steps leading to his second floor apartment - able to tolerate but takes his time. Wife does driving if needed. PCP is Dr. Evans and prescriptions are filled at [facility] with no barriers noted. Denies difficulty with living expenses such as utilities/groceries/prescriptions. Has a ride home at time of dc and pt states he anticipates no needs. ID on consult. Team will follow for potential needs.

## 2025-02-26 NOTE — PROGRESS NOTES
Attempted to reach pt to review dc plan as this TCC is remote. No answer at this time, will try at a later time.     1430 No answer. Reached out to bedside RN for assistance.    1500 TC to pt room, no answer.

## 2025-02-26 NOTE — CONSULTS
Vancomycin Dosing by Pharmacy- INITIAL    Kit Duncan is a 64 y.o. year old male who Pharmacy has been consulted for vancomycin dosing for osteomyelitis/septic arthritis. Based on the patient's indication and renal status this patient will be dosed based on a goal AUC of 400-600.     Renal function is currently stable.    Visit Vitals  /54   Pulse 86   Temp 37.2 °C (99 °F)   Resp 18        Lab Results   Component Value Date    CREATININE 0.84 2025    CREATININE 0.72 2024    CREATININE 0.68 2022        Patient weight is as follows:   Vitals:    25 1710   Weight: 61.2 kg (135 lb)       Cultures:  No results found for the encounter in last 14 days.        No intake/output data recorded.  I/O during current shift:  No intake/output data recorded.    Temp (24hrs), Av.3 °C (99.1 °F), Min:37.2 °C (99 °F), Max:37.4 °C (99.3 °F)         Assessment/Plan     Patient has already been given a loading dose of 1000 mg, while in the ED at 1851 on 25.  Will initiate vancomycin maintenance,  1500 mg every 24 hours beginning at 1800 on 25.    This dosing regimen is predicted by [employer] to result in the following pharmacokinetic parameters:  Loading dose: 1000 mg IV 25 at 1851  Regimen: 1500 mg IV every 24 hours.  Start time: 18:00 on 2025  Exposure target: AUC24 (range)400-600 mg/L.hr   IZB95-21: 431 mg/L.hr  AUC24,ss: 466 mg/L.hr  Probability of AUC24 > 400: 67 %  Ctrough,ss: 11.9 mg/L  Probability of Ctrough,ss > 20: 14 %  Follow-up level will be ordered on 25 with the 1st AM Labs and a second level at 10am unless clinically indicated sooner.  Will continue to monitor renal function daily while on vancomycin and order serum creatinine at least every 48 hours if not already ordered.  Follow for continued vancomycin needs, clinical response, and signs/symptoms of toxicity.       Carlos Lambert [facility]

## 2025-02-26 NOTE — PROGRESS NOTES
Kit Duncan is a 64 y.o. male on day 1 of admission presenting with Abnormal MRI, lumbar spine.      Subjective   Continues to have back pain    Objective     Last Recorded Vitals  /75   Pulse 106   Temp 36.5 °C (97.7 °F)   Resp 17   Wt 61.2 kg (135 lb)   SpO2 91%   Intake/Output last 3 Shifts:    Intake/Output Summary (Last 24 hours) at 2/26/2025 1352  Last data filed at 2/26/2025 0408  Gross per 24 hour   Intake 290 ml   Output --   Net 290 ml       Admission Weight  Weight: 61.2 kg (135 lb) (02/25/25 1710)    Daily Weight  02/25/25 : 61.2 kg (135 lb)    Image Results  XR chest 1 view  Narrative: Interpreted By:  Sarai Navarro,   STUDY:  XR CHEST 1 VIEW;  2/25/2025 6:32 pm      INDICATION:  Signs/Symptoms:fever.      COMPARISON:  06/26/2024      ACCESSION NUMBER(S):  [id number]      ORDERING CLINICIAN:  JUAN SANTO      FINDINGS:          CARDIOMEDIASTINAL SILHOUETTE:  Cardiomediastinal silhouette is normal in size and configuration.  Prosthetic cardiac valves, coronary stent and pacemaker leads  overlying the right atrium and right ventricle noted.      LUNGS:  Stable elevated right hemidiaphragm. Suspected small pleural  effusions. Patchy right basilar airspace opacities are new.  Postsurgical/posttreatment changes at the right lung apex.      ABDOMEN:  No remarkable upper abdominal findings.      BONES:  No acute osseous abnormality.      Impression: New right basilar patchy airspace opacities may represent pneumonia  in the appropriate clinical setting. Radiographic follow-up to  complete resolution is recommended.      Suspected small bilateral pleural effusions.      MACRO:  None      Signed by: Sarai Navarro 2/25/2025 6:50 PM  Dictation workstation:   [id number]  ECG 12 lead  Atrial-sensed ventricular-paced rhythm with prolonged AV conduction  Abnormal ECG  When compared with ECG of 28-FEB-2024 09:27,  Vent. rate has increased BY  14 BPM      Physical Exam  Alert oriented x 3  Lungs  clear to auscultation bilaterally  Heart regular rhythm  Abdomen soft nontender  Extremities no leg edema  CNS no focal deficit    Relevant Results  XR chest 1 view    Result Date: 2/25/2025  Interpreted By:  Sarai Navarro, STUDY: XR CHEST 1 VIEW;  2/25/2025 6:32 pm   INDICATION: Signs/Symptoms:fever.   COMPARISON: 06/26/2024   ACCESSION NUMBER(S): [id number]   ORDERING CLINICIAN: JUAN SANTO   FINDINGS:     CARDIOMEDIASTINAL SILHOUETTE: Cardiomediastinal silhouette is normal in size and configuration. Prosthetic cardiac valves, coronary stent and pacemaker leads overlying the right atrium and right ventricle noted.   LUNGS: Stable elevated right hemidiaphragm. Suspected small pleural effusions. Patchy right basilar airspace opacities are new. Postsurgical/posttreatment changes at the right lung apex.   ABDOMEN: No remarkable upper abdominal findings.   BONES: No acute osseous abnormality.       New right basilar patchy airspace opacities may represent pneumonia in the appropriate clinical setting. Radiographic follow-up to complete resolution is recommended.   Suspected small bilateral pleural effusions.   MACRO: None   Signed by: Sarai Navarro 2/25/2025 6:50 PM Dictation workstation:   [id number]    ECG 12 lead    Result Date: 2/25/2025  Atrial-sensed ventricular-paced rhythm with prolonged AV conduction Abnormal ECG When compared with ECG of 28-FEB-2024 09:27, Vent. rate has increased BY  14 BPM    MR lumbar spine wo IV contrast    Result Date: 2/25/2025  EXAMINATION: MRI OF THE LUMBAR SPINE WITHOUT CONTRAST, 2/25/2025 12:17 pm TECHNIQUE: Multiplanar multisequence MRI of the lumbar spine was performed without the administration of intravenous contrast. COMPARISON: None. HISTORY: ORDERING SYSTEM PROVIDED HISTORY: Acute bilateral low back pain with sciatica, sciatica laterality unspecified, Fever, unspecified fever cause, Leukocytosis, unspecified type, Acquired absence of spleen, History of lymphoma, Other  osteoarthritis of spine, lumbar region TECHNOLOGIST PROVIDED HISTORY: Reason for exam:->back pain ,fever ,leucocytosis,djd,abscence of spleen,h/o lymphoma What is the sedation requirement?->None What reading provider will be dictating this exam?->CRC FINDINGS: BONES/ALIGNMENT: The vertebral body heights are maintained.  There is hyperintense signal within the L4-5 and L5-S1 vertebral bodies.  There is multilevel degenerative disc disease.  There may be fluid in the L4-5 and L5-S1 disc spaces.  There is disc space narrowing throughout the lumbar spine.  There is no significant spondylolisthesis. SPINAL CORD: The conus terminates normally. SOFT TISSUES: No paraspinal mass identified. L1-L2: There is a circumferential disc bulge with facet hypertrophy.  There is no canal stenosis.  There is mild foraminal narrowing. L2-L3: There is a circumferential disc bulge.  There is no canal stenosis. There is narrowing of the lateral recesses.  There is moderate foraminal narrowing. L3-L4: There is a circumferential disc bulge with facet and ligamentous hypertrophy.  There is prominent posterior epidural fat.  There is canal stenosis measuring 7 mm in AP dimension.  There is narrowing of the lateral recesses.  There is moderate to severe foraminal narrowing. L4-L5: There is a circumferential disc bulge with facet and ligamentous hypertrophy.  There is canal stenosis measuring 7 mm in AP dimension.  There is narrowing of the lateral recesses.  There is moderate left and moderate to severe right foraminal narrowing. L5-S1: There is a circumferential disc bulge with facet hypertrophy.  There is no canal stenosis.  There is narrowing of the lateral recesses.  There is moderate left and moderate to severe right foraminal narrowing.    Hyperintense signal within the L4-5 and L5-S1 vertebral bodies with fluid in the disc spaces.  This may simply relate to degenerative disc disease and adjacent Modic type 1 degenerative endplate changes  although a discitis/osteomyelitis cannot entirely be excluded.  Postcontrast imaging may be helpful.  Short-term follow-up pre and post-contrast lumbar spine MRI is recommended. Multilevel degenerative change with canal stenosis at L3-4 and L4-5. Lateral recess narrowing and foraminal narrowing as described above.     Assessment/Plan   Lumbar discitis, acute on chronic back pain  Hypokalemia hyper natremia likely secondary to alcohol dependence  Alcohol use disorder  CAD, a flutter, status post aortic valve repair    Plan:  IV vancomycin and Zosyn  Consult infectious disease, might need biopsy with cultures  Monitor electrolytes  Pain control  DVT prophylaxis  Hold Eliquis and aspirin for potential biopsy  Continue phenobarbital taper    Kelli Alexander MD

## 2025-02-26 NOTE — H&P
History Of Present Illness  Kit Duncan is a 64 y.o. male with a past medical history of CAD, Hodgkin lymphoma, atrial flutter, and aortic valve replacement who presented to the ED with abnormal MRI results. He recently had an acute worsening of chronic back pain and underwent an MRI that raised concerns of discitis or osteomyelitis. He endorses a fever. He denies any numbness, tingling, paresthesia, loss of bladder/bowel, or loss of sensation. He denies smoking cigarettes, endorses drinking 12 ounces of Vodka daily, and endorses smoking cannabis. He denies any history of EtOH withdrawal, however notes he has not gone very long without drinking.      Past Medical History  Past Medical History:   Diagnosis Date    Arrhythmia     Cancer (Multi)        Surgical History  Past Surgical History:   Procedure Laterality Date    AORTIC VALVE REPLACEMENT      CARDIAC ELECTROPHYSIOLOGY PROCEDURE N/A 02/27/2024    Procedure: Ablation SVT;  Surgeon: Chepe Dennis MD;  Location: [facility];  Service: Electrophysiology;  Laterality: N/A;    CARDIAC ELECTROPHYSIOLOGY PROCEDURE N/A 02/28/2024    Procedure: PPM IMPLANT DUAL;  Surgeon: Chepe Dennis MD;  Location: [facility];  Service: Electrophysiology;  Laterality: N/A;    CORONARY ARTERY BYPASS GRAFT  06/2024        Social History  He reports that he has never smoked. He has never used smokeless tobacco. He reports that he does not currently use alcohol after a past usage of about 42.0 standard drinks of alcohol per week. He reports current drug use. Drug: Marijuana.    Family History  No family history on file.     Allergies  Pollen extracts, Atorvastatin, Insects extract, Iodinated contrast media, and Ragweed    Review of Systems   Constitutional:  Positive for fever. Negative for chills.   Respiratory:  Negative for shortness of breath.    Cardiovascular:  Negative for chest pain and palpitations.   Gastrointestinal:  Negative for abdominal  "pain, constipation, diarrhea, nausea and vomiting.   Genitourinary:  Negative for dysuria, flank pain, frequency, hematuria and urgency.   Musculoskeletal:  Positive for back pain.   All other systems reviewed and are negative.       Physical Exam  Vitals reviewed.   HENT:      Head: Normocephalic and atraumatic.   Cardiovascular:      Rate and Rhythm: Normal rate and regular rhythm.      Heart sounds: Normal heart sounds.   Pulmonary:      Effort: Pulmonary effort is normal.      Breath sounds: Normal air entry.   Abdominal:      General: Bowel sounds are normal.      Palpations: Abdomen is soft.      Tenderness: There is no abdominal tenderness.   Musculoskeletal:         General: No deformity.   Skin:     General: Skin is warm and dry.   Neurological:      General: No focal deficit present.      Mental Status: He is alert and oriented to person, place, and time.   Psychiatric:         Mood and Affect: Mood normal.         Behavior: Behavior normal.          Last Recorded Vitals  Blood pressure 117/65, pulse 87, temperature 37.4 °C (99.3 °F), temperature source Temporal, resp. rate 17, height 1.676 m (5' 6\"), weight 61.2 kg (135 lb), SpO2 94%.    Relevant Results      Lab Results   Component Value Date    WBC 16.1 (H) 02/25/2025    HGB 13.8 02/25/2025    HCT 39.9 (L) 02/25/2025    MCV 91 02/25/2025     (H) 02/25/2025     Lab Results   Component Value Date    GLUCOSE 122 (H) 02/25/2025    CALCIUM 10.0 02/25/2025     (L) 02/25/2025    K 3.1 (L) 02/25/2025    CO2 25 02/25/2025    CL 90 (L) 02/25/2025    BUN 30 (H) 02/25/2025    CREATININE 0.84 02/25/2025     XR chest 1 view  Narrative: Interpreted By:  Sarai Navarro,   STUDY:  XR CHEST 1 VIEW;  2/25/2025 6:32 pm      INDICATION:  Signs/Symptoms:fever.      COMPARISON:  06/26/2024      ACCESSION NUMBER(S):  [id number]      ORDERING CLINICIAN:  JUAN SANTO      FINDINGS:          CARDIOMEDIASTINAL SILHOUETTE:  Cardiomediastinal silhouette is normal in " size and configuration.  Prosthetic cardiac valves, coronary stent and pacemaker leads  overlying the right atrium and right ventricle noted.      LUNGS:  Stable elevated right hemidiaphragm. Suspected small pleural  effusions. Patchy right basilar airspace opacities are new.  Postsurgical/posttreatment changes at the right lung apex.      ABDOMEN:  No remarkable upper abdominal findings.      BONES:  No acute osseous abnormality.      Impression: New right basilar patchy airspace opacities may represent pneumonia  in the appropriate clinical setting. Radiographic follow-up to  complete resolution is recommended.      Suspected small bilateral pleural effusions.      MACRO:  None      Signed by: Sarai Navarro 2/25/2025 6:50 PM  Dictation workstation:   [id number]  ECG 12 lead  Atrial-sensed ventricular-paced rhythm with prolonged AV conduction  Abnormal ECG  When compared with ECG of 28-FEB-2024 09:27,  Vent. rate has increased BY  14 BPM    ED Medication Administration from 02/25/2025 1651 to 02/25/2025 2019         Date/Time Order Dose Route Action Action by     02/25/2025 1714 EST sodium chloride 0.9 % bolus 500 mL 500 mL intravenous New Bag Blade, D     02/25/2025 1740 EST ondansetron (Zofran) injection 4 mg 4 mg intravenous Given Chapman, K     02/25/2025 1741 EST ketorolac (Toradol) injection 15 mg 15 mg intravenous Given Chapman, K     02/25/2025 1848 EST piperacillin-tazobactam (Zosyn) 4.5 g in sodium chloride 0.9%  mL 4.5 g intravenous New Bag Chapman, K     02/25/2025 1851 EST vancomycin (Vancocin) 1,000 mg in dextrose 5%  mL 1,000 mg intravenous New Bag Chapman, K     02/25/2025 1854 EST potassium chloride (Klor-Con) packet 20 mEq 20 mEq oral Given Chapman, K     02/25/2025 1856 EST sodium chloride 0.9 % bolus 500 mL 0 mL intravenous Stopped Ari K     02/25/2025 2004 EST piperacillin-tazobactam (Zosyn) 4.5 g in sodium chloride 0.9%  mL 0 g intravenous Stopped WALDEMAR Poole                Assessment/Plan   Assessment & Plan  Abnormal MRI, lumbar spine      #Acute on chronic back pain  #Suspected discitis/osteomyelitis  -Afebrile, +leukocytosis  -NSGY at [facility] [place] [place] contacted, no need for transfer per NSGY  -Vanc, Zosyn started in ED, continue  -Consult ID  -Toradol, Percocet for pain    #Hypokalemia  #Hyponatremia  #Hypochloremia  -Likely 2/2 EtOH dependence  -Replete, follow    #EtOH dependence  -Will start phenobarb taper    #CAD  #History of aflutter  #s/p aortic valve repair  -Continue home meds  -Will start PPI as gi ppx              David Green, APRN-CNP

## 2025-02-26 NOTE — CARE PLAN
The patient's goals for the shift include pain control    The clinical goals for the shift include safety    Over the shift, the patient did not make progress toward the following goals. Barriers to progression include none. Recommendations to address these barriers include toradol.

## 2025-02-27 ENCOUNTER — APPOINTMENT (OUTPATIENT)
Dept: CARDIOLOGY | Facility: HOSPITAL | Age: 65
End: 2025-02-27
Payer: COMMERCIAL

## 2025-02-27 ENCOUNTER — APPOINTMENT (OUTPATIENT)
Dept: RADIOLOGY | Facility: HOSPITAL | Age: 65
End: 2025-02-27
Payer: COMMERCIAL

## 2025-02-27 ENCOUNTER — CARE COORDINATION (OUTPATIENT)
Dept: CARE COORDINATION | Age: 65
End: 2025-02-27

## 2025-02-27 PROCEDURE — 93286 PERI-PX EVAL PM/LDLS PM IP: CPT

## 2025-02-27 PROCEDURE — 2550000001 HC RX 255 CONTRASTS: Performed by: FAMILY MEDICINE

## 2025-02-27 PROCEDURE — 2500000002 HC RX 250 W HCPCS SELF ADMINISTERED DRUGS (ALT 637 FOR MEDICARE OP, ALT 636 FOR OP/ED): Performed by: FAMILY MEDICINE

## 2025-02-27 PROCEDURE — 99233 SBSQ HOSP IP/OBS HIGH 50: CPT | Performed by: FAMILY MEDICINE

## 2025-02-27 PROCEDURE — 72158 MRI LUMBAR SPINE W/O & W/DYE: CPT | Performed by: RADIOLOGY

## 2025-02-27 PROCEDURE — 2500000001 HC RX 250 WO HCPCS SELF ADMINISTERED DRUGS (ALT 637 FOR MEDICARE OP): Performed by: NURSE PRACTITIONER

## 2025-02-27 PROCEDURE — A9575 INJ GADOTERATE MEGLUMI 0.1ML: HCPCS | Performed by: FAMILY MEDICINE

## 2025-02-27 PROCEDURE — 2500000001 HC RX 250 WO HCPCS SELF ADMINISTERED DRUGS (ALT 637 FOR MEDICARE OP): Performed by: FAMILY MEDICINE

## 2025-02-27 PROCEDURE — 1210000001 HC SEMI-PRIVATE ROOM DAILY

## 2025-02-27 PROCEDURE — 2500000004 HC RX 250 GENERAL PHARMACY W/ HCPCS (ALT 636 FOR OP/ED): Performed by: FAMILY MEDICINE

## 2025-02-27 PROCEDURE — 72158 MRI LUMBAR SPINE W/O & W/DYE: CPT

## 2025-02-27 PROCEDURE — 99232 SBSQ HOSP IP/OBS MODERATE 35: CPT | Performed by: INTERNAL MEDICINE

## 2025-02-27 PROCEDURE — 93286 PERI-PX EVAL PM/LDLS PM IP: CPT | Performed by: INTERNAL MEDICINE

## 2025-02-27 RX ORDER — LORAZEPAM 1 MG/1
1 TABLET ORAL EVERY 6 HOURS PRN
Status: ACTIVE | OUTPATIENT
Start: 2025-02-27

## 2025-02-27 RX ORDER — ASPIRIN 81 MG/1
81 TABLET ORAL
Status: DISPENSED | OUTPATIENT
Start: 2025-02-28

## 2025-02-27 RX ORDER — GADOTERATE MEGLUMINE 376.9 MG/ML
0.2 INJECTION INTRAVENOUS
Status: COMPLETED | OUTPATIENT
Start: 2025-02-27 | End: 2025-02-27

## 2025-02-27 RX ADMIN — PRAVASTATIN SODIUM 40 MG: 20 TABLET ORAL at 20:47

## 2025-02-27 RX ADMIN — FOLIC ACID 1 MG: 1 TABLET ORAL at 08:24

## 2025-02-27 RX ADMIN — Medication 1.5 G: at 20:49

## 2025-02-27 RX ADMIN — PHENOBARBITAL 32.4 MG: 32.4 TABLET ORAL at 20:46

## 2025-02-27 RX ADMIN — POTASSIUM CHLORIDE 20 MEQ: 1500 TABLET, EXTENDED RELEASE ORAL at 08:25

## 2025-02-27 RX ADMIN — PHENOBARBITAL 64.8 MG: 32.4 TABLET ORAL at 08:24

## 2025-02-27 RX ADMIN — Medication 1 TABLET: at 08:24

## 2025-02-27 RX ADMIN — Medication 100 MG: at 08:24

## 2025-02-27 RX ADMIN — MAGNESIUM OXIDE 400 MG (241.3 MG MAGNESIUM) TABLET 400 MG: TABLET at 08:24

## 2025-02-27 RX ADMIN — PHENOBARBITAL 64.8 MG: 32.4 TABLET ORAL at 15:21

## 2025-02-27 RX ADMIN — FUROSEMIDE 60 MG: 40 TABLET ORAL at 20:47

## 2025-02-27 RX ADMIN — KETOROLAC TROMETHAMINE 30 MG: 30 INJECTION, SOLUTION INTRAMUSCULAR at 07:35

## 2025-02-27 RX ADMIN — POTASSIUM CHLORIDE 20 MEQ: 1500 TABLET, EXTENDED RELEASE ORAL at 20:47

## 2025-02-27 RX ADMIN — POTASSIUM CHLORIDE 20 MEQ: 1500 TABLET, EXTENDED RELEASE ORAL at 15:21

## 2025-02-27 RX ADMIN — FUROSEMIDE 60 MG: 40 TABLET ORAL at 08:24

## 2025-02-27 RX ADMIN — GADOTERATE MEGLUMINE 12 ML: 376.9 INJECTION INTRAVENOUS at 14:31

## 2025-02-27 RX ADMIN — KETOROLAC TROMETHAMINE 30 MG: 30 INJECTION, SOLUTION INTRAMUSCULAR at 20:37

## 2025-02-27 RX ADMIN — KETOROLAC TROMETHAMINE 30 MG: 30 INJECTION, SOLUTION INTRAMUSCULAR at 13:17

## 2025-02-27 RX ADMIN — PIPERACILLIN SODIUM AND TAZOBACTAM SODIUM 3.38 G: 3; .375 INJECTION, SOLUTION INTRAVENOUS at 15:21

## 2025-02-27 RX ADMIN — METOPROLOL TARTRATE 25 MG: 25 TABLET, FILM COATED ORAL at 08:25

## 2025-02-27 RX ADMIN — PANTOPRAZOLE 20 MG: 20 TABLET, DELAYED RELEASE ORAL at 08:24

## 2025-02-27 RX ADMIN — PIPERACILLIN SODIUM AND TAZOBACTAM SODIUM 3.38 G: 3; .375 INJECTION, SOLUTION INTRAVENOUS at 07:35

## 2025-02-27 RX ADMIN — EMPAGLIFLOZIN 25 MG: 25 TABLET, FILM COATED ORAL at 08:25

## 2025-02-27 RX ADMIN — METOPROLOL TARTRATE 25 MG: 25 TABLET, FILM COATED ORAL at 20:47

## 2025-02-27 RX ADMIN — KETOROLAC TROMETHAMINE 30 MG: 30 INJECTION, SOLUTION INTRAMUSCULAR at 00:43

## 2025-02-27 ASSESSMENT — LIFESTYLE VARIABLES
ANXIETY: NO ANXIETY, AT EASE
VISUAL DISTURBANCES: NOT PRESENT
VISUAL DISTURBANCES: NOT PRESENT
TOTAL SCORE: 0
VISUAL DISTURBANCES: NOT PRESENT
TREMOR: NO TREMOR
TREMOR: NO TREMOR
TOTAL SCORE: 0
PAROXYSMAL SWEATS: NO SWEAT VISIBLE
ORIENTATION AND CLOUDING OF SENSORIUM: ORIENTED AND CAN DO SERIAL ADDITIONS
ANXIETY: NO ANXIETY, AT EASE
VISUAL DISTURBANCES: NOT PRESENT
AUDITORY DISTURBANCES: NOT PRESENT
TOTAL SCORE: 5
AGITATION: NORMAL ACTIVITY
NAUSEA AND VOMITING: NO NAUSEA AND NO VOMITING
PAROXYSMAL SWEATS: NO SWEAT VISIBLE
NAUSEA AND VOMITING: NO NAUSEA AND NO VOMITING
TOTAL SCORE: 0
NAUSEA AND VOMITING: NO NAUSEA AND NO VOMITING
TREMOR: NO TREMOR
HEADACHE, FULLNESS IN HEAD: NOT PRESENT
ORIENTATION AND CLOUDING OF SENSORIUM: ORIENTED AND CAN DO SERIAL ADDITIONS
AGITATION: NORMAL ACTIVITY
HEADACHE, FULLNESS IN HEAD: NOT PRESENT
AUDITORY DISTURBANCES: NOT PRESENT
ORIENTATION AND CLOUDING OF SENSORIUM: ORIENTED AND CAN DO SERIAL ADDITIONS
AUDITORY DISTURBANCES: NOT PRESENT
AUDITORY DISTURBANCES: NOT PRESENT
NAUSEA AND VOMITING: NO NAUSEA AND NO VOMITING
TREMOR: NO TREMOR
AGITATION: NORMAL ACTIVITY
ORIENTATION AND CLOUDING OF SENSORIUM: ORIENTED AND CAN DO SERIAL ADDITIONS
ANXIETY: NO ANXIETY, AT EASE
HEADACHE, FULLNESS IN HEAD: VERY MILD
ANXIETY: 3
AGITATION: SOMEWHAT MORE THAN NORMAL ACTIVITY
PAROXYSMAL SWEATS: NO SWEAT VISIBLE
PAROXYSMAL SWEATS: NO SWEAT VISIBLE
HEADACHE, FULLNESS IN HEAD: NOT PRESENT

## 2025-02-27 ASSESSMENT — PAIN - FUNCTIONAL ASSESSMENT
PAIN_FUNCTIONAL_ASSESSMENT: 0-10
PAIN_FUNCTIONAL_ASSESSMENT: 0-10

## 2025-02-27 ASSESSMENT — COGNITIVE AND FUNCTIONAL STATUS - GENERAL
MOBILITY SCORE: 24
DAILY ACTIVITIY SCORE: 24

## 2025-02-27 ASSESSMENT — PAIN SCALES - GENERAL
PAINLEVEL_OUTOF10: 8
PAINLEVEL_OUTOF10: 5 - MODERATE PAIN
PAINLEVEL_OUTOF10: 4
PAINLEVEL_OUTOF10: 6

## 2025-02-27 NOTE — PROGRESS NOTES
Kit Duncan is a 64 y.o. male on day 2 of admission presenting with Abnormal MRI, lumbar spine.      Subjective   Continues to have back pain    Objective     Last Recorded Vitals  /62   Pulse 90   Temp 36.7 °C (98.1 °F)   Resp 18   Wt 61.2 kg (135 lb)   SpO2 94%   Intake/Output last 3 Shifts:    Intake/Output Summary (Last 24 hours) at 2/27/2025 1331  Last data filed at 2/26/2025 2045  Gross per 24 hour   Intake 500 ml   Output --   Net 500 ml       Admission Weight  Weight: 61.2 kg (135 lb) (02/25/25 1710)    Daily Weight  02/25/25 : 61.2 kg (135 lb)    Image Results  XR chest 1 view  Narrative: Interpreted By:  Sarai Navarro,   STUDY:  XR CHEST 1 VIEW;  2/25/2025 6:32 pm      INDICATION:  Signs/Symptoms:fever.      COMPARISON:  06/26/2024      ACCESSION NUMBER(S):  [id number]      ORDERING CLINICIAN:  JUAN SANTO      FINDINGS:          CARDIOMEDIASTINAL SILHOUETTE:  Cardiomediastinal silhouette is normal in size and configuration.  Prosthetic cardiac valves, coronary stent and pacemaker leads  overlying the right atrium and right ventricle noted.      LUNGS:  Stable elevated right hemidiaphragm. Suspected small pleural  effusions. Patchy right basilar airspace opacities are new.  Postsurgical/posttreatment changes at the right lung apex.      ABDOMEN:  No remarkable upper abdominal findings.      BONES:  No acute osseous abnormality.      Impression: New right basilar patchy airspace opacities may represent pneumonia  in the appropriate clinical setting. Radiographic follow-up to  complete resolution is recommended.      Suspected small bilateral pleural effusions.      MACRO:  None      Signed by: Sarai Navarro 2/25/2025 6:50 PM  Dictation workstation:   [id number]  ECG 12 lead  Atrial-sensed ventricular-paced rhythm with prolonged AV conduction  Abnormal ECG  When compared with ECG of 28-FEB-2024 09:27,  Vent. rate has increased BY  14 BPM      Physical Exam  Alert oriented x 3  Lungs  clear to auscultation bilaterally  Heart regular rhythm  Abdomen soft nontender  Extremities no leg edema  CNS no focal deficit    Relevant Results  XR chest 1 view    Result Date: 2/25/2025  Interpreted By:  Sarai Navarro, STUDY: XR CHEST 1 VIEW;  2/25/2025 6:32 pm   INDICATION: Signs/Symptoms:fever.   COMPARISON: 06/26/2024   ACCESSION NUMBER(S): [id number]   ORDERING CLINICIAN: JUAN SANTO   FINDINGS:     CARDIOMEDIASTINAL SILHOUETTE: Cardiomediastinal silhouette is normal in size and configuration. Prosthetic cardiac valves, coronary stent and pacemaker leads overlying the right atrium and right ventricle noted.   LUNGS: Stable elevated right hemidiaphragm. Suspected small pleural effusions. Patchy right basilar airspace opacities are new. Postsurgical/posttreatment changes at the right lung apex.   ABDOMEN: No remarkable upper abdominal findings.   BONES: No acute osseous abnormality.       New right basilar patchy airspace opacities may represent pneumonia in the appropriate clinical setting. Radiographic follow-up to complete resolution is recommended.   Suspected small bilateral pleural effusions.   MACRO: None   Signed by: Sarai Navarro 2/25/2025 6:50 PM Dictation workstation:   [id number]    ECG 12 lead    Result Date: 2/25/2025  Atrial-sensed ventricular-paced rhythm with prolonged AV conduction Abnormal ECG When compared with ECG of 28-FEB-2024 09:27, Vent. rate has increased BY  14 BPM     Assessment/Plan   Lumbar discitis, acute on chronic back pain  Hypokalemia hyper natremia likely secondary to alcohol dependence  Alcohol use disorder  CAD, a flutter, status post aortic valve repair    Plan:  IV vancomycin and Zosyn  Infectious disease recommendations were noted.  Biopsy with culture has low yield given that the patient is already on antibiotics  Will order MRI of the lumbar spine with contrast  Consult cardiology for possible [facility] per infectious disease recommendations  Pain control  DVT  prophylaxis  Resume Eliquis and aspirin   Continue phenobarbital taper.  Plan for anxiety as needed    Kelli Alexander MD

## 2025-02-27 NOTE — PROGRESS NOTES
"  Infectious Disease    Patient Name: Kit Duncan  Date: 2/27/2025  YOB: 1960  Medical Record Number: [id number]        Chief Complaint   Patient presents with    abnormal mri     Dr sent in due to inflammation found on spine. 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  Mostly lumbosacral worse over 1 to 2 weeks said he had back pain before and usually self resolving this is the worst it has been.  He had an MRI as an outpatient showing potential inflammation in the spine it was given without contrast because he said he had a allergy to contrast in the past but this was with CT. he also has associated fever several days he believes he is unclear if she still having fevers.  He denies any other new symptoms.  He has been started on broad-spectrum antibiotics feels okay at this time    No immunosuppressive conditions in the family      Physical Exam:    Blood pressure 134/75, pulse 96, temperature 37 °C (98.6 °F), resp. rate 18, height 1.676 m (5' 6\"), weight 61.2 kg (135 lb), SpO2 95%.  General: Patient appears ok at the present time. NAD  Skin: no new rashes  HEENT:  Neck is supple, No subconjunctival hemorrhages, no oral exudates  Heart: S1 S2  Lungs: clear bilaterally  Abdomen: soft, ND, NTTP,  Back :no CVA tenderness  Extrem: No edema, non tender  Neuro exam: CN II-XII intact  Psych: cooperative    Labs:  I have reviewed all lab results by electronic record, including most recent CBC, metabolic panel, and pertinent abnormalities were addressed from an infectious disease perspective.  Trends are being monitored over time.    Lab Results   Component Value Date    WBC 13.9 (H) 02/26/2025    HGB 12.4 (L) 02/26/2025    HCT 35.6 (L) 02/26/2025    MCV 90 02/26/2025     (H) 02/26/2025     Lab Results   Component Value Date    GLUCOSE 177 (H) 02/26/2025    CALCIUM 9.6 02/26/2025     (L) 02/26/2025    K 3.8 " 02/26/2025    CO2 24 02/26/2025    CL 95 (L) 02/26/2025    BUN 25 (H) 02/26/2025    CREATININE 0.77 02/26/2025       Radiology:  I have reviewed imaging results per electronic record and most pertinent abnormalities are being addressed from an infectious disease standpoint.            ASSESSMENT:  Problem List Items Addressed This Visit          Hematology and Neoplasia    Leukocytosis    Relevant Orders    Transesophageal Echo ([facility])       Neuro    * (Principal) Abnormal MRI, lumbar spine - Primary    Relevant Orders    Cardiac device check - MRI (Completed)    Cardiac device check - MRI (Completed)     Other Visit Diagnoses       Hyponatremia        Hypokalemia        Pneumonia due to infectious organism, unspecified laterality, unspecified part of lung            Back pain  Fever    MRI repeat noted    Patient is already on antibiotics.        Plan:  There will likely be low yield with aspiration biopsy while on antibiotics , but if fluid present may increase yield. NSGY saying not candidate  blood cultures have been sent but will also be lower yield   would repeat MRI with contrast if possible     [facility]

## 2025-02-27 NOTE — CONSULTS
Infectious Disease    Patient Name: Kit Duncan  Date: 2/27/2025  YOB: 1960  Medical Record Number: [id number]        Chief Complaint   Patient presents with    abnormal mri     Dr sent in due to inflammation found on spine. 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  Mostly lumbosacral worse over 1 to 2 weeks said he had back pain before and usually self resolving this is the worst it has been.  He had an MRI as an outpatient showing potential inflammation in the spine it was given without contrast because he said he had a allergy to contrast in the past but this was with CT. he also has associated fever several days he believes he is unclear if she still having fevers.  He denies any other new symptoms.  He has been started on broad-spectrum antibiotics feels okay at this time      Review of Systems: All other ROS reviewed and are negative other than as stated in HPI            Social History     Tobacco Use    Smoking status: Never    Smokeless tobacco: Never   Vaping Use    Vaping status: Never Used   Substance Use Topics    Alcohol use: Not Currently     Alcohol/week: 42.0 standard drinks of alcohol     Types: 42 Shots of liquor per week     Comment: 12oz of vodka/ daily    Drug use: Yes     Types: Marijuana     Comment: daily         Past Medical History:   Diagnosis Date    Arrhythmia     Arthritis     Cancer (Multi)     CHF (congestive heart failure)     Coronary artery disease     Diabetes mellitus (Multi)     History of transfusion     Hypertension     Stroke (Multi)            Past Surgical History:   Procedure Laterality Date    AORTIC VALVE REPLACEMENT      APPENDECTOMY      CARDIAC ELECTROPHYSIOLOGY PROCEDURE N/A 02/27/2024    Procedure: Ablation SVT;  Surgeon: Chepe Dennis MD;  Location: [facility];  Service: Electrophysiology;  Laterality: N/A;    CARDIAC ELECTROPHYSIOLOGY PROCEDURE N/A  02/28/2024    Procedure: PPM IMPLANT DUAL;  Surgeon: Chepe Dennis MD;  Location: [facility];  Service: Electrophysiology;  Laterality: N/A;    CORONARY ARTERY BYPASS GRAFT  06/2024    HERNIA REPAIR             Current Facility-Administered Medications:     acetaminophen (Tylenol) tablet 650 mg, 650 mg, oral, q4h PRN **OR** acetaminophen (Tylenol) oral liquid 650 mg, 650 mg, oral, q4h PRN **OR** acetaminophen (Tylenol) suppository 650 mg, 650 mg, rectal, q4h PRN, Kelli Alexander MD    [Held by provider] apixaban (Eliquis) tablet 5 mg, 5 mg, oral, BID, Kelli Alexander MD    [Held by provider] aspirin EC tablet 81 mg, 81 mg, oral, Daily, Kelli Alexander MD    empagliflozin (Jardiance) tablet 25 mg, 25 mg, oral, Daily, Kelli Alexander MD, 25 mg at 02/26/25 1157    folic acid (Folvite) tablet 1 mg, 1 mg, oral, Daily, Kelli Alexander MD    furosemide (Lasix) tablet 60 mg, 60 mg, oral, BID, Kelli Alexander MD, 60 mg at 02/26/25 2131    ketorolac (Toradol) injection 30 mg, 30 mg, intravenous, q6h PRN, Kelli Alexander MD, 30 mg at 02/27/25 0043    magnesium hydroxide (Milk of Magnesia) 400 mg/5 mL suspension 30 mL, 30 mL, oral, Daily PRN, Kelli Alexander MD, 30 mL at 02/26/25 1857    magnesium oxide (Mag-Ox) tablet 400 mg, 400 mg, oral, Every other day, Kelli Alexander MD    melatonin tablet 10 mg, 10 mg, oral, Nightly PRN, Kelli Alexander MD    metoprolol tartrate (Lopressor) tablet 25 mg, 25 mg, oral, BID, Kelli Alexander MD, 25 mg at 02/26/25 2131    multivitamin with minerals 1 tablet, 1 tablet, oral, Daily, David Green, APRN-CNP, 1 tablet at 02/26/25 0844    nitroglycerin (Nitrostat) SL tablet 0.4 mg, 0.4 mg, sublingual, q5 min PRN, Kelli Alexander MD    ondansetron (Zofran) injection 4 mg, 4 mg, intravenous, q8h PRN, Kelli Alexander MD, 4 mg at 02/26/25 2006    oxyCODONE-acetaminophen (Percocet) 5-325 mg per tablet 1 tablet, 1 tablet, oral, q6h PRN, Kelli Alexander MD, 1 tablet at 02/25/25 2031    pantoprazole (ProtoNix) EC tablet 20 mg, 20  "mg, oral, Daily, Kelli Alexander MD    PHENobarbital (Luminal) tablet 64.8 mg, 64.8 mg, oral, TID, 64.8 mg at 02/26/25 2131 **FOLLOWED BY** PHENobarbital (Luminal) tablet 32.4 mg, 32.4 mg, oral, TID, Kelli Alexander MD    piperacillin-tazobactam (Zosyn) 3.375 g in dextrose (iso) IV 50 mL, 3.375 g, intravenous, q8h, Kelli Alexander MD, Stopped at 02/27/25 0340    polyethylene glycol (Glycolax, Miralax) packet 17 g, 17 g, oral, Daily PRN, Kelli Alexander MD, 17 g at 02/26/25 1519    potassium chloride CR (Klor-Con M20) ER tablet 20 mEq, 20 mEq, oral, TID, Kelli Alexander MD, 20 mEq at 02/26/25 2131    pravastatin (Pravachol) tablet 40 mg, 40 mg, oral, Nightly, Kelli Alexander MD, 40 mg at 02/26/25 2131    thiamine (Vitamin B-1) tablet 100 mg, 100 mg, oral, Daily, Kelli Alexander MD, 100 mg at 02/26/25 0844    vancomycin (Vancocin) pharmacy to dose - pharmacy monitoring, , miscellaneous, Daily PRN, Kelli Alexander MD    vancomycin 1.5 g in NS  mL, 1.5 g, intravenous, q24h, Kelli Alexander MD, Stopped at 02/26/25 2045     Allergies   Allergen Reactions    Pollen Extracts Hives    Atorvastatin Other    Insects Extract Other     Patient is allergic to flying ants    Iodinated Contrast Media Unknown    Ragweed Other          No immunosuppressive conditions in the family      Physical Exam:    Blood pressure 130/65, pulse 85, temperature 36.5 °C (97.7 °F), temperature source Temporal, resp. rate 18, height 1.676 m (5' 6\"), weight 61.2 kg (135 lb), SpO2 94%.  General: Patient appears ok at the present time. NAD  Skin: no new rashes  HEENT:  Neck is supple, No subconjunctival hemorrhages, no oral exudates  Heart: S1 S2  Lungs: clear bilaterally  Abdomen: soft, ND, NTTP,  Back :no CVA tenderness  Extrem: No edema, non tender  Neuro exam: CN II-XII intact  Psych: cooperative    Labs:  I have reviewed all lab results by electronic record, including most recent CBC, metabolic panel, and pertinent abnormalities were addressed from an infectious " disease perspective.  Trends are being monitored over time.    Lab Results   Component Value Date    WBC 13.9 (H) 02/26/2025    HGB 12.4 (L) 02/26/2025    HCT 35.6 (L) 02/26/2025    MCV 90 02/26/2025     (H) 02/26/2025     Lab Results   Component Value Date    GLUCOSE 177 (H) 02/26/2025    CALCIUM 9.6 02/26/2025     (L) 02/26/2025    K 3.8 02/26/2025    CO2 24 02/26/2025    CL 95 (L) 02/26/2025    BUN 25 (H) 02/26/2025    CREATININE 0.77 02/26/2025       Radiology:  I have reviewed imaging results per electronic record and most pertinent abnormalities are being addressed from an infectious disease standpoint.            ASSESSMENT:  Problem List Items Addressed This Visit          Hematology and Neoplasia    Leukocytosis       Neuro    * (Principal) Abnormal MRI, lumbar spine - Primary     Other Visit Diagnoses       Hyponatremia        Hypokalemia        Pneumonia due to infectious organism, unspecified laterality, unspecified part of lung            Back pain  Fever    MRI equivocal findings.  No other sources of infection evident.  He did have fevers which are not present currently.  I am concerned though he has a valve repair and pacer    No blood cultures are available.  Patient is already on antibiotics.        Plan:  There will likely be low yield with aspiration biopsy while on antibiotics   blood cultures have been sent but will also be lower yield   would repeat MRI with contrast if possible    Would consider [facility] as endocarditis can present with fevers and back pain  .

## 2025-02-27 NOTE — H&P (VIEW-ONLY)
Infectious Disease    Patient Name: Kit Duncan  Date: 2/27/2025  YOB: 1960  Medical Record Number: [id number]        Chief Complaint   Patient presents with    abnormal mri     Dr sent in due to inflammation found on spine. 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  Mostly lumbosacral worse over 1 to 2 weeks said he had back pain before and usually self resolving this is the worst it has been.  He had an MRI as an outpatient showing potential inflammation in the spine it was given without contrast because he said he had a allergy to contrast in the past but this was with CT. he also has associated fever several days he believes he is unclear if she still having fevers.  He denies any other new symptoms.  He has been started on broad-spectrum antibiotics feels okay at this time      Review of Systems: All other ROS reviewed and are negative other than as stated in HPI            Social History     Tobacco Use    Smoking status: Never    Smokeless tobacco: Never   Vaping Use    Vaping status: Never Used   Substance Use Topics    Alcohol use: Not Currently     Alcohol/week: 42.0 standard drinks of alcohol     Types: 42 Shots of liquor per week     Comment: 12oz of vodka/ daily    Drug use: Yes     Types: Marijuana     Comment: daily         Past Medical History:   Diagnosis Date    Arrhythmia     Arthritis     Cancer (Multi)     CHF (congestive heart failure)     Coronary artery disease     Diabetes mellitus (Multi)     History of transfusion     Hypertension     Stroke (Multi)            Past Surgical History:   Procedure Laterality Date    AORTIC VALVE REPLACEMENT      APPENDECTOMY      CARDIAC ELECTROPHYSIOLOGY PROCEDURE N/A 02/27/2024    Procedure: Ablation SVT;  Surgeon: Chepe Dennis MD;  Location: [facility];  Service: Electrophysiology;  Laterality: N/A;    CARDIAC ELECTROPHYSIOLOGY PROCEDURE N/A  02/28/2024    Procedure: PPM IMPLANT DUAL;  Surgeon: Chepe Dennis MD;  Location: [facility];  Service: Electrophysiology;  Laterality: N/A;    CORONARY ARTERY BYPASS GRAFT  06/2024    HERNIA REPAIR             Current Facility-Administered Medications:     acetaminophen (Tylenol) tablet 650 mg, 650 mg, oral, q4h PRN **OR** acetaminophen (Tylenol) oral liquid 650 mg, 650 mg, oral, q4h PRN **OR** acetaminophen (Tylenol) suppository 650 mg, 650 mg, rectal, q4h PRN, Kelli Alexander MD    [Held by provider] apixaban (Eliquis) tablet 5 mg, 5 mg, oral, BID, Kelli Alexander MD    [Held by provider] aspirin EC tablet 81 mg, 81 mg, oral, Daily, Kelli Alexander MD    empagliflozin (Jardiance) tablet 25 mg, 25 mg, oral, Daily, Kelli Alexander MD, 25 mg at 02/26/25 1157    folic acid (Folvite) tablet 1 mg, 1 mg, oral, Daily, Kelli Alexander MD    furosemide (Lasix) tablet 60 mg, 60 mg, oral, BID, Kelli Alexander MD, 60 mg at 02/26/25 2131    ketorolac (Toradol) injection 30 mg, 30 mg, intravenous, q6h PRN, Kelli Alexander MD, 30 mg at 02/27/25 0043    magnesium hydroxide (Milk of Magnesia) 400 mg/5 mL suspension 30 mL, 30 mL, oral, Daily PRN, Kelli Alexander MD, 30 mL at 02/26/25 1857    magnesium oxide (Mag-Ox) tablet 400 mg, 400 mg, oral, Every other day, Kelli Alexander MD    melatonin tablet 10 mg, 10 mg, oral, Nightly PRN, Kelli Alexander MD    metoprolol tartrate (Lopressor) tablet 25 mg, 25 mg, oral, BID, Kelli Alexander MD, 25 mg at 02/26/25 2131    multivitamin with minerals 1 tablet, 1 tablet, oral, Daily, David Green, APRN-CNP, 1 tablet at 02/26/25 0844    nitroglycerin (Nitrostat) SL tablet 0.4 mg, 0.4 mg, sublingual, q5 min PRN, Kelli Alexander MD    ondansetron (Zofran) injection 4 mg, 4 mg, intravenous, q8h PRN, Kelli Alexander MD, 4 mg at 02/26/25 2006    oxyCODONE-acetaminophen (Percocet) 5-325 mg per tablet 1 tablet, 1 tablet, oral, q6h PRN, Kelli lAexander MD, 1 tablet at 02/25/25 2031    pantoprazole (ProtoNix) EC tablet 20 mg, 20  "mg, oral, Daily, Kelli Alexander MD    PHENobarbital (Luminal) tablet 64.8 mg, 64.8 mg, oral, TID, 64.8 mg at 02/26/25 2131 **FOLLOWED BY** PHENobarbital (Luminal) tablet 32.4 mg, 32.4 mg, oral, TID, Kelli Alexander MD    piperacillin-tazobactam (Zosyn) 3.375 g in dextrose (iso) IV 50 mL, 3.375 g, intravenous, q8h, Kelli Alexander MD, Stopped at 02/27/25 0340    polyethylene glycol (Glycolax, Miralax) packet 17 g, 17 g, oral, Daily PRN, Kelli Alexander MD, 17 g at 02/26/25 1519    potassium chloride CR (Klor-Con M20) ER tablet 20 mEq, 20 mEq, oral, TID, Kelli Alexander MD, 20 mEq at 02/26/25 2131    pravastatin (Pravachol) tablet 40 mg, 40 mg, oral, Nightly, Kelli Alexander MD, 40 mg at 02/26/25 2131    thiamine (Vitamin B-1) tablet 100 mg, 100 mg, oral, Daily, Kelli Alexander MD, 100 mg at 02/26/25 0844    vancomycin (Vancocin) pharmacy to dose - pharmacy monitoring, , miscellaneous, Daily PRN, Kelli Alexander MD    vancomycin 1.5 g in NS  mL, 1.5 g, intravenous, q24h, Kelli Alexander MD, Stopped at 02/26/25 2045     Allergies   Allergen Reactions    Pollen Extracts Hives    Atorvastatin Other    Insects Extract Other     Patient is allergic to flying ants    Iodinated Contrast Media Unknown    Ragweed Other          No immunosuppressive conditions in the family      Physical Exam:    Blood pressure 130/65, pulse 85, temperature 36.5 °C (97.7 °F), temperature source Temporal, resp. rate 18, height 1.676 m (5' 6\"), weight 61.2 kg (135 lb), SpO2 94%.  General: Patient appears ok at the present time. NAD  Skin: no new rashes  HEENT:  Neck is supple, No subconjunctival hemorrhages, no oral exudates  Heart: S1 S2  Lungs: clear bilaterally  Abdomen: soft, ND, NTTP,  Back :no CVA tenderness  Extrem: No edema, non tender  Neuro exam: CN II-XII intact  Psych: cooperative    Labs:  I have reviewed all lab results by electronic record, including most recent CBC, metabolic panel, and pertinent abnormalities were addressed from an infectious " disease perspective.  Trends are being monitored over time.    Lab Results   Component Value Date    WBC 13.9 (H) 02/26/2025    HGB 12.4 (L) 02/26/2025    HCT 35.6 (L) 02/26/2025    MCV 90 02/26/2025     (H) 02/26/2025     Lab Results   Component Value Date    GLUCOSE 177 (H) 02/26/2025    CALCIUM 9.6 02/26/2025     (L) 02/26/2025    K 3.8 02/26/2025    CO2 24 02/26/2025    CL 95 (L) 02/26/2025    BUN 25 (H) 02/26/2025    CREATININE 0.77 02/26/2025       Radiology:  I have reviewed imaging results per electronic record and most pertinent abnormalities are being addressed from an infectious disease standpoint.            ASSESSMENT:  Problem List Items Addressed This Visit          Hematology and Neoplasia    Leukocytosis       Neuro    * (Principal) Abnormal MRI, lumbar spine - Primary     Other Visit Diagnoses       Hyponatremia        Hypokalemia        Pneumonia due to infectious organism, unspecified laterality, unspecified part of lung            Back pain  Fever    MRI equivocal findings.  No other sources of infection evident.  He did have fevers which are not present currently.  I am concerned though he has a valve repair and pacer    No blood cultures are available.  Patient is already on antibiotics.        Plan:  There will likely be low yield with aspiration biopsy while on antibiotics   blood cultures have been sent but will also be lower yield   would repeat MRI with contrast if possible    Would consider EVENS as endocarditis can present with fevers and back pain  .

## 2025-02-27 NOTE — CARE PLAN
The patient's goals for the shift include pain control    The clinical goals for the shift include pain management      Problem: Fall/Injury  Goal: Not fall by end of shift  Outcome: Progressing  Goal: Be free from injury by end of the shift  Outcome: Progressing  Goal: Verbalize understanding of personal risk factors for fall in the hospital  Outcome: Progressing  Goal: Verbalize understanding of risk factor reduction measures to prevent injury from fall in the home  Outcome: Progressing  Goal: Use assistive devices by end of the shift  Outcome: Progressing  Goal: Pace activities to prevent fatigue by end of the shift  Outcome: Progressing     Problem: Diabetes  Goal: Achieve decreasing blood glucose levels by end of shift  Outcome: Progressing  Goal: Increase stability of blood glucose readings by end of shift  Outcome: Progressing  Goal: Decrease in ketones present in urine by end of shift  Outcome: Progressing  Goal: Maintain electrolyte levels within acceptable range throughout shift  Outcome: Progressing  Goal: Maintain glucose levels >70mg/dl to <250mg/dl throughout shift  Outcome: Progressing  Goal: No changes in neurological exam by end of shift  Outcome: Progressing  Goal: Learn about and adhere to nutrition recommendations by end of shift  Outcome: Progressing  Goal: Vital signs within normal range for age by end of shift  Outcome: Progressing  Goal: Increase self care and/or family involovement by end of shift  Outcome: Progressing  Goal: Receive DSME education by end of shift  Outcome: Progressing

## 2025-02-27 NOTE — CARE COORDINATION
Ambulatory Care Coordination Note     2/27/2025 3:23 PM     Patient outreach attempt by this ACM today to offer care management services. ;   letter mailed requesting patient  to contact this ACM.      ACM: Juani Estrada RN     Care Summary Note: Patient currently admitted into [facility] due to spinal infection.    PCP/Specialist follow up:   Future Appointments         Provider Specialty Dept Phone    3/5/2025 12:30 PM Chelo Pal MD Family Medicine [phone]    7/10/2025 8:30 AM Ruth Mcmullen, APRN - CNP Cardiology [phone]            Follow Up:   Plan for next ACM outreach in approximately 1 week to complete:  - outreach attempt to offer care management services.

## 2025-02-27 NOTE — CARE PLAN
Problem: Fall/Injury  Goal: Not fall by end of shift  Outcome: Progressing  Goal: Be free from injury by end of the shift  Outcome: Progressing  Goal: Verbalize understanding of personal risk factors for fall in the hospital  Outcome: Progressing  Goal: Verbalize understanding of risk factor reduction measures to prevent injury from fall in the home  Outcome: Progressing  Goal: Use assistive devices by end of the shift  Outcome: Progressing  Goal: Pace activities to prevent fatigue by end of the shift  Outcome: Progressing     Problem: Diabetes  Goal: Achieve decreasing blood glucose levels by end of shift  Outcome: Progressing  Goal: Increase stability of blood glucose readings by end of shift  Outcome: Progressing  Goal: Decrease in ketones present in urine by end of shift  Outcome: Progressing  Goal: Maintain electrolyte levels within acceptable range throughout shift  Outcome: Progressing  Goal: Maintain glucose levels >70mg/dl to <250mg/dl throughout shift  Outcome: Progressing  Goal: No changes in neurological exam by end of shift  Outcome: Progressing  Goal: Learn about and adhere to nutrition recommendations by end of shift  Outcome: Progressing  Goal: Vital signs within normal range for age by end of shift  Outcome: Progressing  Goal: Increase self care and/or family involovement by end of shift  Outcome: Progressing  Goal: Receive DSME education by end of shift  Outcome: Progressing     Problem: Pain  Goal: Takes deep breaths with improved pain control throughout the shift  Outcome: Progressing  Goal: Turns in bed with improved pain control throughout the shift  Outcome: Progressing  Goal: Walks with improved pain control throughout the shift  Outcome: Progressing  Goal: Performs ADL's with improved pain control throughout shift  Outcome: Progressing  Goal: Participates in PT with improved pain control throughout the shift  Outcome: Progressing  Goal: Free from opioid side effects throughout the  shift  Outcome: Progressing  Goal: Free from acute confusion related to pain meds throughout the shift  Outcome: Progressing   The patient's goals for the shift include pain control    The clinical goals for the shift include pain management

## 2025-02-28 ENCOUNTER — APPOINTMENT (OUTPATIENT)
Facility: CLINIC | Age: 65
End: 2025-02-28
Payer: COMMERCIAL

## 2025-02-28 ENCOUNTER — APPOINTMENT (OUTPATIENT)
Dept: CARDIOLOGY | Facility: HOSPITAL | Age: 65
End: 2025-02-28
Payer: COMMERCIAL

## 2025-02-28 LAB
ATRIAL RATE: 91 BPM
BODY SURFACE AREA: 1.69 M2
HOLD SPECIMEN: NORMAL
HOLD SPECIMEN: NORMAL
P AXIS: 117 DEGREES
P OFFSET: 136 MS
P ONSET: 70 MS
PR INTERVAL: 226 MS
Q ONSET: 183 MS
QRS COUNT: 15 BEATS
QRS DURATION: 170 MS
QT INTERVAL: 426 MS
QTC CALCULATION(BAZETT): 523 MS
QTC FREDERICIA: 489 MS
R AXIS: 193 DEGREES
T AXIS: 35 DEGREES
T OFFSET: 396 MS
VANCOMYCIN SERPL-MCNC: 24 UG/ML (ref 5–20)
VENTRICULAR RATE: 91 BPM

## 2025-02-28 PROCEDURE — 93325 DOPPLER ECHO COLOR FLOW MAPG: CPT

## 2025-02-28 PROCEDURE — 2500000004 HC RX 250 GENERAL PHARMACY W/ HCPCS (ALT 636 FOR OP/ED): Performed by: FAMILY MEDICINE

## 2025-02-28 PROCEDURE — 99153 MOD SED SAME PHYS/QHP EA: CPT | Performed by: INTERNAL MEDICINE

## 2025-02-28 PROCEDURE — 99153 MOD SED SAME PHYS/QHP EA: CPT

## 2025-02-28 PROCEDURE — 2500000005 HC RX 250 GENERAL PHARMACY W/O HCPCS: Performed by: INTERNAL MEDICINE

## 2025-02-28 PROCEDURE — 99233 SBSQ HOSP IP/OBS HIGH 50: CPT | Performed by: FAMILY MEDICINE

## 2025-02-28 PROCEDURE — 7100000002 HC RECOVERY ROOM TIME - EACH INCREMENTAL 1 MINUTE

## 2025-02-28 PROCEDURE — 93312 ECHO TRANSESOPHAGEAL: CPT | Performed by: INTERNAL MEDICINE

## 2025-02-28 PROCEDURE — 2500000001 HC RX 250 WO HCPCS SELF ADMINISTERED DRUGS (ALT 637 FOR MEDICARE OP): Performed by: NURSE PRACTITIONER

## 2025-02-28 PROCEDURE — 99233 SBSQ HOSP IP/OBS HIGH 50: CPT | Performed by: INTERNAL MEDICINE

## 2025-02-28 PROCEDURE — 2500000004 HC RX 250 GENERAL PHARMACY W/ HCPCS (ALT 636 FOR OP/ED): Performed by: INTERNAL MEDICINE

## 2025-02-28 PROCEDURE — 99152 MOD SED SAME PHYS/QHP 5/>YRS: CPT | Performed by: INTERNAL MEDICINE

## 2025-02-28 PROCEDURE — 36415 COLL VENOUS BLD VENIPUNCTURE: CPT

## 2025-02-28 PROCEDURE — 99152 MOD SED SAME PHYS/QHP 5/>YRS: CPT

## 2025-02-28 PROCEDURE — 7100000001 HC RECOVERY ROOM TIME - INITIAL BASE CHARGE

## 2025-02-28 PROCEDURE — 2500000001 HC RX 250 WO HCPCS SELF ADMINISTERED DRUGS (ALT 637 FOR MEDICARE OP): Performed by: FAMILY MEDICINE

## 2025-02-28 PROCEDURE — 93325 DOPPLER ECHO COLOR FLOW MAPG: CPT | Performed by: INTERNAL MEDICINE

## 2025-02-28 PROCEDURE — 2500000002 HC RX 250 W HCPCS SELF ADMINISTERED DRUGS (ALT 637 FOR MEDICARE OP, ALT 636 FOR OP/ED): Performed by: FAMILY MEDICINE

## 2025-02-28 PROCEDURE — 1210000001 HC SEMI-PRIVATE ROOM DAILY

## 2025-02-28 PROCEDURE — 80202 ASSAY OF VANCOMYCIN: CPT

## 2025-02-28 RX ORDER — FENTANYL CITRATE 50 UG/ML
INJECTION, SOLUTION INTRAMUSCULAR; INTRAVENOUS AS NEEDED
Status: DISCONTINUED | OUTPATIENT
Start: 2025-02-28 | End: 2025-02-28 | Stop reason: HOSPADM

## 2025-02-28 RX ORDER — LIDOCAINE HYDROCHLORIDE 20 MG/ML
JELLY TOPICAL AS NEEDED
Status: DISCONTINUED | OUTPATIENT
Start: 2025-02-28 | End: 2025-02-28 | Stop reason: HOSPADM

## 2025-02-28 RX ORDER — MIDAZOLAM HYDROCHLORIDE 1 MG/ML
INJECTION INTRAMUSCULAR; INTRAVENOUS AS NEEDED
Status: DISCONTINUED | OUTPATIENT
Start: 2025-02-28 | End: 2025-02-28 | Stop reason: HOSPADM

## 2025-02-28 RX ADMIN — EMPAGLIFLOZIN 25 MG: 25 TABLET, FILM COATED ORAL at 12:28

## 2025-02-28 RX ADMIN — DEXTROSE MONOHYDRATE 2 G: 5 INJECTION INTRAVENOUS at 23:06

## 2025-02-28 RX ADMIN — Medication 1 TABLET: at 12:28

## 2025-02-28 RX ADMIN — METOPROLOL TARTRATE 25 MG: 25 TABLET, FILM COATED ORAL at 12:28

## 2025-02-28 RX ADMIN — KETOROLAC TROMETHAMINE 30 MG: 30 INJECTION, SOLUTION INTRAMUSCULAR at 21:34

## 2025-02-28 RX ADMIN — KETOROLAC TROMETHAMINE 30 MG: 30 INJECTION, SOLUTION INTRAMUSCULAR at 02:56

## 2025-02-28 RX ADMIN — POTASSIUM CHLORIDE 20 MEQ: 1500 TABLET, EXTENDED RELEASE ORAL at 20:51

## 2025-02-28 RX ADMIN — PIPERACILLIN SODIUM AND TAZOBACTAM SODIUM 3.38 G: 3; .375 INJECTION, SOLUTION INTRAVENOUS at 08:35

## 2025-02-28 RX ADMIN — PIPERACILLIN SODIUM AND TAZOBACTAM SODIUM 3.38 G: 3; .375 INJECTION, SOLUTION INTRAVENOUS at 16:34

## 2025-02-28 RX ADMIN — Medication 10 MG: at 20:52

## 2025-02-28 RX ADMIN — KETOROLAC TROMETHAMINE 30 MG: 30 INJECTION, SOLUTION INTRAMUSCULAR at 09:11

## 2025-02-28 RX ADMIN — METOPROLOL TARTRATE 25 MG: 25 TABLET, FILM COATED ORAL at 20:52

## 2025-02-28 RX ADMIN — FENTANYL CITRATE 25 MCG: 50 INJECTION, SOLUTION INTRAMUSCULAR; INTRAVENOUS at 10:23

## 2025-02-28 RX ADMIN — KETOROLAC TROMETHAMINE 30 MG: 30 INJECTION, SOLUTION INTRAMUSCULAR at 15:31

## 2025-02-28 RX ADMIN — PIPERACILLIN SODIUM AND TAZOBACTAM SODIUM 3.38 G: 3; .375 INJECTION, SOLUTION INTRAVENOUS at 00:44

## 2025-02-28 RX ADMIN — FUROSEMIDE 60 MG: 40 TABLET ORAL at 20:52

## 2025-02-28 RX ADMIN — MIDAZOLAM HYDROCHLORIDE 1 MG: 1 INJECTION, SOLUTION INTRAMUSCULAR; INTRAVENOUS at 10:23

## 2025-02-28 RX ADMIN — PHENOBARBITAL 32.4 MG: 32.4 TABLET ORAL at 12:28

## 2025-02-28 RX ADMIN — FENTANYL CITRATE 25 MCG: 50 INJECTION, SOLUTION INTRAMUSCULAR; INTRAVENOUS at 10:25

## 2025-02-28 RX ADMIN — Medication 1.5 G: at 21:01

## 2025-02-28 RX ADMIN — PHENOBARBITAL 32.4 MG: 32.4 TABLET ORAL at 20:52

## 2025-02-28 RX ADMIN — FUROSEMIDE 60 MG: 40 TABLET ORAL at 12:28

## 2025-02-28 RX ADMIN — FENTANYL CITRATE 50 MCG: 50 INJECTION, SOLUTION INTRAMUSCULAR; INTRAVENOUS at 10:22

## 2025-02-28 RX ADMIN — PHENOBARBITAL 32.4 MG: 32.4 TABLET ORAL at 16:34

## 2025-02-28 RX ADMIN — POTASSIUM CHLORIDE 20 MEQ: 1500 TABLET, EXTENDED RELEASE ORAL at 16:34

## 2025-02-28 RX ADMIN — LIDOCAINE HYDROCHLORIDE 1 APPLICATION: 20 JELLY TOPICAL at 10:10

## 2025-02-28 RX ADMIN — BENZOCAINE, BUTAMBEN, AND TETRACAINE HYDROCHLORIDE 2 SPRAY: .028; .004; .004 AEROSOL, SPRAY TOPICAL at 10:06

## 2025-02-28 RX ADMIN — OXYCODONE HYDROCHLORIDE AND ACETAMINOPHEN 1 TABLET: 5; 325 TABLET ORAL at 20:52

## 2025-02-28 RX ADMIN — MIDAZOLAM HYDROCHLORIDE 1 MG: 1 INJECTION, SOLUTION INTRAMUSCULAR; INTRAVENOUS at 10:34

## 2025-02-28 RX ADMIN — PRAVASTATIN SODIUM 40 MG: 20 TABLET ORAL at 20:51

## 2025-02-28 RX ADMIN — FOLIC ACID 1 MG: 1 TABLET ORAL at 12:28

## 2025-02-28 RX ADMIN — Medication 100 MG: at 12:28

## 2025-02-28 RX ADMIN — PANTOPRAZOLE 20 MG: 20 TABLET, DELAYED RELEASE ORAL at 12:28

## 2025-02-28 RX ADMIN — LIDOCAINE HYDROCHLORIDE 1 APPLICATION: 20 JELLY TOPICAL at 10:19

## 2025-02-28 RX ADMIN — POTASSIUM CHLORIDE 20 MEQ: 1500 TABLET, EXTENDED RELEASE ORAL at 12:28

## 2025-02-28 RX ADMIN — MIDAZOLAM HYDROCHLORIDE 2 MG: 1 INJECTION, SOLUTION INTRAMUSCULAR; INTRAVENOUS at 10:22

## 2025-02-28 ASSESSMENT — LIFESTYLE VARIABLES
ANXIETY: NO ANXIETY, AT EASE
TREMOR: NO TREMOR
PAROXYSMAL SWEATS: NO SWEAT VISIBLE
HEADACHE, FULLNESS IN HEAD: NOT PRESENT
AGITATION: NORMAL ACTIVITY
AUDITORY DISTURBANCES: NOT PRESENT
AUDITORY DISTURBANCES: NOT PRESENT
VISUAL DISTURBANCES: NOT PRESENT
TREMOR: NO TREMOR
ORIENTATION AND CLOUDING OF SENSORIUM: ORIENTED AND CAN DO SERIAL ADDITIONS
HEADACHE, FULLNESS IN HEAD: NOT PRESENT
NAUSEA AND VOMITING: NO NAUSEA AND NO VOMITING
VISUAL DISTURBANCES: NOT PRESENT
TOTAL SCORE: 0
PAROXYSMAL SWEATS: NO SWEAT VISIBLE
TOTAL SCORE: 0
ANXIETY: NO ANXIETY, AT EASE
NAUSEA AND VOMITING: NO NAUSEA AND NO VOMITING
AGITATION: NORMAL ACTIVITY
ORIENTATION AND CLOUDING OF SENSORIUM: ORIENTED AND CAN DO SERIAL ADDITIONS

## 2025-02-28 ASSESSMENT — PAIN SCALES - GENERAL
PAINLEVEL_OUTOF10: 0 - NO PAIN
PAINLEVEL_OUTOF10: 6
PAINLEVEL_OUTOF10: 7
PAINLEVEL_OUTOF10: 5 - MODERATE PAIN
PAINLEVEL_OUTOF10: 5 - MODERATE PAIN
PAINLEVEL_OUTOF10: 0 - NO PAIN
PAINLEVEL_OUTOF10: 0 - NO PAIN
PAINLEVEL_OUTOF10: 3
PAINLEVEL_OUTOF10: 0 - NO PAIN

## 2025-02-28 ASSESSMENT — COGNITIVE AND FUNCTIONAL STATUS - GENERAL
CLIMB 3 TO 5 STEPS WITH RAILING: A LITTLE
HELP NEEDED FOR BATHING: A LITTLE
DRESSING REGULAR UPPER BODY CLOTHING: A LITTLE
MOBILITY SCORE: 24
DRESSING REGULAR LOWER BODY CLOTHING: A LITTLE
MOBILITY SCORE: 23
DAILY ACTIVITIY SCORE: 21
DAILY ACTIVITIY SCORE: 24

## 2025-02-28 ASSESSMENT — PAIN - FUNCTIONAL ASSESSMENT
PAIN_FUNCTIONAL_ASSESSMENT: 0-10

## 2025-02-28 ASSESSMENT — PAIN DESCRIPTION - DESCRIPTORS
DESCRIPTORS: DISCOMFORT
DESCRIPTORS: DISCOMFORT;SORE;TENDER

## 2025-02-28 NOTE — CARE PLAN
The patient's goals for the shift include pain control    The clinical goals for the shift include Pain management      Problem: Fall/Injury  Goal: Not fall by end of shift  Outcome: Progressing  Goal: Be free from injury by end of the shift  Outcome: Progressing  Goal: Verbalize understanding of personal risk factors for fall in the hospital  Outcome: Progressing  Goal: Verbalize understanding of risk factor reduction measures to prevent injury from fall in the home  Outcome: Progressing  Goal: Use assistive devices by end of the shift  Outcome: Progressing  Goal: Pace activities to prevent fatigue by end of the shift  Outcome: Progressing

## 2025-02-28 NOTE — PROGRESS NOTES
Kit Duncan is a 64 y.o. male on day 3 of admission presenting with Abnormal MRI, lumbar spine.      Subjective   Continues to have back pain but improved    Objective     Last Recorded Vitals  /59   Pulse 80   Temp 36.3 °C (97.3 °F)   Resp 16   Wt 61.2 kg (135 lb)   SpO2 95%   Intake/Output last 3 Shifts:  No intake or output data in the 24 hours ending 02/28/25 1329      Admission Weight  Weight: 61.2 kg (135 lb) (02/25/25 1710)    Daily Weight  02/25/25 : 61.2 kg (135 lb)    Image Results  ECG 12 lead  Atrial-sensed ventricular-paced rhythm with prolonged AV conduction  Abnormal ECG  When compared with ECG of 28-FEB-2024 09:27,  Vent. rate has increased BY  14 BPM  See ED provider note for full interpretation and clinical correlation  Confirmed by Jody Angeles ([id number]) on 2/28/2025 11:59:01 AM      Physical Exam  Alert oriented x 3  Lungs clear to auscultation bilaterally  Heart regular rhythm  Abdomen soft nontender  Extremities no leg edema  CNS no focal deficit    Relevant Results  MR lumbar spine w and wo IV contrast    Result Date: 2/27/2025  Interpreted By:  Guanakito Fletcher, STUDY: MRI of the lumbar spine without IV contrast;  2/27/2025 2:52 pm   INDICATION: Signs/Symptoms:diskitis.   COMPARISON: MRI 02/25/2025.   ACCESSION NUMBER(S): [id number]   ORDERING CLINICIAN: SANTOS JAMES   TECHNIQUE: Sagittal STIR, T1- and T2-weighted as well as axial T1- and T2- weighted MRI images of the lumbar spine were acquired using a spondylolysis protocol.  12 cc Dotarem administered intravenously. Subsequently, sagittal and axial postcontrast T1 weighted fat saturated imaging was performed.   FINDINGS: Again demonstrated is extensive T2 signal increase in enhancement involving the L4, L5 and S1 vertebrae. This appears similar to prior. There is undulating thickening of the epidural space posteriorly at L4-5, anteriorly at L5 and extending into the sacral canal.   In the sacral canal there is  peripheral enhanced of the thickened epidural space particular posteriorly into the left that measures 11 x 9 mm greatest axial dimensions. Biconvex thickening of the anterior epidural space centered at the L5 level measures up to 14 x 7 mm greatest axial dimensions. Along the right lateral aspect of the L5 level thickening of the epidural space measures 11 x 10 mm.   Overall moderate central canal stenosis at the upper mid L5 level becoming moderately severe at the mid L5 and lower L5 level maintaining moderately severe central canal stenosis into the upper sacrum.   There is no definite paraspinous abscess.   Severe loss of disc height L2-3 through L5-S1. Normal conus termination. Mild degrees retrolisthesis L2-3, L3-4 and L4-5. No compression deformity.   Moderately severe bilateral foraminal stenosis at L4-5 and L5-S1. Severe left and moderate right foraminal stenosis at L3-4.   Moderate degenerative acquired central canal stenosis at L3-4.       Abnormal T2 signal increase and enhancement involving the L3, L4 and S1 vertebrae suspicious for osteomyelitis and likely infection involving the intervening discs. Epidural abscesses at and below the L5 level as described above. Overall findings appear roughly similar to the prior MRI.   See above for details.   MACRO: None   Signed by: Guanakito Fletcher 2/27/2025 4:24 PM Dictation workstation:   [id number]     Assessment/Plan   Lumbar discitis, acute on chronic back pain  Hypokalemia hyper natremia likely secondary to alcohol dependence  Alcohol use disorder  CAD, a flutter, status post aortic valve repair    Plan:  IV vancomycin and Zosyn  Infectious disease recommendations were noted.  Biopsy with culture has low yield given that the patient is already on antibiotics  EVENS w no evidence of intracardiac vegetations  Pain control  DVT prophylaxis  Continue phenobarbital taper.    Kelli Alexander MD

## 2025-02-28 NOTE — NURSING NOTE
Patient has positive gag reflex.  Drinking water without difficulty.  Wife at bedside.  Patient transported back to 1108-A via stretcher.

## 2025-02-28 NOTE — CARE PLAN
Problem: Fall/Injury  Goal: Not fall by end of shift  Outcome: Progressing  Goal: Be free from injury by end of the shift  Outcome: Progressing  Goal: Verbalize understanding of personal risk factors for fall in the hospital  Outcome: Progressing  Goal: Verbalize understanding of risk factor reduction measures to prevent injury from fall in the home  Outcome: Progressing  Goal: Use assistive devices by end of the shift  Outcome: Progressing  Goal: Pace activities to prevent fatigue by end of the shift  Outcome: Progressing     Problem: Diabetes  Goal: Achieve decreasing blood glucose levels by end of shift  Outcome: Progressing  Goal: Increase stability of blood glucose readings by end of shift  Outcome: Progressing  Goal: Decrease in ketones present in urine by end of shift  Outcome: Progressing  Goal: Maintain electrolyte levels within acceptable range throughout shift  Outcome: Progressing  Goal: Maintain glucose levels >70mg/dl to <250mg/dl throughout shift  Outcome: Progressing  Goal: No changes in neurological exam by end of shift  Outcome: Progressing  Goal: Learn about and adhere to nutrition recommendations by end of shift  Outcome: Progressing  Goal: Vital signs within normal range for age by end of shift  Outcome: Progressing  Goal: Increase self care and/or family involovement by end of shift  Outcome: Progressing  Goal: Receive DSME education by end of shift  Outcome: Progressing     Problem: Pain  Goal: Takes deep breaths with improved pain control throughout the shift  Outcome: Progressing  Goal: Turns in bed with improved pain control throughout the shift  Outcome: Progressing  Goal: Walks with improved pain control throughout the shift  Outcome: Progressing  Goal: Performs ADL's with improved pain control throughout shift  Outcome: Progressing  Goal: Participates in PT with improved pain control throughout the shift  Outcome: Progressing  Goal: Free from opioid side effects throughout the  shift  Outcome: Progressing  Goal: Free from acute confusion related to pain meds throughout the shift  Outcome: Progressing       The clinical goals for the shift include safety to prevent falls/injury, maintain mobility, monitor for ETOH withdrawals, and update with plan of care

## 2025-02-28 NOTE — PROGRESS NOTES
Vancomycin Dosing by Pharmacy- FOLLOW UP    Kit Duncan is a 64 year old male who Pharmacy has been consulted for vancomycin dosing for osteomyelitis/septic arthritis. Based on the patient's indication and renal status this patient is being dosed based on a goal AUC of 400-600.     Renal function is currently stable.    Current vancomycin dose: 1500 mg given every 24 hours    Estimated vancomycin AUC on current dose: 579 mg/L.hr     Visit Vitals  /59   Pulse 80   Temp 36.3 °C (97.3 °F)   Resp 16        Lab Results   Component Value Date    CREATININE 0.77 2025    CREATININE 0.84 2025    CREATININE 0.72 2024    CREATININE 0.68 2022        Patient weight is as follows:   Vitals:    25 1710   Weight: 61.2 kg (135 lb)       Cultures:  No results found for the encounter in last 14 days.       I/O last 3 completed shifts:  In: 500 (8.2 mL/kg) [IV Piggyback:500]  Out: - (0 mL/kg)   Weight: 61.2 kg   I/O during current shift:  No intake/output data recorded.    Temp (24hrs), Av.9 °C (87.6 °F), Min:2.3 °C (36.2 °F), Max:37.1 °C (98.8 °F)      Assessment/Plan    Within goal AUC range. Continue current vancomycin regimen.    This dosing regimen is predicted by [employer] to result in the following pharmacokinetic parameters:    Regimen: 1500 mg IV every 24 hours.  Start time: 2100 on 2025  Exposure target: AUC24 (range)400-600 mg/L.hr   [id number]: 579 mg/L.hr  AUC24,ss: 592 mg/L.hr  Probability of AUC24 > 400: 100 %  Ctrough,ss: 15.1 mg/L  Probability of Ctrough,ss > 20: 14 %    The next level will be obtained on 3/2/25 at 0500. May be obtained sooner if clinically indicated.   Will continue to monitor renal function daily while on vancomycin and order serum creatinine at least every 48 hours if not already ordered.  Follow for continued vancomycin needs, clinical response, and signs/symptoms of toxicity.       Kristine E Steinert, [facility]

## 2025-02-28 NOTE — POST-PROCEDURE NOTE
Physician Transition of Care Summary  Invasive Cardiovascular Lab    Procedure Date: 02/28/25     Pre Procedure Indications:   Status post AVR and TVR, rule out endocarditis    Post Procedure Diagnosis:   Moderately thickened aortic valve and tricuspid valve leaflets, with no obvious vegetations.  Multiple pacemaker leads visualized in the right-sided chambers with no obvious thrombus or vegetation.    Procedure(s):   Transesophageal echocardiogram    Procedure Findings:   Normal ventricular Marely function with a visually estimated ejection fraction of 55 to 60%.  Mildly enlarged right ventricle with preserved right ventricular systolic function.  Moderate biatrial enlargement with no obvious thrombus or masses visualized.  Saline contrast bubble study was negative.  S/p AVR with poorly visualized leaflets that appeared mildly thickened, with no obvious vegetations.  There is no significant aortic stenosis or regurgitation.  Moderately thickened mitral valve with mild to moderate mitral regurgitation.  There are no vegetations visualized on the mitral valve leaflets.  Poorly visualized tricuspid valve leaflets with no obvious vegetations are seen.  Moderate size left atrial appendage with prominent pectinate muscle which appears calcified with no obvious thrombus.  Multiple pacemaker leads visualized in the right atrium and right ventricle with no obvious vegetation or thrombus  Moderate plaque in the descending thoracic aorta    Description of the Procedure:   Transesophageal echocardiogram    Complications:   None    Estimated Blood Loss:   None    Anesthesia: Conscious sedation     any Specimen(s) Removed: None      Electronically signed by: Fabián Rush MD, 2/28/2025

## 2025-03-01 LAB
ALBUMIN SERPL BCP-MCNC: 2.8 G/DL (ref 3.4–5)
ALP SERPL-CCNC: 160 U/L (ref 33–136)
ALT SERPL W P-5'-P-CCNC: 22 U/L (ref 10–52)
ANION GAP SERPL CALC-SCNC: 11 MMOL/L (ref 10–20)
AST SERPL W P-5'-P-CCNC: 21 U/L (ref 9–39)
BASOPHILS # BLD AUTO: 0.06 X10*3/UL (ref 0–0.1)
BASOPHILS NFR BLD AUTO: 0.5 %
BILIRUB SERPL-MCNC: 0.2 MG/DL (ref 0–1.2)
BUN SERPL-MCNC: 20 MG/DL (ref 6–23)
CALCIUM SERPL-MCNC: 9 MG/DL (ref 8.6–10.3)
CHLORIDE SERPL-SCNC: 102 MMOL/L (ref 98–107)
CO2 SERPL-SCNC: 23 MMOL/L (ref 21–32)
CREAT SERPL-MCNC: 1.04 MG/DL (ref 0.5–1.3)
EGFRCR SERPLBLD CKD-EPI 2021: 81 ML/MIN/1.73M*2
EOSINOPHIL # BLD AUTO: 0.18 X10*3/UL (ref 0–0.7)
EOSINOPHIL NFR BLD AUTO: 1.5 %
ERYTHROCYTE [DISTWIDTH] IN BLOOD BY AUTOMATED COUNT: 14.6 % (ref 11.5–14.5)
GLUCOSE SERPL-MCNC: 122 MG/DL (ref 74–99)
HCT VFR BLD AUTO: 37.1 % (ref 41–52)
HGB BLD-MCNC: 12.2 G/DL (ref 13.5–17.5)
HIV 1+2 AB+HIV1 P24 AG SERPL QL IA: NONREACTIVE
HOLD SPECIMEN: NORMAL
HOLD SPECIMEN: NORMAL
IMM GRANULOCYTES # BLD AUTO: 0.14 X10*3/UL (ref 0–0.7)
IMM GRANULOCYTES NFR BLD AUTO: 1.2 % (ref 0–0.9)
LYMPHOCYTES # BLD AUTO: 0.28 X10*3/UL (ref 1.2–4.8)
LYMPHOCYTES NFR BLD AUTO: 2.4 %
MAGNESIUM SERPL-MCNC: 1.95 MG/DL (ref 1.6–2.4)
MCH RBC QN AUTO: 31 PG (ref 26–34)
MCHC RBC AUTO-ENTMCNC: 32.9 G/DL (ref 32–36)
MCV RBC AUTO: 94 FL (ref 80–100)
MONOCYTES # BLD AUTO: 1.87 X10*3/UL (ref 0.1–1)
MONOCYTES NFR BLD AUTO: 16 %
NEUTROPHILS # BLD AUTO: 9.15 X10*3/UL (ref 1.2–7.7)
NEUTROPHILS NFR BLD AUTO: 78.4 %
NRBC BLD-RTO: 0 /100 WBCS (ref 0–0)
PLATELET # BLD AUTO: 689 X10*3/UL (ref 150–450)
POTASSIUM SERPL-SCNC: 5.4 MMOL/L (ref 3.5–5.3)
PROT SERPL-MCNC: 5 G/DL (ref 6.4–8.2)
RBC # BLD AUTO: 3.94 X10*6/UL (ref 4.5–5.9)
SODIUM SERPL-SCNC: 131 MMOL/L (ref 136–145)
VANCOMYCIN SERPL-MCNC: 22.2 UG/ML (ref 5–20)
WBC # BLD AUTO: 11.7 X10*3/UL (ref 4.4–11.3)

## 2025-03-01 PROCEDURE — 2500000004 HC RX 250 GENERAL PHARMACY W/ HCPCS (ALT 636 FOR OP/ED): Performed by: FAMILY MEDICINE

## 2025-03-01 PROCEDURE — 85025 COMPLETE CBC W/AUTO DIFF WBC: CPT | Performed by: INTERNAL MEDICINE

## 2025-03-01 PROCEDURE — 2500000001 HC RX 250 WO HCPCS SELF ADMINISTERED DRUGS (ALT 637 FOR MEDICARE OP): Performed by: INTERNAL MEDICINE

## 2025-03-01 PROCEDURE — 80202 ASSAY OF VANCOMYCIN: CPT

## 2025-03-01 PROCEDURE — 36415 COLL VENOUS BLD VENIPUNCTURE: CPT | Performed by: INTERNAL MEDICINE

## 2025-03-01 PROCEDURE — 2500000004 HC RX 250 GENERAL PHARMACY W/ HCPCS (ALT 636 FOR OP/ED): Performed by: INTERNAL MEDICINE

## 2025-03-01 PROCEDURE — 87389 HIV-1 AG W/HIV-1&-2 AB AG IA: CPT | Mod: ELYLAB | Performed by: INTERNAL MEDICINE

## 2025-03-01 PROCEDURE — 2500000001 HC RX 250 WO HCPCS SELF ADMINISTERED DRUGS (ALT 637 FOR MEDICARE OP): Performed by: FAMILY MEDICINE

## 2025-03-01 PROCEDURE — 80053 COMPREHEN METABOLIC PANEL: CPT | Performed by: INTERNAL MEDICINE

## 2025-03-01 PROCEDURE — 2500000004 HC RX 250 GENERAL PHARMACY W/ HCPCS (ALT 636 FOR OP/ED)

## 2025-03-01 PROCEDURE — 2500000001 HC RX 250 WO HCPCS SELF ADMINISTERED DRUGS (ALT 637 FOR MEDICARE OP): Performed by: NURSE PRACTITIONER

## 2025-03-01 PROCEDURE — 2500000002 HC RX 250 W HCPCS SELF ADMINISTERED DRUGS (ALT 637 FOR MEDICARE OP, ALT 636 FOR OP/ED): Performed by: FAMILY MEDICINE

## 2025-03-01 PROCEDURE — 1210000001 HC SEMI-PRIVATE ROOM DAILY

## 2025-03-01 PROCEDURE — 83735 ASSAY OF MAGNESIUM: CPT | Performed by: INTERNAL MEDICINE

## 2025-03-01 PROCEDURE — 99232 SBSQ HOSP IP/OBS MODERATE 35: CPT | Performed by: INTERNAL MEDICINE

## 2025-03-01 PROCEDURE — 99233 SBSQ HOSP IP/OBS HIGH 50: CPT | Performed by: INTERNAL MEDICINE

## 2025-03-01 RX ADMIN — METOPROLOL TARTRATE 25 MG: 25 TABLET, FILM COATED ORAL at 08:19

## 2025-03-01 RX ADMIN — APIXABAN 5 MG: 5 TABLET, FILM COATED ORAL at 20:39

## 2025-03-01 RX ADMIN — FUROSEMIDE 60 MG: 40 TABLET ORAL at 08:18

## 2025-03-01 RX ADMIN — KETOROLAC TROMETHAMINE 30 MG: 30 INJECTION, SOLUTION INTRAMUSCULAR at 10:22

## 2025-03-01 RX ADMIN — EMPAGLIFLOZIN 25 MG: 25 TABLET, FILM COATED ORAL at 08:18

## 2025-03-01 RX ADMIN — OXYCODONE HYDROCHLORIDE AND ACETAMINOPHEN 1 TABLET: 5; 325 TABLET ORAL at 08:19

## 2025-03-01 RX ADMIN — PANTOPRAZOLE 20 MG: 20 TABLET, DELAYED RELEASE ORAL at 08:19

## 2025-03-01 RX ADMIN — DEXTROSE MONOHYDRATE 2 G: 5 INJECTION INTRAVENOUS at 20:37

## 2025-03-01 RX ADMIN — KETOROLAC TROMETHAMINE 30 MG: 30 INJECTION, SOLUTION INTRAMUSCULAR at 23:02

## 2025-03-01 RX ADMIN — FOLIC ACID 1 MG: 1 TABLET ORAL at 08:19

## 2025-03-01 RX ADMIN — METOPROLOL TARTRATE 25 MG: 25 TABLET, FILM COATED ORAL at 20:38

## 2025-03-01 RX ADMIN — PRAVASTATIN SODIUM 40 MG: 20 TABLET ORAL at 20:39

## 2025-03-01 RX ADMIN — FUROSEMIDE 60 MG: 40 TABLET ORAL at 20:38

## 2025-03-01 RX ADMIN — OXYCODONE HYDROCHLORIDE AND ACETAMINOPHEN 1 TABLET: 5; 325 TABLET ORAL at 20:37

## 2025-03-01 RX ADMIN — PHENOBARBITAL 32.4 MG: 32.4 TABLET ORAL at 14:20

## 2025-03-01 RX ADMIN — KETOROLAC TROMETHAMINE 30 MG: 30 INJECTION, SOLUTION INTRAMUSCULAR at 16:35

## 2025-03-01 RX ADMIN — Medication 10 MG: at 20:38

## 2025-03-01 RX ADMIN — Medication 100 MG: at 08:19

## 2025-03-01 RX ADMIN — KETOROLAC TROMETHAMINE 30 MG: 30 INJECTION, SOLUTION INTRAMUSCULAR at 03:57

## 2025-03-01 RX ADMIN — MAGNESIUM OXIDE 400 MG (241.3 MG MAGNESIUM) TABLET 400 MG: TABLET at 08:19

## 2025-03-01 RX ADMIN — VANCOMYCIN HYDROCHLORIDE 1250 MG: 1.25 INJECTION, POWDER, LYOPHILIZED, FOR SOLUTION INTRAVENOUS at 23:02

## 2025-03-01 RX ADMIN — Medication 1 TABLET: at 08:19

## 2025-03-01 RX ADMIN — PHENOBARBITAL 32.4 MG: 32.4 TABLET ORAL at 08:19

## 2025-03-01 ASSESSMENT — LIFESTYLE VARIABLES
ANXIETY: NO ANXIETY, AT EASE
HEADACHE, FULLNESS IN HEAD: NOT PRESENT
TREMOR: NO TREMOR
AUDITORY DISTURBANCES: NOT PRESENT
PAROXYSMAL SWEATS: NO SWEAT VISIBLE
TOTAL SCORE: 0
NAUSEA AND VOMITING: NO NAUSEA AND NO VOMITING
NAUSEA AND VOMITING: NO NAUSEA AND NO VOMITING
AGITATION: NORMAL ACTIVITY
TREMOR: NO TREMOR
AGITATION: NORMAL ACTIVITY
VISUAL DISTURBANCES: NOT PRESENT
ANXIETY: NO ANXIETY, AT EASE
HEADACHE, FULLNESS IN HEAD: NOT PRESENT
AUDITORY DISTURBANCES: NOT PRESENT
PAROXYSMAL SWEATS: NO SWEAT VISIBLE
ORIENTATION AND CLOUDING OF SENSORIUM: ORIENTED AND CAN DO SERIAL ADDITIONS
VISUAL DISTURBANCES: NOT PRESENT
TOTAL SCORE: 0
ORIENTATION AND CLOUDING OF SENSORIUM: ORIENTED AND CAN DO SERIAL ADDITIONS

## 2025-03-01 ASSESSMENT — PAIN SCALES - GENERAL
PAINLEVEL_OUTOF10: 4
PAINLEVEL_OUTOF10: 6
PAINLEVEL_OUTOF10: 3
PAINLEVEL_OUTOF10: 4
PAINLEVEL_OUTOF10: 5 - MODERATE PAIN
PAINLEVEL_OUTOF10: 6
PAINLEVEL_OUTOF10: 3
PAINLEVEL_OUTOF10: 6
PAINLEVEL_OUTOF10: 4

## 2025-03-01 ASSESSMENT — PAIN - FUNCTIONAL ASSESSMENT
PAIN_FUNCTIONAL_ASSESSMENT: 0-10

## 2025-03-01 ASSESSMENT — PAIN DESCRIPTION - DESCRIPTORS
DESCRIPTORS: DISCOMFORT

## 2025-03-01 ASSESSMENT — COGNITIVE AND FUNCTIONAL STATUS - GENERAL
CLIMB 3 TO 5 STEPS WITH RAILING: A LITTLE
HELP NEEDED FOR BATHING: A LITTLE
DAILY ACTIVITIY SCORE: 21
DRESSING REGULAR LOWER BODY CLOTHING: A LITTLE
MOBILITY SCORE: 23
DRESSING REGULAR UPPER BODY CLOTHING: A LITTLE

## 2025-03-01 ASSESSMENT — PAIN DESCRIPTION - ORIENTATION
ORIENTATION: LOWER

## 2025-03-01 ASSESSMENT — PAIN DESCRIPTION - LOCATION
LOCATION: BACK

## 2025-03-01 NOTE — PROGRESS NOTES
"  Infectious Disease    Patient Name: Kit Duncan  Date: 2/28/2025  YOB: 1960  Medical Record Number: [id number]        Chief Complaint   Patient presents with    abnormal mri     Dr sent in due to inflammation found on spine. 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  Mostly lumbosacral worse over 1 to 2 weeks said he had back pain before and usually self resolving this is the worst it has been.  He had an MRI as an outpatient showing potential inflammation in the spine it was given without contrast because he said he had a allergy to contrast in the past but this was with CT. he also has associated fever several days he believes he is unclear if she still having fevers.  He denies any other new symptoms.  He has been started on broad-spectrum antibiotics feels okay at this time    No immunosuppressive conditions in the family      Physical Exam:    Blood pressure 111/59, pulse 80, temperature 36.8 °C (98.2 °F), temperature source Temporal, resp. rate 18, height 1.676 m (5' 6\"), weight 61.2 kg (135 lb), SpO2 95%.  General: Patient appears ok at the present time. NAD  Skin: no new rashes  HEENT:  Neck is supple, No subconjunctival hemorrhages, no oral exudates  Heart: S1 S2  Lungs: clear bilaterally  Abdomen: soft, ND, NTTP,  Back :no CVA tenderness  Extrem: No edema, non tender  Neuro exam: CN II-XII intact  Psych: cooperative    Labs:  I have reviewed all lab results by electronic record, including most recent CBC, metabolic panel, and pertinent abnormalities were addressed from an infectious disease perspective.  Trends are being monitored over time.    Lab Results   Component Value Date    WBC 13.9 (H) 02/26/2025    HGB 12.4 (L) 02/26/2025    HCT 35.6 (L) 02/26/2025    MCV 90 02/26/2025     (H) 02/26/2025     Lab Results   Component Value Date    GLUCOSE 177 (H) 02/26/2025    CALCIUM 9.6 02/26/2025    NA " 128 (L) 02/26/2025    K 3.8 02/26/2025    CO2 24 02/26/2025    CL 95 (L) 02/26/2025    BUN 25 (H) 02/26/2025    CREATININE 0.77 02/26/2025       Radiology:  I have reviewed imaging results per electronic record and most pertinent abnormalities are being addressed from an infectious disease standpoint.            ASSESSMENT:  Problem List Items Addressed This Visit          Hematology and Neoplasia    Leukocytosis    Relevant Orders    Transesophageal Echo ([facility])       Neuro    * (Principal) Abnormal MRI, lumbar spine - Primary    Relevant Orders    Cardiac device check - MRI (Completed)    Cardiac device check - MRI (Completed)     Other Visit Diagnoses       Hyponatremia        Hypokalemia        Pneumonia due to infectious organism, unspecified laterality, unspecified part of lung            Back pain  Fever    MRI repeat noted    Patient is already on antibiotics.        Plan:  There will likely be low yield with aspiration needle  biopsy while on antibiotics, IR couldn't aspirate fluid epidural area. Pt not interested surgery    blood cultures have been sent but will also be lower yield       EVENS is negative    Treatment will be empiric , seems better on current therapy but I am concerned of side effects with regimen for weeks.  Change zosyn to ceftriaxone and see if he remains ok

## 2025-03-01 NOTE — PROGRESS NOTES
Vancomycin Dosing by Pharmacy- FOLLOW UP    Kit Duncan is a 64 y.o. year old male who Pharmacy has been consulted for vancomycin dosing for osteomyelitis/septic arthritis. Based on the patient's indication and renal status this patient is being dosed based on a goal AUC of 400-600.     Renal function is currently declining.    Current vancomycin dose: 1500 mg given every 24 hours    Estimated vancomycin AUC on current dose: 637 mg/L.hr     Visit Vitals  /69 (BP Location: Left arm, Patient Position: Lying)   Pulse 77   Temp 37 °C (98.6 °F) (Tympanic)   Resp 16        Lab Results   Component Value Date    CREATININE 1.04 2025    CREATININE 0.77 2025    CREATININE 0.84 2025    CREATININE 0.72 2024        Patient weight is as follows:   Vitals:    25 0604   Weight: 62.2 kg (137 lb 2 oz)       Cultures:  No results found for the encounter in last 14 days.       I/O last 3 completed shifts:  In: 840 (13.5 mL/kg) [P.O.:240; IV Piggyback:600]  Out: 300 (4.8 mL/kg) [Urine:300 (0.1 mL/kg/hr)]  Weight: 62.2 kg   I/O during current shift:  I/O this shift:  In: 580 [P.O.:580]  Out: 300 [Urine:300]    Temp (24hrs), Av.4 °C (97.5 °F), Min:35.3 °C (95.5 °F), Max:37 °C (98.6 °F)      Assessment/Plan    Above goal AUC. Orders placed for new vancomcyin regimen of 1250 mg every 24 hours to begin at 2100.    This dosing regimen is predicted by [employer] to result in the following pharmacokinetic parameters:  Regimen: 1250 mg IV every 24 hours.  Start time: 21:01 on 2025  Exposure target: AUC24 (range)400-600 mg/L.hr   [id number]: 540 mg/L.hr  AUC24,ss: 536 mg/L.hr  Probability of AUC24 > 400: 99 %  Ctrough,ss: 14.4 mg/L  Probability of Ctrough,ss > 20: 8 %    The next level will be obtained on 3/3 at 0500. May be obtained sooner if clinically indicated.   Will continue to monitor renal function daily while on vancomycin and order serum creatinine at least every 48 hours if not  already ordered.  Follow for continued vancomycin needs, clinical response, and signs/symptoms of toxicity.       Siria Cruz, PharmD

## 2025-03-01 NOTE — CARE PLAN
Problem: Fall/Injury  Goal: Not fall by end of shift  Outcome: Progressing  Goal: Be free from injury by end of the shift  Outcome: Progressing  Goal: Verbalize understanding of personal risk factors for fall in the hospital  Outcome: Progressing  Goal: Verbalize understanding of risk factor reduction measures to prevent injury from fall in the home  Outcome: Progressing  Goal: Use assistive devices by end of the shift  Outcome: Progressing  Goal: Pace activities to prevent fatigue by end of the shift  Outcome: Progressing     Problem: Diabetes  Goal: Achieve decreasing blood glucose levels by end of shift  Outcome: Progressing  Goal: Increase stability of blood glucose readings by end of shift  Outcome: Progressing  Goal: Decrease in ketones present in urine by end of shift  Outcome: Progressing  Goal: Maintain electrolyte levels within acceptable range throughout shift  Outcome: Progressing  Goal: Maintain glucose levels >70mg/dl to <250mg/dl throughout shift  Outcome: Progressing  Goal: No changes in neurological exam by end of shift  Outcome: Progressing  Goal: Learn about and adhere to nutrition recommendations by end of shift  Outcome: Progressing  Goal: Vital signs within normal range for age by end of shift  Outcome: Progressing  Goal: Increase self care and/or family involovement by end of shift  Outcome: Progressing  Goal: Receive DSME education by end of shift  Outcome: Progressing     Problem: Pain  Goal: Takes deep breaths with improved pain control throughout the shift  Outcome: Progressing  Goal: Turns in bed with improved pain control throughout the shift  Outcome: Progressing  Goal: Walks with improved pain control throughout the shift  Outcome: Progressing  Goal: Performs ADL's with improved pain control throughout shift  Outcome: Progressing  Goal: Participates in PT with improved pain control throughout the shift  Outcome: Progressing  Goal: Free from opioid side effects throughout the  shift  Outcome: Progressing  Goal: Free from acute confusion related to pain meds throughout the shift  Outcome: Progressing     Problem: Skin  Goal: Participates in plan/prevention/treatment measures  Outcome: Progressing  Goal: Prevent/manage excess moisture  Outcome: Progressing  Goal: Prevent/minimize sheer/friction injuries  Outcome: Progressing  Goal: Promote/optimize nutrition  Outcome: Progressing  Goal: Promote skin healing  Outcome: Progressing   The patient's goals for the shift include Labs WNL    The clinical goals for the shift include patient will remain free from injury throughout shift    Over the shift, the patient did not make progress toward the following goals. Barriers to progression include acute illness. Recommendations to address these barriers include continued education and reinforcement of information.

## 2025-03-01 NOTE — PROGRESS NOTES
ADMISSION DATE: 2/25/2025  HOSPITAL DAY: 4    SUBJECTIVE:  Patient was seen at bedside.  No fever no chills. Uneventful night.  Currently on IV ceftriaxone and vancomycin.  Blood cultures are negative.  EVENS did not reveal any vegetation.  Aspirin and Eliquis was on hold for the preparation of biopsy.    OBJECTIVE:  Vitals:    02/28/25 2353 03/01/25 0604 03/01/25 0720 03/01/25 1528   BP: 93/51  131/69 128/60   BP Location:   Left arm    Patient Position:   Lying    Pulse: 64  77 72   Resp:   16    Temp: 35.3 °C (95.5 °F)  37 °C (98.6 °F) 36.2 °C (97.2 °F)   TempSrc:   Tympanic    SpO2: 94%  94% 95%   Weight:  62.2 kg (137 lb 2 oz)     Height:            Intake/Output Summary (Last 24 hours) at 3/1/2025 1845  Last data filed at 3/1/2025 1200  Gross per 24 hour   Intake 1420 ml   Output 600 ml   Net 820 ml      Wt Readings from Last 10 Encounters:   03/01/25 62.2 kg (137 lb 2 oz)   01/02/25 60.6 kg (133 lb 11.2 oz)   12/31/24 59.9 kg (132 lb)   06/26/24 64 kg (141 lb)   02/27/24 66.9 kg (147 lb 7.8 oz)   02/26/24 66.9 kg (147 lb 7.8 oz)   02/15/24 65.1 kg (143 lb 9.6 oz)       PHYSICAL EXAM:  Gen: Alert, awake, Oriented X 3. Not in any acute distress   HEENT:  Atraumatic, PERRL.  Conjunctivae clear.   Moist nasal mucous membranes. oropharynx non erythematous,   Neck:  Supple without thyromegaly or lymphadenopathy.  Lungs:  Clear to auscultation without rales, rhonchi, or rub.  Heart:  RRR, S1, S2, without M.  Abdomen:  Soft, non tender, no organ enlargement, bruit. Bowel sounds present . No CVA tenderness.  Extremities:  No edema. No calf swelling or tenderness.    Skin:  No rash, ecchymosis or erythema.    CURRENT ACTIVE MEDS:  apixaban, 5 mg, oral, BID  aspirin, 81 mg, oral, Daily  cefTRIAXone, 2 g, intravenous, q24h  empagliflozin, 25 mg, oral, Daily  folic acid, 1 mg, oral, Daily  furosemide, 60 mg, oral, BID  magnesium oxide, 400 mg, oral, Every other day  metoprolol tartrate, 25 mg, oral, BID  multivitamin with  minerals, 1 tablet, oral, Daily  pantoprazole, 20 mg, oral, Daily  potassium chloride CR, 20 mEq, oral, TID  pravastatin, 40 mg, oral, Nightly  thiamine, 100 mg, oral, Daily  vancomycin, 1,250 mg, intravenous, q24h      LAB RESULTS:   CBC:   Results from last 7 days   Lab Units 03/01/25  0710 02/26/25  0731 02/25/25  1706   WBC AUTO x10*3/uL 11.7* 13.9* 16.1*   RBC AUTO x10*6/uL 3.94* 3.97* 4.37*   HEMOGLOBIN g/dL 12.2* 12.4* 13.8   HEMATOCRIT % 37.1* 35.6* 39.9*   MCV fL 94 90 91   MCH pg 31.0 31.2 31.6   MCHC g/dL 32.9 34.8 34.6   RDW % 14.6* 13.8 13.9   PLATELETS AUTO x10*3/uL 689* 606* 607*     CMP:    Results from last 7 days   Lab Units 03/01/25  0710 02/26/25  0732 02/25/25  1706   SODIUM mmol/L 131* 128* 129*   POTASSIUM mmol/L 5.4* 3.8 3.1*   CHLORIDE mmol/L 102 95* 90*   CO2 mmol/L 23 24 25   BUN mg/dL 20 25* 30*   CREATININE mg/dL 1.04 0.77 0.84   GLUCOSE mg/dL 122* 177* 122*   PROTEIN TOTAL g/dL 5.0*  --  7.5   CALCIUM mg/dL 9.0 9.6 10.0   BILIRUBIN TOTAL mg/dL 0.2  --  0.4   ALK PHOS U/L 160*  --  170*   AST U/L 21  --  21   ALT U/L 22  --  19     BMP:    Results from last 7 days   Lab Units 03/01/25  0710 02/26/25  0732 02/25/25  1706   SODIUM mmol/L 131* 128* 129*   POTASSIUM mmol/L 5.4* 3.8 3.1*   CHLORIDE mmol/L 102 95* 90*   CO2 mmol/L 23 24 25   BUN mg/dL 20 25* 30*   CREATININE mg/dL 1.04 0.77 0.84   CALCIUM mg/dL 9.0 9.6 10.0   GLUCOSE mg/dL 122* 177* 122*     Magnesium:  Results from last 7 days   Lab Units 03/01/25  0710   MAGNESIUM mg/dL 1.95     Troponin:    Results from last 7 days   Lab Units 02/25/25  1706   TROPHS ng/L 14     Lab Results   Component Value Date    HGBA1C 7.4 (H) 11/19/2024    HGBA1C 7.3 (H) 09/20/2024    HGBA1C 7.3 (H) 06/27/2024     Lab Results   Component Value Date    CREATININE 1.04 03/01/2025     Lab Results   Component Value Date    INR 1.1 02/26/2024    INR 1.0 01/25/2022    PROTIME 12.3 02/26/2024    PROTIME 11.9 01/25/2022       CULTURES & SUSCEPTIBILITIES:   No  results found for the last 90 days.      IMAGING STUDIES:  I have reviewed following imaging studies.  I agree with the impression and incorporated those results into my MDM     === 02/25/25 ===  XR CHEST 1 VIEW  New right basilar patchy airspace opacities may represent pneumonia  in the appropriate clinical setting. Radiographic follow-up to  complete resolution is recommended.  Suspected small bilateral pleural effusions.    === 02/25/25 ===  MR LUMBAR SPINE W AND WO CONTRAST  Abnormal T2 signal increase and enhancement involving the L3, L4 and  S1 vertebrae suspicious for osteomyelitis and likely infection  involving the intervening discs. Epidural abscesses at and below the  L5 level as described above. Overall findings appear roughly similar  to the prior MRI.    LAST EKG  Encounter Date: 02/25/25   ECG 12 lead   Result Value    Ventricular Rate 91    Atrial Rate 91    MD Interval 226    QRS Duration 170    QT Interval 426    QTC Calculation(Bazett) 523    P Axis 117    R Axis 193    T Axis 35    QRS Count 15    Q Onset 183    P Onset 70    P Offset 136    T Offset 396    QTC Fredericia 489       PROBLEMS ON ADMISSION:  Hypokalemia [E87.6]  Hyponatremia [E87.1]  Abnormal MRI, lumbar spine [R93.7]  Leukocytosis, unspecified type [D72.829]  Pneumonia due to infectious organism, unspecified laterality, unspecified part of lung [J18.9]    HOSPITAL PROBLEM LIST:   Lumbar discitis, acute on chronic back pain  Hypokalemia hyper natremia likely secondary to alcohol dependence  Alcohol use disorder  CAD  A flutter  status post aortic valve repair    ASSESSMENT AND PLAN FOR 3/1/2025  IV vancomycin and ceftriaxone  Infectious disease to decide on home-going antibiotic, whether we need PICC versus oral  Monitor electrolytes  Pain control  DVT prophylaxis  Will resume Eliquis and aspirin.  Biopsy of the epidural fluid has been failed        P.S: This note was completed using Dragon voice recognition technology and may  include unintended errors with respect to translation of words, typographical errors or grammar errors which may not have been identified while finalizing the chart.

## 2025-03-01 NOTE — CARE PLAN
The patient's goals for the shift include Labs WNL    The clinical goals for the shift include Labs WNL      Problem: Fall/Injury  Goal: Not fall by end of shift  Outcome: Progressing  Goal: Be free from injury by end of the shift  Outcome: Progressing  Goal: Verbalize understanding of personal risk factors for fall in the hospital  Outcome: Progressing  Goal: Verbalize understanding of risk factor reduction measures to prevent injury from fall in the home  Outcome: Progressing  Goal: Use assistive devices by end of the shift  Outcome: Progressing  Goal: Pace activities to prevent fatigue by end of the shift  Outcome: Progressing     Problem: Diabetes  Goal: Achieve decreasing blood glucose levels by end of shift  Outcome: Progressing  Goal: Increase stability of blood glucose readings by end of shift  Outcome: Progressing  Goal: Decrease in ketones present in urine by end of shift  Outcome: Progressing  Goal: Maintain electrolyte levels within acceptable range throughout shift  Outcome: Progressing  Goal: Maintain glucose levels >70mg/dl to <250mg/dl throughout shift  Outcome: Progressing  Goal: No changes in neurological exam by end of shift  Outcome: Progressing  Goal: Learn about and adhere to nutrition recommendations by end of shift  Outcome: Progressing  Goal: Vital signs within normal range for age by end of shift  Outcome: Progressing  Goal: Increase self care and/or family involovement by end of shift  Outcome: Progressing  Goal: Receive DSME education by end of shift  Outcome: Progressing     Problem: Pain  Goal: Takes deep breaths with improved pain control throughout the shift  Outcome: Progressing  Goal: Turns in bed with improved pain control throughout the shift  Outcome: Progressing  Goal: Walks with improved pain control throughout the shift  Outcome: Progressing  Goal: Performs ADL's with improved pain control throughout shift  Outcome: Progressing  Goal: Participates in PT with improved pain  control throughout the shift  Outcome: Progressing  Goal: Free from opioid side effects throughout the shift  Outcome: Progressing  Goal: Free from acute confusion related to pain meds throughout the shift  Outcome: Progressing     Problem: Skin  Goal: Participates in plan/prevention/treatment measures  3/1/2025 0606 by Sandee Thompson RN  Outcome: Progressing  2/28/2025 1951 by Sandee Thompson RN  Flowsheets (Taken 2/28/2025 1951)  Participates in plan/prevention/treatment measures:   Increase activity/out of bed for meals   Discuss with provider PT/OT consult   Elevate heels  Goal: Prevent/manage excess moisture  3/1/2025 0606 by Sandee Thompson RN  Outcome: Progressing  2/28/2025 1951 by Sandee Thompson RN  Flowsheets (Taken 2/28/2025 1951)  Prevent/manage excess moisture:   Use wicking fabric (obtain order)   Moisturize dry skin   Cleanse incontinence/protect with barrier cream   Monitor for/manage infection if present   Follow provider orders for dressing changes  Goal: Prevent/minimize sheer/friction injuries  3/1/2025 0606 by Sandee Thompson RN  Outcome: Progressing  2/28/2025 1951 by Sandee Thompson RN  Flowsheets (Taken 2/28/2025 1951)  Prevent/minimize sheer/friction injuries:   Utilize specialty bed per algorithm   Use pull sheet   Turn/reposition every 2 hours/use positioning/transfer devices   Increase activity/out of bed for meals   HOB 30 degrees or less   Complete micro-shifts as needed if patient unable. Adjust patient position to relieve pressure points, not a full turn  Goal: Promote/optimize nutrition  3/1/2025 0606 by Sandee Thompson RN  Outcome: Progressing  2/28/2025 1951 by Sandee Thompson RN  Flowsheets (Taken 2/28/2025 1951)  Promote/optimize nutrition:   Offer water/supplements/favorite foods   Discuss with provider if NPO > 2 days   Assist with feeding   Reassess MST if dietician not consulted   Monitor/record intake including meals   Consume > 50%  meals/supplements  Goal: Promote skin healing  3/1/2025 0606 by Sandee Thompson RN  Outcome: Progressing  2/28/2025 1951 by Sandee Thompson RN  Flowsheets (Taken 2/28/2025 1951)  Promote skin healing:   Turn/reposition every 2 hours/use positioning/transfer devices   Rotate device position/do not position patient on device   Protective dressings over bony prominences   Ensure correct size (line/device) and apply per  instructions   Assess skin/pad under line(s)/device(s)

## 2025-03-02 ENCOUNTER — APPOINTMENT (OUTPATIENT)
Dept: RADIOLOGY | Facility: HOSPITAL | Age: 65
End: 2025-03-02
Payer: COMMERCIAL

## 2025-03-02 VITALS
BODY MASS INDEX: 22.04 KG/M2 | TEMPERATURE: 97.2 F | OXYGEN SATURATION: 93 % | RESPIRATION RATE: 16 BRPM | WEIGHT: 137.13 LBS | HEIGHT: 66 IN | HEART RATE: 70 BPM | DIASTOLIC BLOOD PRESSURE: 61 MMHG | SYSTOLIC BLOOD PRESSURE: 121 MMHG

## 2025-03-02 LAB
ALBUMIN SERPL BCP-MCNC: 2.9 G/DL (ref 3.4–5)
ALBUMIN SERPL BCP-MCNC: 3.3 G/DL (ref 3.4–5)
ALP SERPL-CCNC: 138 U/L (ref 33–136)
ALP SERPL-CCNC: 181 U/L (ref 33–136)
ALT SERPL W P-5'-P-CCNC: 20 U/L (ref 10–52)
ALT SERPL W P-5'-P-CCNC: 21 U/L (ref 10–52)
ANION GAP SERPL CALC-SCNC: 11 MMOL/L (ref 10–20)
ANION GAP SERPL CALC-SCNC: 12 MMOL/L (ref 10–20)
AST SERPL W P-5'-P-CCNC: 18 U/L (ref 9–39)
AST SERPL W P-5'-P-CCNC: 19 U/L (ref 9–39)
BACTERIA BLD CULT: NORMAL
BACTERIA BLD CULT: NORMAL
BASOPHILS # BLD AUTO: 0.06 X10*3/UL (ref 0–0.1)
BASOPHILS NFR BLD AUTO: 0.4 %
BILIRUB SERPL-MCNC: 0.2 MG/DL (ref 0–1.2)
BILIRUB SERPL-MCNC: 0.2 MG/DL (ref 0–1.2)
BUN SERPL-MCNC: 19 MG/DL (ref 6–23)
BUN SERPL-MCNC: 19 MG/DL (ref 6–23)
CALCIUM SERPL-MCNC: 8.8 MG/DL (ref 8.6–10.3)
CALCIUM SERPL-MCNC: 9.1 MG/DL (ref 8.6–10.3)
CHLORIDE SERPL-SCNC: 101 MMOL/L (ref 98–107)
CHLORIDE SERPL-SCNC: 99 MMOL/L (ref 98–107)
CO2 SERPL-SCNC: 23 MMOL/L (ref 21–32)
CO2 SERPL-SCNC: 26 MMOL/L (ref 21–32)
CREAT SERPL-MCNC: 0.93 MG/DL (ref 0.5–1.3)
CREAT SERPL-MCNC: 0.94 MG/DL (ref 0.5–1.3)
EGFRCR SERPLBLD CKD-EPI 2021: >90 ML/MIN/1.73M*2
EGFRCR SERPLBLD CKD-EPI 2021: >90 ML/MIN/1.73M*2
EOSINOPHIL # BLD AUTO: 0.26 X10*3/UL (ref 0–0.7)
EOSINOPHIL NFR BLD AUTO: 1.7 %
ERYTHROCYTE [DISTWIDTH] IN BLOOD BY AUTOMATED COUNT: 14.2 % (ref 11.5–14.5)
GLUCOSE SERPL-MCNC: 142 MG/DL (ref 74–99)
GLUCOSE SERPL-MCNC: 146 MG/DL (ref 74–99)
HCT VFR BLD AUTO: 36 % (ref 41–52)
HGB BLD-MCNC: 12 G/DL (ref 13.5–17.5)
IMM GRANULOCYTES # BLD AUTO: 0.12 X10*3/UL (ref 0–0.7)
IMM GRANULOCYTES NFR BLD AUTO: 0.8 % (ref 0–0.9)
LYMPHOCYTES # BLD AUTO: 0.23 X10*3/UL (ref 1.2–4.8)
LYMPHOCYTES NFR BLD AUTO: 1.5 %
MAGNESIUM SERPL-MCNC: 1.8 MG/DL (ref 1.6–2.4)
MCH RBC QN AUTO: 30.5 PG (ref 26–34)
MCHC RBC AUTO-ENTMCNC: 33.3 G/DL (ref 32–36)
MCV RBC AUTO: 92 FL (ref 80–100)
MONOCYTES # BLD AUTO: 1.78 X10*3/UL (ref 0.1–1)
MONOCYTES NFR BLD AUTO: 11.8 %
NEUTROPHILS # BLD AUTO: 12.69 X10*3/UL (ref 1.2–7.7)
NEUTROPHILS NFR BLD AUTO: 83.8 %
NRBC BLD-RTO: 0 /100 WBCS (ref 0–0)
PLATELET # BLD AUTO: 723 X10*3/UL (ref 150–450)
POTASSIUM SERPL-SCNC: 4.5 MMOL/L (ref 3.5–5.3)
POTASSIUM SERPL-SCNC: 4.6 MMOL/L (ref 3.5–5.3)
PROT SERPL-MCNC: 5.6 G/DL (ref 6.4–8.2)
PROT SERPL-MCNC: 6.2 G/DL (ref 6.4–8.2)
RBC # BLD AUTO: 3.93 X10*6/UL (ref 4.5–5.9)
SODIUM SERPL-SCNC: 131 MMOL/L (ref 136–145)
SODIUM SERPL-SCNC: 131 MMOL/L (ref 136–145)
WBC # BLD AUTO: 15.1 X10*3/UL (ref 4.4–11.3)

## 2025-03-02 PROCEDURE — 85025 COMPLETE CBC W/AUTO DIFF WBC: CPT | Performed by: INTERNAL MEDICINE

## 2025-03-02 PROCEDURE — 84520 ASSAY OF UREA NITROGEN: CPT | Performed by: NURSE PRACTITIONER

## 2025-03-02 PROCEDURE — 2500000001 HC RX 250 WO HCPCS SELF ADMINISTERED DRUGS (ALT 637 FOR MEDICARE OP): Performed by: FAMILY MEDICINE

## 2025-03-02 PROCEDURE — 83735 ASSAY OF MAGNESIUM: CPT | Performed by: INTERNAL MEDICINE

## 2025-03-02 PROCEDURE — 74176 CT ABD & PELVIS W/O CONTRAST: CPT

## 2025-03-02 PROCEDURE — 74176 CT ABD & PELVIS W/O CONTRAST: CPT | Performed by: RADIOLOGY

## 2025-03-02 PROCEDURE — 36415 COLL VENOUS BLD VENIPUNCTURE: CPT | Performed by: INTERNAL MEDICINE

## 2025-03-02 PROCEDURE — 80053 COMPREHEN METABOLIC PANEL: CPT | Performed by: INTERNAL MEDICINE

## 2025-03-02 PROCEDURE — 2500000002 HC RX 250 W HCPCS SELF ADMINISTERED DRUGS (ALT 637 FOR MEDICARE OP, ALT 636 FOR OP/ED): Performed by: FAMILY MEDICINE

## 2025-03-02 PROCEDURE — 2500000004 HC RX 250 GENERAL PHARMACY W/ HCPCS (ALT 636 FOR OP/ED): Performed by: INTERNAL MEDICINE

## 2025-03-02 PROCEDURE — 2500000001 HC RX 250 WO HCPCS SELF ADMINISTERED DRUGS (ALT 637 FOR MEDICARE OP): Performed by: INTERNAL MEDICINE

## 2025-03-02 PROCEDURE — 2500000001 HC RX 250 WO HCPCS SELF ADMINISTERED DRUGS (ALT 637 FOR MEDICARE OP): Performed by: NURSE PRACTITIONER

## 2025-03-02 PROCEDURE — 1210000001 HC SEMI-PRIVATE ROOM DAILY

## 2025-03-02 PROCEDURE — 36415 COLL VENOUS BLD VENIPUNCTURE: CPT | Performed by: NURSE PRACTITIONER

## 2025-03-02 PROCEDURE — 2500000001 HC RX 250 WO HCPCS SELF ADMINISTERED DRUGS (ALT 637 FOR MEDICARE OP): Performed by: PHARMACIST

## 2025-03-02 PROCEDURE — 99233 SBSQ HOSP IP/OBS HIGH 50: CPT | Performed by: INTERNAL MEDICINE

## 2025-03-02 PROCEDURE — 2500000004 HC RX 250 GENERAL PHARMACY W/ HCPCS (ALT 636 FOR OP/ED): Performed by: FAMILY MEDICINE

## 2025-03-02 PROCEDURE — 2500000004 HC RX 250 GENERAL PHARMACY W/ HCPCS (ALT 636 FOR OP/ED)

## 2025-03-02 RX ORDER — BUTYROSPERMUM PARKII(SHEA BUTTER), SIMMONDSIA CHINENSIS (JOJOBA) SEED OIL, ALOE BARBADENSIS LEAF EXTRACT .01; 1; 3.5 G/100G; G/100G; G/100G
250 LIQUID TOPICAL 2 TIMES DAILY
Status: DISCONTINUED | OUTPATIENT
Start: 2025-03-02 | End: 2025-03-02

## 2025-03-02 RX ORDER — L. ACIDOPHILUS/L.BULGARICUS 1MM CELL
1 TABLET ORAL 2 TIMES DAILY
Status: DISPENSED | OUTPATIENT
Start: 2025-03-02

## 2025-03-02 RX ORDER — IBUPROFEN 400 MG/1
400 TABLET ORAL EVERY 8 HOURS PRN
Status: DISPENSED | OUTPATIENT
Start: 2025-03-02

## 2025-03-02 RX ADMIN — Medication 1 TABLET: at 21:03

## 2025-03-02 RX ADMIN — IBUPROFEN 400 MG: 400 TABLET, FILM COATED ORAL at 21:34

## 2025-03-02 RX ADMIN — PRAVASTATIN SODIUM 40 MG: 20 TABLET ORAL at 21:03

## 2025-03-02 RX ADMIN — KETOROLAC TROMETHAMINE 30 MG: 30 INJECTION, SOLUTION INTRAMUSCULAR at 05:13

## 2025-03-02 RX ADMIN — KETOROLAC TROMETHAMINE 30 MG: 30 INJECTION, SOLUTION INTRAMUSCULAR at 17:15

## 2025-03-02 RX ADMIN — EMPAGLIFLOZIN 25 MG: 25 TABLET, FILM COATED ORAL at 10:22

## 2025-03-02 RX ADMIN — METOPROLOL TARTRATE 25 MG: 25 TABLET, FILM COATED ORAL at 10:20

## 2025-03-02 RX ADMIN — ASPIRIN 81 MG: 81 TABLET, COATED ORAL at 10:22

## 2025-03-02 RX ADMIN — FOLIC ACID 1 MG: 1 TABLET ORAL at 10:20

## 2025-03-02 RX ADMIN — APIXABAN 5 MG: 5 TABLET, FILM COATED ORAL at 21:03

## 2025-03-02 RX ADMIN — METOPROLOL TARTRATE 25 MG: 25 TABLET, FILM COATED ORAL at 21:03

## 2025-03-02 RX ADMIN — VANCOMYCIN HYDROCHLORIDE 1250 MG: 1.25 INJECTION, POWDER, LYOPHILIZED, FOR SOLUTION INTRAVENOUS at 21:03

## 2025-03-02 RX ADMIN — FUROSEMIDE 60 MG: 40 TABLET ORAL at 10:21

## 2025-03-02 RX ADMIN — Medication 1 TABLET: at 10:22

## 2025-03-02 RX ADMIN — OXYCODONE HYDROCHLORIDE AND ACETAMINOPHEN 1 TABLET: 5; 325 TABLET ORAL at 23:30

## 2025-03-02 RX ADMIN — DEXTROSE MONOHYDRATE 2 G: 5 INJECTION INTRAVENOUS at 23:31

## 2025-03-02 RX ADMIN — Medication 10 MG: at 21:03

## 2025-03-02 RX ADMIN — KETOROLAC TROMETHAMINE 30 MG: 30 INJECTION, SOLUTION INTRAMUSCULAR at 11:04

## 2025-03-02 RX ADMIN — POTASSIUM CHLORIDE 20 MEQ: 1500 TABLET, EXTENDED RELEASE ORAL at 15:09

## 2025-03-02 RX ADMIN — APIXABAN 5 MG: 5 TABLET, FILM COATED ORAL at 10:20

## 2025-03-02 RX ADMIN — Medication 100 MG: at 10:20

## 2025-03-02 RX ADMIN — OXYCODONE HYDROCHLORIDE AND ACETAMINOPHEN 1 TABLET: 5; 325 TABLET ORAL at 15:30

## 2025-03-02 RX ADMIN — PANTOPRAZOLE 20 MG: 20 TABLET, DELAYED RELEASE ORAL at 10:21

## 2025-03-02 ASSESSMENT — PAIN DESCRIPTION - ORIENTATION
ORIENTATION: LOWER

## 2025-03-02 ASSESSMENT — COGNITIVE AND FUNCTIONAL STATUS - GENERAL
DRESSING REGULAR LOWER BODY CLOTHING: A LITTLE
DRESSING REGULAR UPPER BODY CLOTHING: A LITTLE
DAILY ACTIVITIY SCORE: 21
HELP NEEDED FOR BATHING: A LITTLE
CLIMB 3 TO 5 STEPS WITH RAILING: A LITTLE
MOBILITY SCORE: 23

## 2025-03-02 ASSESSMENT — PAIN SCALES - GENERAL
PAINLEVEL_OUTOF10: 6
PAINLEVEL_OUTOF10: 7
PAINLEVEL_OUTOF10: 6
PAINLEVEL_OUTOF10: 0 - NO PAIN
PAINLEVEL_OUTOF10: 6
PAINLEVEL_OUTOF10: 3
PAINLEVEL_OUTOF10: 5 - MODERATE PAIN
PAINLEVEL_OUTOF10: 3
PAINLEVEL_OUTOF10: 6

## 2025-03-02 ASSESSMENT — PAIN - FUNCTIONAL ASSESSMENT
PAIN_FUNCTIONAL_ASSESSMENT: 0-10
PAIN_FUNCTIONAL_ASSESSMENT: 0-10

## 2025-03-02 ASSESSMENT — LIFESTYLE VARIABLES
AGITATION: NORMAL ACTIVITY
ANXIETY: NO ANXIETY, AT EASE
TOTAL SCORE: 0
AUDITORY DISTURBANCES: NOT PRESENT
HEADACHE, FULLNESS IN HEAD: NOT PRESENT
VISUAL DISTURBANCES: NOT PRESENT
ORIENTATION AND CLOUDING OF SENSORIUM: ORIENTED AND CAN DO SERIAL ADDITIONS
TREMOR: NO TREMOR
PAROXYSMAL SWEATS: NO SWEAT VISIBLE
NAUSEA AND VOMITING: NO NAUSEA AND NO VOMITING

## 2025-03-02 ASSESSMENT — PAIN DESCRIPTION - LOCATION
LOCATION: BACK

## 2025-03-02 NOTE — PROGRESS NOTES
"  Infectious Disease    Patient Name: Kit Duncan  Date: 3/2/2025  YOB: 1960  Medical Record Number: [id number]        Chief Complaint   Patient presents with    abnormal mri     Dr sent in due to inflammation found on spine. 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  Mostly lumbosacral worse over 1 to 2 weeks said he had back pain before and usually self resolving this is the worst it has been.  He had an MRI as an outpatient showing potential inflammation in the spine it was given without contrast because he said he had a allergy to contrast in the past but this was with CT. he also has associated fever several days he believes he is unclear if she still having fevers.  He denies any other new symptoms.  He has been started on broad-spectrum antibiotics feels okay at this time    No immunosuppressive conditions in the family    Feeling ok. Still some pain. No fevers      Physical Exam:    Blood pressure 118/54, pulse 78, temperature 36.5 °C (97.7 °F), resp. rate 16, height 1.676 m (5' 6\"), weight 62.2 kg (137 lb 2 oz), SpO2 92%.  General: Patient appears ok at the present time. NAD  Skin: no new rashes  HEENT:  Neck is supple, No subconjunctival hemorrhages, no oral exudates  Heart: S1 S2  Lungs: clear bilaterally  Abdomen: soft, ND, NTTP,  Back :no CVA tenderness  Extrem: No edema, non tender  Neuro exam: CN II-XII intact  Psych: cooperative    Labs:  I have reviewed all lab results by electronic record, including most recent CBC, metabolic panel, and pertinent abnormalities were addressed from an infectious disease perspective.  Trends are being monitored over time.    Lab Results   Component Value Date    WBC 15.1 (H) 03/02/2025    HGB 12.0 (L) 03/02/2025    HCT 36.0 (L) 03/02/2025    MCV 92 03/02/2025     (H) 03/02/2025     Lab Results   Component Value Date    GLUCOSE 142 (H) 03/02/2025    CALCIUM 8.8 " 03/02/2025     (L) 03/02/2025    K 4.6 03/02/2025    CO2 23 03/02/2025     03/02/2025    BUN 19 03/02/2025    CREATININE 0.93 03/02/2025       Radiology:  I have reviewed imaging results per electronic record and most pertinent abnormalities are being addressed from an infectious disease standpoint.            ASSESSMENT:  Problem List Items Addressed This Visit          Hematology and Neoplasia    Leukocytosis    Relevant Orders    Transesophageal Echo ([facility])       Neuro    * (Principal) Abnormal MRI, lumbar spine - Primary    Relevant Orders    Cardiac device check - MRI (Completed)    Cardiac device check - MRI (Completed)     Other Visit Diagnoses       Hyponatremia        Hypokalemia        Pneumonia due to infectious organism, unspecified laterality, unspecified part of lung            Back pain  Fever    MRI repeat noted    Patient is already on antibiotics.        Plan:  There will likely be low yield with aspiration needle  biopsy while on antibiotics, IR couldn't aspirate fluid epidural area. Pt not interested surgery    blood cultures have been sent but will also be lower yield       [facility] is negative    Treatment will be empiric , seems better on vanco/zosyn but I am concerned of side effects with regimen for weeks.  Change zosyn to ceftriaxone and see if he remains ok    I am going to CT abd/pelvis to see if there are any other focus of infection

## 2025-03-02 NOTE — CARE PLAN
Problem: Fall/Injury  Goal: Not fall by end of shift  Outcome: Progressing  Goal: Be free from injury by end of the shift  Outcome: Progressing  Goal: Verbalize understanding of personal risk factors for fall in the hospital  Outcome: Progressing  Goal: Verbalize understanding of risk factor reduction measures to prevent injury from fall in the home  Outcome: Progressing  Goal: Use assistive devices by end of the shift  Outcome: Progressing  Goal: Pace activities to prevent fatigue by end of the shift  Outcome: Progressing     Problem: Diabetes  Goal: Maintain electrolyte levels within acceptable range throughout shift  3/2/2025 1814 by Jerri Aleman RN  Outcome: Progressing  3/2/2025 0753 by Jerri Aleman RN  Flowsheets (Taken 3/2/2025 0753)  Maintain electrolyte levels within acceptable range throughout shift:   Med administration/monitoring of effect   Monitor urine output  Goal: Maintain glucose levels >70mg/dl to <250mg/dl throughout shift  3/2/2025 1814 by Jerri Aleman RN  Outcome: Progressing  3/2/2025 0753 by Jerri Aleman RN  Flowsheets (Taken 3/2/2025 0753)  Maintain glucose levels >70mg/dl to <250mg/dl throughout shift: Med administration/monitoring of effect  Goal: Learn about and adhere to nutrition recommendations by end of shift  3/2/2025 1814 by Jerri Aleman RN  Outcome: Progressing  3/2/2025 0753 by Jerri Aleman RN  Flowsheets (Taken 3/2/2025 0753)  Learn about and adhere to nutrition recommendations by end of shift: Ensure/encourage compliance with appropriate diet  Goal: Vital signs within normal range for age by end of shift  3/2/2025 1814 by Jerri Aleman RN  Outcome: Progressing  3/2/2025 0753 by Jerri Aleman RN  Flowsheets (Taken 3/2/2025 0753)  Vital signs within normal range for age by end of shift: Med administration/monitoring of effect  Goal: Receive DSME education by end of shift  3/2/2025 1814 by Jerri Aleman RN  Outcome: Progressing  3/2/2025 0753 by  Jerri Aleman RN  Flowsheets (Taken 3/2/2025 0753)  Receive DSME education by end of shift: Provide patient centered education on Diabetic Self Management Education     Problem: Pain  Goal: Takes deep breaths with improved pain control throughout the shift  Outcome: Progressing  Goal: Turns in bed with improved pain control throughout the shift  Outcome: Progressing  Goal: Walks with improved pain control throughout the shift  Outcome: Progressing  Goal: Performs ADL's with improved pain control throughout shift  Outcome: Progressing  Goal: Participates in PT with improved pain control throughout the shift  Outcome: Progressing  Goal: Free from opioid side effects throughout the shift  Outcome: Progressing  Goal: Free from acute confusion related to pain meds throughout the shift  Outcome: Progressing     Problem: Skin  Goal: Participates in plan/prevention/treatment measures  3/2/2025 1814 by Jerri Aleman RN  Outcome: Progressing  3/2/2025 0753 by Jerri Aleman RN  Flowsheets (Taken 3/2/2025 0753)  Participates in plan/prevention/treatment measures:   Discuss with provider PT/OT consult   Elevate heels   Increase activity/out of bed for meals  Goal: Prevent/manage excess moisture  3/2/2025 1814 by Jerri Aelman RN  Outcome: Progressing  3/2/2025 0753 by Jerri Aleman RN  Flowsheets (Taken 3/2/2025 0753)  Prevent/manage excess moisture:   Monitor for/manage infection if present   Moisturize dry skin  Goal: Prevent/minimize sheer/friction injuries  3/2/2025 1814 by Jerri Aleman RN  Outcome: Progressing  3/2/2025 0753 by Jerri Aleman RN  Flowsheets (Taken 3/2/2025 0753)  Prevent/minimize sheer/friction injuries: Increase activity/out of bed for meals  Goal: Promote/optimize nutrition  3/2/2025 1814 by Jerri Aleman RN  Outcome: Progressing  3/2/2025 0753 by Jerri Aleman RN  Flowsheets (Taken 3/2/2025 0753)  Promote/optimize nutrition:   Offer water/supplements/favorite foods    Monitor/record intake including meals  Goal: Promote skin healing  3/2/2025 1814 by Jerri Aleman RN  Outcome: Progressing  3/2/2025 0753 by Jerri Aleman RN  Flowsheets (Taken 3/2/2025 0753)  Promote skin healing:   Assess skin/pad under line(s)/device(s)   Ensure correct size (line/device) and apply per  instructions   Rotate device position/do not position patient on device     Problem: Pain - Adult  Goal: Verbalizes/displays adequate comfort level or baseline comfort level  3/2/2025 1814 by Jerri Aleman RN  Outcome: Progressing  3/2/2025 0753 by Jerri Aleman RN  Flowsheets (Taken 3/2/2025 0753)  Verbalizes/displays adequate comfort level or baseline comfort level:   Encourage patient to monitor pain and request assistance   Administer analgesics based on type and severity of pain and evaluate response   Consider cultural and social influences on pain and pain management   Assess pain using appropriate pain scale   Implement non-pharmacological measures as appropriate and evaluate response   Notify Licensed Independent Practitioner if interventions unsuccessful or patient reports new pain     Problem: Safety - Adult  Goal: Free from fall injury  3/2/2025 1814 by Jerri Aleman RN  Outcome: Progressing  3/2/2025 0753 by Jerri Aleman RN  Flowsheets (Taken 3/2/2025 0753)  Free from fall injury: Instruct family/caregiver on patient safety     Problem: Discharge Planning  Goal: Discharge to home or other facility with appropriate resources  3/2/2025 1814 by Jerri Aleman RN  Outcome: Progressing  3/2/2025 0753 by Jerri Aleman RN  Flowsheets (Taken 3/2/2025 0753)  Discharge to home or other facility with appropriate resources:   Identify barriers to discharge with patient and caregiver   Identify discharge learning needs (meds, wound care, etc)   Refer to discharge planning if patient needs post-hospital services based on physician order or complex needs related to functional  status, cognitive ability or social support system   Arrange for needed discharge resources and transportation as appropriate     Problem: Chronic Conditions and Co-morbidities  Goal: Patient's chronic conditions and co-morbidity symptoms are monitored and maintained or improved  3/2/2025 1814 by Jerri Aleman RN  Outcome: Progressing  3/2/2025 0753 by Jerri Aleman RN  Flowsheets (Taken 3/2/2025 0753)  Care Plan - Patient's Chronic Conditions and Co-Morbidity Symptoms are Monitored and Maintained or Improved:   Monitor and assess patient's chronic conditions and comorbid symptoms for stability, deterioration, or improvement   Collaborate with multidisciplinary team to address chronic and comorbid conditions and prevent exacerbation or deterioration   Update acute care plan with appropriate goals if chronic or comorbid symptoms are exacerbated and prevent overall improvement and discharge     Problem: Nutrition  Goal: Nutrient intake appropriate for maintaining nutritional needs  Outcome: Progressing   The patient's goals for the shift include Labs WNL    The clinical goals for the shift include Patient's pain will be managed to a tolerable level throughout this shift.    Over the shift, the patient did make progress toward care plan goals.

## 2025-03-03 ENCOUNTER — APPOINTMENT (OUTPATIENT)
Dept: RADIOLOGY | Facility: HOSPITAL | Age: 65
End: 2025-03-03
Payer: COMMERCIAL

## 2025-03-03 ENCOUNTER — HOME HEALTH ADMISSION (OUTPATIENT)
Dept: HOME HEALTH SERVICES | Facility: HOME HEALTH | Age: 65
End: 2025-03-03
Payer: COMMERCIAL

## 2025-03-03 LAB
ALBUMIN SERPL BCP-MCNC: 3.1 G/DL (ref 3.4–5)
ALP SERPL-CCNC: 169 U/L (ref 33–136)
ALT SERPL W P-5'-P-CCNC: 19 U/L (ref 10–52)
ANION GAP SERPL CALC-SCNC: 11 MMOL/L (ref 10–20)
AST SERPL W P-5'-P-CCNC: 18 U/L (ref 9–39)
BASOPHILS # BLD AUTO: 0.09 X10*3/UL (ref 0–0.1)
BASOPHILS NFR BLD AUTO: 0.6 %
BILIRUB SERPL-MCNC: 0.3 MG/DL (ref 0–1.2)
BUN SERPL-MCNC: 17 MG/DL (ref 6–23)
CALCIUM SERPL-MCNC: 9.2 MG/DL (ref 8.6–10.3)
CHLORIDE SERPL-SCNC: 99 MMOL/L (ref 98–107)
CO2 SERPL-SCNC: 25 MMOL/L (ref 21–32)
CREAT SERPL-MCNC: 0.85 MG/DL (ref 0.5–1.3)
EGFRCR SERPLBLD CKD-EPI 2021: >90 ML/MIN/1.73M*2
EOSINOPHIL # BLD AUTO: 0.22 X10*3/UL (ref 0–0.7)
EOSINOPHIL NFR BLD AUTO: 1.6 %
ERYTHROCYTE [DISTWIDTH] IN BLOOD BY AUTOMATED COUNT: 14.2 % (ref 11.5–14.5)
GLUCOSE SERPL-MCNC: 114 MG/DL (ref 74–99)
HCT VFR BLD AUTO: 37.4 % (ref 41–52)
HGB BLD-MCNC: 12.7 G/DL (ref 13.5–17.5)
HOLD SPECIMEN: NORMAL
IMM GRANULOCYTES # BLD AUTO: 0.1 X10*3/UL (ref 0–0.7)
IMM GRANULOCYTES NFR BLD AUTO: 0.7 % (ref 0–0.9)
LYMPHOCYTES # BLD AUTO: 0.43 X10*3/UL (ref 1.2–4.8)
LYMPHOCYTES NFR BLD AUTO: 3.1 %
MAGNESIUM SERPL-MCNC: 1.86 MG/DL (ref 1.6–2.4)
MCH RBC QN AUTO: 31.3 PG (ref 26–34)
MCHC RBC AUTO-ENTMCNC: 34 G/DL (ref 32–36)
MCV RBC AUTO: 92 FL (ref 80–100)
MONOCYTES # BLD AUTO: 2.17 X10*3/UL (ref 0.1–1)
MONOCYTES NFR BLD AUTO: 15.6 %
NEUTROPHILS # BLD AUTO: 10.92 X10*3/UL (ref 1.2–7.7)
NEUTROPHILS NFR BLD AUTO: 78.4 %
NRBC BLD-RTO: 0 /100 WBCS (ref 0–0)
PLATELET # BLD AUTO: 744 X10*3/UL (ref 150–450)
POTASSIUM SERPL-SCNC: 4.4 MMOL/L (ref 3.5–5.3)
PROT SERPL-MCNC: 6 G/DL (ref 6.4–8.2)
RBC # BLD AUTO: 4.06 X10*6/UL (ref 4.5–5.9)
SODIUM SERPL-SCNC: 131 MMOL/L (ref 136–145)
VANCOMYCIN SERPL-MCNC: 27.7 UG/ML (ref 5–20)
WBC # BLD AUTO: 13.9 X10*3/UL (ref 4.4–11.3)

## 2025-03-03 PROCEDURE — 99233 SBSQ HOSP IP/OBS HIGH 50: CPT | Performed by: INTERNAL MEDICINE

## 2025-03-03 PROCEDURE — 2500000001 HC RX 250 WO HCPCS SELF ADMINISTERED DRUGS (ALT 637 FOR MEDICARE OP): Performed by: FAMILY MEDICINE

## 2025-03-03 PROCEDURE — 2500000004 HC RX 250 GENERAL PHARMACY W/ HCPCS (ALT 636 FOR OP/ED): Performed by: INTERNAL MEDICINE

## 2025-03-03 PROCEDURE — 2720000007 HC OR 272 NO HCPCS

## 2025-03-03 PROCEDURE — 80053 COMPREHEN METABOLIC PANEL: CPT | Performed by: INTERNAL MEDICINE

## 2025-03-03 PROCEDURE — 2500000002 HC RX 250 W HCPCS SELF ADMINISTERED DRUGS (ALT 637 FOR MEDICARE OP, ALT 636 FOR OP/ED): Performed by: FAMILY MEDICINE

## 2025-03-03 PROCEDURE — 83735 ASSAY OF MAGNESIUM: CPT | Performed by: INTERNAL MEDICINE

## 2025-03-03 PROCEDURE — C1751 CATH, INF, PER/CENT/MIDLINE: HCPCS

## 2025-03-03 PROCEDURE — 80202 ASSAY OF VANCOMYCIN: CPT

## 2025-03-03 PROCEDURE — 2500000001 HC RX 250 WO HCPCS SELF ADMINISTERED DRUGS (ALT 637 FOR MEDICARE OP): Performed by: NURSE PRACTITIONER

## 2025-03-03 PROCEDURE — 2500000004 HC RX 250 GENERAL PHARMACY W/ HCPCS (ALT 636 FOR OP/ED)

## 2025-03-03 PROCEDURE — 36415 COLL VENOUS BLD VENIPUNCTURE: CPT | Performed by: INTERNAL MEDICINE

## 2025-03-03 PROCEDURE — 2500000001 HC RX 250 WO HCPCS SELF ADMINISTERED DRUGS (ALT 637 FOR MEDICARE OP): Performed by: INTERNAL MEDICINE

## 2025-03-03 PROCEDURE — 85025 COMPLETE CBC W/AUTO DIFF WBC: CPT | Performed by: INTERNAL MEDICINE

## 2025-03-03 PROCEDURE — 2500000004 HC RX 250 GENERAL PHARMACY W/ HCPCS (ALT 636 FOR OP/ED): Performed by: FAMILY MEDICINE

## 2025-03-03 PROCEDURE — 2500000001 HC RX 250 WO HCPCS SELF ADMINISTERED DRUGS (ALT 637 FOR MEDICARE OP): Performed by: PHARMACIST

## 2025-03-03 PROCEDURE — 1210000001 HC SEMI-PRIVATE ROOM DAILY

## 2025-03-03 PROCEDURE — 99233 SBSQ HOSP IP/OBS HIGH 50: CPT | Performed by: FAMILY MEDICINE

## 2025-03-03 PROCEDURE — 87081 CULTURE SCREEN ONLY: CPT | Mod: ELYLAB | Performed by: INTERNAL MEDICINE

## 2025-03-03 PROCEDURE — 36569 INSJ PICC 5 YR+ W/O IMAGING: CPT | Performed by: RADIOLOGY

## 2025-03-03 PROCEDURE — RXMED WILLOW AMBULATORY MEDICATION CHARGE

## 2025-03-03 RX ORDER — IBUPROFEN 600 MG/1
600 TABLET ORAL EVERY 8 HOURS PRN
Status: DISCONTINUED | OUTPATIENT
Start: 2025-03-03 | End: 2025-03-03

## 2025-03-03 RX ORDER — IBUPROFEN 600 MG/1
600 TABLET ORAL EVERY 6 HOURS PRN
Status: DISCONTINUED | OUTPATIENT
Start: 2025-03-03 | End: 2025-03-04 | Stop reason: HOSPADM

## 2025-03-03 RX ORDER — IBUPROFEN 600 MG/1
600 TABLET ORAL EVERY 6 HOURS PRN
Qty: 60 TABLET | Refills: 0 | Status: SHIPPED | OUTPATIENT
Start: 2025-03-03

## 2025-03-03 RX ORDER — L. ACIDOPHILUS/L.BULGARICUS 1MM CELL
1 TABLET ORAL 2 TIMES DAILY
Qty: 60 TABLET | Refills: 0 | Status: SHIPPED | OUTPATIENT
Start: 2025-03-03 | End: 2025-04-03

## 2025-03-03 RX ORDER — OXYCODONE AND ACETAMINOPHEN 5; 325 MG/1; MG/1
1 TABLET ORAL EVERY 6 HOURS PRN
Qty: 5 TABLET | Refills: 0 | Status: SHIPPED | OUTPATIENT
Start: 2025-03-03 | End: 2025-03-04

## 2025-03-03 RX ORDER — LANOLIN ALCOHOL/MO/W.PET/CERES
100 CREAM (GRAM) TOPICAL DAILY
Qty: 30 TABLET | Refills: 0 | Status: SHIPPED | OUTPATIENT
Start: 2025-03-04

## 2025-03-03 RX ORDER — LIDOCAINE HYDROCHLORIDE 10 MG/ML
5 INJECTION, SOLUTION INFILTRATION; PERINEURAL ONCE
Status: COMPLETED | OUTPATIENT
Start: 2025-03-03 | End: 2025-03-03

## 2025-03-03 RX ADMIN — Medication 1 TABLET: at 21:11

## 2025-03-03 RX ADMIN — PANTOPRAZOLE 20 MG: 20 TABLET, DELAYED RELEASE ORAL at 09:56

## 2025-03-03 RX ADMIN — APIXABAN 5 MG: 5 TABLET, FILM COATED ORAL at 21:11

## 2025-03-03 RX ADMIN — IBUPROFEN 600 MG: 600 TABLET ORAL at 12:59

## 2025-03-03 RX ADMIN — LIDOCAINE HYDROCHLORIDE 1 ML: 10 INJECTION, SOLUTION INFILTRATION; PERINEURAL at 15:53

## 2025-03-03 RX ADMIN — Medication 100 MG: at 09:56

## 2025-03-03 RX ADMIN — OXYCODONE HYDROCHLORIDE AND ACETAMINOPHEN 1 TABLET: 5; 325 TABLET ORAL at 18:47

## 2025-03-03 RX ADMIN — METOPROLOL TARTRATE 25 MG: 25 TABLET, FILM COATED ORAL at 09:56

## 2025-03-03 RX ADMIN — Medication 1 TABLET: at 09:56

## 2025-03-03 RX ADMIN — DEXTROSE MONOHYDRATE 2 G: 5 INJECTION INTRAVENOUS at 21:12

## 2025-03-03 RX ADMIN — OXYCODONE HYDROCHLORIDE AND ACETAMINOPHEN 1 TABLET: 5; 325 TABLET ORAL at 12:11

## 2025-03-03 RX ADMIN — VANCOMYCIN HYDROCHLORIDE 1250 MG: 1.25 INJECTION, POWDER, LYOPHILIZED, FOR SOLUTION INTRAVENOUS at 18:52

## 2025-03-03 RX ADMIN — Medication 10 MG: at 23:37

## 2025-03-03 RX ADMIN — METOPROLOL TARTRATE 25 MG: 25 TABLET, FILM COATED ORAL at 21:11

## 2025-03-03 RX ADMIN — ASPIRIN 81 MG: 81 TABLET, COATED ORAL at 09:55

## 2025-03-03 RX ADMIN — IBUPROFEN 600 MG: 600 TABLET, FILM COATED ORAL at 06:03

## 2025-03-03 RX ADMIN — PRAVASTATIN SODIUM 40 MG: 20 TABLET ORAL at 21:11

## 2025-03-03 RX ADMIN — IBUPROFEN 600 MG: 600 TABLET ORAL at 18:48

## 2025-03-03 RX ADMIN — MAGNESIUM OXIDE 400 MG (241.3 MG MAGNESIUM) TABLET 400 MG: TABLET at 09:55

## 2025-03-03 RX ADMIN — FOLIC ACID 1 MG: 1 TABLET ORAL at 09:56

## 2025-03-03 RX ADMIN — EMPAGLIFLOZIN 25 MG: 25 TABLET, FILM COATED ORAL at 09:56

## 2025-03-03 RX ADMIN — OXYCODONE HYDROCHLORIDE AND ACETAMINOPHEN 1 TABLET: 5; 325 TABLET ORAL at 06:03

## 2025-03-03 ASSESSMENT — COGNITIVE AND FUNCTIONAL STATUS - GENERAL
CLIMB 3 TO 5 STEPS WITH RAILING: A LOT
MOBILITY SCORE: 20
DAILY ACTIVITIY SCORE: 21
DRESSING REGULAR LOWER BODY CLOTHING: A LITTLE
HELP NEEDED FOR BATHING: A LITTLE
WALKING IN HOSPITAL ROOM: A LITTLE
MOVING TO AND FROM BED TO CHAIR: A LITTLE
DRESSING REGULAR UPPER BODY CLOTHING: A LITTLE

## 2025-03-03 ASSESSMENT — LIFESTYLE VARIABLES
AUDITORY DISTURBANCES: NOT PRESENT
ORIENTATION AND CLOUDING OF SENSORIUM: ORIENTED AND CAN DO SERIAL ADDITIONS
NAUSEA AND VOMITING: NO NAUSEA AND NO VOMITING
TOTAL SCORE: 0
AGITATION: NORMAL ACTIVITY
ANXIETY: NO ANXIETY, AT EASE
PAROXYSMAL SWEATS: NO SWEAT VISIBLE
TREMOR: NO TREMOR
HEADACHE, FULLNESS IN HEAD: NOT PRESENT
VISUAL DISTURBANCES: NOT PRESENT

## 2025-03-03 ASSESSMENT — PAIN - FUNCTIONAL ASSESSMENT
PAIN_FUNCTIONAL_ASSESSMENT: 0-10

## 2025-03-03 ASSESSMENT — PAIN SCALES - GENERAL
PAINLEVEL_OUTOF10: 8
PAINLEVEL_OUTOF10: 6
PAINLEVEL_OUTOF10: 7
PAINLEVEL_OUTOF10: 10 - WORST POSSIBLE PAIN
PAINLEVEL_OUTOF10: 7

## 2025-03-03 ASSESSMENT — PAIN DESCRIPTION - ORIENTATION: ORIENTATION: LOWER

## 2025-03-03 ASSESSMENT — PAIN DESCRIPTION - LOCATION: LOCATION: BACK

## 2025-03-03 ASSESSMENT — PAIN SCALES - PAIN ASSESSMENT IN ADVANCED DEMENTIA (PAINAD): TOTALSCORE: MEDICATION (SEE MAR)

## 2025-03-03 NOTE — PROGRESS NOTES
Kit Duncan is a 64 y.o. male on day 6 of admission presenting with Abnormal MRI, lumbar spine.      Subjective   Continues to have back pain but improved w pain meds    Objective     Last Recorded Vitals  /59   Pulse 84   Temp 36.3 °C (97.3 °F)   Resp 16   Wt 62.2 kg (137 lb 2 oz)   SpO2 92%   Intake/Output last 3 Shifts:    Intake/Output Summary (Last 24 hours) at 3/3/2025 1237  Last data filed at 3/2/2025 1813  Gross per 24 hour   Intake 840 ml   Output --   Net 840 ml         Admission Weight  Weight: 61.2 kg (135 lb) (02/25/25 1710)    Daily Weight  03/01/25 : 62.2 kg (137 lb 2 oz)    Image Results  CT abdomen pelvis wo IV contrast  Narrative: Interpreted By:  Schoenberger, Joseph,   STUDY:  CT ABDOMEN PELVIS WO IV CONTRAST;  3/2/2025 3:23 pm      INDICATION:  Signs/Symptoms:fevers, ?other sources of infection.          COMPARISON:  None      ACCESSION NUMBER(S):  [id number]      ORDERING CLINICIAN:  DUANE CHAMPAGNE      TECHNIQUE:  CT of the abdomen and pelvis was performed. Contiguous axial images  were obtained at 3 mm slice thickness through the abdomen and pelvis.  Coronal and sagittal reconstructions at 3 mm slice thickness were  performed.  No intravenous or oral contrast agents were administered.      FINDINGS:  Please note that the evaluation of vessels, lymph nodes and organs is  limited without intravenous contrast.      LOWER CHEST:  There is a moderate right pleural effusion. There is adjacent  dependent and compressive atelectasis. A portion of the fluid appears  loculated along the anterolateral lower chest. Additionally a portion  of the fluid appears to be loculated within the major fissure in the  right lung. There is a small dependent layering left pleural  effusion. The with a      ABDOMEN:      LIVER:  Nodular contours consistent with cirrhosis.      BILE DUCTS:  No dilation      GALLBLADDER:  Dependent layering calcified gallstones. No wall thickening  or  pericholecystic inflammatory findings.      PANCREAS:  Previous distal pancreatectomy      SPLEEN:  Surgically absent      ADRENAL GLANDS:  Bilateral adrenal glands appear normal.      KIDNEYS AND URETERS:  Bilateral parenchymal scarring. Distinctly marginated water  attenuating focus in the posterior interpolar left kidney measuring  3.3 cm consistent with cyst. No hydroureteronephrosis or  nephroureterolithiasis is identified.      PELVIS:      BLADDER:  The urinary bladder appears normal without abnormal wall thickening.      REPRODUCTIVE ORGANS:  Unremarkable      BOWEL:  The stomach is unremarkable.  The small and large bowel are normal in  caliber and demonstrate no wall thickening. There are numerous colon  diverticula with no convincing evidence for acute diverticulitis The  appendix is not definitely visualized. There is however no pericecal  stranding or fluid.      VESSELS:  There is no aneurysmal dilatation of the abdominal aorta. The IVC  appears normal.      PERITONEUM/RETROPERITONEUM/LYMPH NODES:  There is no free peritoneal air. There is a small amount of dependent  layering pelvic fluid. No abdominopelvic lymphadenopathy is present.      ABDOMINAL WALL:  Intact      BONES:  No suspicious osseous lesions are identified. Degenerative discogenic  disease is noted in the lower thoracic and lumbar spine.      Impression: 1.  Node definite acute abdominal or pelvic findings.  2. There is a small dependent layering left pleural effusion.  3. There is a moderate right pleural effusion. Portions appear  loculated along the anterolateral right chest wall as well as in the  upper aspect of the inter lobar fissure of the right lung. There is  adjacent compressive consolidative opacity in the basal segments of  the right lower lobe  4. Previous distal pancreatectomy and splenectomy.          MACRO:  None      Signed by: Joseph Schoenberger 3/3/2025 9:18 AM  Dictation workstation:   [id number]      Physical  Exam  Alert oriented x 3  Lungs clear to auscultation bilaterally  Heart regular rhythm  Abdomen soft nontender  Extremities no leg edema  CNS no focal deficit    Relevant Results  CT abdomen pelvis wo IV contrast    Result Date: 3/3/2025  Interpreted By:  Schoenberger, Joseph, STUDY: CT ABDOMEN PELVIS WO IV CONTRAST;  3/2/2025 3:23 pm   INDICATION: Signs/Symptoms:fevers, ?other sources of infection.     COMPARISON: None   ACCESSION NUMBER(S): [id number]   ORDERING CLINICIAN: DUANE CHAMPAGNE   TECHNIQUE: CT of the abdomen and pelvis was performed. Contiguous axial images were obtained at 3 mm slice thickness through the abdomen and pelvis. Coronal and sagittal reconstructions at 3 mm slice thickness were performed.  No intravenous or oral contrast agents were administered.   FINDINGS: Please note that the evaluation of vessels, lymph nodes and organs is limited without intravenous contrast.   LOWER CHEST: There is a moderate right pleural effusion. There is adjacent dependent and compressive atelectasis. A portion of the fluid appears loculated along the anterolateral lower chest. Additionally a portion of the fluid appears to be loculated within the major fissure in the right lung. There is a small dependent layering left pleural effusion. The with a   ABDOMEN:   LIVER: Nodular contours consistent with cirrhosis.   BILE DUCTS: No dilation   GALLBLADDER: Dependent layering calcified gallstones. No wall thickening or pericholecystic inflammatory findings.   PANCREAS: Previous distal pancreatectomy   SPLEEN: Surgically absent   ADRENAL GLANDS: Bilateral adrenal glands appear normal.   KIDNEYS AND URETERS: Bilateral parenchymal scarring. Distinctly marginated water attenuating focus in the posterior interpolar left kidney measuring 3.3 cm consistent with cyst. No hydroureteronephrosis or nephroureterolithiasis is identified.   PELVIS:   BLADDER: The urinary bladder appears normal without abnormal wall thickening.    REPRODUCTIVE ORGANS: Unremarkable   BOWEL: The stomach is unremarkable.  The small and large bowel are normal in caliber and demonstrate no wall thickening. There are numerous colon diverticula with no convincing evidence for acute diverticulitis The appendix is not definitely visualized. There is however no pericecal stranding or fluid.   VESSELS: There is no aneurysmal dilatation of the abdominal aorta. The IVC appears normal.   PERITONEUM/RETROPERITONEUM/LYMPH NODES: There is no free peritoneal air. There is a small amount of dependent layering pelvic fluid. No abdominopelvic lymphadenopathy is present.   ABDOMINAL WALL: Intact   BONES: No suspicious osseous lesions are identified. Degenerative discogenic disease is noted in the lower thoracic and lumbar spine.       1.  Node definite acute abdominal or pelvic findings. 2. There is a small dependent layering left pleural effusion. 3. There is a moderate right pleural effusion. Portions appear loculated along the anterolateral right chest wall as well as in the upper aspect of the inter lobar fissure of the right lung. There is adjacent compressive consolidative opacity in the basal segments of the right lower lobe 4. Previous distal pancreatectomy and splenectomy.     MACRO: None   Signed by: Joseph Schoenberger 3/3/2025 9:18 AM Dictation workstation:   [id number]     Assessment/Plan   Lumbar discitis, Epidural abscess, acute on chronic back pain  Hypokalemia hyper natremia likely secondary to alcohol dependence  Alcohol use disorder  CAD, A-flutter, status post aortic valve repair    Plan:  IV vancomycin and Ceftriaxone for 6 weeks per infectious disease recommendations.  He will have a PICC line placed today.  Case management is working on arrangements for home infusion.  I placed an order for home health care  [facility] w no evidence of intracardiac vegetations  Pain control  DVT prophylaxis  Continue phenobarbital taper.No withdrawal symptoms at the time  He will  need to follow-up with pain management.  I will send him on a regimen of ibuprofen and Percocet in the meantime.    Kelli Alexander MD

## 2025-03-03 NOTE — PROGRESS NOTES
Kit Duncan was assessed by a Substance Use Navigator Specialist (SUNS) at 2:53 PM     Contact Number obtained during encounter:     Medical History:   Past Medical History:   Diagnosis Date    Arrhythmia     Arthritis     Cancer (Multi)     CHF (congestive heart failure)     Coronary artery disease     Diabetes mellitus (Multi)     History of transfusion     Hypertension     Stroke (Multi)         Psychiatric History:  NA    Social History:   Social History     Socioeconomic History    Marital status:      Spouse name: Not on file    Number of children: Not on file    Years of education: Not on file    Highest education level: Not on file   Occupational History    Not on file   Tobacco Use    Smoking status: Never    Smokeless tobacco: Never   Vaping Use    Vaping status: Never Used   Substance and Sexual Activity    Alcohol use: Not Currently     Alcohol/week: 42.0 standard drinks of alcohol     Types: 42 Shots of liquor per week     Comment: 12oz of vodka/ daily    Drug use: Yes     Types: Marijuana     Comment: daily    Sexual activity: Defer   Other Topics Concern    Not on file   Social History Narrative    Not on file     Social Drivers of Health     Financial Resource Strain: Low Risk  (2/26/2025)    Overall Financial Resource Strain (CARDIA)     Difficulty of Paying Living Expenses: Not hard at all   Food Insecurity: No Food Insecurity (2/26/2025)    Hunger Vital Sign     Worried About Running Out of Food in the Last Year: Never true     Ran Out of Food in the Last Year: Never true   Transportation Needs: No Transportation Needs (2/26/2025)    PRAPARE - Transportation     Lack of Transportation (Medical): No     Lack of Transportation (Non-Medical): No   Physical Activity: Insufficiently Active (2/26/2025)    Exercise Vital Sign     Days of Exercise per Week: 3 days     Minutes of Exercise per Session: 30 min   Stress: Not on file   Social Connections: Moderately Isolated (2/26/2025)     Social Connection and Isolation Panel [NHANES]     Frequency of Communication with Friends and Family: Three times a week     Frequency of Social Gatherings with Friends and Family: Three times a week     Attends [employer] Services: Never     Active Member of Clubs or Organizations: No     Attends Club or Organization Meetings: Never     Marital Status:    Intimate Partner Violence: Not At Risk (2/26/2025)    Humiliation, Afraid, Rape, and Kick questionnaire     Fear of Current or Ex-Partner: No     Emotionally Abused: No     Physically Abused: No     Sexually Abused: No   Housing Stability: Low Risk  (2/26/2025)    Housing Stability Vital Sign     Unable to Pay for Housing in the Last Year: No     Number of Times Moved in the Last Year: 0     Homeless in the Last Year: No        UDS / Tox panel results:   NA    Substance(s) used: ALCOHOL, 3 MARTINIS A DAY, 12 oz A DAY     Brief Summary of Assessment:   JOY was referred by Lissett PAREDES RN care transitions to talk with pt. SUNS talked with pt. Pt shared they live with their wife in [place]. Pt said they don't struggle with mental health and they do drive. Pt said they drink 3 martinis a day (12 OZ a day). Pt shared they don't think they have a issue with their alcohol intake. Pt said they meditate. Pt said they have a happy life with no worries.     Diagnosis / Diagnostic Impression:   NA    Summary / Plan: NA     Patient interested in treatment: No        Transportation Provided: LG

## 2025-03-03 NOTE — PROCEDURES
Pre-Procedure Checklist:  Emergent Line Insertion: No  Type of Line to be Placed: PICC 4F Dual Lumen Right basilic 39 cm; arm circumference 26.5 cm  Consent Obtained: Yes  Emergency Medication Necessary: No  Patient Identified with 2 Independent Identifiers: Yes  Review of Allergies, Anticoagulation, Relevant Labs, ECG/Telemetry: Yes  Risks/Benefits/Alternatives Discussed with Patient/POA/Legal Representative: Yes  Stop Sign on Door: Yes  Time Out Performed: Yes  Catheter Exchange: No    Positioning Checklist:  All People, Including Patient, in the Room with Cap and Mask: Yes   Fluoroscopy Used to Identify Vessel and Guide Insertion: Yes   Sterile Cover Used: Yes   Full Barrier Precautions Followed (Mask, Cap, Gown, Gloves): Yes   Hands Washed: Yes   Monitors Attached with Sound Alarms On: Yes  Full Body Sterile Drape (Head-to-Toe) Used to Cover Patient: Yes   Trendelenburg Position (For IJ and Subclavian): No   CHG Skin Prep Used and Allowed to Air Dry to Skin Procedure: Yes     Procedure Checklist:  Blood Aspirated From All Lumens, All Ports Subsequently Flushed: Yes   Catheter Caps Placed on All Lumens; Lumens Clamped: Yes   Maintain Guidewire Control Throughout, Ensuring Guidewire Removal: Yes   Maintain Sterile Field Throughout Insertion: Yes   Catheter Secured: Yes   Confirmatory Test of Venous Placement: Non-Pulsatile Blood     Post Procedure Checklist:  Date and Time Written on Dressing: Yes   Sharp and Wire Count and Safe Disposal of all Sharps/Wires: Yes   Sterile Dressing Applied Per Protocol: Yes   X-ray Ordered or ECG Image: Yes     PICC Insertion Details:  See LDA for further details  PICC line expires in 1 year  Additional Details: Line was inserted using fluoroscopy  Placed by: Louise Reid RN

## 2025-03-03 NOTE — PROGRESS NOTES
03/03/25 1230   Discharge Planning   Living Arrangements Spouse/significant other   Support Systems Spouse/significant other   Assistance Needed IV ABX   Home or Post Acute Services In home services   Type of Home Care Services Home nursing visits   Expected Discharge Disposition Home H  ([facility])   Does the patient need discharge transport arranged? No   Intensity of Service   Intensity of Service 0-30 min     Notified this am by hospitalist, Dr Alexander that pt will need IV ABX on discharge. Currently IV vancomycin 1250mg q24h & rocephin 2gm q24h. Met with pt who reports he lives with his spouse & they will learn to do IV infusion. Discussed options & pt chose [facility]. Pt voiced understanding that [facility] nurse will not be out daily. Requested orders for [facility] & script for ABX, final IV Abx orders , vanco to be dosed on day of discharge per ID. Pt to have line placed. Care transitions team to cont to follow for orders.   124pm [facility] notified orders sent & notified ADOD tomorrow. Requested insurance check.

## 2025-03-03 NOTE — PROGRESS NOTES
1ADMISSION DATE: 2/25/2025  HOSPITAL DAY: 5    SUBJECTIVE:  Patient was seen at bedside.  No fever no chills. Uneventful night.  Patient complained about loose stool intermittently.  He was using more laxative last 2 days; he is also 60 mg twice daily Lasix and 20 mg 3 times a day potassium.  His last EVENS shows ejection fraction of 60%    OBJECTIVE:  Vitals:    03/01/25 1528 03/01/25 2347 03/02/25 0723 03/02/25 1532   BP: 128/60 102/55 118/54 138/64   BP Location:  Right arm     Patient Position:  Lying     Pulse: 72 66 78 66   Resp:  16     Temp: 36.2 °C (97.2 °F) 36.8 °C (98.2 °F) 36.5 °C (97.7 °F) 36.6 °C (97.9 °F)   TempSrc:  Temporal     SpO2: 95% 93% 92% 96%   Weight:       Height:            Intake/Output Summary (Last 24 hours) at 3/2/2025 1938  Last data filed at 3/2/2025 1813  Gross per 24 hour   Intake 1450 ml   Output --   Net 1450 ml      Wt Readings from Last 10 Encounters:   03/01/25 62.2 kg (137 lb 2 oz)   01/02/25 60.6 kg (133 lb 11.2 oz)   12/31/24 59.9 kg (132 lb)   06/26/24 64 kg (141 lb)   02/27/24 66.9 kg (147 lb 7.8 oz)   02/26/24 66.9 kg (147 lb 7.8 oz)   02/15/24 65.1 kg (143 lb 9.6 oz)       PHYSICAL EXAM:  Gen: Alert, awake, Oriented X 3. Not in any acute distress   HEENT:  Atraumatic, PERRL.  Conjunctivae clear.   Moist nasal mucous membranes. oropharynx non erythematous,   Neck:  Supple without thyromegaly or lymphadenopathy.  Lungs:  Clear to auscultation without rales, rhonchi, or rub.  Heart:  RRR, S1, S2, without M.  Abdomen:  Soft, non tender, no organ enlargement, bruit. Bowel sounds present . No CVA tenderness.  Extremities:  No edema. No calf swelling or tenderness.    Skin:  No rash, ecchymosis or erythema.    CURRENT ACTIVE MEDS:  apixaban, 5 mg, oral, BID  aspirin, 81 mg, oral, Daily  cefTRIAXone, 2 g, intravenous, q24h  empagliflozin, 25 mg, oral, Daily  folic acid, 1 mg, oral, Daily  furosemide, 60 mg, oral, BID  magnesium oxide, 400 mg, oral, Every other day  metoprolol  tartrate, 25 mg, oral, BID  multivitamin with minerals, 1 tablet, oral, Daily  pantoprazole, 20 mg, oral, Daily  potassium chloride CR, 20 mEq, oral, TID  pravastatin, 40 mg, oral, Nightly  saccharomyces boulardii, 250 mg, oral, BID  thiamine, 100 mg, oral, Daily  vancomycin, 1,250 mg, intravenous, q24h      LAB RESULTS:   CBC:   Results from last 7 days   Lab Units 03/02/25  0606 03/01/25  0710 02/26/25  0731   WBC AUTO x10*3/uL 15.1* 11.7* 13.9*   RBC AUTO x10*6/uL 3.93* 3.94* 3.97*   HEMOGLOBIN g/dL 12.0* 12.2* 12.4*   HEMATOCRIT % 36.0* 37.1* 35.6*   MCV fL 92 94 90   MCH pg 30.5 31.0 31.2   MCHC g/dL 33.3 32.9 34.8   RDW % 14.2 14.6* 13.8   PLATELETS AUTO x10*3/uL 723* 689* 606*     CMP:    Results from last 7 days   Lab Units 03/02/25  0606 03/01/25  0710 02/26/25  0732 02/25/25  1706   SODIUM mmol/L 131* 131* 128* 129*   POTASSIUM mmol/L 4.6 5.4* 3.8 3.1*   CHLORIDE mmol/L 101 102 95* 90*   CO2 mmol/L 23 23 24 25   BUN mg/dL 19 20 25* 30*   CREATININE mg/dL 0.93 1.04 0.77 0.84   GLUCOSE mg/dL 142* 122* 177* 122*   PROTEIN TOTAL g/dL 5.6* 5.0*  --  7.5   CALCIUM mg/dL 8.8 9.0 9.6 10.0   BILIRUBIN TOTAL mg/dL 0.2 0.2  --  0.4   ALK PHOS U/L 138* 160*  --  170*   AST U/L 18 21  --  21   ALT U/L 20 22  --  19     BMP:    Results from last 7 days   Lab Units 03/02/25  0606 03/01/25  0710 02/26/25  0732   SODIUM mmol/L 131* 131* 128*   POTASSIUM mmol/L 4.6 5.4* 3.8   CHLORIDE mmol/L 101 102 95*   CO2 mmol/L 23 23 24   BUN mg/dL 19 20 25*   CREATININE mg/dL 0.93 1.04 0.77   CALCIUM mg/dL 8.8 9.0 9.6   GLUCOSE mg/dL 142* 122* 177*     Magnesium:  Results from last 7 days   Lab Units 03/02/25  0606 03/01/25  0710   MAGNESIUM mg/dL 1.80 1.95     Troponin:    Results from last 7 days   Lab Units 02/25/25  1706   TROPHS ng/L 14     Lab Results   Component Value Date    HGBA1C 7.4 (H) 11/19/2024    HGBA1C 7.3 (H) 09/20/2024    HGBA1C 7.3 (H) 06/27/2024     Lab Results   Component Value Date    CREATININE 0.93 03/02/2025      Lab Results   Component Value Date    INR 1.1 02/26/2024    INR 1.0 01/25/2022    PROTIME 12.3 02/26/2024    PROTIME 11.9 01/25/2022       CULTURES & SUSCEPTIBILITIES:   No results found for the last 90 days.      IMAGING STUDIES:  I have reviewed following imaging studies.  I agree with the impression and incorporated those results into my MDM     === 02/25/25 ===  XR CHEST 1 VIEW  New right basilar patchy airspace opacities may represent pneumonia  in the appropriate clinical setting. Radiographic follow-up to  complete resolution is recommended.  Suspected small bilateral pleural effusions.    === 02/25/25 ===  MR LUMBAR SPINE W AND WO CONTRAST  Abnormal T2 signal increase and enhancement involving the L3, L4 and  S1 vertebrae suspicious for osteomyelitis and likely infection  involving the intervening discs. Epidural abscesses at and below the  L5 level as described above. Overall findings appear roughly similar  to the prior MRI.    LAST EKG  Encounter Date: 02/25/25   ECG 12 lead   Result Value    Ventricular Rate 91    Atrial Rate 91    LA Interval 226    QRS Duration 170    QT Interval 426    QTC Calculation(Bazett) 523    P Axis 117    R Axis 193    T Axis 35    QRS Count 15    Q Onset 183    P Onset 70    P Offset 136    T Offset 396    QTC Fredericia 489       PROBLEMS ON ADMISSION:  Hypokalemia [E87.6]  Hyponatremia [E87.1]  Abnormal MRI, lumbar spine [R93.7]  Leukocytosis, unspecified type [D72.829]  Pneumonia due to infectious organism, unspecified laterality, unspecified part of lung [J18.9]    HOSPITAL PROBLEM LIST:   Lumbar discitis, acute on chronic back pain  Hypokalemia hyper natremia likely secondary to alcohol dependence  Alcohol use disorder  CAD  A flutter  status post aortic valve repair    ASSESSMENT AND PLAN FOR 3/2/2025  IV vancomycin and ceftriaxone  Infectious disease to decide on home-going antibiotic, whether we need PICC versus oral  Monitor electrolytes : Will stop potassium  supplementation.  I am also holding his Lasix 60 mg twice daily.  I am not sure why he takes such a hefty dose of Lasix.  2 days ago he had EVENS done has LVEF of 60% and preserved right ventricular systolic function.  Pain control is being done with IV Toradol.  This could be nephrotoxic.   I will add probiotic today    DVT prophylaxis:   Will resume Eliquis and aspirin.  Biopsy of the epidural fluid has been failed        P.S: This note was completed using Dragon voice recognition technology and may include unintended errors with respect to translation of words, typographical errors or grammar errors which may not have been identified while finalizing the chart.

## 2025-03-03 NOTE — PROGRESS NOTES
Vancomycin Dosing by Pharmacy- FOLLOW UP    Kit Duncan is a 64 y.o. year old male who Pharmacy has been consulted for vancomycin dosing for osteomyelitis/septic arthritis. Based on the patient's indication and renal status this patient is being dosed based on a goal AUC of 400-600.     Renal function is currently stable.    Current vancomycin dose: 1250 mg given every 24 hours    Estimated vancomycin AUC on current dose: 517-527 mg/L.hr     Visit Vitals  /59   Pulse 84   Temp 36.3 °C (97.3 °F)   Resp 16        Lab Results   Component Value Date    CREATININE 0.85 2025    CREATININE 0.94 2025    CREATININE 0.93 2025    CREATININE 1.04 2025        Patient weight is as follows:   Vitals:    25 0604   Weight: 62.2 kg (137 lb 2 oz)       Cultures:  No results found for the encounter in last 14 days.       I/O last 3 completed shifts:  In: 1450 (23.3 mL/kg) [P.O.:1200; IV Piggyback:250]  Out: - (0 mL/kg)   Weight: 62.2 kg   I/O during current shift:  No intake/output data recorded.    Temp (24hrs), Av.5 °C (97.7 °F), Min:36.2 °C (97.2 °F), Max:36.8 °C (98.2 °F)      Assessment/Plan    Within goal AUC range. Continue current vancomycin regimen.    This dosing regimen is predicted by [employer] to result in the following pharmacokinetic parameters:    Loading dose: N/A  Regimen: 1250 mg IV every 24 hours.  Start time: 21:00 on 2025  Exposure target: AUC24 (range)400-600 mg/L.hr   DSY93-10: 527 mg/L.hr  AUC24,ss: 517 mg/L.hr  Probability of AUC24 > 400: 99 %  Ctrough,ss: 14.7 mg/L  Probability of Ctrough,ss > 20: 6 %    The next level will be obtained on 25 at 0500. May be obtained sooner if clinically indicated.   Will continue to monitor renal function daily while on vancomycin and order serum creatinine at least every 48 hours if not already ordered.  Follow for continued vancomycin needs, clinical response, and signs/symptoms of toxicity.       Josefina STEEL  [facility]

## 2025-03-03 NOTE — CARE PLAN
Problem: Pain - Adult  Goal: Verbalizes/displays adequate comfort level or baseline comfort level  Outcome: Progressing  Flowsheets (Taken 3/3/2025 1145)  Verbalizes/displays adequate comfort level or baseline comfort level:   Assess pain using appropriate pain scale   Encourage patient to monitor pain and request assistance   Administer analgesics based on type and severity of pain and evaluate response     Problem: Safety - Adult  Goal: Free from fall injury  Outcome: Progressing  Flowsheets (Taken 3/3/2025 1145)  Free from fall injury:   Instruct family/caregiver on patient safety   Based on caregiver fall risk screen, instruct family/caregiver to ask for assistance with transferring infant if caregiver noted to have fall risk factors     Problem: Discharge Planning  Goal: Discharge to home or other facility with appropriate resources  Outcome: Progressing  Flowsheets (Taken 3/3/2025 1145)  Discharge to home or other facility with appropriate resources:   Identify barriers to discharge with patient and caregiver   Refer to discharge planning if patient needs post-hospital services based on physician order or complex needs related to functional status, cognitive ability or social support system   Identify discharge learning needs (meds, wound care, etc)     Problem: Chronic Conditions and Co-morbidities  Goal: Patient's chronic conditions and co-morbidity symptoms are monitored and maintained or improved  Outcome: Progressing  Flowsheets (Taken 3/3/2025 1145)  Care Plan - Patient's Chronic Conditions and Co-Morbidity Symptoms are Monitored and Maintained or Improved:   Monitor and assess patient's chronic conditions and comorbid symptoms for stability, deterioration, or improvement   Collaborate with multidisciplinary team to address chronic and comorbid conditions and prevent exacerbation or deterioration   Update acute care plan with appropriate goals if chronic or comorbid symptoms are exacerbated and prevent  overall improvement and discharge   The patient's goals for the shift include pain control    The clinical goals for the shift include patient's pain will be <7/10 for shift

## 2025-03-03 NOTE — PROGRESS NOTES
"H&P NOTE    Patient name: Dana James  MRN: [id number]  Mother:  Paulette James    Gestational Age: 39w1d female now 39w 2d on DOL# 1 days    Delivery Clinician:  REILLY AGUIAR     Peds/FP: Enriqueta Holland MD    PRENATAL / BIRTH HISTORY / DELIVERY   ROM on 2020 at 2:38 PM;     Infant delivered on 2020 at 5:29 PM    Gestational Age: 39w1d term female born by  Spontaneous Vaginal Delivery to a 32 y.o.   . AROM x 2h 51m . Amniotic fluid was Clear. Cord Information: 3 vessels; Complications: Nuchal. MBT: O+ prenatal labs negative, GBS negative, and prenatal ultrasounds reviewed and normal. Pregnancy complicated by anemia. Mother received  Fe and PNV during pregnancy and/or labor. Resuscitation at delivery: Suctioning;Tactile Stimulation. Apgars: 8  and 9 .    Mother's COVID-19 results: Negative 20    VITAL SIGNS & PHYSICAL EXAM:   Birth Wt: 6 lb 6.5 oz (2906 g) T: 98.3 °F (36.8 °C) (Axillary)  HR: 112   RR: 52        Current Weight:    Weight: 2906 g (6 lb 6.5 oz)(Filed from Delivery Summary)    Birth Length: 18.75       Change in weight since birth: 0% Birth Head circumference: Head Circumference: 35 cm (13.78\")                  NORMAL  EXAMINATION    UNLESS OTHERWISE NOTED EXCEPTIONS    (AS NOTED)   General/Neuro   In no apparent distress, appears c/w EGA  Exam/reflexes appropriate for age and gestation None   Skin   Clear w/o abnormal rash, jaundice or lesions  Normal perfusion and peripheral pulses isaias   HEENT   Normocephalic w/ nl sutures, eyes open.  RR:red reflex present bilaterally, conjunctiva without erythema, no drainage, sclera white, and no edema  ENT patent w/o obvious defects molding and caput   Chest   In no apparent respiratory distress  CTA / RRR. No Murmur None   Abdomen/Genitalia   Soft, nondistended w/o organomegaly  Normal appearance for gender and gestation  normal female   Trunk  Spine  Extremities Straight w/o obvious defects  Active, mobile " Estimated Creatinine Clearance: 78.3 mL/min (by C-G formula based on SCr of 0.85 mg/dL).      Infectious Disease Progress Note       3/3/2025    Patient is a followup regarding lumbar discitis and epidural abscess.  MRI with abnormal T2 signal increase at L3, L4, S1 vertebrae.  Epidural abscess at L5 measuring 14 x 7 mm anterior epidural space and 11 x 10 mm right lateral space.  Not amenable to drainage.  Patient has no history of MRSA per medical record.  Has been on vancomycin and ceftriaxone empirically.  Blood cultures negative EVENS negative.  No available bone biopsy or cultures from the epidural space      Subjectively, no new complaints at this time.   Family currently at bedside.  All questions answered to the best of my ability    Estimated Creatinine Clearance: 78.3 mL/min (by C-G formula based on SCr of 0.85 mg/dL).      Lab Results   Component Value Date    WBC 13.9 (H) 03/03/2025    HGB 12.7 (L) 03/03/2025    HCT 37.4 (L) 03/03/2025    MCV 92 03/03/2025     (H) 03/03/2025     Lab Results   Component Value Date    GLUCOSE 114 (H) 03/03/2025    CALCIUM 9.2 03/03/2025     (L) 03/03/2025    K 4.4 03/03/2025    CO2 25 03/03/2025    CL 99 03/03/2025    BUN 17 03/03/2025    CREATININE 0.85 03/03/2025       WBC trends are being monitored. Antibiotic doses are being adjusted per most recent renal labs.     Vitals:    03/03/25 0803   BP: 123/59   Pulse: 84   Resp:    Temp: 36.3 °C (97.3 °F)   SpO2: 92%     Patient is awake and alert, sitting up in a chair  NAD  Neck supple  Heart S1S2  Chest: Equal expansion, bilaterally clear to auscultation  Abdomen: soft, ND, NTTP, no guarding  Extrem: no pain to palpation  Skin: no rashes, no diaphoresis  Neuro: CNS intact  Affect appropriate and patient is interactive        Patient Active Problem List   Diagnosis    YANIRA (acute kidney injury) (CMS-[facility])    Anemia    Aortic stenosis    ASCVD (arteriosclerotic cardiovascular disease)    Asplenia    Atelectasis     AV dissociation    AVM (arteriovenous malformation) ([facility])    Basal cell carcinoma, eyelid    Basal cell carcinoma, trunk    Borderline diabetes    BRBPR (bright red blood per rectum)    CAD S/P percutaneous coronary angioplasty    Colon polyp    Coronary artery disease involving autologous artery coronary bypass graft without angina pectoris    Coronary atherosclerosis    Diabetes mellitus type 2, diet-controlled    Diverticulosis    Elevated troponin I level    ETOH abuse    Facial droop    Fever    GERD (gastroesophageal reflux disease)    Hiatal hernia    Heart murmur    History of aortic valve replacement    History of stroke    Hodgkin disease (Multi)    HTN (hypertension), benign    Hyperglycemia    Internal hemorrhoid    Iron deficiency anemia    Left leg weakness    Leukocytosis    Hyperlipidemia    Mixed hyperlipidemia    Nonrheumatic tricuspid valve regurgitation    Pain, postoperative, acute    Paroxysmal atrial fibrillation (Multi)    Persistent atrial fibrillation (Multi)    Peripheral edema    Radiation-induced heart disease    Reactive thrombocytosis    Thrombocytosis    Restless leg    S/P TVR (tricuspid valve repair)    Subcutaneous emphysema (CMS-[facility])    Type 2 diabetes mellitus, without long-term current use of insulin (Multi)    Atrial flutter (Multi)    Sinus node dysfunction (Multi)    Junctional bradycardia    BMI 21.0-21.9, adult    Never smoked tobacco    Abnormal MRI, lumbar spine         ASSESSMENT:  Acute on chronic back pain with right sided epidural abscess L5 and lumbar discitis, multilevel  Immunosuppressed status, status post splenectomy  Atrial flutter, status post aortic valve replacement    PLAN:  PICC line.  Discussed with patient and family at bedside.  Patient currently on ceftriaxone 2 g IV daily and vancomycin dosed to be determined prior to discharge  Case discussed with primary.  Planning 8 weeks total IV antibiotics for treatment of discitis and epidural abscess at L5  without deformity none     RECOGNIZED PROBLEMS & IMMEDIATE PLAN(S) OF CARE:     Patient Active Problem List    Diagnosis Date Noted   • Single liveborn, born in hospital, delivered by vaginal delivery 2020     Note Last Updated: 2020     Was thought to be late prenatal care but was a transfer of care  Cord Tox Pending  Infant UDS negative  ------------------------------------------------------------------------------           INTAKE AND OUTPUT     Feeding: breastfeeding fair- well BRF x6/13hrs    Intake & Output (last day)        0701 -  0700  07 -  0700          Stool Unmeasured Occurrence 1 x 1 x    Emesis Unmeasured Occurrence 1 x 1 x          LABS     Infant Blood Type: O+  BERNICE: Negative   Passive AB:N/A    Recent Results (from the past 24 hour(s))   Cord Blood Evaluation    Collection Time: 20  5:40 PM    Specimen: Umbilical Cord; Cord Blood   Result Value Ref Range    ABO Type O     RH type Positive     BERNICE IgG Negative    Urine Drug Screen - Urine, Clean Catch    Collection Time: 20  9:02 AM    Specimen: Urine, Clean Catch   Result Value Ref Range    Amphet/Methamphet, Screen Negative Negative    Barbiturates Screen, Urine Negative Negative    Benzodiazepine Screen, Urine Negative Negative    Cocaine Screen, Urine Negative Negative    Opiate Screen Negative Negative    THC, Screen, Urine Negative Negative    Methadone Screen, Urine Negative Negative    Oxycodone Screen, Urine Negative Negative       TCI:       TESTING      BP:   pending Location: Right Leg              Location: Right Arm    CCHD     Car Seat Challenge Test  n/a   Hearing Screen Hearing Screen Date: 20 (20 1000)  Hearing Screen, Left Ear: passed (20 1000)  Hearing Screen, Right Ear: passed (20 1000)     Screen         Immunization History   Administered Date(s) Administered   • Hep B, Adolescent or Pediatric 2020       As indicated in active problem  list and/or as listed as below. The plan of care has been / will be discussed with the family/primary caregiver(s).      FOLLOW UP:     Check/ follow up: cordstat toxicology    Other Issues: None    [facility]  [facility]  Documentation reviewed and electronically signed on 2020 at 11:17 EST     right side  Patient will need weekly CBC BMP CRP and Vanco trough while on antibiotics  Patient to be followed up in the infectious disease clinic within 2 to 3 weeks of discharge  Please ensure that patient's labs are faxed to [phone].   In case of any questions, please call the [facility] outpatient infusion center infectious disease office at [phone] Monday through Friday from 9 AM to 4 PM.  There is no answering service after hours or on weekends.  An epic chat message must be sent after hours, please direct that message to ALL Dr. Debbie Soliz, Dr Fer Marmolejo, and Sandee Ernandez  Will need to monitor patient closely for infections.  He has a history of splenectomy  Pain management recommended outpatient for.  follow-up long-term  Discussed ways to prevent ileus associated with pain medications:  Patient to start taking daily prune juice and add gentle laxatives on occasion as necessary.  If he finds himself constipated, he is to reach out to his primary care physician as soon as possible    Patient is on Vancomycin  Final dose of Vancomycin to be determined between infectious disease and pharmacy as close to discharge as possible. Do not discharge patient without confirming final dose of Vancomycin and length of therapy with ID.     Regarding outpatient follow up labs: Patient will need outpatient weekly Vanco trough and BMP while on Vancomycin.  These lab results will need to be FAXED to [phone].     IF Vancomycin trough is 20 or above, please hold Vancomycin and call the Infectious Disease office at [place] at [phone] Monday thru Friday from 9am - 4pm. There is no answering service, and no one is immediately available in the office after hours or on weekends. Those with access may send an EPIC CHAT message to all of the following: Sandee Ernandez, Dr Debbie Soliz and Dr Fer Marmolejo in addition to faxing the labs to the office. If concern is emergent, and you have been unable  to reach anyone after three attempts, the patient will need to go to the emergency department for evaluation. IF Vanco trough is above 20, no further Vancomycin should be administered until there is a conversation with Infectious Disease. Thank you for your understanding and cooperation.     Thank you for your cooperation in this matter.  We will not be able to follow labs without this step.    Imaging and labs were reviewed per medical records and any ID pertinent labs were also addressed  Time spent before, during and after care today, including coordination of care >40 min      Debbie Soliz, DO

## 2025-03-04 ENCOUNTER — DOCUMENTATION (OUTPATIENT)
Dept: HOME HEALTH SERVICES | Facility: HOME HEALTH | Age: 65
End: 2025-03-04
Payer: COMMERCIAL

## 2025-03-04 ENCOUNTER — PHARMACY VISIT (OUTPATIENT)
Dept: PHARMACY | Facility: CLINIC | Age: 65
End: 2025-03-04
Payer: MEDICAID

## 2025-03-04 ENCOUNTER — HOME INFUSION (OUTPATIENT)
Dept: INFUSION THERAPY | Age: 65
End: 2025-03-04
Payer: COMMERCIAL

## 2025-03-04 DIAGNOSIS — M46.26 OSTEOMYELITIS OF LUMBAR SPINE (MULTI): ICD-10-CM

## 2025-03-04 DIAGNOSIS — G06.2 EPIDURAL ABSCESS (HHS-HCC): ICD-10-CM

## 2025-03-04 DIAGNOSIS — G06.1: ICD-10-CM

## 2025-03-04 LAB
ALBUMIN SERPL BCP-MCNC: 2.9 G/DL (ref 3.4–5)
ALP SERPL-CCNC: 163 U/L (ref 33–136)
ALT SERPL W P-5'-P-CCNC: 17 U/L (ref 10–52)
ANION GAP SERPL CALC-SCNC: 11 MMOL/L (ref 10–20)
AST SERPL W P-5'-P-CCNC: 16 U/L (ref 9–39)
BASOPHILS # BLD AUTO: 0.1 X10*3/UL (ref 0–0.1)
BASOPHILS NFR BLD AUTO: 0.8 %
BILIRUB SERPL-MCNC: 0.3 MG/DL (ref 0–1.2)
BUN SERPL-MCNC: 17 MG/DL (ref 6–23)
CALCIUM SERPL-MCNC: 9.3 MG/DL (ref 8.6–10.3)
CHLORIDE SERPL-SCNC: 102 MMOL/L (ref 98–107)
CO2 SERPL-SCNC: 25 MMOL/L (ref 21–32)
CREAT SERPL-MCNC: 0.77 MG/DL (ref 0.5–1.3)
EGFRCR SERPLBLD CKD-EPI 2021: >90 ML/MIN/1.73M*2
EOSINOPHIL # BLD AUTO: 0.15 X10*3/UL (ref 0–0.7)
EOSINOPHIL NFR BLD AUTO: 1.2 %
ERYTHROCYTE [DISTWIDTH] IN BLOOD BY AUTOMATED COUNT: 14.4 % (ref 11.5–14.5)
GLUCOSE SERPL-MCNC: 111 MG/DL (ref 74–99)
HCT VFR BLD AUTO: 35.3 % (ref 41–52)
HGB BLD-MCNC: 11.8 G/DL (ref 13.5–17.5)
HOLD SPECIMEN: NORMAL
IMM GRANULOCYTES # BLD AUTO: 0.07 X10*3/UL (ref 0–0.7)
IMM GRANULOCYTES NFR BLD AUTO: 0.5 % (ref 0–0.9)
LYMPHOCYTES # BLD AUTO: 0.7 X10*3/UL (ref 1.2–4.8)
LYMPHOCYTES NFR BLD AUTO: 5.5 %
MAGNESIUM SERPL-MCNC: 1.98 MG/DL (ref 1.6–2.4)
MCH RBC QN AUTO: 30.7 PG (ref 26–34)
MCHC RBC AUTO-ENTMCNC: 33.4 G/DL (ref 32–36)
MCV RBC AUTO: 92 FL (ref 80–100)
MONOCYTES # BLD AUTO: 1.54 X10*3/UL (ref 0.1–1)
MONOCYTES NFR BLD AUTO: 12.1 %
NEUTROPHILS # BLD AUTO: 10.22 X10*3/UL (ref 1.2–7.7)
NEUTROPHILS NFR BLD AUTO: 79.9 %
NRBC BLD-RTO: 0 /100 WBCS (ref 0–0)
PLATELET # BLD AUTO: 750 X10*3/UL (ref 150–450)
POTASSIUM SERPL-SCNC: 4.5 MMOL/L (ref 3.5–5.3)
PROT SERPL-MCNC: 5.8 G/DL (ref 6.4–8.2)
RBC # BLD AUTO: 3.84 X10*6/UL (ref 4.5–5.9)
SODIUM SERPL-SCNC: 133 MMOL/L (ref 136–145)
WBC # BLD AUTO: 12.8 X10*3/UL (ref 4.4–11.3)

## 2025-03-04 PROCEDURE — 2500000004 HC RX 250 GENERAL PHARMACY W/ HCPCS (ALT 636 FOR OP/ED)

## 2025-03-04 PROCEDURE — 83735 ASSAY OF MAGNESIUM: CPT | Performed by: INTERNAL MEDICINE

## 2025-03-04 PROCEDURE — 99239 HOSP IP/OBS DSCHRG MGMT >30: CPT | Performed by: STUDENT IN AN ORGANIZED HEALTH CARE EDUCATION/TRAINING PROGRAM

## 2025-03-04 PROCEDURE — 2500000001 HC RX 250 WO HCPCS SELF ADMINISTERED DRUGS (ALT 637 FOR MEDICARE OP): Performed by: INTERNAL MEDICINE

## 2025-03-04 PROCEDURE — 80202 ASSAY OF VANCOMYCIN: CPT | Performed by: INTERNAL MEDICINE

## 2025-03-04 PROCEDURE — 85025 COMPLETE CBC W/AUTO DIFF WBC: CPT | Performed by: INTERNAL MEDICINE

## 2025-03-04 PROCEDURE — 2500000001 HC RX 250 WO HCPCS SELF ADMINISTERED DRUGS (ALT 637 FOR MEDICARE OP): Performed by: FAMILY MEDICINE

## 2025-03-04 PROCEDURE — RXMED WILLOW AMBULATORY MEDICATION CHARGE

## 2025-03-04 PROCEDURE — 99232 SBSQ HOSP IP/OBS MODERATE 35: CPT | Performed by: INTERNAL MEDICINE

## 2025-03-04 PROCEDURE — 2500000001 HC RX 250 WO HCPCS SELF ADMINISTERED DRUGS (ALT 637 FOR MEDICARE OP): Performed by: PHARMACIST

## 2025-03-04 PROCEDURE — 2500000004 HC RX 250 GENERAL PHARMACY W/ HCPCS (ALT 636 FOR OP/ED): Performed by: INTERNAL MEDICINE

## 2025-03-04 PROCEDURE — 2500000001 HC RX 250 WO HCPCS SELF ADMINISTERED DRUGS (ALT 637 FOR MEDICARE OP): Performed by: NURSE PRACTITIONER

## 2025-03-04 PROCEDURE — 80053 COMPREHEN METABOLIC PANEL: CPT | Performed by: INTERNAL MEDICINE

## 2025-03-04 PROCEDURE — 2500000002 HC RX 250 W HCPCS SELF ADMINISTERED DRUGS (ALT 637 FOR MEDICARE OP, ALT 636 FOR OP/ED): Performed by: FAMILY MEDICINE

## 2025-03-04 RX ORDER — OXYCODONE AND ACETAMINOPHEN 5; 325 MG/1; MG/1
1 TABLET ORAL EVERY 6 HOURS PRN
Qty: 20 TABLET | Refills: 0 | Status: SHIPPED | OUTPATIENT
Start: 2025-03-04 | End: 2025-03-09

## 2025-03-04 RX ADMIN — ASPIRIN 81 MG: 81 TABLET, COATED ORAL at 10:32

## 2025-03-04 RX ADMIN — IBUPROFEN 600 MG: 600 TABLET ORAL at 07:07

## 2025-03-04 RX ADMIN — FOLIC ACID 1 MG: 1 TABLET ORAL at 10:32

## 2025-03-04 RX ADMIN — OXYCODONE HYDROCHLORIDE AND ACETAMINOPHEN 1 TABLET: 5; 325 TABLET ORAL at 05:13

## 2025-03-04 RX ADMIN — Medication 100 MG: at 10:32

## 2025-03-04 RX ADMIN — IBUPROFEN 600 MG: 600 TABLET ORAL at 01:09

## 2025-03-04 RX ADMIN — APIXABAN 5 MG: 5 TABLET, FILM COATED ORAL at 10:32

## 2025-03-04 RX ADMIN — METOPROLOL TARTRATE 25 MG: 25 TABLET, FILM COATED ORAL at 10:32

## 2025-03-04 RX ADMIN — Medication 1 TABLET: at 10:32

## 2025-03-04 RX ADMIN — PANTOPRAZOLE 20 MG: 20 TABLET, DELAYED RELEASE ORAL at 10:32

## 2025-03-04 RX ADMIN — IBUPROFEN 600 MG: 600 TABLET ORAL at 12:59

## 2025-03-04 RX ADMIN — EMPAGLIFLOZIN 25 MG: 25 TABLET, FILM COATED ORAL at 10:32

## 2025-03-04 RX ADMIN — OXYCODONE HYDROCHLORIDE AND ACETAMINOPHEN 1 TABLET: 5; 325 TABLET ORAL at 11:45

## 2025-03-04 RX ADMIN — DEXTROSE MONOHYDRATE 2 G: 5 INJECTION INTRAVENOUS at 15:08

## 2025-03-04 RX ADMIN — VANCOMYCIN HYDROCHLORIDE 1250 MG: 1.25 INJECTION, POWDER, LYOPHILIZED, FOR SOLUTION INTRAVENOUS at 15:00

## 2025-03-04 ASSESSMENT — PAIN DESCRIPTION - LOCATION
LOCATION: BACK

## 2025-03-04 ASSESSMENT — PAIN SCALES - GENERAL
PAINLEVEL_OUTOF10: 3
PAINLEVEL_OUTOF10: 2
PAINLEVEL_OUTOF10: 7
PAINLEVEL_OUTOF10: 7
PAINLEVEL_OUTOF10: 3
PAINLEVEL_OUTOF10: 7

## 2025-03-04 ASSESSMENT — PAIN - FUNCTIONAL ASSESSMENT
PAIN_FUNCTIONAL_ASSESSMENT: 0-10

## 2025-03-04 ASSESSMENT — PAIN DESCRIPTION - ORIENTATION
ORIENTATION: RIGHT;LEFT
ORIENTATION: LOWER
ORIENTATION: RIGHT;LEFT;LOWER
ORIENTATION: LOWER

## 2025-03-04 NOTE — CARE PLAN
Problem: Pain - Adult  Goal: Verbalizes/displays adequate comfort level or baseline comfort level  Outcome: Progressing  Flowsheets (Taken 3/4/2025 1056)  Verbalizes/displays adequate comfort level or baseline comfort level:   Encourage patient to monitor pain and request assistance   Assess pain using appropriate pain scale   Implement non-pharmacological measures as appropriate and evaluate response   Administer analgesics based on type and severity of pain and evaluate response     Problem: Safety - Adult  Goal: Free from fall injury  Outcome: Progressing  Flowsheets (Taken 3/4/2025 1056)  Free from fall injury:   Instruct family/caregiver on patient safety   Based on caregiver fall risk screen, instruct family/caregiver to ask for assistance with transferring infant if caregiver noted to have fall risk factors     Problem: Discharge Planning  Goal: Discharge to home or other facility with appropriate resources  Outcome: Progressing  Flowsheets (Taken 3/4/2025 1056)  Discharge to home or other facility with appropriate resources:   Identify discharge learning needs (meds, wound care, etc)   Identify barriers to discharge with patient and caregiver     Problem: Chronic Conditions and Co-morbidities  Goal: Patient's chronic conditions and co-morbidity symptoms are monitored and maintained or improved  Outcome: Progressing  Flowsheets (Taken 3/4/2025 1056)  Care Plan - Patient's Chronic Conditions and Co-Morbidity Symptoms are Monitored and Maintained or Improved:   Monitor and assess patient's chronic conditions and comorbid symptoms for stability, deterioration, or improvement   Collaborate with multidisciplinary team to address chronic and comorbid conditions and prevent exacerbation or deterioration   Update acute care plan with appropriate goals if chronic or comorbid symptoms are exacerbated and prevent overall improvement and discharge     Problem: Nutrition  Goal: Nutrient intake appropriate for  maintaining nutritional needs  Outcome: Progressing   The patient's goals for the shift include Labs WNL    The clinical goals for the shift include patient's pain will be controlled for shift

## 2025-03-04 NOTE — PROGRESS NOTES
REVIEWED PT INFO AS CORRECT.   DX...  back pain with no evident source of infection  REVIEWED ALLERGIES... lipitor, contrast media  PMH... cad, hodgkin lymphoma, atrial flutter, aortic valve replacement  NO MEDICATION INTERACTIONS.  REVIEWED RELEVANT BASELINE VALUES  LAB ARE ... weekly crp, cbc, bmp and vanco trough results to dr soliz  PT HAS …..  2L picc 39cm placed 3/3/25 FLUSH PER [facility] PROTOCOL.  CONTINUE MED THRU TENTATIVE STOP DATE … vancomycin 1.25gm iv q24h via gravity -AND- ceftriaxone 2gm iv q24h via gravity (MB+) thru 4/23/25  CARE PLAN DONE TODAY    Patient is a new admit to [facility] for receipt of vancomycin and ceftriaxone for abnormal MRI with complaints of back pain and fever. Source of infection is unclear. MD is treating empirically as patient has improved on vanco and zosyn. MD changed to vanco and ceftriaxone d/t concern for side effects and will see if he remains stable with change of meds prior to discharge home.    Weekly labs ordered as above with results to dr Soliz.    Processed fill for 9 x vancomycin and 9 x ceftriaxone gravity doses for mix and straight delivery 3/4/25 to cover doses 3/5 thru 3/13/25.    Follow up 3/12/25 with refill ovn. Check patient progress and labs.

## 2025-03-04 NOTE — PROGRESS NOTES
Received script from Dr Soliz ID phys for IV Vancomycin 1250mg IV q24h x8 weeks & Ceftriaxone 2gm IV daily x8 weeks. Script faxed to [facility] infusion team # [phone]. Labs ordered & FU sched by Dr Soliz with her office. SOC for 3/5 confirmed. Updated pt & nurse Jenni. Plan for dc today after Vanco & ceftriaxone dose given.

## 2025-03-04 NOTE — CARE PLAN
The patient's goals for the shift include Labs WNL    The clinical goals for the shift include Pt will be free from fall/injury throughout this shift.

## 2025-03-04 NOTE — SIGNIFICANT EVENT
Called and spoke to pharmacy. Patient to be discharged today. Vanco dose recommended by pharmacy is 1250mg q 24 hours.     Debbie Soliz DO

## 2025-03-04 NOTE — DISCHARGE INSTRUCTIONS
Regarding outpatient follow up labs: Patient will need outpatient weekly Vanco trough and BMP while on Vancomycin.  These lab results will need to be FAXED to [phone].      IF Vancomycin trough is 20 or above, please hold Vancomycin and call the Infectious Disease office at [place] at [phone] Monday thru Friday from 9am - 4pm. There is no answering service, and no one is immediately available in the office after hours or on weekends. Those with access may send an EPIC CHAT message to all of the following: Sandee Ernandez, Dr Debbie Soliz and Dr Fer Marmolejo in addition to faxing the labs to the office. If concern is emergent, and you have been unable to reach anyone after three attempts, the patient will need to go to the emergency department for evaluation. IF Vanco trough is above 20, no further Vancomycin should be administered until there is a conversation with Infectious Disease. Thank you for your understanding and cooperation.

## 2025-03-04 NOTE — NURSING NOTE
This nurse introduced self and role to patient and family, prepared and provided AVS, sat with all, started and completed discharge education, pt verbalized understanding, without further questions or concerns, agreeable and comfortable with discharge at this time, picc remains in place, no tele, pt states he has all of his belongings, pt ready for discharge once antibiotic is done infusing, pt states he sees his pcp tomorrow, transport requested, discharge complete.

## 2025-03-04 NOTE — CARE PLAN
The patient's goals for the shift include Labs WNL    The clinical goals for the shift include patient's pain will be controlled for shift    Problem: Fall/Injury  Goal: Not fall by end of shift  Outcome: Adequate for Discharge  Goal: Be free from injury by end of the shift  Outcome: Adequate for Discharge  Goal: Verbalize understanding of personal risk factors for fall in the hospital  Outcome: Adequate for Discharge  Goal: Verbalize understanding of risk factor reduction measures to prevent injury from fall in the home  Outcome: Adequate for Discharge  Goal: Use assistive devices by end of the shift  Outcome: Adequate for Discharge  Goal: Pace activities to prevent fatigue by end of the shift  Outcome: Adequate for Discharge     Problem: Diabetes  Goal: Maintain electrolyte levels within acceptable range throughout shift  Outcome: Adequate for Discharge  Goal: Maintain glucose levels >70mg/dl to <250mg/dl throughout shift  Outcome: Adequate for Discharge  Goal: Learn about and adhere to nutrition recommendations by end of shift  Outcome: Adequate for Discharge  Goal: Vital signs within normal range for age by end of shift  Outcome: Adequate for Discharge  Goal: Receive DSME education by end of shift  Outcome: Adequate for Discharge     Problem: Pain  Goal: Takes deep breaths with improved pain control throughout the shift  Outcome: Adequate for Discharge  Goal: Turns in bed with improved pain control throughout the shift  Outcome: Adequate for Discharge  Goal: Walks with improved pain control throughout the shift  Outcome: Adequate for Discharge  Goal: Performs ADL's with improved pain control throughout shift  Outcome: Adequate for Discharge  Goal: Participates in PT with improved pain control throughout the shift  Outcome: Adequate for Discharge  Goal: Free from opioid side effects throughout the shift  Outcome: Adequate for Discharge  Goal: Free from acute confusion related to pain meds throughout the  shift  Outcome: Adequate for Discharge     Problem: Skin  Goal: Participates in plan/prevention/treatment measures  Outcome: Adequate for Discharge  Goal: Prevent/manage excess moisture  Outcome: Adequate for Discharge  Goal: Prevent/minimize sheer/friction injuries  Outcome: Adequate for Discharge  Goal: Promote/optimize nutrition  Outcome: Adequate for Discharge  Goal: Promote skin healing  Outcome: Adequate for Discharge     Problem: Pain - Adult  Goal: Verbalizes/displays adequate comfort level or baseline comfort level  Outcome: Adequate for Discharge     Problem: Safety - Adult  Goal: Free from fall injury  Outcome: Adequate for Discharge     Problem: Discharge Planning  Goal: Discharge to home or other facility with appropriate resources  Outcome: Adequate for Discharge     Problem: Chronic Conditions and Co-morbidities  Goal: Patient's chronic conditions and co-morbidity symptoms are monitored and maintained or improved  Outcome: Adequate for Discharge     Problem: Nutrition  Goal: Nutrient intake appropriate for maintaining nutritional needs  Outcome: Adequate for Discharge

## 2025-03-04 NOTE — PROGRESS NOTES
Estimated Creatinine Clearance: 86.4 mL/min (by C-G formula based on SCr of 0.77 mg/dL).      Infectious Disease Progress Note       3/4/2025    Patient is a followup regarding lumbar discitis and epidural abscess.  MRI with abnormal T2 signal increase at L3, L4, S1 vertebrae.  Epidural abscess at L5 measuring 14 x 7 mm anterior epidural space and 11 x 10 mm right lateral space.  Not amenable to drainage.  Patient has no history of MRSA per medical record.  Has been on vancomycin and ceftriaxone empirically.  Blood cultures negative EVENS negative.  No available bone biopsy or cultures from the epidural space      Subjectively, no new complaints at this time.   Family currently at bedside.  All questions answered to the best of my ability    Estimated Creatinine Clearance: 86.4 mL/min (by C-G formula based on SCr of 0.77 mg/dL).      Lab Results   Component Value Date    WBC 12.8 (H) 03/04/2025    HGB 11.8 (L) 03/04/2025    HCT 35.3 (L) 03/04/2025    MCV 92 03/04/2025     (H) 03/04/2025     Lab Results   Component Value Date    GLUCOSE 111 (H) 03/04/2025    CALCIUM 9.3 03/04/2025     (L) 03/04/2025    K 4.5 03/04/2025    CO2 25 03/04/2025     03/04/2025    BUN 17 03/04/2025    CREATININE 0.77 03/04/2025       WBC trends are being monitored. Antibiotic doses are being adjusted per most recent renal labs.     Vitals:    03/04/25 0751   BP:    Pulse:    Resp:    Temp:    SpO2: 92%     Patient is awake and alert, sitting up in a chair  NAD  Neck supple  Heart S1S2  Chest: Equal expansion, bilaterally clear to auscultation  Abdomen: soft, ND, NTTP, no guarding  Extrem: no pain to palpation  Skin: no rashes, no diaphoresis  Neuro: CNS intact  Affect appropriate and patient is interactive        Patient Active Problem List   Diagnosis    YANIRA (acute kidney injury) (CMS-HCC)    Anemia    Aortic stenosis    ASCVD (arteriosclerotic cardiovascular disease)    Asplenia    Atelectasis    AV dissociation    AVM  (arteriovenous malformation) ([facility]-HCC)    Basal cell carcinoma, eyelid    Basal cell carcinoma, trunk    Borderline diabetes    BRBPR (bright red blood per rectum)    CAD S/P percutaneous coronary angioplasty    Colon polyp    Coronary artery disease involving autologous artery coronary bypass graft without angina pectoris    Coronary atherosclerosis    Diabetes mellitus type 2, diet-controlled    Diverticulosis    Elevated troponin I level    ETOH abuse    Facial droop    Fever    GERD (gastroesophageal reflux disease)    Hiatal hernia    Heart murmur    History of aortic valve replacement    History of stroke    Hodgkin disease (Multi)    HTN (hypertension), benign    Hyperglycemia    Internal hemorrhoid    Iron deficiency anemia    Left leg weakness    Leukocytosis    Hyperlipidemia    Mixed hyperlipidemia    Nonrheumatic tricuspid valve regurgitation    Pain, postoperative, acute    Paroxysmal atrial fibrillation (Multi)    Persistent atrial fibrillation (Multi)    Peripheral edema    Radiation-induced heart disease    Reactive thrombocytosis    Thrombocytosis    Restless leg    S/P TVR (tricuspid valve repair)    Subcutaneous emphysema (CMS-HCC)    Type 2 diabetes mellitus, without long-term current use of insulin (Multi)    Atrial flutter (Multi)    Sinus node dysfunction (Multi)    Junctional bradycardia    BMI 21.0-21.9, adult    Never smoked tobacco    Abnormal MRI, lumbar spine         ASSESSMENT:  Acute on chronic back pain with right sided epidural abscess L5 and lumbar discitis, multilevel  Immunosuppressed status, status post splenectomy  Atrial flutter, status post aortic valve replacement    PLAN:  Patient currently on ceftriaxone 2 g IV daily and vancomycin 1250 mg IV every 24 hours per conversation with pharmacy  Planning 8 weeks total IV antibiotics for treatment of discitis and epidural abscess at L5 right side  Patient will need weekly CBC BMP CRP and Vanco trough while on antibiotics  Patient  to be followed up in the infectious disease clinic within 2 to 3 weeks of discharge  Please ensure that patient's labs are faxed to [phone].   In case of any questions, please call the [facility] outpatient infusion center infectious disease office at [phone] Monday through Friday from 9 AM to 4 PM.  There is no answering service after hours or on weekends.  An epic chat message must be sent after hours, please direct that message to ALL Dr. Debbie Soliz, Dr Fer Marmolejo, and Sandee Ernandez  Will need to monitor patient closely for infections.  He has a history of splenectomy  Pain management recommended outpatient for.  follow-up long-term  Discussed ways to prevent ileus associated with pain medications:  Patient to start taking daily prune juice and add gentle laxatives on occasion as necessary.  If he finds himself constipated, he is to reach out to his primary care physician as soon as possible    Imaging and labs were reviewed per medical records and any ID pertinent labs were also addressed  Time spent before, during and after care today, including coordination of care >40 min      Debbie Soliz, DO

## 2025-03-04 NOTE — DISCHARGE SUMMARY
Medical Group Discharge Summary  DISCHARGE DIAGNOSIS     Lumbar discitis, Epidural abscess, acute on chronic back pain  Hypokalemia hyper natremia likely secondary to alcohol dependence  Alcohol use disorder  CAD, A-flutter, status post aortic valve repair    HOSPITAL COURSE AND DETAILS     Kit Duncan is a 64 y.o. male with a past medical history of CAD, Hodgkin lymphoma, atrial flutter, and aortic valve replacement who presented to the ED with abnormal MRI results. He recently had an acute worsening of chronic back pain and underwent an MRI that raised concerns of discitis or osteomyelitis.  Patient was noted to have leukocytosis on presentation and he was admitted to the hospitalist service for management and monitoring.  While in house patient was started on broad-spectrum antibiotics and ID was consulted for evaluation.  IR guided aspiration was pursued however IR was unable to aspirate the epidural area and patient was not amenable for surgery while in house.  EVENS was pursued with no vegetation noted on exam.  Blood cultures were obtained and followed and finalized as negative.  Empiric antibiotic coverage advised per ID.  Final antibiotic regimen was determined to be vancomycin and ceftriaxone of which ID provided prescriptions at time of discharge.  Outpatient lab orders also provided per ID.  Home health care was arranged with start of care set up for 3/5/2025.  Final antibiotic duration to be determined by ID and patient is to follow-up closely with ID clinic on discharge.  Patient stable condition on day of discharge with no acute concerns.    35 minutes spent on discharge. Time calculated includes outpatient care coordination, bedside education, and counselling.     ---Of note, this documentation is completed using the Dragon Dictation system (voice recognition software). There may be spelling and/or grammatical errors that were not corrected prior to final submission.---    Sesar Montague,  MD    DISCHARGE PHYSICAL EXAM     Last Recorded Vitals:  Vitals:    03/03/25 1706 03/03/25 2005 03/04/25 0731 03/04/25 0751   BP: 135/68 121/62 125/60    Pulse: 75 86 88    Resp:  18     Temp: 36.5 °C (97.7 °F) 37 °C (98.6 °F) 36.8 °C (98.2 °F)    TempSrc:  Temporal     SpO2: 93% 93% 90% 92%   Weight:       Height:           Physical Exam  General: Well-developed adult male in mild distress  HEENT: Clear sclera, EOMI, trachea midline, moist mucous membranes  Respiratory: Equal chest rise, no retractions  Abdomen: Soft, nontender, nondistended  Extremities: No cyanosis or clubbing appreciated  Neurological: Spontaneously moves all extremities, no dysarthria, cranial nerves grossly intact  Psychiatric: Appropriate mood and affect  Skin: Warm, dry    DISCHARGE MEDICATIONS        Your medication list        START taking these medications        Instructions Last Dose Given Next Dose Due   cefTRIAXone 2 gram/50 mL IV  Commonly known as: Rocephin      Infuse 50 mL (2 g) at 100 mL/hr over 30 minutes into a venous catheter once every 24 hours.       ibuprofen 600 mg tablet      Take 1 tablet (600 mg) by mouth every 6 hours if needed for moderate pain (4 - 6).       lactobacillus acidophilus tablet tablet      Take 1 tablet by mouth 2 times a day.       oxyCODONE-acetaminophen 5-325 mg tablet  Commonly known as: Percocet      Take 1 tablet by mouth every 6 hours if needed for severe pain (7 - 10) for up to 7 days.       thiamine 100 mg tablet  Commonly known as: Vitamin B-1      Take 1 tablet (100 mg) by mouth once daily.       vancomycin 1250 mg/250 mL IV  Commonly known as: Vancocin      Infuse 250 mL (1,250 mg) at 200 mL/hr over 75 minutes into a venous catheter once every 24 hours.              CHANGE how you take these medications        Instructions Last Dose Given Next Dose Due   apixaban 5 mg tablet  Commonly known as: Eliquis  What changed: additional instructions      Take 1 tablet (5 mg) by mouth 2 times a day.               CONTINUE taking these medications        Instructions Last Dose Given Next Dose Due   acetaminophen 325 mg tablet  Commonly known as: Tylenol      Take 2 tablets (650 mg) by mouth every 4 hours if needed for mild pain (1 - 3).       aspirin 81 mg EC tablet           coenzyme Q10-vitamin E 100-5 mg-unit capsule           cyanocobalamin 1,000 mcg tablet  Commonly known as: Vitamin B-12           empagliflozin 25 mg  Commonly known as: Jardiance           EPINEPHrine 0.3 mg/0.3 mL injection syringe  Commonly known as: Epipen           ferrous sulfate, dried 160 mg (50 mg iron) ER tablet           folic acid 800 mcg tablet  Commonly known as: Folvite           furosemide 20 mg tablet  Commonly known as: Lasix           magnesium oxide 400 mg (241.3 mg magnesium) tablet  Commonly known as: Mag-Ox           metoprolol tartrate 25 mg tablet  Commonly known as: Lopressor      Take 1 tablet (25 mg) by mouth 2 times a day.       nitroglycerin 0.4 mg SL tablet  Commonly known as: Nitrostat           pantoprazole 20 mg EC tablet  Commonly known as: ProtoNix           pravastatin 40 mg tablet  Commonly known as: Pravachol                  STOP taking these medications      amoxicillin 500 mg tablet  Commonly known as: Amoxil        potassium chloride CR 10 mEq ER tablet  Commonly known as: Klor-Con                  Where to Get Your Medications        These medications were sent to [facility]  [address] [place] [address]      Hours: 9 AM to 5:30 PM Mon-Fri, 9 AM to 1 PM Saturday Phone: [phone]   ibuprofen 600 mg tablet  lactobacillus acidophilus tablet tablet  thiamine 100 mg tablet       You can get these medications from any pharmacy    Bring a paper prescription for each of these medications  cefTRIAXone 2 gram/50 mL IV  oxyCODONE-acetaminophen 5-325 mg tablet  vancomycin 1250 mg/250 mL IV           OUTPATIENT FOLLOW-UP     Future Appointments   Date Time Provider Department  [place]   7/2/2025  9:00 AM [facility] [place]   7/2/2025  9:45 AM Chepe Dennis MD [id number] [place]

## 2025-03-04 NOTE — HH CARE COORDINATION
Home Care received a Referral for Infusion, Nursing, Physical Therapy, and Occupational Therapy. We have processed the referral for a Start of Care on 3/5/25.     If you have any questions or concerns, please feel free to contact us at [phone]. Follow the prompts, enter your five digit zip code, and you will be directed to your care team on [facility].

## 2025-03-05 ENCOUNTER — HOME CARE VISIT (OUTPATIENT)
Dept: HOME HEALTH SERVICES | Facility: HOME HEALTH | Age: 65
End: 2025-03-05
Payer: COMMERCIAL

## 2025-03-05 ENCOUNTER — TELEPHONE (OUTPATIENT)
Facility: CLINIC | Age: 65
End: 2025-03-05
Payer: COMMERCIAL

## 2025-03-05 ENCOUNTER — OFFICE VISIT (OUTPATIENT)
Dept: FAMILY MEDICINE CLINIC | Age: 65
End: 2025-03-05
Payer: COMMERCIAL

## 2025-03-05 VITALS
OXYGEN SATURATION: 96 % | WEIGHT: 135 LBS | TEMPERATURE: 96.3 F | RESPIRATION RATE: 16 BRPM | DIASTOLIC BLOOD PRESSURE: 62 MMHG | HEIGHT: 66 IN | BODY MASS INDEX: 21.69 KG/M2 | SYSTOLIC BLOOD PRESSURE: 104 MMHG | HEART RATE: 69 BPM

## 2025-03-05 VITALS
SYSTOLIC BLOOD PRESSURE: 116 MMHG | DIASTOLIC BLOOD PRESSURE: 72 MMHG | HEART RATE: 70 BPM | HEIGHT: 66 IN | TEMPERATURE: 97.2 F | WEIGHT: 149.2 LBS | BODY MASS INDEX: 23.98 KG/M2 | OXYGEN SATURATION: 97 %

## 2025-03-05 DIAGNOSIS — Z09 HOSPITAL DISCHARGE FOLLOW-UP: Primary | ICD-10-CM

## 2025-03-05 DIAGNOSIS — G89.29 CHRONIC MIDLINE LOW BACK PAIN WITH SCIATICA, SCIATICA LATERALITY UNSPECIFIED: ICD-10-CM

## 2025-03-05 DIAGNOSIS — Q27.30 AVM (ARTERIOVENOUS MALFORMATION): ICD-10-CM

## 2025-03-05 DIAGNOSIS — M54.40 CHRONIC MIDLINE LOW BACK PAIN WITH SCIATICA, SCIATICA LATERALITY UNSPECIFIED: ICD-10-CM

## 2025-03-05 DIAGNOSIS — Z87.39 H/O DISCITIS: ICD-10-CM

## 2025-03-05 LAB
STAPHYLOCOCCUS SPEC CULT: NORMAL
VANCOMYCIN TROUGH SERPL-MCNC: 21 UG/ML (ref 5–20)

## 2025-03-05 PROCEDURE — 1111F DSCHRG MED/CURRENT MED MERGE: CPT | Performed by: INTERNAL MEDICINE

## 2025-03-05 PROCEDURE — 99215 OFFICE O/P EST HI 40 MIN: CPT | Performed by: INTERNAL MEDICINE

## 2025-03-05 PROCEDURE — G0299 HHS/HOSPICE OF RN EA 15 MIN: HCPCS

## 2025-03-05 RX ORDER — CEFTRIAXONE 2 G/1
INJECTION, POWDER, FOR SOLUTION INTRAMUSCULAR; INTRAVENOUS
COMMUNITY
Start: 2025-03-04

## 2025-03-05 RX ORDER — OXYCODONE AND ACETAMINOPHEN 5; 325 MG/1; MG/1
TABLET ORAL
COMMUNITY
Start: 2025-03-04 | End: 2025-03-07

## 2025-03-05 RX ORDER — IBUPROFEN 600 MG/1
600 TABLET, FILM COATED ORAL EVERY 6 HOURS PRN
COMMUNITY
Start: 2025-03-03

## 2025-03-05 RX ORDER — LANOLIN ALCOHOL/MO/W.PET/CERES
100 CREAM (GRAM) TOPICAL DAILY
COMMUNITY
Start: 2025-03-04

## 2025-03-05 RX ORDER — VANCOMYCIN HYDROCHLORIDE 1 G/20ML
INJECTION, POWDER, LYOPHILIZED, FOR SOLUTION INTRAVENOUS
COMMUNITY
Start: 2025-03-04

## 2025-03-05 RX ORDER — L. ACIDOPHILUS/L.BULGARICUS 1MM CELL
1 TABLET ORAL 2 TIMES DAILY
COMMUNITY
Start: 2025-03-03 | End: 2025-04-03

## 2025-03-05 RX ADMIN — SODIUM CHLORIDE 10 ML: 9 INJECTION, SOLUTION INTRAVENOUS at 10:55

## 2025-03-05 RX ADMIN — SODIUM CHLORIDE 10 ML: 9 INJECTION, SOLUTION INTRAVENOUS at 10:10

## 2025-03-05 RX ADMIN — SODIUM CHLORIDE 10 ML: 9 INJECTION, SOLUTION INTRAVENOUS at 09:00

## 2025-03-05 ASSESSMENT — ENCOUNTER SYMPTOMS
PAIN LOCATION - PAIN FREQUENCY: CONSTANT
CHANGE IN APPETITE: UNCHANGED
PAIN LOCATION - PAIN DURATION: CONSTANT
PERSON REPORTING PAIN: PATIENT
PAIN LOCATION - RELIEVING FACTORS: REST/RX
PAIN: 1
OCCASIONAL FEELINGS OF UNSTEADINESS: 1
APPETITE LEVEL: FAIR
HIGHEST PAIN SEVERITY IN PAST 24 HOURS: 8/10
STOOL FREQUENCY: DAILY
ARTHRALGIAS: 1
LOSS OF SENSATION IN FEET: 0
PAIN LOCATION - PAIN QUALITY: ACHE
LOWEST PAIN SEVERITY IN PAST 24 HOURS: 3/10
PAIN LOCATION - EXACERBATING FACTORS: ACTIVITY
SUBJECTIVE PAIN PROGRESSION: UNCHANGED
LAST BOWEL MOVEMENT: 67269
PAIN SEVERITY GOAL: 0/10
DEPRESSION: 0
PAIN LOCATION: BACK
HYPERTENSION: 1
PAIN LOCATION - PAIN SEVERITY: 3/10
BOWEL PATTERN NORMAL: 1

## 2025-03-05 ASSESSMENT — PAIN SCALES - PAIN ASSESSMENT IN ADVANCED DEMENTIA (PAINAD)
CONSOLABILITY: 0 - NO NEED TO CONSOLE.
BREATHING: 0
FACIALEXPRESSION: 0 - SMILING OR INEXPRESSIVE.
FACIALEXPRESSION: 0
CONSOLABILITY: 0
NEGVOCALIZATION: 0
BODYLANGUAGE: 0 - RELAXED.
TOTALSCORE: 0
NEGVOCALIZATION: 0 - NONE.
BODYLANGUAGE: 0

## 2025-03-05 ASSESSMENT — ACTIVITIES OF DAILY LIVING (ADL)
PHYSICAL TRANSFERS ASSESSED: 1
OASIS_M1830: 03
ENTERING_EXITING_HOME: NEEDS ASSISTANCE
CURRENT_FUNCTION: INDEPENDENT
AMBULATION ASSISTANCE: INDEPENDENT
AMBULATION ASSISTANCE: 1

## 2025-03-05 NOTE — PROGRESS NOTES
Vanco trough was added to 3/4/2025 bloodwork and this RN received a call from lab with a critical result for vanco trough of 21.0. Critical results reported to Dr. Soliz and verbal orders obtained to hold patient’s vancomycin dose on 3/6/2025. This RN spoke to the patient and he verbalized understanding to hold his vancomycin dose on 3/6/2025 and resume dose on 3/7/2025.

## 2025-03-05 NOTE — PROGRESS NOTES
Chronic congestive heart failure (HCC)    Scalp lesion    Acute bilateral low back pain with sciatica    Mixed hyperlipidemia    Fever    Leukocytosis    History of lymphoma    Spinal osteoarthritis    Discitis of lumbar region       Medications listed as ordered at the time of discharge from hospital     Medication List            Accurate as of March 5, 2025  1:16 PM. If you have any questions, ask your nurse or doctor.                CHANGE how you take these medications      potassium chloride 10 MEQ extended release capsule  Commonly known as: MICRO-K  Take 2 capsules by mouth 3 times daily  What changed:   how much to take  when to take this            CONTINUE taking these medications      amoxicillin 500 MG tablet  Commonly known as: AMOXIL  TAKE 2,000 MG (4 TABLETS) BY MOUTH SEE ADMIN INSTRUCTIONS PRIOR TO DENTAL PROCEDURES.     aspirin 81 MG tablet     cefTRIAXone 2 g injection  Commonly known as: ROCEPHIN     coenzyme Q-10 100 MG capsule     DAILY HERBS BLOOD SUGAR BALANC PO     Eliquis 5 MG Tabs tablet  Generic drug: apixaban     empagliflozin 25 MG tablet  Commonly known as: Jardiance  Take 1 tablet by mouth daily     EPINEPHrine 0.3 MG/0.3ML Soaj injection  Commonly known as: EPIPEN  Inject 0.3 mLs into the muscle once as needed (insect sting)     Ferrous Sulfate ER 50 MG Tbcr  Take 160 mg by mouth in the morning and at bedtime     folic acid 800 MCG tablet  Commonly known as: FOLVITE     furosemide 40 MG tablet  Commonly known as: LASIX  Take 2 tablets by mouth 2 times daily     ibuprofen 600 MG tablet  Commonly known as: ADVIL;MOTRIN     Lactobacillus Tabs     magnesium oxide 400 MG tablet  Commonly known as: MAG-OX  Take 1 tablet by mouth daily     metoprolol tartrate 50 MG tablet  Commonly known as: LOPRESSOR  Take 2 tablets by mouth 2 times daily     nitroGLYCERIN 0.4 MG SL tablet  Commonly known as: NITROSTAT  Place 1 tablet under the tongue every 5 minutes as needed for Chest pain (x 3 doses)

## 2025-03-06 ENCOUNTER — HOME CARE VISIT (OUTPATIENT)
Dept: HOME HEALTH SERVICES | Facility: HOME HEALTH | Age: 65
End: 2025-03-06
Payer: COMMERCIAL

## 2025-03-06 ENCOUNTER — CARE COORDINATION (OUTPATIENT)
Dept: CARE COORDINATION | Age: 65
End: 2025-03-06

## 2025-03-06 ENCOUNTER — TELEPHONE (OUTPATIENT)
Dept: CARDIOLOGY CLINIC | Age: 65
End: 2025-03-06

## 2025-03-06 VITALS
OXYGEN SATURATION: 96 % | HEART RATE: 61 BPM | SYSTOLIC BLOOD PRESSURE: 108 MMHG | RESPIRATION RATE: 18 BRPM | TEMPERATURE: 96.8 F | DIASTOLIC BLOOD PRESSURE: 68 MMHG

## 2025-03-06 LAB — EJECTION FRACTION: 53 %

## 2025-03-06 PROCEDURE — G0299 HHS/HOSPICE OF RN EA 15 MIN: HCPCS

## 2025-03-06 RX ADMIN — SODIUM CHLORIDE 10 ML: 9 INJECTION, SOLUTION INTRAVENOUS at 10:07

## 2025-03-06 RX ADMIN — SODIUM CHLORIDE 10 ML: 9 INJECTION, SOLUTION INTRAVENOUS at 09:10

## 2025-03-06 RX ADMIN — SODIUM CHLORIDE 10 ML: 9 INJECTION, SOLUTION INTRAVENOUS at 09:47

## 2025-03-06 ASSESSMENT — PAIN SCALES - PAIN ASSESSMENT IN ADVANCED DEMENTIA (PAINAD)
NEGVOCALIZATION: 0
BREATHING: 0
BODYLANGUAGE: 0 - RELAXED.
FACIALEXPRESSION: 0 - SMILING OR INEXPRESSIVE.
CONSOLABILITY: 0 - NO NEED TO CONSOLE.
NEGVOCALIZATION: 0 - NONE.
TOTALSCORE: 0
FACIALEXPRESSION: 0
CONSOLABILITY: 0
BODYLANGUAGE: 0

## 2025-03-06 ASSESSMENT — ENCOUNTER SYMPTOMS
DIARRHEA: 1
APPETITE LEVEL: GOOD
PAIN LOCATION - PAIN DURATION: CONSTANT
CHANGE IN APPETITE: UNCHANGED
HIGHEST PAIN SEVERITY IN PAST 24 HOURS: 4/10
SUBJECTIVE PAIN PROGRESSION: UNCHANGED
LOWEST PAIN SEVERITY IN PAST 24 HOURS: 2/10
PAIN SEVERITY GOAL: 0/10
LAST BOWEL MOVEMENT: 67270
OCCASIONAL FEELINGS OF UNSTEADINESS: 1
PERSON REPORTING PAIN: PATIENT
PAIN: 1
PAIN LOCATION - PAIN SEVERITY: 4/10
LOSS OF SENSATION IN FEET: 0
PAIN LOCATION - PAIN FREQUENCY: CONSTANT
PAIN LOCATION - PAIN QUALITY: ACHE
ARTHRALGIAS: 1
STOOL FREQUENCY: DAILY
DEPRESSION: 0
PAIN LOCATION - RELIEVING FACTORS: REST/RX
PAIN LOCATION - EXACERBATING FACTORS: ACTIVITY
PAIN LOCATION: BACK
HYPERTENSION: 1

## 2025-03-06 NOTE — CARE COORDINATION
Ambulatory Care Coordination Note     3/6/2025 3:04 PM     Patient outreach attempt by this ACM today to offer care management services. ACM was unable to reach the patient by telephone today;   left voice message requesting a return phone call to this ACM.     ACM: Juani Estrada RN         PCP/Specialist follow up:   Future Appointments         Provider Specialty Dept Phone    3/17/2025 3:00 PM Hay Babcock, DO Pain Management [phone]    4/7/2025 12:30 PM Chelo Pal MD Family Medicine [phone]    7/10/2025 8:30 AM Ruth Mcmullen, APRN - CNP Cardiology [phone]            Follow Up:   Plan for next ACM outreach in approximately 1-2 days  to complete:  - outreach attempt to offer care management services.

## 2025-03-06 NOTE — TELEPHONE ENCOUNTER
Appointment Request From: Conner Cheung      With Provider: RADHA Curiel CNP [[facility] Cardiology]      Preferred Date Range: 3/10/2025 - 3/14/2025      Preferred Times: Monday Afternoon, Wednesday Afternoon, Thursday Afternoon, Friday Afternoon      Reason for visit: Request an Appointment      Comments:   After discharge from hospital follow up.     Appointment Request  (Newest Message First)  Conner SANDOVAL [facility] Cardiology Front Desk37 minutes ago (2:35 PM)       Appointment Request From: Conner Cheung     With Provider: RADHA Curiel CNP [[facility] Cardiology]     Preferred Date Range: 3/10/2025 - 3/14/2025     Preferred Times: Monday Afternoon, Wednesday Afternoon, Thursday Afternoon, Friday Afternoon     Reason for visit: Request an Appointment     Comments:  After discharge from hospital follow up.

## 2025-03-07 ENCOUNTER — HOME CARE VISIT (OUTPATIENT)
Dept: HOME HEALTH SERVICES | Facility: HOME HEALTH | Age: 65
End: 2025-03-07
Payer: COMMERCIAL

## 2025-03-07 DIAGNOSIS — G06.2 EPIDURAL ABSCESS (HHS-HCC): ICD-10-CM

## 2025-03-07 DIAGNOSIS — M46.26 OSTEOMYELITIS OF LUMBAR SPINE (MULTI): ICD-10-CM

## 2025-03-07 DIAGNOSIS — M46.46 DISCITIS OF LUMBAR REGION: Primary | ICD-10-CM

## 2025-03-07 PROCEDURE — G0151 HHCP-SERV OF PT,EA 15 MIN: HCPCS

## 2025-03-07 RX ORDER — OXYCODONE HYDROCHLORIDE 5 MG/1
5 TABLET ORAL 2 TIMES DAILY PRN
Qty: 12 TABLET | Refills: 0 | Status: SHIPPED | OUTPATIENT
Start: 2025-03-07 | End: 2025-03-17

## 2025-03-07 SDOH — HEALTH STABILITY: PHYSICAL HEALTH: EXERCISE ACTIVITY: KNEE FLEXION

## 2025-03-07 SDOH — HEALTH STABILITY: PHYSICAL HEALTH: EXERCISE ACTIVITY: ANKLE PUMPS

## 2025-03-07 SDOH — HEALTH STABILITY: PHYSICAL HEALTH: PHYSICAL EXERCISE: SEATED

## 2025-03-07 SDOH — HEALTH STABILITY: PHYSICAL HEALTH: EXERCISE ACTIVITY: HIP FLEXION

## 2025-03-07 SDOH — HEALTH STABILITY: PHYSICAL HEALTH: EXERCISE TYPE: INSTRUCTED AND DEMONSTRATED SBA SEATED THER EX PROGRAM

## 2025-03-07 SDOH — HEALTH STABILITY: PHYSICAL HEALTH: EXERCISE ACTIVITY: HIP ABD/ADDUCTION

## 2025-03-07 SDOH — HEALTH STABILITY: PHYSICAL HEALTH: EXERCISE ACTIVITY: KNEE EXTENSION

## 2025-03-07 SDOH — HEALTH STABILITY: PHYSICAL HEALTH: EXERCISE ACTIVITY: SIT TO STAND

## 2025-03-07 ASSESSMENT — ENCOUNTER SYMPTOMS
PAIN: 1
PAIN SEVERITY GOAL: 0/10
PAIN LOCATION: BACK
PAIN LOCATION - PAIN SEVERITY: 7/10
SUBJECTIVE PAIN PROGRESSION: UNCHANGED
PAIN LOCATION - PAIN FREQUENCY: CONSTANT
HIGHEST PAIN SEVERITY IN PAST 24 HOURS: 7/10
PERSON REPORTING PAIN: PATIENT
PAIN LOCATION - PAIN QUALITY: ACHING
OCCASIONAL FEELINGS OF UNSTEADINESS: 1
LOWEST PAIN SEVERITY IN PAST 24 HOURS: 4/10

## 2025-03-07 ASSESSMENT — ACTIVITIES OF DAILY LIVING (ADL): AMBULATION ASSISTANCE ON FLAT SURFACES: 1

## 2025-03-07 NOTE — DOCUMENTATION CLARIFICATION NOTE
"    PATIENT:               JOE FITCH  ACCT #:                  [id number]  MRN:                       [id number]  :                       1960  ADMIT DATE:       2025 4:52 PM  DISCH DATE:        3/4/2025 4:57 PM  RESPONDING PROVIDER #:        [id number]          PROVIDER RESPONSE TEXT:    Osteomyelitis    CDI QUERY TEXT:    Clarification    Instruction:    Based on your assessment of the patient and the clinical information, please provide the requested documentation by clicking on the appropriate radio button and enter any additional information if prompted.    Question: Please further clarify after study the diagnosis of CDI TO ENTER    When answering this query, please exercise your independent professional judgment. The fact that a question is being asked, does not imply that any particular answer is desired or expected.    The patient's clinical indicators include:  Clinical Information: H&P Patient admitted with \"Abnormal MRI, lumbar spine\"    Clinical Indicators: \"Discharge summary :\"He recently had an acute worsening of chronic back pain and underwent an MRI that raised concerns of discitis or osteomyelitis.\" \"IR guided aspiration was pursued however IR was unable to aspirate the epidural area and patient was not amenable for surgery while in house.\"     MR lumbar spine:\"Abnormal T2 signal increase and enhancement involving the L3, L4 and S1 vertebrae suspicious for osteomyelitis and likely infection  involving the intervening discs.Epidural abscesses at and below the L5 level as described above.\"    MAR: Zosyn IV, Vancomycin IV, Rocephin IV    Treatment: MR spine, Antibiotics, monitoring    Risk Factors: abscess, back pain  Options provided:  -- Discitis  -- Osteomyelitis  -- Other - I will add my own diagnosis  -- Refer to Clinical Documentation Reviewer    Query created by: Pat Rojas on 3/7/2025 2:12 PM      Electronically signed by:  LEROY RUST MD 3/7/2025 2:22 PM          "

## 2025-03-08 ENCOUNTER — HOME CARE VISIT (OUTPATIENT)
Dept: HOME HEALTH SERVICES | Facility: HOME HEALTH | Age: 65
End: 2025-03-08
Payer: COMMERCIAL

## 2025-03-09 ENCOUNTER — HOME INFUSION (OUTPATIENT)
Dept: INFUSION THERAPY | Age: 65
End: 2025-03-09
Payer: COMMERCIAL

## 2025-03-09 NOTE — PROGRESS NOTES
Message received from on call RN mgr that patient was out of tubing and flushes and needed for am dose tomorrow.    Tel call with wife. She states she has had to waste 3 tubing d/t not being able to clear bubbles from the tubing every time. She states it is only the vanco bags giving her trouble. RN had instructed her at admit to take bag out of fridge 1/2 hr before her visit and pt/wife have been taking bag out of fridge 1/2 before each dose. Realizing that she is not having trouble with ceftriaxone which is room temp and after reading pharmacy label on bag to take med out 2-3 hours before dose they are now going to start taking out of fridge at 7 am (9am dose). She checked inventory of flushes and tubing and has 37 x NS flushes and 7 x tubing. She has iv abx thru 3/13 as per previous note. Has not had to use any extra heparin. She states she does not need a delivery today. RN is scheduled to visit tomorrow am for lab draw. Since vanco dose will be held until after lab draw, wife will have RN watch her spike and hang vanco bag before she leaves to see if RN sees anything that can be done differently. Wife will call pharmacy Mon or Tues if further tubings are wasted so that a delivery can be sent early. Otherwise patient will need delivery on Weds 3/12/25.    Follow up 3/12/25. Check labs and refill further doses straight (d/t need for tubing on Thursday).

## 2025-03-10 ENCOUNTER — TELEPHONE (OUTPATIENT)
Dept: FAMILY MEDICINE CLINIC | Age: 65
End: 2025-03-10

## 2025-03-10 ENCOUNTER — HOME INFUSION (OUTPATIENT)
Dept: INFUSION THERAPY | Age: 65
End: 2025-03-10
Payer: COMMERCIAL

## 2025-03-10 ENCOUNTER — HOME CARE VISIT (OUTPATIENT)
Dept: HOME HEALTH SERVICES | Facility: HOME HEALTH | Age: 65
End: 2025-03-10
Payer: COMMERCIAL

## 2025-03-10 ENCOUNTER — CARE COORDINATION (OUTPATIENT)
Dept: CARE COORDINATION | Age: 65
End: 2025-03-10

## 2025-03-10 VITALS
DIASTOLIC BLOOD PRESSURE: 62 MMHG | RESPIRATION RATE: 18 BRPM | HEART RATE: 74 BPM | OXYGEN SATURATION: 94 % | SYSTOLIC BLOOD PRESSURE: 116 MMHG

## 2025-03-10 PROCEDURE — G0299 HHS/HOSPICE OF RN EA 15 MIN: HCPCS

## 2025-03-10 SDOH — SOCIAL STABILITY: SOCIAL INSECURITY: WITHIN THE LAST YEAR, HAVE YOU BEEN HUMILIATED OR EMOTIONALLY ABUSED IN OTHER WAYS BY YOUR PARTNER OR EX-PARTNER?: NO

## 2025-03-10 SDOH — ECONOMIC STABILITY: FOOD INSECURITY: WITHIN THE PAST 12 MONTHS, YOU WORRIED THAT YOUR FOOD WOULD RUN OUT BEFORE YOU GOT THE MONEY TO BUY MORE.: NEVER TRUE

## 2025-03-10 SDOH — SOCIAL STABILITY: SOCIAL NETWORK: HOW OFTEN DO YOU GET TOGETHER WITH FRIENDS OR RELATIVES?: MORE THAN THREE TIMES A WEEK

## 2025-03-10 SDOH — SOCIAL STABILITY: SOCIAL NETWORK: HOW OFTEN DO YOU ATTEND CHURCH OR RELIGIOUS SERVICES?: NEVER

## 2025-03-10 SDOH — HEALTH STABILITY: MENTAL HEALTH
DO YOU FEEL STRESS - TENSE, RESTLESS, NERVOUS, OR ANXIOUS, OR UNABLE TO SLEEP AT NIGHT BECAUSE YOUR MIND IS TROUBLED ALL THE TIME - THESE DAYS?: ONLY A LITTLE

## 2025-03-10 SDOH — ECONOMIC STABILITY: HOUSING INSECURITY: IN THE LAST 12 MONTHS, WAS THERE A TIME WHEN YOU WERE NOT ABLE TO PAY THE MORTGAGE OR RENT ON TIME?: NO

## 2025-03-10 SDOH — ECONOMIC STABILITY: FOOD INSECURITY: HOW HARD IS IT FOR YOU TO PAY FOR THE VERY BASICS LIKE FOOD, HOUSING, MEDICAL CARE, AND HEATING?: NOT VERY HARD

## 2025-03-10 SDOH — ECONOMIC STABILITY: TRANSPORTATION INSECURITY: IN THE PAST 12 MONTHS, HAS LACK OF TRANSPORTATION KEPT YOU FROM MEDICAL APPOINTMENTS OR FROM GETTING MEDICATIONS?: NO

## 2025-03-10 SDOH — SOCIAL STABILITY: SOCIAL INSECURITY: WITHIN THE LAST YEAR, HAVE YOU BEEN AFRAID OF YOUR PARTNER OR EX-PARTNER?: NO

## 2025-03-10 SDOH — ECONOMIC STABILITY: FOOD INSECURITY: WITHIN THE PAST 12 MONTHS, THE FOOD YOU BOUGHT JUST DIDN'T LAST AND YOU DIDN'T HAVE MONEY TO GET MORE.: NEVER TRUE

## 2025-03-10 SDOH — SOCIAL STABILITY: SOCIAL NETWORK
IN A TYPICAL WEEK, HOW MANY TIMES DO YOU TALK ON THE PHONE WITH FAMILY, FRIENDS, OR NEIGHBORS?: MORE THAN THREE TIMES A WEEK

## 2025-03-10 SDOH — HEALTH STABILITY: PHYSICAL HEALTH: ON AVERAGE, HOW MANY DAYS PER WEEK DO YOU ENGAGE IN MODERATE TO STRENUOUS EXERCISE (LIKE A BRISK WALK)?: 0 DAYS

## 2025-03-10 SDOH — SOCIAL STABILITY: SOCIAL NETWORK: HOW OFTEN DO YOU ATTEND MEETINGS OF THE CLUBS OR ORGANIZATIONS YOU BELONG TO?: NEVER

## 2025-03-10 SDOH — SOCIAL STABILITY: SOCIAL INSECURITY: ARE YOU MARRIED, WIDOWED, DIVORCED, SEPARATED, NEVER MARRIED, OR LIVING WITH A PARTNER?: MARRIED

## 2025-03-10 SDOH — HEALTH STABILITY: PHYSICAL HEALTH: ON AVERAGE, HOW MANY MINUTES DO YOU ENGAGE IN EXERCISE AT THIS LEVEL?: 0 MIN

## 2025-03-10 SDOH — HEALTH STABILITY: MENTAL HEALTH: HOW OFTEN DO YOU HAVE A DRINK CONTAINING ALCOHOL?: 4 OR MORE TIMES A WEEK

## 2025-03-10 SDOH — HEALTH STABILITY: MENTAL HEALTH: HOW MANY DRINKS CONTAINING ALCOHOL DO YOU HAVE ON A TYPICAL DAY WHEN YOU ARE DRINKING?: 3 OR 4

## 2025-03-10 ASSESSMENT — PAIN SCALES - PAIN ASSESSMENT IN ADVANCED DEMENTIA (PAINAD)
CONSOLABILITY: 0
NEGVOCALIZATION: 0 - NONE.
FACIALEXPRESSION: 0
FACIALEXPRESSION: 0 - SMILING OR INEXPRESSIVE.
NEGVOCALIZATION: 0
CONSOLABILITY: 0 - NO NEED TO CONSOLE.
TOTALSCORE: 0
BODYLANGUAGE: 0
BREATHING: 0
BODYLANGUAGE: 0 - RELAXED.

## 2025-03-10 ASSESSMENT — ENCOUNTER SYMPTOMS
PAIN LOCATION: BACK
SUBJECTIVE PAIN PROGRESSION: UNCHANGED
HIGHEST PAIN SEVERITY IN PAST 24 HOURS: 4/10
DIARRHEA: 1
APPETITE LEVEL: FAIR
PAIN: 1
PERSON REPORTING PAIN: PATIENT

## 2025-03-10 ASSESSMENT — ACTIVITIES OF DAILY LIVING (ADL): LACK_OF_TRANSPORTATION: NO

## 2025-03-10 NOTE — PROGRESS NOTES
[facility] rec'd call from patient's wife, still having difficulty with tubing and air bubbles in the line. Reports having 2 sets of tubing remaining and needs delivery tonight of additional tubing sets. RN reteach completed today.    Please send 6 tubing sets to cover thru 3/12, no other supplies needed    Follow up 3/12 - check progress, if problems persist, change to elastomeric device - new teach would be needed

## 2025-03-10 NOTE — TELEPHONE ENCOUNTER
Patient is requesting a Rx for Ambien.  He has previously been on med with previous PCP.  Per patient he was on 10 mg       Please set to [facility] [place] if acceptable.    Patient last OV 3/5/2025 patient next OV 4/7/2025

## 2025-03-10 NOTE — CARE COORDINATION
Ambulatory Care Coordination Note     3/10/2025 2:45 PM     Patient Current Location:  Home: [address] [place] [id number]     This patient was received as a referral from Provider.    ACM contacted the patient by telephone. Verified name and  with patient as identifiers. Provided introduction to self, and explanation of the ACM role.   Patient accepted care management services at this time.          ACM: Juani Estrada RN     Challenges to be reviewed by the provider   Additional needs identified to be addressed with provider Yes  medications-rx for ambien               Method of communication with provider:  tele encounter to pcp .    Utilization: Initial Call - N/A    Care Summary Note: ACM spoke to patient.  Introduced ACC program role of ACM goals and benefits.  Patient was referral from PCP.  Patient agreed to additional engagement.  ACM completed initial assessment S SAE and medication review.  Patient had recent [facility] admission from  through 3/4/2025 for the following  Lumbar discitis, Epidural abscess, acute on chronic back pain  Hypokalemia hyper natremia likely secondary to alcohol dependence  Alcohol use disorder  CAD, A-flutter, status post aortic valve repair  Patient is home doing IV antibiotics with Vanco and Rocephin daily for an additional 7 weeks.  Patient has  home health care several days a week.  Patient has follow-up with  wound center on 2025.  Patient engages with [facility] cardiology  Patient will be new consult to pain management  Offered patient enrollment in the Remote Patient Monitoring (RPM) program for in-home monitoring: Deferred at this time because  ; will discuss at next outreach.     Assessments Completed:       3/10/2025     1:33 PM   Amb Fall Risk Assessment and TUG Test   Do you feel unsteady or are you worried about falling?  no   2 or more falls in past year? no   Fall with injury in past year? no    ,   Ambulatory Care Coordination Assessment    Care

## 2025-03-11 ENCOUNTER — HOME CARE VISIT (OUTPATIENT)
Dept: HOME HEALTH SERVICES | Facility: HOME HEALTH | Age: 65
End: 2025-03-11
Payer: COMMERCIAL

## 2025-03-11 VITALS
RESPIRATION RATE: 18 BRPM | BODY MASS INDEX: 22.04 KG/M2 | OXYGEN SATURATION: 94 % | HEART RATE: 74 BPM | SYSTOLIC BLOOD PRESSURE: 120 MMHG | HEIGHT: 66 IN | DIASTOLIC BLOOD PRESSURE: 58 MMHG | TEMPERATURE: 97.3 F | WEIGHT: 137.13 LBS

## 2025-03-11 LAB
ANION GAP SERPL CALCULATED.4IONS-SCNC: 17 MMOL/L (CALC) (ref 7–17)
BUN SERPL-MCNC: 18 MG/DL (ref 7–25)
BUN/CREAT SERPL: ABNORMAL (CALC) (ref 6–22)
CALCIUM SERPL-MCNC: 9.1 MG/DL (ref 8.6–10.3)
CHLORIDE SERPL-SCNC: 90 MMOL/L (ref 98–110)
CO2 SERPL-SCNC: 22 MMOL/L (ref 20–32)
CREAT SERPL-MCNC: 0.88 MG/DL (ref 0.7–1.35)
CRP SERPL-MCNC: 29 MG/L
EGFRCR SERPLBLD CKD-EPI 2021: 97 ML/MIN/1.73M2
ERYTHROCYTE [DISTWIDTH] IN BLOOD BY AUTOMATED COUNT: 13 % (ref 11–15)
GLUCOSE SERPL-MCNC: 83 MG/DL (ref 65–139)
HCT VFR BLD AUTO: 36.8 % (ref 38.5–50)
HGB BLD-MCNC: 12.2 G/DL (ref 13.2–17.1)
MCH RBC QN AUTO: 30.7 PG (ref 27–33)
MCHC RBC AUTO-ENTMCNC: 33.2 G/DL (ref 32–36)
MCV RBC AUTO: 92.5 FL (ref 80–100)
PLATELET # BLD AUTO: 764 THOUSAND/UL (ref 140–400)
PMV BLD REES-ECKER: 9.7 FL (ref 7.5–12.5)
POTASSIUM SERPL-SCNC: 4.9 MMOL/L (ref 3.5–5.3)
RBC # BLD AUTO: 3.98 MILLION/UL (ref 4.2–5.8)
SODIUM SERPL-SCNC: 129 MMOL/L (ref 135–146)
WBC # BLD AUTO: 11 THOUSAND/UL (ref 3.8–10.8)

## 2025-03-11 PROCEDURE — G0299 HHS/HOSPICE OF RN EA 15 MIN: HCPCS

## 2025-03-11 RX ORDER — TRAZODONE HYDROCHLORIDE 50 MG/1
50 TABLET ORAL NIGHTLY
Qty: 90 TABLET | Refills: 1 | Status: SHIPPED | OUTPATIENT
Start: 2025-03-11

## 2025-03-11 RX ADMIN — SODIUM CHLORIDE 30 ML: 9 INJECTION, SOLUTION INTRAVENOUS at 09:40

## 2025-03-11 ASSESSMENT — ACTIVITIES OF DAILY LIVING (ADL): AMBULATION ASSISTANCE: STAND BY ASSIST

## 2025-03-11 NOTE — TELEPHONE ENCOUNTER
I spoke with patient and advised him that you cannot prescribe Ambien due to he is already on a controlled substance.  Patient verbalized understanding and stated he would except anything you could provide to help him.

## 2025-03-12 ENCOUNTER — HOME CARE VISIT (OUTPATIENT)
Dept: HOME HEALTH SERVICES | Facility: HOME HEALTH | Age: 65
End: 2025-03-12
Payer: COMMERCIAL

## 2025-03-12 ENCOUNTER — HOME INFUSION (OUTPATIENT)
Dept: INFUSION THERAPY | Age: 65
End: 2025-03-12
Payer: COMMERCIAL

## 2025-03-12 ENCOUNTER — OFFICE VISIT (OUTPATIENT)
Dept: CARDIOLOGY CLINIC | Age: 65
End: 2025-03-12
Payer: COMMERCIAL

## 2025-03-12 VITALS
OXYGEN SATURATION: 96 % | RESPIRATION RATE: 16 BRPM | HEART RATE: 60 BPM | BODY MASS INDEX: 23.98 KG/M2 | SYSTOLIC BLOOD PRESSURE: 110 MMHG | DIASTOLIC BLOOD PRESSURE: 70 MMHG | WEIGHT: 148.6 LBS

## 2025-03-12 DIAGNOSIS — Z95.1 STATUS POST CORONARY ARTERY BYPASS GRAFT: ICD-10-CM

## 2025-03-12 DIAGNOSIS — I25.119 CORONARY ARTERY DISEASE WITH ANGINA PECTORIS, UNSPECIFIED VESSEL OR LESION TYPE, UNSPECIFIED WHETHER NATIVE OR TRANSPLANTED HEART: Primary | ICD-10-CM

## 2025-03-12 DIAGNOSIS — Z95.2 HISTORY OF AORTIC VALVE REPLACEMENT: Chronic | ICD-10-CM

## 2025-03-12 LAB — VANCOMYCIN TROUGH SERPL-MCNC: 13.3 MG/L (ref 10–20)

## 2025-03-12 PROCEDURE — G8420 CALC BMI NORM PARAMETERS: HCPCS

## 2025-03-12 PROCEDURE — 1036F TOBACCO NON-USER: CPT

## 2025-03-12 PROCEDURE — 3017F COLORECTAL CA SCREEN DOC REV: CPT

## 2025-03-12 PROCEDURE — G8427 DOCREV CUR MEDS BY ELIG CLIN: HCPCS

## 2025-03-12 PROCEDURE — 99213 OFFICE O/P EST LOW 20 MIN: CPT

## 2025-03-12 PROCEDURE — 3078F DIAST BP <80 MM HG: CPT

## 2025-03-12 PROCEDURE — 3074F SYST BP LT 130 MM HG: CPT

## 2025-03-12 RX ORDER — FUROSEMIDE 40 MG/1
80 TABLET ORAL DAILY
Qty: 360 TABLET | Refills: 0 | Status: SHIPPED | OUTPATIENT
Start: 2025-03-12

## 2025-03-12 RX ORDER — SPIRONOLACTONE 25 MG/1
25 TABLET ORAL DAILY
Qty: 30 TABLET | Refills: 3 | Status: SHIPPED | OUTPATIENT
Start: 2025-03-12

## 2025-03-12 ASSESSMENT — ENCOUNTER SYMPTOMS
PAIN: 1
PERSON REPORTING PAIN: PATIENT
DIARRHEA: 1
HIGHEST PAIN SEVERITY IN PAST 24 HOURS: 5/10
SUBJECTIVE PAIN PROGRESSION: UNCHANGED
APPETITE LEVEL: FAIR
CHANGE IN APPETITE: UNCHANGED
PAIN LOCATION: BACK

## 2025-03-12 ASSESSMENT — PAIN SCALES - PAIN ASSESSMENT IN ADVANCED DEMENTIA (PAINAD)
BODYLANGUAGE: 0 - RELAXED.
BREATHING: 0
NEGVOCALIZATION: 0 - NONE.
FACIALEXPRESSION: 0
NEGVOCALIZATION: 0
BODYLANGUAGE: 0
FACIALEXPRESSION: 0 - SMILING OR INEXPRESSIVE.
CONSOLABILITY: 0
TOTALSCORE: 0
CONSOLABILITY: 0 - NO NEED TO CONSOLE.

## 2025-03-12 ASSESSMENT — ACTIVITIES OF DAILY LIVING (ADL): AMBULATION ASSISTANCE: STAND BY ASSIST

## 2025-03-12 NOTE — PROGRESS NOTES
Kit Duncan is receiving vancomycin 1.25gm q24h and ceftriaxone 2g q24h  for back pain with no evident source of infection through 4/23/25. Followed by Dr. Soliz  Labs ordered include weekly CRP, CBC, BMP, vanco trough    Labs from 3/10 reviewed, no vanco trough available, sodium low at 129, CRP 2.90   Line TWO LUMEN PICC line    RX contacted spouse and patient, infusions going well. Still having trouble with air bubbles in line. Used their last tubing today. Has ceftriaxone dose for tomorrow 3/13 and three additional vanco doses due to holding dose for the entire day on lab draw days instead of just holding until after lab draw. Vanco trough drawn 3/11 by RN but no results available in Epic or Split. Agreeable to switching to elastomeric home pumps for both meds. Message to Alycia DEGROOT, she can see patient Friday morning to do a teach, then will return Monday morning to draw labs. Delivery can be any time today,  to call on the way.    [facility] offered to  - pt stated no questions at this time.    Dispensing 3/12 with supplies and flushes to match for straight delivery:  6x vancomycin 1.25gm hp  6x ceftriaxone 2g hp  DOS 3/14-3/19  Please include 4 sets of gravity tubing for use 3/13    Follow up 3/18 check labs, progress, OVN

## 2025-03-12 NOTE — PROGRESS NOTES
Chief Complaint   Patient presents with    Follow-Up from Hospital    Atrial Fibrillation    Coronary Artery Disease    Shortness of Breath     continued    Edema     BLE    Discuss Medications     LASIX       Patient presents for initial medical evaluation. Patient is followed on a regular basis by Chelo Arechiga MD. HX OF CAD S/P PCI.HX OF AVR at [place] 10 yrs ago. Patient with hx of cancer at age 19 and had radiation to chest. Patient with hx of Aflutter since 2019. Not on full dose DOAC. Patient with hx of hemorrhoids.   Last echo in 2017 with EF of 60%, AV with mean gradient of 16mmhg.   Pt denies chest pain, dyspnea, dyspnea on exertion, change in exercise capacity, fatigue,  nausea, vomiting, diarrhea, constipation, motor weakness, insomnia, weight loss, syncope, dizziness, lightheadedness, palpitations, PND, orthopnea, or claudication. He is active without any angina.     1-13-22: doing well. No issues. Noted to venkatesh in afib last OV, placed on DOAC.    HX OF CAD S/P PCI.HX OF AVR at [place] 10 yrs ago. Patient with hx of cancer at age 19 and had radiation to chest. Patient with hx of Aflutter since 2019. Not on full dose DOAC. Patient with hx of hemorrhoids.  Echo with EF of 55%, normal bioprosthetic AV in place.   Pt denies chest pain, dyspnea, dyspnea on exertion, change in exercise capacity, fatigue,  nausea, vomiting, diarrhea, constipation, motor weakness, insomnia, weight loss, syncope, dizziness, lightheadedness, palpitations, PND, orthopnea, or claudication.  EKG with NSR< no ischemia.   EKG with afib/flutter.     1/20/2022:HX OF CAD S/P PCI.HX OF AVR at [facility] 10 yrs ago. Patient with hx of cancer at age 19 and had radiation to chest. Patient with hx of Aflutter since 2019. Not on full dose DOAC. Patient with hx of hemorrhoids.  Echo with EF of 55%, normal bioprosthetic AV in place.     Patient seen in office today status post cardioversion and sotalol drug loading 1 week ago.

## 2025-03-13 ENCOUNTER — HOME CARE VISIT (OUTPATIENT)
Dept: HOME HEALTH SERVICES | Facility: HOME HEALTH | Age: 65
End: 2025-03-13
Payer: COMMERCIAL

## 2025-03-13 VITALS
SYSTOLIC BLOOD PRESSURE: 122 MMHG | RESPIRATION RATE: 18 BRPM | HEART RATE: 64 BPM | DIASTOLIC BLOOD PRESSURE: 68 MMHG | OXYGEN SATURATION: 95 % | TEMPERATURE: 96.4 F

## 2025-03-13 PROCEDURE — G0299 HHS/HOSPICE OF RN EA 15 MIN: HCPCS

## 2025-03-13 RX ADMIN — SODIUM CHLORIDE 10 ML: 9 INJECTION, SOLUTION INTRAVENOUS at 09:55

## 2025-03-13 RX ADMIN — SODIUM CHLORIDE 10 ML: 9 INJECTION, SOLUTION INTRAVENOUS at 10:03

## 2025-03-13 ASSESSMENT — ENCOUNTER SYMPTOMS
PAIN LOCATION - PAIN DURATION: CONSTANT
PAIN LOCATION: COCCYX
PAIN LOCATION - RELIEVING FACTORS: REST/RX
PAIN LOCATION - PAIN QUALITY: ACHE
DEPRESSION: 1
PAIN LOCATION - EXACERBATING FACTORS: ACTIVITY
HIGHEST PAIN SEVERITY IN PAST 24 HOURS: 8/10
OCCASIONAL FEELINGS OF UNSTEADINESS: 1
PAIN: 1
LOWER EXTREMITY EDEMA: 1
APPETITE LEVEL: FAIR
PAIN SEVERITY GOAL: 0/10
HYPERTENSION: 1
BOWEL PATTERN NORMAL: 1
ARTHRALGIAS: 1
PERSON REPORTING PAIN: PATIENT
STOOL FREQUENCY: DAILY
LOSS OF SENSATION IN FEET: 0
LOWEST PAIN SEVERITY IN PAST 24 HOURS: 3/10
LAST BOWEL MOVEMENT: 67277
PAIN LOCATION - PAIN SEVERITY: 8/10
CHANGE IN APPETITE: DECREASED
MUSCLE WEAKNESS: 1
PAIN LOCATION - PAIN FREQUENCY: CONSTANT

## 2025-03-13 ASSESSMENT — PAIN SCALES - PAIN ASSESSMENT IN ADVANCED DEMENTIA (PAINAD)
BODYLANGUAGE: 0
BODYLANGUAGE: 0 - RELAXED.
NEGVOCALIZATION: 0
FACIALEXPRESSION: 0 - SMILING OR INEXPRESSIVE.
TOTALSCORE: 0
CONSOLABILITY: 0
BREATHING: 0
CONSOLABILITY: 0 - NO NEED TO CONSOLE.
FACIALEXPRESSION: 0
NEGVOCALIZATION: 0 - NONE.

## 2025-03-14 ENCOUNTER — HOME CARE VISIT (OUTPATIENT)
Dept: HOME HEALTH SERVICES | Facility: HOME HEALTH | Age: 65
End: 2025-03-14
Payer: COMMERCIAL

## 2025-03-14 DIAGNOSIS — M46.26 OSTEOMYELITIS OF LUMBAR SPINE (MULTI): ICD-10-CM

## 2025-03-14 DIAGNOSIS — G06.2 EPIDURAL ABSCESS (HHS-HCC): ICD-10-CM

## 2025-03-14 PROCEDURE — G0157 HHC PT ASSISTANT EA 15: HCPCS | Mod: CQ

## 2025-03-15 ASSESSMENT — PAIN DESCRIPTION - PAIN TYPE: TYPE: ACUTE PAIN

## 2025-03-17 ENCOUNTER — HOME CARE VISIT (OUTPATIENT)
Dept: HOME HEALTH SERVICES | Facility: HOME HEALTH | Age: 65
End: 2025-03-17
Payer: COMMERCIAL

## 2025-03-17 ENCOUNTER — LAB REQUISITION (OUTPATIENT)
Dept: LAB | Facility: HOSPITAL | Age: 65
End: 2025-03-17
Payer: COMMERCIAL

## 2025-03-17 ENCOUNTER — INITIAL CONSULT (OUTPATIENT)
Age: 65
End: 2025-03-17
Payer: COMMERCIAL

## 2025-03-17 ENCOUNTER — CARE COORDINATION (OUTPATIENT)
Dept: CARE COORDINATION | Age: 65
End: 2025-03-17

## 2025-03-17 ENCOUNTER — HOME INFUSION (OUTPATIENT)
Dept: INFUSION THERAPY | Age: 65
End: 2025-03-17
Payer: COMMERCIAL

## 2025-03-17 VITALS
HEART RATE: 63 BPM | RESPIRATION RATE: 18 BRPM | OXYGEN SATURATION: 95 % | DIASTOLIC BLOOD PRESSURE: 64 MMHG | TEMPERATURE: 96.5 F | SYSTOLIC BLOOD PRESSURE: 120 MMHG

## 2025-03-17 VITALS — BODY MASS INDEX: 23.78 KG/M2 | TEMPERATURE: 96.8 F | HEIGHT: 66 IN | WEIGHT: 148 LBS

## 2025-03-17 DIAGNOSIS — G06.2 EXTRADURAL AND SUBDURAL ABSCESS, UNSPECIFIED: ICD-10-CM

## 2025-03-17 DIAGNOSIS — M48.062 SPINAL STENOSIS OF LUMBAR REGION WITH NEUROGENIC CLAUDICATION: Primary | ICD-10-CM

## 2025-03-17 DIAGNOSIS — M46.26 OSTEOMYELITIS OF VERTEBRA, LUMBAR REGION (MULTI): ICD-10-CM

## 2025-03-17 LAB — VANCOMYCIN TROUGH SERPL-MCNC: 18.2 UG/ML (ref 5–20)

## 2025-03-17 PROCEDURE — 80202 ASSAY OF VANCOMYCIN: CPT

## 2025-03-17 PROCEDURE — 99204 OFFICE O/P NEW MOD 45 MIN: CPT | Performed by: STUDENT IN AN ORGANIZED HEALTH CARE EDUCATION/TRAINING PROGRAM

## 2025-03-17 PROCEDURE — G8427 DOCREV CUR MEDS BY ELIG CLIN: HCPCS | Performed by: STUDENT IN AN ORGANIZED HEALTH CARE EDUCATION/TRAINING PROGRAM

## 2025-03-17 PROCEDURE — G0299 HHS/HOSPICE OF RN EA 15 MIN: HCPCS

## 2025-03-17 PROCEDURE — 1036F TOBACCO NON-USER: CPT | Performed by: STUDENT IN AN ORGANIZED HEALTH CARE EDUCATION/TRAINING PROGRAM

## 2025-03-17 PROCEDURE — 3017F COLORECTAL CA SCREEN DOC REV: CPT | Performed by: STUDENT IN AN ORGANIZED HEALTH CARE EDUCATION/TRAINING PROGRAM

## 2025-03-17 PROCEDURE — G8420 CALC BMI NORM PARAMETERS: HCPCS | Performed by: STUDENT IN AN ORGANIZED HEALTH CARE EDUCATION/TRAINING PROGRAM

## 2025-03-17 RX ORDER — BACLOFEN 10 MG/1
10 TABLET ORAL 3 TIMES DAILY
Qty: 90 TABLET | Refills: 1 | Status: SHIPPED | OUTPATIENT
Start: 2025-03-17 | End: 2025-05-16

## 2025-03-17 RX ORDER — GABAPENTIN 300 MG/1
300 CAPSULE ORAL 3 TIMES DAILY
Qty: 90 CAPSULE | Refills: 1 | Status: SHIPPED | OUTPATIENT
Start: 2025-03-17 | End: 2025-05-16

## 2025-03-17 RX ADMIN — SODIUM CHLORIDE 10 ML: 9 INJECTION, SOLUTION INTRAVENOUS at 10:05

## 2025-03-17 RX ADMIN — SODIUM CHLORIDE 10 ML: 9 INJECTION, SOLUTION INTRAVENOUS at 10:02

## 2025-03-17 RX ADMIN — SODIUM CHLORIDE 10 ML: 9 INJECTION, SOLUTION INTRAVENOUS at 09:59

## 2025-03-17 ASSESSMENT — ENCOUNTER SYMPTOMS
PAIN LOCATION - RELIEVING FACTORS: REST/RX
LOWEST PAIN SEVERITY IN PAST 24 HOURS: 6/10
PAIN SEVERITY GOAL: 0/10
PAIN LOCATION - PAIN QUALITY: ACHE
LOSS OF SENSATION IN FEET: 1
PAIN LOCATION - PAIN DURATION: CONSTANT
LOWER EXTREMITY EDEMA: 1
DIARRHEA: 1
HIGHEST PAIN SEVERITY IN PAST 24 HOURS: 8/10
DEPRESSION: 0
PAIN LOCATION - PAIN SEVERITY: 8/10
MUSCLE WEAKNESS: 1
STOOL FREQUENCY: DAILY
ARTHRALGIAS: 1
PAIN LOCATION - PAIN FREQUENCY: CONSTANT
CHANGE IN APPETITE: DECREASED
BACK PAIN: 1
OCCASIONAL FEELINGS OF UNSTEADINESS: 1
PAIN: 1
APPETITE LEVEL: POOR
HYPERTENSION: 1
LAST BOWEL MOVEMENT: 67281
PERSON REPORTING PAIN: PATIENT
PAIN LOCATION - EXACERBATING FACTORS: ACTIVITY
PAIN LOCATION: BACK

## 2025-03-17 ASSESSMENT — PAIN SCALES - PAIN ASSESSMENT IN ADVANCED DEMENTIA (PAINAD)
BODYLANGUAGE: 1 - TENSE. DISTRESSED PACING. FIDGETING.
FACIALEXPRESSION: 2 - FACIAL GRIMACING.
CONSOLABILITY: 1 - DISTRACTED OR REASSURED BY VOICE OR TOUCH.
CONSOLABILITY: 1
NEGVOCALIZATION: 1 - OCCASIONAL MOAN OR GROAN. LOW-LEVEL SPEECH WITH A NEGATIVE OR DISAPPROVING QUALITY.
BODYLANGUAGE: 1
NEGVOCALIZATION: 1
TOTALSCORE: 5
FACIALEXPRESSION: 2
BREATHING: 0

## 2025-03-17 NOTE — PROGRESS NOTES
HPI  Onset: February 15, 2025  Location: Back, Right Leg  Quality: aching, cramping, and sharp  Patient states that his pain is at a 7 at rest and a 10 with activity on the pain scale.   The pain is present: Intermittently and consistent  The pain is exacerbated by: Walking, Lying Down, Standing, and sitting with legs elevated  and alleviated by: Heat, Ice, Rest, and Medication.  The patient states the pain interferes with his  ADL's : Yes Transferring , Ambulating , and Sleeping  Recent falls: no  Bladder bowel dysfunction:yes - loose stools - believed to be from current antibiotics  He is taking the following medications for pain:   APAP  and NSAIDS: ibuprofen  Prior treatments tried for chief complaint listed above: n/a  Surgery: no  Physical Therapy: yes: currently.  Chiropractor: no  Acupuncture: yes  Massage Therapy: no   Behavior/Wellness/Psychological support: yes  Expectations of Treatment at the Pain Center: The patient presents today for evaluation with the expectation that they will be able to perform their ADL's without excruciating pain.   Patient denies the following symptoms: Ataxia, saddle anesthesia, nausea, fever, vomiting, or recent antibiotics.       
coordination of care.    I have reviewed the patient's medical history in detail and updated the computerized patient record.  Objective     Vitals:    03/17/25 1505   Temp: 96.8 °F (36 °C)   Weight: 67.1 kg (148 lb)   Height: 1.676 m (5' 6\")      Body mass index is 23.89 kg/m².    Lab Results   Component Value Date    INR 1.2 02/19/2024    INR 0.9 01/05/2017    INR 1.0 11/13/2015    PROTIME 15.4 (H) 02/19/2024    PROTIME 10.1 01/05/2017    PROTIME 9.7 11/13/2015     Lab Results   Component Value Date    APTT 26.5 01/05/2017     Lab Results   Component Value Date    CREATININE 0.88 03/10/2025     Hemoglobin A1C   Date Value Ref Range Status   11/19/2024 7.4 (H) 4.0 - 6.0 % Final         Thank you for this most interesting referral. Please do not hesitate to contact Dr. Hay Babcock DO at  with any questions or concerns.    Sincerely,    Hay Babcock DO

## 2025-03-17 NOTE — CARE COORDINATION
Ambulatory Care Coordination Note     3/17/276238:09     Patient Current Location:  Home: [address] [place] [id number]     ACM contacted the patient by telephone. Verified name and  with patient as identifiers.         ACM: Juani Estrada RN     Challenges to be reviewed by the provider   Additional needs identified to be addressed with provider No  none               Method of communication with provider: none.    Utilization: Patient has not had any utilization since our last call.     Care Summary Note: Patient reports he received an infusion device pump for his daily antibiotic.  Patient reports this is much better no more air bubbles.  Patient states he has obtained his trazodone 50 mg.  He reports it is working a little bit.  But continues to have a lot of pain which is keeping him up.  Patient has follow-up with pain management today.  Discussed ongoing pain needs.  Patient has skilled nurse and physical therapy this week.  Patient had follow-up with cardiology.  Recommended patient be seen by GI for cirrhosis.  Patient has an appointment later in the month.  Overall patient is doing well.    Offered patient enrollment in the Remote Patient Monitoring (RPM) program for in-home monitoring: Deferred at this time because  ; will discuss at next outreach.     Assessments Completed:   Congestive Heart Failure Assessment    Are you currently restricting fluids?: No Restriction  Do you understand a low sodium diet?: Yes  Do you understand how to read food labels?: Yes  How many restaurant meals do you eat per week?: 1-2  Do you salt your food before tasting it?: No     No patient-reported symptoms      Symptoms:  CHF associated angina: Neg, CHF associated dyspnea on exertion: Pos, CHF associated fatigue: Neg, CHF associated leg swelling: Neg, CHF associated orthostatic hypotension: Neg, CHF associated PND: Neg, CHF associated shortness of breath: Neg, CHF associated weakness: Neg      Symptom course:

## 2025-03-17 NOTE — PROGRESS NOTES
Reviewed chart and Kit Duncan is receiving vancomycin 1.25gm q24h and ceftriaxone 2g q24h  for back pain with no evident source of infection through 4/23/25. Followed by Dr. Soliz  Labs ordered include weekly CRP, CBC, BMP, vanco trough     Labs from 3/11 and 03/17/25 reviewed, vanco trough has improved from 13.3 to 18.2 with change to elastomerics. RN visits will be on Mondays.  Line TWO LUMEN PICC line     RX contacted spouse and patient, infusions going well. Only has doses theough 03/18 in home. RN started patient on elastomerics on 03/13/25 (1 day early) so billed doses are only through 03/18/25. Will send one replacement dose of each medication at no charge to patient. Send all supplies and ND flushes and go light on heparin flushes. Delivery can be anytime Tuesday.     Dispensing 3/18 with supplies and flushes to match for straight delivery:  8x vancomycin 1.25gm hp  8x ceftriaxone 2g hp  DOS 3/20-3/27    1x vancomycin 1.25gm hp. Replacement dose at no charge  1x ceftriaxone 2g hp. Replacement dose at no charge  DOS 3/19/25     Follow up 3/26 check labs, progress, OVN

## 2025-03-18 ENCOUNTER — PATIENT MESSAGE (OUTPATIENT)
Age: 65
End: 2025-03-18

## 2025-03-18 ENCOUNTER — HOME CARE VISIT (OUTPATIENT)
Dept: HOME HEALTH SERVICES | Facility: HOME HEALTH | Age: 65
End: 2025-03-18
Payer: COMMERCIAL

## 2025-03-18 LAB
ANION GAP SERPL CALCULATED.4IONS-SCNC: 11 MMOL/L (CALC) (ref 7–17)
BUN SERPL-MCNC: 14 MG/DL (ref 7–25)
BUN/CREAT SERPL: ABNORMAL (CALC) (ref 6–22)
CALCIUM SERPL-MCNC: 8.6 MG/DL (ref 8.6–10.3)
CHLORIDE SERPL-SCNC: 93 MMOL/L (ref 98–110)
CO2 SERPL-SCNC: 23 MMOL/L (ref 20–32)
CREAT SERPL-MCNC: 0.88 MG/DL (ref 0.7–1.35)
CRP SERPL-MCNC: 5.7 MG/L
EGFRCR SERPLBLD CKD-EPI 2021: 97 ML/MIN/1.73M2
ERYTHROCYTE [DISTWIDTH] IN BLOOD BY AUTOMATED COUNT: 12.8 % (ref 11–15)
GLUCOSE SERPL-MCNC: 142 MG/DL (ref 65–99)
HCT VFR BLD AUTO: 35.9 % (ref 38.5–50)
HGB BLD-MCNC: 12.2 G/DL (ref 13.2–17.1)
MCH RBC QN AUTO: 30.7 PG (ref 27–33)
MCHC RBC AUTO-ENTMCNC: 34 G/DL (ref 32–36)
MCV RBC AUTO: 90.2 FL (ref 80–100)
PLATELET # BLD AUTO: 442 THOUSAND/UL (ref 140–400)
PMV BLD REES-ECKER: 9.8 FL (ref 7.5–12.5)
POTASSIUM SERPL-SCNC: 4.3 MMOL/L (ref 3.5–5.3)
RBC # BLD AUTO: 3.98 MILLION/UL (ref 4.2–5.8)
SODIUM SERPL-SCNC: 127 MMOL/L (ref 135–146)
WBC # BLD AUTO: 10.9 THOUSAND/UL (ref 3.8–10.8)

## 2025-03-19 ENCOUNTER — HOME CARE VISIT (OUTPATIENT)
Dept: HOME HEALTH SERVICES | Facility: HOME HEALTH | Age: 65
End: 2025-03-19
Payer: COMMERCIAL

## 2025-03-19 PROCEDURE — G0157 HHC PT ASSISTANT EA 15: HCPCS | Mod: CQ

## 2025-03-19 ASSESSMENT — PAIN DESCRIPTION - PAIN TYPE: TYPE: ACUTE PAIN

## 2025-03-20 ENCOUNTER — TELEPHONE (OUTPATIENT)
Age: 65
End: 2025-03-20

## 2025-03-20 ENCOUNTER — HOME CARE VISIT (OUTPATIENT)
Dept: HOME HEALTH SERVICES | Facility: HOME HEALTH | Age: 65
End: 2025-03-20
Payer: COMMERCIAL

## 2025-03-20 ENCOUNTER — CARE COORDINATION (OUTPATIENT)
Dept: CARE COORDINATION | Age: 65
End: 2025-03-20

## 2025-03-20 ENCOUNTER — TELEPHONE (OUTPATIENT)
Facility: CLINIC | Age: 65
End: 2025-03-20
Payer: COMMERCIAL

## 2025-03-20 ENCOUNTER — TELEPHONE (OUTPATIENT)
Dept: HEMATOLOGY/ONCOLOGY | Facility: CLINIC | Age: 65
End: 2025-03-20
Payer: COMMERCIAL

## 2025-03-20 SDOH — ECONOMIC STABILITY: HOUSING INSECURITY: EVIDENCE OF SMOKING MATERIAL: 0

## 2025-03-20 SDOH — HEALTH STABILITY: MENTAL HEALTH: SMOKING IN HOME: 0

## 2025-03-20 ASSESSMENT — ENCOUNTER SYMPTOMS
PAIN: 1
PAIN LOCATION - PAIN QUALITY: ACHING, SHARP
PAIN LOCATION - PAIN FREQUENCY: CONSTANT
PERSON REPORTING PAIN: PATIENT
PAIN LOCATION: GENERALIZED
PAIN LOCATION - PAIN SEVERITY: 10/10

## 2025-03-20 NOTE — CARE COORDINATION
Wife called ACM she is trying to contact pain  management and cardio To advise of patients current condition. Following 3/17 appointment with pain management. Patient was started on gabapentin 3 times daily and baclofen 3 times daily. Per wife starting the second day he continues to have 8 out of 10 pain he is hallucinating at times delusional thinking and talking at times has fallen several times. hands are shaking dropping glasses.  She reported  slurred speech at times.    Patient's wife states that patient has not taken his Eliquis or aspirin in greater then a month.  Per wife provider was aware that patient had several nosebleeds but did encourage him to continue on his medication.  Patient has chosen not to take the medication.    Patient continues to take NSAIDs but as stated above is no longer taking blood thinners.    Per wife patient is still drinking approximately 6 ounces of alcohol daily and also smoking marijuana up to 2 times daily.      ACM advised wife to take patient to the nearest hospital for medical evaluation.  ACM reviewed with wife the importance of medical evaluation if patient is having strokelike symptoms.  ACM reviewed strokelike symptoms.  Wife states they will consider seeking medical evaluation.

## 2025-03-20 NOTE — TELEPHONE ENCOUNTER
Pt's wife called stating that pt is still having to take Ibuprofen on top of his Gabapentin and Baclofen. The pain plain does not seem to be working. Pt is not on any blood thinners besides the Ibuprofen. Pt took himself off the blood thinners. Pt is having bad brain functions- he is hallucinating, his thought process is very dysfunctional, he is having tremors and spasms. His wife stated she feels he is a fall risk since his legs have given out on him. Please Advise.

## 2025-03-20 NOTE — TELEPHONE ENCOUNTER
Received message regarding patient with increased pain and concerned. I called patient back, but got his VM. I left a VM to return my call at [phone].

## 2025-03-20 NOTE — TELEPHONE ENCOUNTER
I spoke with patient and his wife Katarzyna regarding the increased pain. They also have spoken to his pain management provider from [facility]. I told them that its best to return to the ER to be evaluated regarding this increased pain. The pain management provider advised them of this as well. Pt and spouse are agreeable to returning to the ER for further evaluation.

## 2025-03-21 ENCOUNTER — OFFICE VISIT (OUTPATIENT)
Dept: FAMILY MEDICINE CLINIC | Age: 65
End: 2025-03-21
Payer: COMMERCIAL

## 2025-03-21 VITALS
BODY MASS INDEX: 25.39 KG/M2 | HEIGHT: 66 IN | OXYGEN SATURATION: 91 % | DIASTOLIC BLOOD PRESSURE: 58 MMHG | TEMPERATURE: 98.2 F | WEIGHT: 158 LBS | SYSTOLIC BLOOD PRESSURE: 102 MMHG | HEART RATE: 53 BPM

## 2025-03-21 DIAGNOSIS — G06.2 EPIDURAL ABSCESS (HHS-HCC): ICD-10-CM

## 2025-03-21 DIAGNOSIS — M46.26 OSTEOMYELITIS OF LUMBAR SPINE (MULTI): ICD-10-CM

## 2025-03-21 DIAGNOSIS — R41.82 ALTERED MENTAL STATUS, UNSPECIFIED ALTERED MENTAL STATUS TYPE: Primary | ICD-10-CM

## 2025-03-21 DIAGNOSIS — F10.10 ALCOHOL ABUSE: ICD-10-CM

## 2025-03-21 PROCEDURE — 3074F SYST BP LT 130 MM HG: CPT | Performed by: NURSE PRACTITIONER

## 2025-03-21 PROCEDURE — G8427 DOCREV CUR MEDS BY ELIG CLIN: HCPCS | Performed by: NURSE PRACTITIONER

## 2025-03-21 PROCEDURE — G8419 CALC BMI OUT NRM PARAM NOF/U: HCPCS | Performed by: NURSE PRACTITIONER

## 2025-03-21 PROCEDURE — 1036F TOBACCO NON-USER: CPT | Performed by: NURSE PRACTITIONER

## 2025-03-21 PROCEDURE — 3078F DIAST BP <80 MM HG: CPT | Performed by: NURSE PRACTITIONER

## 2025-03-21 PROCEDURE — 3017F COLORECTAL CA SCREEN DOC REV: CPT | Performed by: NURSE PRACTITIONER

## 2025-03-21 PROCEDURE — 99214 OFFICE O/P EST MOD 30 MIN: CPT | Performed by: NURSE PRACTITIONER

## 2025-03-21 ASSESSMENT — LIFESTYLE VARIABLES
HOW OFTEN DURING THE LAST YEAR HAVE YOU FOUND THAT YOU WERE NOT ABLE TO STOP DRINKING ONCE YOU HAD STARTED: NEVER
HAS A RELATIVE, FRIEND, DOCTOR, OR ANOTHER HEALTH PROFESSIONAL EXPRESSED CONCERN ABOUT YOUR DRINKING OR SUGGESTED YOU CUT DOWN: YES, DURING THE PAST YEAR
HOW OFTEN DO YOU HAVE A DRINK CONTAINING ALCOHOL: 4 OR MORE TIMES A WEEK
HOW OFTEN DURING THE LAST YEAR HAVE YOU NEEDED AN ALCOHOLIC DRINK FIRST THING IN THE MORNING TO GET YOURSELF GOING AFTER A NIGHT OF HEAVY DRINKING: NEVER
HOW OFTEN DURING THE LAST YEAR HAVE YOU FAILED TO DO WHAT WAS NORMALLY EXPECTED FROM YOU BECAUSE OF DRINKING: NEVER
HAVE YOU OR SOMEONE ELSE BEEN INJURED AS A RESULT OF YOUR DRINKING: NO
HOW MANY STANDARD DRINKS CONTAINING ALCOHOL DO YOU HAVE ON A TYPICAL DAY: 1 OR 2
HOW OFTEN DURING THE LAST YEAR HAVE YOU BEEN UNABLE TO REMEMBER WHAT HAPPENED THE NIGHT BEFORE BECAUSE YOU HAD BEEN DRINKING: NEVER
HOW OFTEN DURING THE LAST YEAR HAVE YOU HAD A FEELING OF GUILT OR REMORSE AFTER DRINKING: NEVER

## 2025-03-21 ASSESSMENT — ENCOUNTER SYMPTOMS
WHEEZING: 0
SHORTNESS OF BREATH: 0
BACK PAIN: 1
COUGH: 0
CHEST TIGHTNESS: 0
COLOR CHANGE: 0

## 2025-03-21 NOTE — TELEPHONE ENCOUNTER
Per telephone encounter from today, patient's wife had been instructed to take patient to the ER.

## 2025-03-21 NOTE — PROGRESS NOTES
Conner Cheung (: 1960) is a 64 y.o. male, Established patient, who presents today for:    Chief Complaint   Patient presents with    Drug / Alcohol Assessment     Pt is here for an assessment as asked for by  and wife so he can take his medication for a spinal infection          Subjective     HPI:  History of Present Illness  The patient is a 64-year-old male who presents for evaluation of alcohol intake, spinal infection, and pain management.    History is reported by patient's wife and himself.  He has expressed a desire to reduce his alcohol consumption to zero to facilitate his vancomycin treatment. He has been on vancomycin for approximately 3 weeks, with 1 week administered in the hospital and 2 weeks at home. His current regimen includes 3 ounces of vodka daily, a significant reduction from his previous intake of 16 ounces. He reports no vomiting. He also uses cannabis, which has been discussed with the infectious disease specialist, who did not identify any contraindications with his antibiotics. They plan to consult a medical marijuana practitioner for guidance on dosing. He is currently on trazodone, which he started less than a week ago, and has been experiencing increased confusion and tremors. He was prescribed gabapentin and baclofen by the pain management team on the following Monday, after which he began experiencing hallucinations, body jerks, and occasional disorientation. He was advised to seek emergency care but chose not to do so. He has a history of hospitalizations and prefers to avoid them. He is also on thiamine. He has been experiencing knee buckling, necessitating the use of a wheeled walker. He has been having difficulty moving without assistance. He has been having muscle spasms all the time. He has been having difficulty sleeping and has been experiencing PTSD symptoms. He has been having severe pain.    SOCIAL HISTORY  The patient drinks approximately 3

## 2025-03-24 ENCOUNTER — HOME CARE VISIT (OUTPATIENT)
Dept: HOME HEALTH SERVICES | Facility: HOME HEALTH | Age: 65
End: 2025-03-24
Payer: COMMERCIAL

## 2025-03-24 ENCOUNTER — CARE COORDINATION (OUTPATIENT)
Dept: CARE COORDINATION | Age: 65
End: 2025-03-24

## 2025-03-24 ENCOUNTER — LAB (OUTPATIENT)
Dept: LAB | Facility: HOSPITAL | Age: 65
End: 2025-03-24
Payer: COMMERCIAL

## 2025-03-24 VITALS
RESPIRATION RATE: 20 BRPM | DIASTOLIC BLOOD PRESSURE: 70 MMHG | TEMPERATURE: 98.1 F | HEART RATE: 60 BPM | OXYGEN SATURATION: 94 % | SYSTOLIC BLOOD PRESSURE: 112 MMHG

## 2025-03-24 DIAGNOSIS — G06.2 EXTRADURAL AND SUBDURAL ABSCESS, UNSPECIFIED: Primary | ICD-10-CM

## 2025-03-24 DIAGNOSIS — M46.26 OSTEOMYELITIS OF VERTEBRA, LUMBAR REGION (MULTI): ICD-10-CM

## 2025-03-24 LAB
ANION GAP SERPL CALC-SCNC: 13 MMOL/L (ref 10–20)
BUN SERPL-MCNC: 16 MG/DL (ref 6–23)
CALCIUM SERPL-MCNC: 9 MG/DL (ref 8.6–10.3)
CHLORIDE SERPL-SCNC: 96 MMOL/L (ref 98–107)
CO2 SERPL-SCNC: 26 MMOL/L (ref 21–32)
CREAT SERPL-MCNC: 0.83 MG/DL (ref 0.5–1.3)
CRP SERPL-MCNC: 1.69 MG/DL
EGFRCR SERPLBLD CKD-EPI 2021: >90 ML/MIN/1.73M*2
ERYTHROCYTE [DISTWIDTH] IN BLOOD BY AUTOMATED COUNT: 14.9 % (ref 11.5–14.5)
GLUCOSE SERPL-MCNC: 119 MG/DL (ref 74–99)
HCT VFR BLD AUTO: 34.5 % (ref 41–52)
HGB BLD-MCNC: 11.8 G/DL (ref 13.5–17.5)
MCH RBC QN AUTO: 31 PG (ref 26–34)
MCHC RBC AUTO-ENTMCNC: 34.2 G/DL (ref 32–36)
MCV RBC AUTO: 91 FL (ref 80–100)
NRBC BLD-RTO: 0 /100 WBCS (ref 0–0)
PLATELET # BLD AUTO: 429 X10*3/UL (ref 150–450)
POTASSIUM SERPL-SCNC: 4.4 MMOL/L (ref 3.5–5.3)
RBC # BLD AUTO: 3.81 X10*6/UL (ref 4.5–5.9)
SODIUM SERPL-SCNC: 131 MMOL/L (ref 136–145)
VANCOMYCIN TROUGH SERPL-MCNC: 16 UG/ML (ref 5–20)
WBC # BLD AUTO: 12.9 X10*3/UL (ref 4.4–11.3)

## 2025-03-24 PROCEDURE — 80048 BASIC METABOLIC PNL TOTAL CA: CPT

## 2025-03-24 PROCEDURE — 86140 C-REACTIVE PROTEIN: CPT

## 2025-03-24 PROCEDURE — 80202 ASSAY OF VANCOMYCIN: CPT

## 2025-03-24 PROCEDURE — G0299 HHS/HOSPICE OF RN EA 15 MIN: HCPCS

## 2025-03-24 PROCEDURE — 85027 COMPLETE CBC AUTOMATED: CPT

## 2025-03-24 RX ADMIN — SODIUM CHLORIDE 20 ML: 9 INJECTION, SOLUTION INTRAVENOUS at 09:00

## 2025-03-24 ASSESSMENT — ENCOUNTER SYMPTOMS
LOSS OF SENSATION IN FEET: 1
PAIN LOCATION - RELIEVING FACTORS: MEDS,REST
PAIN: 1
HIGHEST PAIN SEVERITY IN PAST 24 HOURS: 8/10
FATIGUE: 1
PAIN LOCATION - PAIN SEVERITY: 8/10
LOWER EXTREMITY EDEMA: 1
PAIN LOCATION - EXACERBATING FACTORS: DISEASE PROCESS
PAIN LOCATION: GENERALIZED
DEPRESSION: 0
SHORTNESS OF BREATH: 1
PAIN LOCATION - PAIN FREQUENCY: FREQUENT
APPETITE LEVEL: FAIR
DYSPNEA ACTIVITY LEVEL: AFTER AMBULATING 10 - 20 FT
LOWEST PAIN SEVERITY IN PAST 24 HOURS: 5/10
MUSCLE WEAKNESS: 1
PERSON REPORTING PAIN: PATIENT
OCCASIONAL FEELINGS OF UNSTEADINESS: 1
PAIN SEVERITY GOAL: 2/10
FATIGUES EASILY: 1
SUBJECTIVE PAIN PROGRESSION: WAXING AND WANING
PAIN LOCATION - PAIN QUALITY: ACHING

## 2025-03-24 ASSESSMENT — ACTIVITIES OF DAILY LIVING (ADL)
AMBULATION ASSISTANCE: ONE PERSON
CURRENT_FUNCTION: ONE PERSON

## 2025-03-24 ASSESSMENT — PAIN SCALES - PAIN ASSESSMENT IN ADVANCED DEMENTIA (PAINAD)
NEGVOCALIZATION: 0 - NONE.
CONSOLABILITY: 0
BODYLANGUAGE: 1
TOTALSCORE: 1
FACIALEXPRESSION: 0
BODYLANGUAGE: 1 - TENSE. DISTRESSED PACING. FIDGETING.
NEGVOCALIZATION: 0
FACIALEXPRESSION: 0 - SMILING OR INEXPRESSIVE.
CONSOLABILITY: 0 - NO NEED TO CONSOLE.
BREATHING: 0

## 2025-03-24 NOTE — CARE COORDINATION
Ambulatory Care Coordination Note     3/24/2025 9:43 AM     Patient Current Location:  Home: [address] [place] [id number]     ACM contacted the patient and spouse/partner by telephone. Verified name and  with patient as identifiers.         ACM: Juani Estrada RN     Challenges to be reviewed by the provider   Additional needs identified to be addressed with provider No  none               Method of communication with provider: none.    Utilization: Patient has not had any utilization since our last call.     Care Summary Note: ACM spoke with patient and his wife.  Reviewed recent PCP visit.  Patient states that he really has no pain anymore since stopping the gabapentin and the baclofen.  ACM notes PCP appointment directly addressed patient's alcoholism.  Patient feels they  have everything under control at this time.  Patient stated they will reach out to pain management and cardiology for further actions.  Patient did not discuss his plan for alcohol cessation.     Offered patient enrollment in the Remote Patient Monitoring (RPM) program for in-home monitoring: Yes, but did not enroll at this time: Already addressed .     Assessments Completed:   Congestive Heart Failure Assessment    Are you currently restricting fluids?: No Restriction  Do you understand a low sodium diet?: Yes  Do you understand how to read food labels?: Yes  How many restaurant meals do you eat per week?: 1-2  Do you salt your food before tasting it?: No         Symptoms:               Medications Reviewed:   Patient denies any changes with medications and reports taking all medications as prescribed.    Advance Care Planning:   Health Care Decision maker confirmed        Care Planning:    Goals Addressed                   This Visit's Progress     Conditions and Symptoms   Improving     I will schedule office visits, as directed by my provider.  I will keep my appointment or reschedule if I have to cancel.  I will notify my provider

## 2025-03-25 ENCOUNTER — DOCUMENTATION (OUTPATIENT)
Dept: INFUSION THERAPY | Age: 65
End: 2025-03-25
Payer: COMMERCIAL

## 2025-03-25 ENCOUNTER — HOME INFUSION (OUTPATIENT)
Dept: INFUSION THERAPY | Age: 65
End: 2025-03-25
Payer: COMMERCIAL

## 2025-03-25 ENCOUNTER — TELEPHONE (OUTPATIENT)
Dept: FAMILY MEDICINE CLINIC | Age: 65
End: 2025-03-25

## 2025-03-25 NOTE — TELEPHONE ENCOUNTER
Pts wife called in stating that her and her  would like a sleep aide sent in for her Conner as he is not sleeping at all and is really needing help with this. Pts wife wanted to make sure it was documented that this medication cannot react with his medications for his current spinal infection. Please advise.

## 2025-03-25 NOTE — PROGRESS NOTES
Reviewed chart and Kit Duncan is receiving vancomycin 1.25gm q24h and ceftriaxone 2g q24h for back pain with no evident source of infection through 4/23/25. Followed by Dr. Soliz  Labs ordered include weekly CRP, CBC, BMP, vanco trough     Labs from 3/24 reviewed - WBC 12.9, CRP 1.69. Vanco trough (16.9) and Cr (0.83) remain WNL.    RX contacted spouse and patient, infusions going well. Wife states that doses yesterday AM infused slowly potentially d/t being cold, but otherwise everything is fine. Vanco trough and Cr ok so pharmacy will proceed with delivery. No dressing kits, flushes or alcohol swabs needed this week.     Mixing 3/26 and delivering 3/27 with supplies as above:  7x vancomycin 1.25gm HP  7x ceftriaxone 2g HP  DOS 3/28-4/3    Follow up 4/2 check labs, progress, OVN

## 2025-03-25 NOTE — PROGRESS NOTES
PER [facility] WIFE AGREED TO ANOTHER WEEK OF [facility] ABTS FOR DELIVERY ON THURSDAY... NEEDS ONLY MEDS, NO FLUYHES, DRESSING KITS OR ALCOHOL WIPES NEEDED...

## 2025-03-25 NOTE — TELEPHONE ENCOUNTER
Pt stated he is no longer taking any pain medications only tylenol and him and his wife stopped the trazodone due to side effects he was having. Pt is asking if there is anything else he can do. He refused to start the trazodone again.

## 2025-03-26 ENCOUNTER — FOLLOW-UP (OUTPATIENT)
Facility: CLINIC | Age: 65
End: 2025-03-26
Payer: COMMERCIAL

## 2025-03-26 VITALS
OXYGEN SATURATION: 98 % | DIASTOLIC BLOOD PRESSURE: 59 MMHG | HEART RATE: 62 BPM | RESPIRATION RATE: 20 BRPM | TEMPERATURE: 97.2 F | SYSTOLIC BLOOD PRESSURE: 123 MMHG

## 2025-03-26 DIAGNOSIS — R11.0 NAUSEA: Primary | ICD-10-CM

## 2025-03-26 DIAGNOSIS — M79.89 SWELLING, LIMB: ICD-10-CM

## 2025-03-26 DIAGNOSIS — G89.29 CHRONIC LOW BACK PAIN WITH SCIATICA, SCIATICA LATERALITY UNSPECIFIED, UNSPECIFIED BACK PAIN LATERALITY: ICD-10-CM

## 2025-03-26 DIAGNOSIS — G06.2 EPIDURAL ABSCESS (HHS-HCC): ICD-10-CM

## 2025-03-26 DIAGNOSIS — M54.40 CHRONIC LOW BACK PAIN WITH SCIATICA, SCIATICA LATERALITY UNSPECIFIED, UNSPECIFIED BACK PAIN LATERALITY: ICD-10-CM

## 2025-03-26 PROCEDURE — 99215 OFFICE O/P EST HI 40 MIN: CPT | Performed by: INTERNAL MEDICINE

## 2025-03-26 PROCEDURE — 99417 PROLNG OP E/M EACH 15 MIN: CPT | Performed by: INTERNAL MEDICINE

## 2025-03-26 RX ORDER — ONDANSETRON 4 MG/1
4 TABLET, FILM COATED ORAL EVERY 12 HOURS PRN
Qty: 10 TABLET | Refills: 0 | Status: SHIPPED | OUTPATIENT
Start: 2025-03-26 | End: 2025-04-02

## 2025-03-26 ASSESSMENT — PAIN SCALES - GENERAL: PAINLEVEL_OUTOF10: 7

## 2025-03-26 NOTE — PROGRESS NOTES
Infectious Disease Progress Note       3/26/2025    Patient is a followup regarding lumbar discitis and epidural abscess.  MRI with abnormal T2 signal increase at L3, L4, S1 vertebrae.  Epidural abscess at L5 measuring 14 x 7 mm anterior epidural space and 11 x 10 mm right lateral space.  Not amenable to drainage per interventional radiology.  Neurosurgery had been contacted initially at the beginning of the hospitalization and said he was not an operative candidate.  Patient has no history of MRSA per medical record.  Has been on vancomycin and ceftriaxone empirically.  Blood cultures negative, EVENS negative despite his valve history.      Has multiple complaints posthospitalization.  Overall he feels that his back pain has improved but it is now moved more to the right buttocks area into right thigh.  He does have problems with lying on 1 side and getting lack of sleep.  He was seeing pain management but there were issues with pain meds.  Apparently he was taking multiple medications including Neurontin,baclofen for pain relief and had a hallucinatory event.    Currently he is off alcohol use.  He has used Tylenol and ibuprofen with some relief.    Major complaint is bilateral swelling or edema lower extremities which seems to be increasing he does have some baseline edema but has increased in the last several weeks.  He also has some swelling at the left arm.  Apparently he lays primarily on the side.  Has told me he PCP told him to followup with cardiology was told this was not a heart failure.  He is wearing compression stockings.    Multiple family currently at bedside.  All questions answered to the best of my ability    Patient had called recently about worsening pain.  He was referred to the ER but refused to go    CrCl cannot be calculated (Unknown ideal weight.).      Lab Results   Component Value Date    WBC 12.9 (H) 03/24/2025    HGB 11.8 (L) 03/24/2025    HCT 34.5 (L) 03/24/2025    MCV 91 03/24/2025      03/24/2025     Lab Results   Component Value Date    GLUCOSE 119 (H) 03/24/2025    CALCIUM 9.0 03/24/2025     (L) 03/24/2025    K 4.4 03/24/2025    CO2 26 03/24/2025    CL 96 (L) 03/24/2025    BUN 16 03/24/2025    CREATININE 0.83 03/24/2025       WBC trends are being monitored. Antibiotic doses are being adjusted per most recent renal labs.     Vitals:    03/26/25 1235   BP: 123/59   Pulse: 62   Resp: 20   Temp: 36.2 °C (97.2 °F)   SpO2: 98%     Patient is awake and alert, sitting up in a chair  NAD  Neck supple  Heart S1S2  Abdomen: soft, ND, NTTP, no guarding  Extrem: no pain to palpation  Skin: no rashes, no diaphoresis  Neuro: CNS intact  Affect appropriate and patient is interactive    Edema bilaterally 3+ up into areas of lower back anasarca like also some more edema left upper extremity        Patient Active Problem List   Diagnosis    YANIRA (acute kidney injury) (CMS-[facility])    Anemia    Aortic stenosis    ASCVD (arteriosclerotic cardiovascular disease)    Asplenia    Atelectasis    AV dissociation    AVM (arteriovenous malformation) ([facility])    Basal cell carcinoma, eyelid    Basal cell carcinoma, trunk    Borderline diabetes    BRBPR (bright red blood per rectum)    CAD S/P percutaneous coronary angioplasty    Colon polyp    Coronary artery disease involving autologous artery coronary bypass graft without angina pectoris    Coronary atherosclerosis    Diabetes mellitus type 2, diet-controlled    Diverticulosis    Elevated troponin I level    ETOH abuse    Facial droop    Fever    GERD (gastroesophageal reflux disease)    Hiatal hernia    Heart murmur    History of aortic valve replacement    History of stroke    Hodgkin disease (Multi)    HTN (hypertension), benign    Hyperglycemia    Internal hemorrhoid    Iron deficiency anemia    Left leg weakness    Leukocytosis    Hyperlipidemia    Mixed hyperlipidemia    Nonrheumatic tricuspid valve regurgitation    Pain, postoperative, acute     Paroxysmal atrial fibrillation (Multi)    Persistent atrial fibrillation (Multi)    Peripheral edema    Radiation-induced heart disease    Reactive thrombocytosis    Thrombocytosis    Restless leg    S/P TVR (tricuspid valve repair)    Subcutaneous emphysema (CMS-HCC)    Type 2 diabetes mellitus, without long-term current use of insulin (Multi)    Atrial flutter (Multi)    Sinus node dysfunction (Multi)    Junctional bradycardia    BMI 21.0-21.9, adult    Never smoked tobacco    Abnormal MRI, lumbar spine         ASSESSMENT:  Acute on chronic back pain with right sided epidural abscess L5 and lumbar discitis, multilevel  Immunosuppressed status, status post splenectomy  Atrial flutter, status post aortic valve replacement  Edema       Patient overall seems a bit better in terms of his back pain but has now localized to the right side with sciatica which is giving him quality of life issues.  I told him it is difficult to exclude a worsening infectious process causing this but looking at him clinically and decreasing CRP values this may just be anatomical in nature even with improvement infectious process    We never isolated an organism.  He has not seen neurosurgery in follow-up treatment has been empiric, which can increase the risks of antibiotic failure    Edema itself is almost anasarca like I think this is probably a function of lack of movement poor nutritional status etc.  He does have some edema at baseline likely related to previous surgeries with vein venous harvesting and heart failure potentially.  He has seen cardiology apparently.  His renal function is okay on recent blood work      PLAN:  Patient currently on ceftriaxone and vancomycin.  Planning 8 weeks total IV antibiotics for treatment of discitis and epidural abscess at L5 right side      Needs neurosurgery appointment will refer    Repeat MRI, to make sure not worsening infectious process and spine this will likely continue to show  abnormalities even if improving from an infectious point of view though.  Discussed with family    Patient does not want to see ER at this time for pain and swelling he cannot get into his PCP will try to call over there.  He is getting continue compression elevation and  can discuss any changes in diuresis with his cardiologist.  I think he also needs to improve his nutritional status his albumin was low on last CMP which could be affecting this as well.  Apparently he has lack of appetite due to nausea with eating occasionally.  He asked for some anti emetics  until he can see his PCP no major drug interactions through the software detected, so will prescribe for now any side effects he needs to discontinue and notify provider    I will also check ultrasounds just to make sure no venous clots.  He has a previous history of needing Eliquis which he has been off of and he has lack of mobility at this time    Time spent with patient reviewing chart and coordinating care greater than 75 minutes      MCKAY Marmolejo MD

## 2025-03-27 ENCOUNTER — OFFICE VISIT (OUTPATIENT)
Dept: FAMILY MEDICINE CLINIC | Age: 65
End: 2025-03-27
Payer: COMMERCIAL

## 2025-03-27 ENCOUNTER — TELEPHONE (OUTPATIENT)
Age: 65
End: 2025-03-27

## 2025-03-27 VITALS
SYSTOLIC BLOOD PRESSURE: 92 MMHG | WEIGHT: 158 LBS | BODY MASS INDEX: 25.39 KG/M2 | DIASTOLIC BLOOD PRESSURE: 47 MMHG | HEART RATE: 61 BPM | HEIGHT: 66 IN | TEMPERATURE: 97.6 F | OXYGEN SATURATION: 95 %

## 2025-03-27 DIAGNOSIS — G89.29 CHRONIC MIDLINE LOW BACK PAIN WITH SCIATICA, SCIATICA LATERALITY UNSPECIFIED: Primary | ICD-10-CM

## 2025-03-27 DIAGNOSIS — K21.9 GASTROESOPHAGEAL REFLUX DISEASE WITHOUT ESOPHAGITIS: ICD-10-CM

## 2025-03-27 DIAGNOSIS — R60.0 LOWER EXTREMITY EDEMA: ICD-10-CM

## 2025-03-27 DIAGNOSIS — F32.A DEPRESSION, UNSPECIFIED DEPRESSION TYPE: ICD-10-CM

## 2025-03-27 DIAGNOSIS — R60.0 LOWER EXTREMITY EDEMA: Primary | ICD-10-CM

## 2025-03-27 DIAGNOSIS — M54.40 CHRONIC MIDLINE LOW BACK PAIN WITH SCIATICA, SCIATICA LATERALITY UNSPECIFIED: Primary | ICD-10-CM

## 2025-03-27 DIAGNOSIS — G47.00 INSOMNIA, UNSPECIFIED TYPE: ICD-10-CM

## 2025-03-27 PROCEDURE — 3074F SYST BP LT 130 MM HG: CPT | Performed by: NURSE PRACTITIONER

## 2025-03-27 PROCEDURE — G8427 DOCREV CUR MEDS BY ELIG CLIN: HCPCS | Performed by: NURSE PRACTITIONER

## 2025-03-27 PROCEDURE — 3017F COLORECTAL CA SCREEN DOC REV: CPT | Performed by: NURSE PRACTITIONER

## 2025-03-27 PROCEDURE — 1036F TOBACCO NON-USER: CPT | Performed by: NURSE PRACTITIONER

## 2025-03-27 PROCEDURE — G8419 CALC BMI OUT NRM PARAM NOF/U: HCPCS | Performed by: NURSE PRACTITIONER

## 2025-03-27 PROCEDURE — 3078F DIAST BP <80 MM HG: CPT | Performed by: NURSE PRACTITIONER

## 2025-03-27 PROCEDURE — 99214 OFFICE O/P EST MOD 30 MIN: CPT | Performed by: NURSE PRACTITIONER

## 2025-03-27 RX ORDER — SPIRONOLACTONE 25 MG/1
25 TABLET ORAL DAILY
Qty: 30 TABLET | Refills: 3 | Status: SHIPPED | OUTPATIENT
Start: 2025-03-27

## 2025-03-27 RX ORDER — L. ACIDOPHILUS/L.BULGARICUS 1MM CELL
1 TABLET ORAL 2 TIMES DAILY
Qty: 90 TABLET | Refills: 1 | Status: SHIPPED | OUTPATIENT
Start: 2025-03-27 | End: 2025-04-27

## 2025-03-27 RX ORDER — ONDANSETRON 4 MG/1
4 TABLET, FILM COATED ORAL EVERY 12 HOURS PRN
COMMUNITY
Start: 2025-03-26 | End: 2025-04-02

## 2025-03-27 RX ORDER — PANTOPRAZOLE SODIUM 20 MG/1
TABLET, DELAYED RELEASE ORAL
Qty: 90 TABLET | Refills: 3 | Status: SHIPPED | OUTPATIENT
Start: 2025-03-27

## 2025-03-27 ASSESSMENT — ENCOUNTER SYMPTOMS
CONSTIPATION: 0
COUGH: 0
CHEST TIGHTNESS: 0
VOMITING: 0
NAUSEA: 0
WHEEZING: 0
DIARRHEA: 0
ABDOMINAL PAIN: 0
COLOR CHANGE: 0
SHORTNESS OF BREATH: 0

## 2025-03-27 NOTE — TELEPHONE ENCOUNTER
Patient was given a referral to Vascular Surgery, but Dr. Estrada is no longer in practice, and Dr Kennedy does not take the patients insurance. Please place new referral.

## 2025-03-27 NOTE — PROGRESS NOTES
Conner Cheung (: 1960) is a 64 y.o. male, Established patient, who presents today for:    Chief Complaint   Patient presents with    Pain     Swelling, pain in the lower right buttocks, its been going on for weeks          Subjective     HPI:  History of Present Illness  The patient is a 64-year-old male who presents today for evaluation of right buttock pain, sleep issues, and bilateral leg swelling.  He presents with his wife, his mother, his sister, and his brother.  All are in the exam room to partake in the visit and offer their observations, opinions and ask questions.    Chronic pain in the right buttock that extends into the lower leg is reported, attributed to a previous infection in his spine. He was last seen on Friday, 2025. Gabapentin, trazodone, and baclofen were discontinued by the patient's wife due to severe side effects, including hallucinations, altered mental status, weakness, knee buckling and falls. These symptoms resolved by Saturday. Alcohol consumption has also been stopped.  Per patient's wife there has been no recent communication with Dr. Cm Mcintyre, who patient has seen for pain management.  I did however review several messages that were addressed by Dr. Cm Mcintyre regarding being evaluated in the emergency room, also the recommendation to lower the dose of gabapentin and he did discuss side effects of gabapentin and baclofen as well.  After I read those messages to patient's wife she does admit that they were sent those messages through patient's [employer].  She makes note that they have not spoken to Dr. Cm Mcintyre recently. A follow-up consultation with an infectious disease specialist occurred yesterday, he is managing the patient's PICC line and IV vancomycin that he is receiving for this abscess of the spine.  The infectious disease specialist recommended a neurosurgeon be consulted and he discussed with family that he did not approve of prescribing steroids for

## 2025-03-28 ENCOUNTER — PATIENT MESSAGE (OUTPATIENT)
Dept: CARDIOLOGY | Facility: CLINIC | Age: 65
End: 2025-03-28
Payer: COMMERCIAL

## 2025-03-31 ENCOUNTER — HOME CARE VISIT (OUTPATIENT)
Dept: HOME HEALTH SERVICES | Facility: HOME HEALTH | Age: 65
End: 2025-03-31
Payer: COMMERCIAL

## 2025-03-31 ENCOUNTER — TELEPHONE (OUTPATIENT)
Dept: CARDIOLOGY CLINIC | Age: 65
End: 2025-03-31

## 2025-03-31 ENCOUNTER — LAB REQUISITION (OUTPATIENT)
Dept: LAB | Facility: HOSPITAL | Age: 65
End: 2025-03-31
Payer: COMMERCIAL

## 2025-03-31 ENCOUNTER — CARE COORDINATION (OUTPATIENT)
Dept: CARE COORDINATION | Age: 65
End: 2025-03-31

## 2025-03-31 VITALS
WEIGHT: 144.25 LBS | RESPIRATION RATE: 18 BRPM | SYSTOLIC BLOOD PRESSURE: 138 MMHG | TEMPERATURE: 97.5 F | HEART RATE: 60 BPM | DIASTOLIC BLOOD PRESSURE: 70 MMHG | BODY MASS INDEX: 23.28 KG/M2 | OXYGEN SATURATION: 97 %

## 2025-03-31 DIAGNOSIS — M46.46 DISCITIS, UNSPECIFIED, LUMBAR REGION: ICD-10-CM

## 2025-03-31 LAB — VANCOMYCIN TROUGH SERPL-MCNC: 15.6 UG/ML (ref 5–20)

## 2025-03-31 PROCEDURE — 80202 ASSAY OF VANCOMYCIN: CPT

## 2025-03-31 PROCEDURE — G0299 HHS/HOSPICE OF RN EA 15 MIN: HCPCS

## 2025-03-31 RX ORDER — FUROSEMIDE 40 MG/1
40 TABLET ORAL DAILY
Qty: 90 TABLET | Refills: 1 | Status: SHIPPED | OUTPATIENT
Start: 2025-03-31

## 2025-03-31 RX ORDER — METOPROLOL TARTRATE 25 MG/1
25 TABLET, FILM COATED ORAL 2 TIMES DAILY
Qty: 180 TABLET | Refills: 1 | Status: SHIPPED | OUTPATIENT
Start: 2025-03-31

## 2025-03-31 NOTE — TELEPHONE ENCOUNTER
Per Ruth:  We can take half the dose of metoprolol twice daily   We can cut back on the lasix and take half dose as well and see how he does   We are fine with magnesium   How low are his blood pressures       Medication list updated. New prescriptions for Lopressor 25mg PO BID and Lasix 40mg daily sent to [facility] in [place] for patient. Patient notified via My chart message reply.

## 2025-03-31 NOTE — CARE COORDINATION
Ambulatory Care Coordination Note     3/31/2025 9:34 AM     Patient Current Location:  Home: [address] [place] [id number]     ACM contacted the patient by telephone. Verified name and  with patient as identifiers.         ACM: Juani Estrada RN     Challenges to be reviewed by the provider   Additional needs identified to be addressed with provider No  none               Method of communication with provider: none.    Utilization: Patient has not had any utilization since our last call.     Care Summary Note: ACM spoke to patient.  He reports doing okay.  Patient continues to complain of lack of sleep.  Patient continues to complain of ongoing pain.  Patient reports he is taking gabapentin and ibuprofen.  As we have discussed previously patient should not be taking Eliquis and ibuprofen.  Per patient this has been addressed with his providers.  Patient is set up for the end of next month with  vascular Dr. Taylor in [place].  Patient continues with home health and physical therapy.  Wound provider and infectious disease.    ACM reviewed ACP documentation.  Patient agreed to have LSW send out packets for him and his wife.  Referral sent.    Offered patient enrollment in the Remote Patient Monitoring (RPM) program for in-home monitoring: Yes, but did not enroll at this time: already addressed  .     Assessments Completed:   Congestive Heart Failure Assessment    Are you currently restricting fluids?: No Restriction  Do you understand a low sodium diet?: Yes  Do you understand how to read food labels?: Yes  How many restaurant meals do you eat per week?: 1-2  Do you salt your food before tasting it?: No     No patient-reported symptoms      Symptoms:  CHF associated angina: Neg, CHF associated dyspnea on exertion: Neg, CHF associated fatigue: Pos, CHF associated leg swelling: Pos, CHF associated orthostatic hypotension: Neg, CHF associated PND: Neg, CHF associated shortness of breath: Neg, CHF associated

## 2025-04-01 ENCOUNTER — HOME INFUSION (OUTPATIENT)
Dept: INFUSION THERAPY | Age: 65
End: 2025-04-01
Payer: COMMERCIAL

## 2025-04-01 LAB
BUN SERPL-MCNC: 20 MG/DL (ref 7–25)
BUN/CREAT SERPL: ABNORMAL (CALC) (ref 6–22)
CALCIUM SERPL-MCNC: 9.1 MG/DL (ref 8.6–10.3)
CHLORIDE SERPL-SCNC: 97 MMOL/L (ref 98–110)
CO2 SERPL-SCNC: 27 MMOL/L (ref 20–32)
CREAT SERPL-MCNC: 0.74 MG/DL (ref 0.7–1.35)
EGFRCR SERPLBLD CKD-EPI 2021: 102 ML/MIN/1.73M2
ERYTHROCYTE [DISTWIDTH] IN BLOOD BY AUTOMATED COUNT: 14 % (ref 11–15)
GLUCOSE SERPL-MCNC: 143 MG/DL (ref 65–139)
HCT VFR BLD AUTO: 37 % (ref 38.5–50)
HGB BLD-MCNC: 12.4 G/DL (ref 13.2–17.1)
MCH RBC QN AUTO: 30.7 PG (ref 27–33)
MCHC RBC AUTO-ENTMCNC: 33.5 G/DL (ref 32–36)
MCV RBC AUTO: 91.6 FL (ref 80–100)
PLATELET # BLD AUTO: 468 THOUSAND/UL (ref 140–400)
PMV BLD REES-ECKER: 9.8 FL (ref 7.5–12.5)
POTASSIUM SERPL-SCNC: 4.9 MMOL/L (ref 3.5–5.3)
RBC # BLD AUTO: 4.04 MILLION/UL (ref 4.2–5.8)
SODIUM SERPL-SCNC: 133 MMOL/L (ref 135–146)
WBC # BLD AUTO: 9.6 THOUSAND/UL (ref 3.8–10.8)

## 2025-04-01 ASSESSMENT — ENCOUNTER SYMPTOMS
PAIN LOCATION: BACK
LOWEST PAIN SEVERITY IN PAST 24 HOURS: 7/10
PAIN LOCATION - PAIN FREQUENCY: CONSTANT
PAIN LOCATION - EXACERBATING FACTORS: POSITIONAL AND ACTIVITY
APPETITE LEVEL: FAIR
PERSON REPORTING PAIN: PATIENT
PAIN SEVERITY GOAL: 2/10
MUSCLE WEAKNESS: 1
PAIN: 1
SUBJECTIVE PAIN PROGRESSION: UNCHANGED
PAIN LOCATION - RELIEVING FACTORS: REST AND MEDICATION
CHANGE IN APPETITE: UNCHANGED
BOWEL PATTERN NORMAL: 1
HIGHEST PAIN SEVERITY IN PAST 24 HOURS: 8/10
STOOL FREQUENCY: DAILY
LAST BOWEL MOVEMENT: 67295
PAIN LOCATION - PAIN QUALITY: ACHY TO SHARP
PAIN LOCATION - PAIN SEVERITY: 7/10

## 2025-04-01 ASSESSMENT — ACTIVITIES OF DAILY LIVING (ADL)
BATHING_CURRENT_FUNCTION: CONTACT GUARD ASSIST
PREPARING MEALS: DEPENDENT
HOUSEKEEPING ASSESSED: 1
TRANSPORTATION: DEPENDENT
AMBULATION ASSISTANCE: 1
AMBULATION ASSISTANCE: STAND BY ASSIST
LIGHT HOUSEKEEPING: DEPENDENT
PHYSICAL TRANSFERS ASSESSED: 1
TRANSPORTATION ASSESSED: 1
BATHING EQUIPMENT USED: BATH BENCH
CURRENT_FUNCTION: STAND BY ASSIST
BATHING ASSESSED: 1
PHYSICAL_TRANSFERS_DEVICES: ROLLATOR

## 2025-04-01 NOTE — PROGRESS NOTES
Review of chart. Patient with order for vancomycin 1.25gm q24h and ceftriaxone 2gm q24h thru 4/23/25 for back pain with no evident source of infection. Weekly labs are ordered with results to dr Soliz. Patient is scheduled to see dr Soliz on 4/23/25.    Review of labs from 3/31/25. Vanco trough 15.6. WBC wtl. RBC H and H improving.     Review of rn visit notes. No problems noted with infusions or line.    Tel call with wife. Infusions are going well. Wife is very happy with the elastomerics. She is agreeable to a delivery of further doses on 4/3/25. Regarding supplies, patient does NOT dressing kits (currently has 3) or extensions sets (does not use). They have #8 NS flushes (use 4/day) and #7 Heparin flushes and 1 site scrub. She does not see any microclave caps (pt has 2 L picc). He will need flushes, box of 100 alcohol pads, microclaves, site scrubs and netting.    Processed fill for 7 x vancomycin and 7 x ceftriaxone HP for mix and ovn delivery 4/2/25 to cover doses 4/4 thru 4/10/25.    Follow up 4/9/25 with next fill ovn. Check labs.

## 2025-04-02 ENCOUNTER — APPOINTMENT (OUTPATIENT)
Facility: CLINIC | Age: 65
End: 2025-04-02
Payer: COMMERCIAL

## 2025-04-02 ENCOUNTER — CARE COORDINATION (OUTPATIENT)
Dept: CARE COORDINATION | Age: 65
End: 2025-04-02

## 2025-04-02 NOTE — CARE COORDINATION
Advance Care Planning Note      Date: 4/2/2025    Patient Current Location:  [place]    ACP Specialist:  Julia Mcfadden    Date Referral Received:3/31/2025    Initial Outreach:     Outreach call to patient in follow-up to ACP Specialist referral from: Ambulatory Care Manager Juani Estrada RN  requesting assistance with new advance directive completion.  I spoke with patient who request to have two copies (one for wife) of Advance Directives mailed to his home.    Next Step:    Mailed Information Sheets  Mailed Advance Directive        Thank you for this referral.

## 2025-04-03 ENCOUNTER — TELEPHONE (OUTPATIENT)
Age: 65
End: 2025-04-03

## 2025-04-03 NOTE — TELEPHONE ENCOUNTER
Appointment Request From: Conner Cheung      With Provider: RADHA Curiel CNP [[facility]]      Preferred Date Range: Any      Preferred Times: Any Time      Reason for visit: Request an Appointment      Health Maintenance Topic:      Comments:   follow up on edema and shortness of breath     Appointment Request  (Newest Message First)  Conner SANDOVAL [facility] Cardiology  (supporting RADHA Curiel CNP)3 minutes ago (4:17 PM)       Appointment Request From: Conner Cheung     With Provider: RADHA Curiel CNP [[facility]]     Preferred Date Range: Any     Preferred Times: Any Time     Reason for visit: Request an Appointment     Health Maintenance Topic:      Comments:  follow up on edema and shortness of breath

## 2025-04-07 ENCOUNTER — HOME CARE VISIT (OUTPATIENT)
Dept: HOME HEALTH SERVICES | Facility: HOME HEALTH | Age: 65
End: 2025-04-07
Payer: COMMERCIAL

## 2025-04-07 ENCOUNTER — APPOINTMENT (OUTPATIENT)
Dept: VASCULAR SURGERY | Facility: CLINIC | Age: 65
End: 2025-04-07
Payer: COMMERCIAL

## 2025-04-07 ENCOUNTER — OFFICE VISIT (OUTPATIENT)
Dept: FAMILY MEDICINE CLINIC | Age: 65
End: 2025-04-07
Payer: COMMERCIAL

## 2025-04-07 ENCOUNTER — LAB REQUISITION (OUTPATIENT)
Dept: LAB | Facility: HOSPITAL | Age: 65
End: 2025-04-07

## 2025-04-07 VITALS
BODY MASS INDEX: 23.14 KG/M2 | OXYGEN SATURATION: 96 % | HEIGHT: 66 IN | TEMPERATURE: 98.7 F | DIASTOLIC BLOOD PRESSURE: 54 MMHG | WEIGHT: 144 LBS | SYSTOLIC BLOOD PRESSURE: 102 MMHG | HEART RATE: 60 BPM

## 2025-04-07 DIAGNOSIS — D50.8 OTHER IRON DEFICIENCY ANEMIA: ICD-10-CM

## 2025-04-07 DIAGNOSIS — E11.69 HYPERLIPIDEMIA ASSOCIATED WITH TYPE 2 DIABETES MELLITUS: ICD-10-CM

## 2025-04-07 DIAGNOSIS — E11.59 HYPERTENSION ASSOCIATED WITH DIABETES: ICD-10-CM

## 2025-04-07 DIAGNOSIS — Z13.29 SCREENING FOR THYROID DISORDER: ICD-10-CM

## 2025-04-07 DIAGNOSIS — M48.062 SPINAL STENOSIS OF LUMBAR REGION WITH NEUROGENIC CLAUDICATION: ICD-10-CM

## 2025-04-07 DIAGNOSIS — Z12.5 SCREENING FOR PROSTATE CANCER: ICD-10-CM

## 2025-04-07 DIAGNOSIS — Z87.39 H/O DISCITIS: ICD-10-CM

## 2025-04-07 DIAGNOSIS — E11.9 CONTROLLED TYPE 2 DIABETES MELLITUS WITHOUT COMPLICATION, WITHOUT LONG-TERM CURRENT USE OF INSULIN: Primary | ICD-10-CM

## 2025-04-07 DIAGNOSIS — E78.5 HYPERLIPIDEMIA ASSOCIATED WITH TYPE 2 DIABETES MELLITUS: ICD-10-CM

## 2025-04-07 DIAGNOSIS — E61.1 IRON DEFICIENCY: ICD-10-CM

## 2025-04-07 DIAGNOSIS — M46.46 DISCITIS, UNSPECIFIED, LUMBAR REGION: ICD-10-CM

## 2025-04-07 DIAGNOSIS — I48.19 PERSISTENT ATRIAL FIBRILLATION (HCC): ICD-10-CM

## 2025-04-07 DIAGNOSIS — R01.1 HEART MURMUR: Chronic | ICD-10-CM

## 2025-04-07 DIAGNOSIS — I50.22 CHRONIC SYSTOLIC CONGESTIVE HEART FAILURE (HCC): ICD-10-CM

## 2025-04-07 DIAGNOSIS — I15.2 HYPERTENSION ASSOCIATED WITH DIABETES: ICD-10-CM

## 2025-04-07 PROBLEM — D72.829 LEUKOCYTOSIS: Status: RESOLVED | Noted: 2025-02-25 | Resolved: 2025-04-07

## 2025-04-07 PROBLEM — M54.40 ACUTE BILATERAL LOW BACK PAIN WITH SCIATICA: Status: RESOLVED | Noted: 2025-02-25 | Resolved: 2025-04-07

## 2025-04-07 PROBLEM — M47.9 SPINAL OSTEOARTHRITIS: Status: RESOLVED | Noted: 2025-02-25 | Resolved: 2025-04-07

## 2025-04-07 PROBLEM — E78.2 MIXED HYPERLIPIDEMIA: Chronic | Status: RESOLVED | Noted: 2025-02-25 | Resolved: 2025-04-07

## 2025-04-07 PROBLEM — D64.9 ABSOLUTE ANEMIA: Status: ACTIVE | Noted: 2025-04-07

## 2025-04-07 PROBLEM — L98.9 SCALP LESION: Status: RESOLVED | Noted: 2024-11-20 | Resolved: 2025-04-07

## 2025-04-07 PROBLEM — R50.9 FEVER: Status: RESOLVED | Noted: 2025-02-25 | Resolved: 2025-04-07

## 2025-04-07 LAB — VANCOMYCIN TROUGH SERPL-MCNC: 16.6 UG/ML (ref 5–20)

## 2025-04-07 PROCEDURE — 80202 ASSAY OF VANCOMYCIN: CPT

## 2025-04-07 PROCEDURE — 3046F HEMOGLOBIN A1C LEVEL >9.0%: CPT | Performed by: INTERNAL MEDICINE

## 2025-04-07 PROCEDURE — 3078F DIAST BP <80 MM HG: CPT | Performed by: INTERNAL MEDICINE

## 2025-04-07 PROCEDURE — 3074F SYST BP LT 130 MM HG: CPT | Performed by: INTERNAL MEDICINE

## 2025-04-07 PROCEDURE — 2022F DILAT RTA XM EVC RTNOPTHY: CPT | Performed by: INTERNAL MEDICINE

## 2025-04-07 PROCEDURE — G0299 HHS/HOSPICE OF RN EA 15 MIN: HCPCS

## 2025-04-07 PROCEDURE — 1036F TOBACCO NON-USER: CPT | Performed by: INTERNAL MEDICINE

## 2025-04-07 PROCEDURE — G8427 DOCREV CUR MEDS BY ELIG CLIN: HCPCS | Performed by: INTERNAL MEDICINE

## 2025-04-07 PROCEDURE — G8420 CALC BMI NORM PARAMETERS: HCPCS | Performed by: INTERNAL MEDICINE

## 2025-04-07 PROCEDURE — 3017F COLORECTAL CA SCREEN DOC REV: CPT | Performed by: INTERNAL MEDICINE

## 2025-04-07 PROCEDURE — 99214 OFFICE O/P EST MOD 30 MIN: CPT | Performed by: INTERNAL MEDICINE

## 2025-04-07 RX ORDER — ONDANSETRON 4 MG/1
4 TABLET, FILM COATED ORAL EVERY 8 HOURS PRN
COMMUNITY

## 2025-04-07 RX ORDER — GABAPENTIN 300 MG/1
300 CAPSULE ORAL 2 TIMES DAILY
Qty: 60 CAPSULE | Refills: 0 | Status: SHIPPED | OUTPATIENT
Start: 2025-04-07 | End: 2025-05-07

## 2025-04-07 RX ADMIN — SODIUM CHLORIDE 40 ML: 9 INJECTION, SOLUTION INTRAVENOUS at 09:22

## 2025-04-07 ASSESSMENT — PAIN SCALES - PAIN ASSESSMENT IN ADVANCED DEMENTIA (PAINAD)
CONSOLABILITY: 0 - NO NEED TO CONSOLE.
CONSOLABILITY: 0
TOTALSCORE: 0
NEGVOCALIZATION: 0
NEGVOCALIZATION: 0 - NONE.
BODYLANGUAGE: 0 - RELAXED.
FACIALEXPRESSION: 0
BREATHING: 0
FACIALEXPRESSION: 0 - SMILING OR INEXPRESSIVE.
BODYLANGUAGE: 0

## 2025-04-07 ASSESSMENT — ENCOUNTER SYMPTOMS
PAIN LOCATION: BACK
BLOOD IN STOOL: 0
ABDOMINAL PAIN: 0
PHOTOPHOBIA: 0
CONSTIPATION: 0
NAUSEA: 0
EYE PAIN: 0
BACK PAIN: 1
PAIN: 1
COLOR CHANGE: 0
PERSON REPORTING PAIN: PATIENT
APPETITE LEVEL: FAIR
VOICE CHANGE: 0
SUBJECTIVE PAIN PROGRESSION: GRADUALLY IMPROVING

## 2025-04-07 ASSESSMENT — ACTIVITIES OF DAILY LIVING (ADL)
AMBULATION ASSISTANCE: STAND BY ASSIST
AMBULATION ASSISTANCE: 1

## 2025-04-07 NOTE — PROGRESS NOTES
record.    Objective        Objective     Vitals:    04/07/25 1235   BP: (!) 102/54   BP Site: Left Upper Arm   Patient Position: Sitting   BP Cuff Size: Medium Adult   Pulse: 60   Temp: 98.7 °F (37.1 °C)   TempSrc: Temporal   SpO2: 96%   Weight: 65.3 kg (144 lb)   Height: 1.676 m (5' 6\")          Chelo Pal MD

## 2025-04-08 ENCOUNTER — HOME INFUSION (OUTPATIENT)
Dept: INFUSION THERAPY | Age: 65
End: 2025-04-08
Payer: COMMERCIAL

## 2025-04-08 LAB
BASOPHILS # BLD AUTO: 118 CELLS/UL (ref 0–200)
BASOPHILS NFR BLD AUTO: 1.3 %
BUN SERPL-MCNC: 20 MG/DL (ref 7–25)
BUN/CREAT SERPL: ABNORMAL (CALC) (ref 6–22)
CALCIUM SERPL-MCNC: 8.9 MG/DL (ref 8.6–10.3)
CHLORIDE SERPL-SCNC: 99 MMOL/L (ref 98–110)
CO2 SERPL-SCNC: 24 MMOL/L (ref 20–32)
CREAT SERPL-MCNC: 0.82 MG/DL (ref 0.7–1.35)
EGFRCR SERPLBLD CKD-EPI 2021: 98 ML/MIN/1.73M2
EOSINOPHIL # BLD AUTO: 300 CELLS/UL (ref 15–500)
EOSINOPHIL NFR BLD AUTO: 3.3 %
ERYTHROCYTE [DISTWIDTH] IN BLOOD BY AUTOMATED COUNT: 14 % (ref 11–15)
GLUCOSE SERPL-MCNC: 110 MG/DL (ref 65–99)
HCT VFR BLD AUTO: 34.8 % (ref 38.5–50)
HGB BLD-MCNC: 11.5 G/DL (ref 13.2–17.1)
LYMPHOCYTES # BLD AUTO: 346 CELLS/UL (ref 850–3900)
LYMPHOCYTES NFR BLD AUTO: 3.8 %
MCH RBC QN AUTO: 30.5 PG (ref 27–33)
MCHC RBC AUTO-ENTMCNC: 33 G/DL (ref 32–36)
MCV RBC AUTO: 92.3 FL (ref 80–100)
MONOCYTES # BLD AUTO: 1192 CELLS/UL (ref 200–950)
MONOCYTES NFR BLD AUTO: 13.1 %
NEUTROPHILS # BLD AUTO: 7144 CELLS/UL (ref 1500–7800)
NEUTROPHILS NFR BLD AUTO: 78.5 %
PLATELET # BLD AUTO: 489 THOUSAND/UL (ref 140–400)
PMV BLD REES-ECKER: 9.7 FL (ref 7.5–12.5)
POTASSIUM SERPL-SCNC: 5.1 MMOL/L (ref 3.5–5.3)
RBC # BLD AUTO: 3.77 MILLION/UL (ref 4.2–5.8)
SODIUM SERPL-SCNC: 133 MMOL/L (ref 135–146)
WBC # BLD AUTO: 9.1 THOUSAND/UL (ref 3.8–10.8)

## 2025-04-08 NOTE — TELEPHONE ENCOUNTER
Mary to schedule an appt with Dr Simons.  Pt already has an appt scheduled for 6/27/2025 with Dr Simons.

## 2025-04-08 NOTE — PROGRESS NOTES
Review of chart. Patient with order for vancomycin 1.25gm q24h and ceftriaxone 2gm q24h thru 4/23/25 for back pain with no evident source of infection. Weekly labs are ordered with results to Dr Soliz. Patient is scheduled to see Dr Soliz on 4/23/25.     Review of labs from 4/7/25. Vanco trough 16.6. Others unremarkable.     Review of RN visit notes. No problems noted with infusions or line.     Tel call with wife. Infusions are going well, but she notes that one of the CTX doses from prior delivery leaked into the bag while in refrigerator. She thus has vanco thru Thurs but CTX only thru Wed 4/9. She is agreeable to delivery Wed 4/9 for replacement CTX dose plus one additional week of both therapies. Regarding supplies, patient does NOT need dressing kits (currently has 2) or extensions sets (does not use). They have #16 NS flushes (use 4/day) and #8 Heparin flushes. He will need flushes and microclaves only for this delivery.     Processed fill for 7x vancomycin and 7x ceftriaxone HP plus one additional CTX (replacement at no charge) for mix and delivery 4/9/25 to cover doses 4/10 thru 4/17/25.     Follow up 4/16/25 with KEVIN CHRISTIANN. Check labs.

## 2025-04-12 ENCOUNTER — HOME CARE VISIT (OUTPATIENT)
Dept: HOME HEALTH SERVICES | Facility: HOME HEALTH | Age: 65
End: 2025-04-12
Payer: COMMERCIAL

## 2025-04-12 VITALS — TEMPERATURE: 98.2 F

## 2025-04-12 PROCEDURE — G0151 HHCP-SERV OF PT,EA 15 MIN: HCPCS

## 2025-04-12 SDOH — HEALTH STABILITY: PHYSICAL HEALTH: EXERCISE ACTIVITY: SIT TO STAND

## 2025-04-12 SDOH — HEALTH STABILITY: PHYSICAL HEALTH: EXERCISE ACTIVITY: ANKLE PUMPS

## 2025-04-12 SDOH — HEALTH STABILITY: PHYSICAL HEALTH: PHYSICAL EXERCISE: SEATED

## 2025-04-12 SDOH — HEALTH STABILITY: PHYSICAL HEALTH: EXERCISE TYPE: INSTRUCTED AND DEMONSTRATED SBA SEATED THER EX PROGRAM

## 2025-04-12 SDOH — HEALTH STABILITY: PHYSICAL HEALTH: EXERCISE ACTIVITY: KNEE FLEXION

## 2025-04-12 SDOH — HEALTH STABILITY: PHYSICAL HEALTH: EXERCISE ACTIVITY: KNEE EXTENSION

## 2025-04-12 SDOH — HEALTH STABILITY: PHYSICAL HEALTH: EXERCISE ACTIVITY: HIP FLEXION

## 2025-04-12 SDOH — HEALTH STABILITY: PHYSICAL HEALTH: EXERCISE ACTIVITY: HIP ABD/ADDUCTION

## 2025-04-12 ASSESSMENT — ENCOUNTER SYMPTOMS
OCCASIONAL FEELINGS OF UNSTEADINESS: 1
PAIN LOCATION: RIGHT HIP
HIGHEST PAIN SEVERITY IN PAST 24 HOURS: 8/10
PAIN LOCATION - PAIN SEVERITY: 3/10
LOWEST PAIN SEVERITY IN PAST 24 HOURS: 3/10
PAIN LOCATION - PAIN FREQUENCY: CONSTANT
PERSON REPORTING PAIN: PATIENT
PAIN SEVERITY GOAL: 0/10
PAIN: 1
PAIN LOCATION - PAIN QUALITY: ACHING
SUBJECTIVE PAIN PROGRESSION: UNCHANGED

## 2025-04-12 ASSESSMENT — ACTIVITIES OF DAILY LIVING (ADL): AMBULATION ASSISTANCE ON FLAT SURFACES: 1

## 2025-04-14 ENCOUNTER — LAB REQUISITION (OUTPATIENT)
Dept: LAB | Facility: HOSPITAL | Age: 65
End: 2025-04-14
Payer: COMMERCIAL

## 2025-04-14 ENCOUNTER — SOCIAL WORK (OUTPATIENT)
Dept: CARE COORDINATION | Age: 65
End: 2025-04-14

## 2025-04-14 ENCOUNTER — CARE COORDINATION (OUTPATIENT)
Dept: CARE COORDINATION | Age: 65
End: 2025-04-14

## 2025-04-14 ENCOUNTER — HOME CARE VISIT (OUTPATIENT)
Dept: HOME HEALTH SERVICES | Facility: HOME HEALTH | Age: 65
End: 2025-04-14
Payer: COMMERCIAL

## 2025-04-14 DIAGNOSIS — M46.46 DISCITIS, UNSPECIFIED, LUMBAR REGION: ICD-10-CM

## 2025-04-14 LAB — VANCOMYCIN TROUGH SERPL-MCNC: 15.3 UG/ML (ref 5–20)

## 2025-04-14 PROCEDURE — 80202 ASSAY OF VANCOMYCIN: CPT

## 2025-04-14 PROCEDURE — G0299 HHS/HOSPICE OF RN EA 15 MIN: HCPCS

## 2025-04-14 ASSESSMENT — PAIN SCALES - PAIN ASSESSMENT IN ADVANCED DEMENTIA (PAINAD)
BODYLANGUAGE: 0 - RELAXED.
CONSOLABILITY: 0 - NO NEED TO CONSOLE.
FACIALEXPRESSION: 0
CONSOLABILITY: 0
TOTALSCORE: 0
NEGVOCALIZATION: 0 - NONE.
BODYLANGUAGE: 0
NEGVOCALIZATION: 0
FACIALEXPRESSION: 0 - SMILING OR INEXPRESSIVE.
BREATHING: 0

## 2025-04-14 ASSESSMENT — ENCOUNTER SYMPTOMS
OCCASIONAL FEELINGS OF UNSTEADINESS: 0
PAIN: 1
PERSON REPORTING PAIN: PATIENT
APPETITE LEVEL: GOOD
SUBJECTIVE PAIN PROGRESSION: UNCHANGED
CHANGE IN APPETITE: UNCHANGED

## 2025-04-14 ASSESSMENT — ACTIVITIES OF DAILY LIVING (ADL)
AMBULATION ASSISTANCE: STAND BY ASSIST
AMBULATION ASSISTANCE: 1

## 2025-04-14 NOTE — CARE COORDINATION
Advance Care Planning Note      Date: 4/14/2025    Patient Current Location:  [place]    ACP Specialist:  Julia Mcfadden    Date Referral Received:3/31/2025    Second Outreach:     Outreach call to patient in follow-up to ACP Specialist.     Next Step:  Patient states that he received the Advance Directive in mail. Patient states that he does not need assistance completing forms. Patient no longer wants follow up calls.   Closing referral due to patient declining ACP services..  Routing closure to referral source.     Thank you for this referral.      NIALL Coats  [facility] Care Coordination  [phone]

## 2025-04-14 NOTE — CARE COORDINATION
Ambulatory Care Coordination Note     4/14/2025 10:40 AM     Patient outreach attempt by this ACM today to perform care management follow up . ACM was unable to reach the patient by telephone today;   left voice message requesting a return phone call to this ACM.     ACM: Juani Estrada RN     Care Summary Note:     PCP/Specialist follow up:   Future Appointments         Provider Specialty Dept Phone    4/15/2025 11:30 AM Dayo Simons DO Cardiology [phone]    6/27/2025 9:15 AM Dayo Simons DO Cardiology [phone]            Follow Up:   Plan for next AC outreach in approximately 1 week to complete:  - disease specific assessments  - medication review  - advance care planning  - goal progression  - education   Care needs  .

## 2025-04-15 ENCOUNTER — OFFICE VISIT (OUTPATIENT)
Dept: CARDIOLOGY CLINIC | Age: 65
End: 2025-04-15
Payer: COMMERCIAL

## 2025-04-15 ENCOUNTER — HOME INFUSION (OUTPATIENT)
Dept: INFUSION THERAPY | Age: 65
End: 2025-04-15
Payer: COMMERCIAL

## 2025-04-15 VITALS
HEART RATE: 60 BPM | WEIGHT: 135 LBS | DIASTOLIC BLOOD PRESSURE: 60 MMHG | SYSTOLIC BLOOD PRESSURE: 108 MMHG | BODY MASS INDEX: 21.79 KG/M2

## 2025-04-15 DIAGNOSIS — Z98.61 CAD S/P PERCUTANEOUS CORONARY ANGIOPLASTY: Primary | ICD-10-CM

## 2025-04-15 DIAGNOSIS — Z95.0 NORMALLY FUNCTIONING CARDIAC PACEMAKER PRESENT: ICD-10-CM

## 2025-04-15 DIAGNOSIS — I25.10 CAD S/P PERCUTANEOUS CORONARY ANGIOPLASTY: Primary | ICD-10-CM

## 2025-04-15 LAB
BASOPHILS # BLD AUTO: 144 CELLS/UL (ref 0–200)
BASOPHILS NFR BLD AUTO: 1.2 %
CRP SERPL-MCNC: 9.6 MG/L
EOSINOPHIL # BLD AUTO: 432 CELLS/UL (ref 15–500)
EOSINOPHIL NFR BLD AUTO: 3.6 %
ERYTHROCYTE [DISTWIDTH] IN BLOOD BY AUTOMATED COUNT: 13.4 % (ref 11–15)
HCT VFR BLD AUTO: 38.5 % (ref 38.5–50)
HGB BLD-MCNC: 12.5 G/DL (ref 13.2–17.1)
LYMPHOCYTES # BLD AUTO: 336 CELLS/UL (ref 850–3900)
LYMPHOCYTES NFR BLD AUTO: 2.8 %
MCH RBC QN AUTO: 30.2 PG (ref 27–33)
MCHC RBC AUTO-ENTMCNC: 32.5 G/DL (ref 32–36)
MCV RBC AUTO: 93 FL (ref 80–100)
MONOCYTES # BLD AUTO: 1716 CELLS/UL (ref 200–950)
MONOCYTES NFR BLD AUTO: 14.3 %
NEUTROPHILS # BLD AUTO: 9372 CELLS/UL (ref 1500–7800)
NEUTROPHILS NFR BLD AUTO: 78.1 %
PLATELET # BLD AUTO: 466 THOUSAND/UL (ref 140–400)
PMV BLD REES-ECKER: 9.8 FL (ref 7.5–12.5)
QUEST DIFF COMMENT: ABNORMAL
RBC # BLD AUTO: 4.14 MILLION/UL (ref 4.2–5.8)
WBC # BLD AUTO: 12 THOUSAND/UL (ref 3.8–10.8)

## 2025-04-15 PROCEDURE — 3017F COLORECTAL CA SCREEN DOC REV: CPT | Performed by: INTERNAL MEDICINE

## 2025-04-15 PROCEDURE — 99214 OFFICE O/P EST MOD 30 MIN: CPT | Performed by: INTERNAL MEDICINE

## 2025-04-15 PROCEDURE — 93000 ELECTROCARDIOGRAM COMPLETE: CPT | Performed by: INTERNAL MEDICINE

## 2025-04-15 PROCEDURE — 3074F SYST BP LT 130 MM HG: CPT | Performed by: INTERNAL MEDICINE

## 2025-04-15 PROCEDURE — 3078F DIAST BP <80 MM HG: CPT | Performed by: INTERNAL MEDICINE

## 2025-04-15 PROCEDURE — 1036F TOBACCO NON-USER: CPT | Performed by: INTERNAL MEDICINE

## 2025-04-15 PROCEDURE — G8420 CALC BMI NORM PARAMETERS: HCPCS | Performed by: INTERNAL MEDICINE

## 2025-04-15 PROCEDURE — G8427 DOCREV CUR MEDS BY ELIG CLIN: HCPCS | Performed by: INTERNAL MEDICINE

## 2025-04-15 RX ORDER — LANOLIN ALCOHOL/MO/W.PET/CERES
400 CREAM (GRAM) TOPICAL DAILY
COMMUNITY

## 2025-04-15 RX ORDER — THIAMINE MONONITRATE (VIT B1) 100 MG
100 TABLET ORAL DAILY
COMMUNITY

## 2025-04-15 RX ORDER — METOPROLOL TARTRATE 25 MG/1
25 TABLET, FILM COATED ORAL 2 TIMES DAILY
COMMUNITY

## 2025-04-15 RX ORDER — FOLIC ACID 1 MG/1
1 TABLET ORAL DAILY
COMMUNITY

## 2025-04-15 RX ORDER — UBIDECARENONE 75 MG
50 CAPSULE ORAL DAILY
COMMUNITY

## 2025-04-15 NOTE — PROGRESS NOTES
Review of chart. Patient with order for vancomycin 1.25gm q24h and ceftriaxone 2gm q24h thru 4/23/25 for back pain with no evident source of infection. Weekly labs are ordered with results to Dr Soliz. Patient is scheduled to see Dr Soliz on 4/23/25.     Review of labs from 4/14/25. Vanco trough 15.3.  WBC elevated 12. Others unremarkable.     Review of RN visit notes. No problems noted with infusions or line.     Tel call with wife. Infusions are going well, she confirmed that infusions are going well and delivery of remaining doses is needed on 4/17/25. Appt with dr Soliz is 4/23 12n. Regarding supplies, patient does NOT need the dressing change kit or heparin flushes (currently has #22). Patient does need 6 days of antibiotics, microclave, site scrubs and netting and approx 3 days of NS flushes (currently has #24 and uses 4/day).     Processed fill for 6x vancomycin and 6x ceftriaxone HP for mix and ovn delivery 4/16/25 to cover doses 4/18 thru 4/23/25.     Follow up 4/23/25 to check plan of care after appt with Dr Soliz. Patient has a picc.

## 2025-04-15 NOTE — PROGRESS NOTES
Chief Complaint   Patient presents with    Atrial Fibrillation    Coronary Artery Disease    Hypertension    Discuss Medications    Shortness of Breath       Patient presents for initial medical evaluation. Patient is followed on a regular basis by Chelo Arechiga MD. HX OF CAD S/P PCI.HX OF AVR at [facility] 10 yrs ago. Patient with hx of cancer at age 19 and had radiation to chest. Patient with hx of Aflutter since 2019. Not on full dose DOAC. Patient with hx of hemorrhoids.   Last echo in 2017 with EF of 60%, AV with mean gradient of 16mmhg.   Pt denies chest pain, dyspnea, dyspnea on exertion, change in exercise capacity, fatigue,  nausea, vomiting, diarrhea, constipation, motor weakness, insomnia, weight loss, syncope, dizziness, lightheadedness, palpitations, PND, orthopnea, or claudication. He is active without any angina.     1-13-22: doing well. No issues. Noted to venkatesh in afib last OV, placed on DOAC.    HX OF CAD S/P PCI.HX OF AVR at [facility] 10 yrs ago. Patient with hx of cancer at age 19 and had radiation to chest. Patient with hx of Aflutter since 2019. Not on full dose DOAC. Patient with hx of hemorrhoids.  Echo with EF of 55%, normal bioprosthetic AV in place.   Pt denies chest pain, dyspnea, dyspnea on exertion, change in exercise capacity, fatigue,  nausea, vomiting, diarrhea, constipation, motor weakness, insomnia, weight loss, syncope, dizziness, lightheadedness, palpitations, PND, orthopnea, or claudication.  EKG with NSR< no ischemia.   EKG with afib/flutter.     1/20/2022:HX OF CAD S/P PCI.HX OF AVR at [facility] 10 yrs ago. Patient with hx of cancer at age 19 and had radiation to chest. Patient with hx of Aflutter since 2019. Not on full dose DOAC. Patient with hx of hemorrhoids.  Echo with EF of 55%, normal bioprosthetic AV in place.     Patient seen in office today status post cardioversion and sotalol drug loading 1 week ago.  EKG done in office today shows patient in normal

## 2025-04-18 ENCOUNTER — HOME CARE VISIT (OUTPATIENT)
Dept: HOME HEALTH SERVICES | Facility: HOME HEALTH | Age: 65
End: 2025-04-18
Payer: COMMERCIAL

## 2025-04-21 ENCOUNTER — HOME CARE VISIT (OUTPATIENT)
Dept: HOME HEALTH SERVICES | Facility: HOME HEALTH | Age: 65
End: 2025-04-21
Payer: COMMERCIAL

## 2025-04-21 ENCOUNTER — LAB REQUISITION (OUTPATIENT)
Dept: LAB | Facility: HOSPITAL | Age: 65
End: 2025-04-21
Payer: COMMERCIAL

## 2025-04-21 ENCOUNTER — CARE COORDINATION (OUTPATIENT)
Dept: CARE COORDINATION | Age: 65
End: 2025-04-21

## 2025-04-21 VITALS
RESPIRATION RATE: 18 BRPM | DIASTOLIC BLOOD PRESSURE: 64 MMHG | TEMPERATURE: 96.8 F | OXYGEN SATURATION: 95 % | SYSTOLIC BLOOD PRESSURE: 120 MMHG | HEART RATE: 87 BPM

## 2025-04-21 DIAGNOSIS — M46.46 DISCITIS, UNSPECIFIED, LUMBAR REGION: ICD-10-CM

## 2025-04-21 LAB — VANCOMYCIN TROUGH SERPL-MCNC: 19 UG/ML (ref 5–20)

## 2025-04-21 PROCEDURE — G0299 HHS/HOSPICE OF RN EA 15 MIN: HCPCS

## 2025-04-21 PROCEDURE — 80202 ASSAY OF VANCOMYCIN: CPT

## 2025-04-21 RX ADMIN — SODIUM CHLORIDE 10 ML: 9 INJECTION, SOLUTION INTRAVENOUS at 10:03

## 2025-04-21 RX ADMIN — SODIUM CHLORIDE 10 ML: 9 INJECTION, SOLUTION INTRAVENOUS at 10:00

## 2025-04-21 ASSESSMENT — ACTIVITIES OF DAILY LIVING (ADL): PHYSICAL TRANSFERS ASSESSED: 1

## 2025-04-21 ASSESSMENT — ENCOUNTER SYMPTOMS
PAIN SEVERITY GOAL: 0/10
PAIN LOCATION - RELIEVING FACTORS: REST/RX
LAST BOWEL MOVEMENT: 67316
LOWEST PAIN SEVERITY IN PAST 24 HOURS: 0/10
LOSS OF SENSATION IN FEET: 0
ARTHRALGIAS: 1
STOOL FREQUENCY: TWICE DAILY
PAIN LOCATION - PAIN QUALITY: ACHE
PAIN LOCATION - EXACERBATING FACTORS: ACTIVITY
HIGHEST PAIN SEVERITY IN PAST 24 HOURS: 2/10
APPETITE LEVEL: GOOD
PAIN LOCATION - PAIN FREQUENCY: CONSTANT
HYPERTENSION: 1
OCCASIONAL FEELINGS OF UNSTEADINESS: 1
PAIN LOCATION: BACK
PAIN LOCATION - PAIN SEVERITY: 2/10
CHANGE IN APPETITE: INCREASED
PAIN LOCATION - PAIN DURATION: CONSTANT
PAIN: 1
DEPRESSION: 0
PERSON REPORTING PAIN: PATIENT

## 2025-04-21 ASSESSMENT — PAIN SCALES - PAIN ASSESSMENT IN ADVANCED DEMENTIA (PAINAD)
FACIALEXPRESSION: 0 - SMILING OR INEXPRESSIVE.
NEGVOCALIZATION: 0 - NONE.
BODYLANGUAGE: 0 - RELAXED.
CONSOLABILITY: 0
FACIALEXPRESSION: 0
TOTALSCORE: 0
NEGVOCALIZATION: 0
CONSOLABILITY: 0 - NO NEED TO CONSOLE.
BODYLANGUAGE: 0
BREATHING: 0

## 2025-04-21 NOTE — CARE COORDINATION
Ambulatory Care Coordination Note     4/21/2025 1:00 PM     Patient outreach attempt by this ACM today to perform care management follow up . ACM was unable to reach the patient by telephone today;   left voice message requesting a return phone call to this ACM.     ACM: Juani Estrada RN     Care Summary Note:     PCP/Specialist follow up:   Future Appointments         Provider Specialty Dept Phone    6/27/2025 9:15 AM Dayo Simons DO Cardiology [phone]    10/16/2025 2:00 PM Dayo Simons DO Cardiology [phone]            Follow Up:   Plan for next AC outreach in approximately  to complete:  - disease specific assessments  - advance care planning  - goal progression  - education   Care needs  .

## 2025-04-22 LAB
BASOPHILS # BLD AUTO: 151 CELLS/UL (ref 0–200)
BASOPHILS NFR BLD AUTO: 1.2 %
BUN SERPL-MCNC: 18 MG/DL (ref 7–25)
BUN/CREAT SERPL: ABNORMAL (CALC) (ref 6–22)
CALCIUM SERPL-MCNC: 10 MG/DL (ref 8.6–10.3)
CHLORIDE SERPL-SCNC: 103 MMOL/L (ref 98–110)
CO2 SERPL-SCNC: 23 MMOL/L (ref 20–32)
CREAT SERPL-MCNC: 0.73 MG/DL (ref 0.7–1.35)
CRP SERPL-MCNC: 8.6 MG/L
EGFRCR SERPLBLD CKD-EPI 2021: 102 ML/MIN/1.73M2
EOSINOPHIL # BLD AUTO: 630 CELLS/UL (ref 15–500)
EOSINOPHIL NFR BLD AUTO: 5 %
ERYTHROCYTE [DISTWIDTH] IN BLOOD BY AUTOMATED COUNT: 13.4 % (ref 11–15)
GLUCOSE SERPL-MCNC: 153 MG/DL (ref 65–99)
HCT VFR BLD AUTO: 40.2 % (ref 38.5–50)
HGB BLD-MCNC: 13.3 G/DL (ref 13.2–17.1)
LYMPHOCYTES # BLD AUTO: 315 CELLS/UL (ref 850–3900)
LYMPHOCYTES NFR BLD AUTO: 2.5 %
MCH RBC QN AUTO: 29.9 PG (ref 27–33)
MCHC RBC AUTO-ENTMCNC: 33.1 G/DL (ref 32–36)
MCV RBC AUTO: 90.3 FL (ref 80–100)
MONOCYTES # BLD AUTO: 1562 CELLS/UL (ref 200–950)
MONOCYTES NFR BLD AUTO: 12.4 %
NEUTROPHILS # BLD AUTO: 9941 CELLS/UL (ref 1500–7800)
NEUTROPHILS NFR BLD AUTO: 78.9 %
PLATELET # BLD AUTO: 503 THOUSAND/UL (ref 140–400)
PMV BLD REES-ECKER: 10 FL (ref 7.5–12.5)
POTASSIUM SERPL-SCNC: 4.8 MMOL/L (ref 3.5–5.3)
QUEST DIFF COMMENT: ABNORMAL
RBC # BLD AUTO: 4.45 MILLION/UL (ref 4.2–5.8)
SODIUM SERPL-SCNC: 136 MMOL/L (ref 135–146)
WBC # BLD AUTO: 12.6 THOUSAND/UL (ref 3.8–10.8)

## 2025-04-23 ENCOUNTER — OFFICE VISIT (OUTPATIENT)
Facility: CLINIC | Age: 65
End: 2025-04-23
Payer: COMMERCIAL

## 2025-04-23 ENCOUNTER — HOME INFUSION (OUTPATIENT)
Dept: INFUSION THERAPY | Age: 65
End: 2025-04-23
Payer: COMMERCIAL

## 2025-04-23 VITALS
OXYGEN SATURATION: 97 % | RESPIRATION RATE: 20 BRPM | BODY MASS INDEX: 19.93 KG/M2 | DIASTOLIC BLOOD PRESSURE: 54 MMHG | WEIGHT: 124 LBS | TEMPERATURE: 97.7 F | HEIGHT: 66 IN | HEART RATE: 77 BPM | SYSTOLIC BLOOD PRESSURE: 93 MMHG

## 2025-04-23 DIAGNOSIS — G89.29 CHRONIC LOW BACK PAIN WITH SCIATICA, SCIATICA LATERALITY UNSPECIFIED, UNSPECIFIED BACK PAIN LATERALITY: ICD-10-CM

## 2025-04-23 DIAGNOSIS — M54.40 CHRONIC LOW BACK PAIN WITH SCIATICA, SCIATICA LATERALITY UNSPECIFIED, UNSPECIFIED BACK PAIN LATERALITY: ICD-10-CM

## 2025-04-23 DIAGNOSIS — Z90.81 H/O SPLENECTOMY: Primary | ICD-10-CM

## 2025-04-23 DIAGNOSIS — G06.2 EPIDURAL ABSCESS (HHS-HCC): ICD-10-CM

## 2025-04-23 PROCEDURE — 3008F BODY MASS INDEX DOCD: CPT | Performed by: INTERNAL MEDICINE

## 2025-04-23 PROCEDURE — 99214 OFFICE O/P EST MOD 30 MIN: CPT | Performed by: INTERNAL MEDICINE

## 2025-04-23 PROCEDURE — 3078F DIAST BP <80 MM HG: CPT | Performed by: INTERNAL MEDICINE

## 2025-04-23 PROCEDURE — 3074F SYST BP LT 130 MM HG: CPT | Performed by: INTERNAL MEDICINE

## 2025-04-23 RX ORDER — CEPHALEXIN 500 MG/1
500 CAPSULE ORAL EVERY 8 HOURS
Qty: 21 CAPSULE | Refills: 0 | Status: SHIPPED | OUTPATIENT
Start: 2025-04-23 | End: 2025-04-30

## 2025-04-23 ASSESSMENT — PAIN SCALES - GENERAL: PAINLEVEL_OUTOF10: 3

## 2025-04-23 NOTE — PROGRESS NOTES
Infectious Disease Progress Note       4/23/2025    Patient is a followup regarding lumbar discitis and epidural abscess. MRI with abnormal T2 signal increase at L3, L4, S1 vertebrae. Epidural abscess at L5 measuring 14 x 7 mm anterior epidural space and 11 x 10 mm right lateral space. Not amenable to drainage. Patient has no history of MRSA per medical record. Has been on vancomycin and ceftriaxone empirically. Blood cultures negative EVENS negative. No available bone biopsy or cultures from the epidural space   Creatinine clearance 3/4/2025 was noted to be 86    I had last seen the patient on 3/4/2025 and at that time:  Patient currently on ceftriaxone 2 g IV daily and vancomycin 1250 mg IV every 24 hours per conversation with pharmacy  Planning 8 weeks total IV antibiotics for treatment of discitis and epidural abscess at L5 right side  Patient will need weekly CBC BMP CRP and Vanco trough while on antibiotics  Patient to be followed up in the infectious disease clinic within 2 to 3 weeks of discharge    He did follow-up with my partner on 3/26/2025.  According to the patient, he was not drinking any alcohol at that time, was having issues with hallucinations secondary to pain medications.  He was using Tylenol and ibuprofen with relief.  Major complaint at that time was bilateral swelling lower extremities and swelling in the left upper extremity.  He was wearing his compression stockings multiple family members were present during the follow-up.  He was recommended to go to the ER due to persistent pain localized to the back with radiculopathy to right side with sciatica but did not want to go at the time    Of note, organism was never isolated.  He had not seen neurosurgery as a follow-up to treatment.  Edema seemed like anasarca.  He continued on ceftriaxone and vancomycin with plan for 8 weeks total IV antibiotics for treatment of discitis and epidural abscess at  L5 right side    Since that visit on  3/26/2025, he has been seen for pain and he has been seen by his primary care physician    Currently at the end of therapy   Per patient's spouse currently at bedside, patient does drink alcohol.  They are also heavily into naturopathic means of healing and prefer this.    Labs from 4/21/2025:  Vanco trough has been therapeutic  CRP downtrending overall most recently 2 days ago 8.6  WBCs 12.6 although still has some absolute neutrophil elevated. He attributes this to PICC line and would like it out.   Creatinine 0.73 and stable    He was ordered to have MRI lumbar spine on 3/26/2025 but has not done this since he is feeling much better overall        Lab Results   Component Value Date    WBC 12.6 (H) 04/21/2025    HGB 13.3 04/21/2025    HCT 40.2 04/21/2025    MCV 90.3 04/21/2025     (H) 04/21/2025     Lab Results   Component Value Date    GLUCOSE 153 (H) 04/21/2025    CALCIUM 10.0 04/21/2025     04/21/2025    K 4.8 04/21/2025    CO2 23 04/21/2025     04/21/2025    BUN 18 04/21/2025    CREATININE 0.73 04/21/2025       WBC trends are being monitored. Antibiotic doses are being adjusted per most recent renal labs.     Vitals:    04/23/25 1200   BP: 93/54   Pulse: 77   Resp: 20   Temp: 36.5 °C (97.7 °F)   SpO2: 97%     Patient is awake and alert, smiling interactive, affect appropriate.  Overall looks much better  NAD  Neck supple  Heart S1S2  Chest: Equal expansion, bilaterally clear to auscultation  Abdomen: soft, ND, NTTP, no guarding  Extrem: no pain to palpation, lymphedema has completely resolved  Back pain is much better overall  Skin: no rashes, no diaphoresis  Neuro: CNS intact        Problem List[1]        ASSESSMENT:  Acute on chronic back pain with right sided epidural abscess L5 and lumbar discitis, multilevel -has been on broad-spectrum antibiotics with stop date that was supposed to be scheduled for today.  CRP still noted to be 8 however downtrending.  Unable to tell if everything has  resolved without any further imaging.  Immunosuppressed status, status post splenectomy  Atrial flutter, status post aortic valve replacement  Bilateral lower extremity swelling  Left upper extremity swelling/lymphedema, much better  History of splenectomy  Alcohol use history    PLAN:  Neurosurgery follow-up scheduled on 5/6/2025  MRI May 19 - scheduled  Patient has vascular appointment scheduled but his lymphedema has completely resolved.   Discussed appropriate vaccinations with history of splenectomy and potential for infection in immunosuppressed state  Can follow-up with infectious disease in 1 to 2 months as needed  Follow-up with hematologist oncologist and primary care physician regarding his abnormal labs.  Clinically he is much better.  Patient is overall feeling much better at this time  Stop IV abx and dc line.  7 more days of oral antibiotics and stop    Imaging and labs were reviewed per medical records and any ID pertinent labs were also addressed  Time spent before, during and after care today, including coordination of care >40 min      Debbie Soliz DO            [1]   Patient Active Problem List  Diagnosis    YANIRA (acute kidney injury)    Anemia    Aortic stenosis    ASCVD (arteriosclerotic cardiovascular disease)    Asplenia    Atelectasis    AV dissociation    AVM (arteriovenous malformation) ([facility])    Basal cell carcinoma, eyelid    Basal cell carcinoma, trunk    Borderline diabetes    BRBPR (bright red blood per rectum)    CAD S/P percutaneous coronary angioplasty    Colon polyp    Coronary artery disease involving autologous artery coronary bypass graft without angina pectoris    Coronary atherosclerosis    Diabetes mellitus type 2, diet-controlled    Diverticulosis    Elevated troponin I level    ETOH abuse    Facial droop    Fever    GERD (gastroesophageal reflux disease)    Hiatal hernia    Heart murmur    History of aortic valve replacement    History of stroke    Hodgkin disease  (Multi)    HTN (hypertension), benign    Hyperglycemia    Internal hemorrhoid    Iron deficiency anemia    Left leg weakness    Leukocytosis    Hyperlipidemia    Mixed hyperlipidemia    Nonrheumatic tricuspid valve regurgitation    Pain, postoperative, acute    Paroxysmal atrial fibrillation (Multi)    Persistent atrial fibrillation (Multi)    Peripheral edema    Radiation-induced heart disease    Reactive thrombocytosis    Thrombocytosis    Restless leg    S/P TVR (tricuspid valve repair)    Subcutaneous emphysema (CMS-HCC)    Type 2 diabetes mellitus, without long-term current use of insulin (Multi)    Atrial flutter (Multi)    Sinus node dysfunction (Multi)    Junctional bradycardia    BMI 21.0-21.9, adult    Never smoked tobacco    Abnormal MRI, lumbar spine

## 2025-04-23 NOTE — PROGRESS NOTES
Patient seen in clinic by Dr Soliz - IV antibiotics stopped and PICC line removed  Discharge from Infusion pharmacy service

## 2025-04-23 NOTE — LETTER
05/03/25    Josselyn Evans MD  [address] [address]      Dear Dr. Josselyn Evans MD,    I am writing to confirm that your patient, Kit Duncan, received care in my office on 05/03/25. I have enclosed a summary of the care provided to Kit for your reference.    Please contact me with any questions you may have regarding the visit.    Sincerely,         Debbie Soliz, DO  [address] [address]    CC: No Recipients

## 2025-04-25 ENCOUNTER — HOME CARE VISIT (OUTPATIENT)
Dept: HOME HEALTH SERVICES | Facility: HOME HEALTH | Age: 65
End: 2025-04-25
Payer: COMMERCIAL

## 2025-04-28 ENCOUNTER — HOME CARE VISIT (OUTPATIENT)
Dept: HOME HEALTH SERVICES | Facility: HOME HEALTH | Age: 65
End: 2025-04-28
Payer: COMMERCIAL

## 2025-04-28 ENCOUNTER — CARE COORDINATION (OUTPATIENT)
Dept: CARE COORDINATION | Age: 65
End: 2025-04-28

## 2025-04-28 NOTE — CARE COORDINATION
Ambulatory Care Coordination Note     4/28/2025 11:40 AM     patient outreach attempt by this ACM today to perform care management follow up . ACM was unable to reach the patient by telephone today;   left voice message requesting a return phone call to this ACM.     Patient closed (unable to reach patient) from the High Risk Care Management program on 4/28/2025.  Patient  unable to reach .  Care management goals have been completed. No further Ambulatory Care Manager follow up scheduled.

## 2025-04-29 DIAGNOSIS — F32.A DEPRESSION, UNSPECIFIED DEPRESSION TYPE: ICD-10-CM

## 2025-04-29 NOTE — TELEPHONE ENCOUNTER
Comments:     Last Office Visit (last PCP visit):   3/27/2025    Next Visit Date:  Future Appointments   Date Time Provider Department Center   5/13/2025  9:00 AM Jennie Lange, PT [facility]  [facility]   6/27/2025  9:15 AM HolDayo pedraza DO [facility]   8/20/2025 10:30 AM [facility]   10/16/2025  2:00 PM Hol[facility]o pedraza DO [facility]       **If hasn't been seen in over a year OR hasn't followed up according to last diabetes/ADHD visit, make appointment for patient before sending refill to provider.    Rx requested:  Requested Prescriptions     Pending Prescriptions Disp Refills    sertraline (ZOLOFT) 50 MG tablet [Pharmacy Med Name: SERTRALINE HCL 50 MG TABLET] 30 tablet 0     Sig: TAKE 1 TABLET BY MOUTH EVERY DAY

## 2025-04-30 ENCOUNTER — APPOINTMENT (OUTPATIENT)
Dept: VASCULAR SURGERY | Facility: CLINIC | Age: 65
End: 2025-04-30
Payer: COMMERCIAL

## 2025-04-30 ENCOUNTER — HOME CARE VISIT (OUTPATIENT)
Dept: HOME HEALTH SERVICES | Facility: HOME HEALTH | Age: 65
End: 2025-04-30
Payer: COMMERCIAL

## 2025-04-30 VITALS
HEART RATE: 65 BPM | SYSTOLIC BLOOD PRESSURE: 108 MMHG | DIASTOLIC BLOOD PRESSURE: 64 MMHG | TEMPERATURE: 97.5 F | OXYGEN SATURATION: 97 % | RESPIRATION RATE: 18 BRPM

## 2025-04-30 PROCEDURE — G0299 HHS/HOSPICE OF RN EA 15 MIN: HCPCS

## 2025-04-30 ASSESSMENT — PAIN SCALES - PAIN ASSESSMENT IN ADVANCED DEMENTIA (PAINAD)
FACIALEXPRESSION: 0 - SMILING OR INEXPRESSIVE.
CONSOLABILITY: 0
NEGVOCALIZATION: 0 - NONE.
FACIALEXPRESSION: 0
NEGVOCALIZATION: 0
BREATHING: 0
BODYLANGUAGE: 0 - RELAXED.
BODYLANGUAGE: 0
TOTALSCORE: 0
CONSOLABILITY: 0 - NO NEED TO CONSOLE.

## 2025-04-30 ASSESSMENT — ENCOUNTER SYMPTOMS
PAIN LOCATION - PAIN DURATION: INTERMITTENT
HIGHEST PAIN SEVERITY IN PAST 24 HOURS: 3/10
PAIN LOCATION - PAIN SEVERITY: 3/10
PAIN LOCATION - EXACERBATING FACTORS: ACTIVITY
PAIN LOCATION: BACK
PAIN LOCATION - RELIEVING FACTORS: REST/RX
PAIN SEVERITY GOAL: 0/10
PAIN: 1
PERSON REPORTING PAIN: PATIENT
PAIN LOCATION - PAIN FREQUENCY: INTERMITTENT
LOWEST PAIN SEVERITY IN PAST 24 HOURS: 0/10
PAIN LOCATION - PAIN QUALITY: ACHE

## 2025-04-30 ASSESSMENT — ACTIVITIES OF DAILY LIVING (ADL)
AMBULATION ASSISTANCE: INDEPENDENT
PHYSICAL TRANSFERS ASSESSED: 1
OASIS_M1830: 00
CURRENT_FUNCTION: INDEPENDENT
AMBULATION ASSISTANCE: 1
HOME_HEALTH_OASIS: 00

## 2025-05-05 DIAGNOSIS — I10 ESSENTIAL (PRIMARY) HYPERTENSION: ICD-10-CM

## 2025-05-05 DIAGNOSIS — I35.0 NONRHEUMATIC AORTIC (VALVE) STENOSIS: ICD-10-CM

## 2025-05-06 ENCOUNTER — APPOINTMENT (OUTPATIENT)
Dept: NEUROSURGERY | Facility: CLINIC | Age: 65
End: 2025-05-06
Payer: COMMERCIAL

## 2025-05-06 DIAGNOSIS — G89.29 CHRONIC LOW BACK PAIN WITH SCIATICA, SCIATICA LATERALITY UNSPECIFIED, UNSPECIFIED BACK PAIN LATERALITY: ICD-10-CM

## 2025-05-06 DIAGNOSIS — G06.1 ABSCESS IN EPIDURAL SPACE OF LUMBAR SPINE (HHS-HCC): Primary | ICD-10-CM

## 2025-05-06 DIAGNOSIS — M54.40 CHRONIC LOW BACK PAIN WITH SCIATICA, SCIATICA LATERALITY UNSPECIFIED, UNSPECIFIED BACK PAIN LATERALITY: ICD-10-CM

## 2025-05-06 PROCEDURE — 99204 OFFICE O/P NEW MOD 45 MIN: CPT | Performed by: STUDENT IN AN ORGANIZED HEALTH CARE EDUCATION/TRAINING PROGRAM

## 2025-05-06 RX ORDER — APIXABAN 5 MG/1
5 TABLET, FILM COATED ORAL 2 TIMES DAILY
Qty: 180 TABLET | Refills: 3 | Status: SHIPPED | OUTPATIENT
Start: 2025-05-06

## 2025-05-06 NOTE — PROGRESS NOTES
[facility] [place]  Department of Neurological Surgery  New Patient Visit    History of Present Illness:  Kit Duncan is a 65 y.o. year old male who presents to the spine clinic following lumbar discitis / epidural abscess.    Kit was admitted to [facility] from 2/25 - 3/4/25. There is no neurosurgical consultation from [facility].  His exam remained intact. No biopsy.  He was scheduled to see me in clinic for persistent and intense back pain, which has greatly improved over the last 7-10 days. Back pain currently reaches a 3.  He also had concurrent sciatica for about 8 weeks that has resolved. No radicular complaints. He arrived with his wife and walker, which he is weaning off and is going to start physical therapy through [facility].      ID Visit on 4/23/25  Patient is a followup regarding lumbar discitis and epidural abscess. MRI with abnormal T2 signal increase at L3, L4, S1 vertebrae. Epidural abscess at L5 measuring 14 x 7 mm anterior epidural space and 11 x 10 mm right lateral space. Not amenable to drainage. Patient has no history of MRSA per medical record. Has been on vancomycin and ceftriaxone empirically. Blood cultures negative EVENS negative. No available bone biopsy or cultures from the epidural space     His leg swelling has improved.  He has completed his ABX course. He is scheduled fro an MRI on 5/19 per ID. He has history of splenectomy, likely contributing to abscess. No organism ever identified.    He has significant cardiac hx with multiple MI x4, triple bypass, aflutter, AVR,  HL s/p sternotomy and radiation complications.  History of EtOH abuse that states is resolved.      14/14 systems reviewed and negative other than what is listed in the history of present illness    Problem List[1]  Medical History[2]  Surgical History[3]  Social History     Tobacco Use    Smoking status: Never    Smokeless tobacco: Never   Substance Use Topics    Alcohol use: Not Currently      Alcohol/week: 42.0 standard drinks of alcohol     Types: 42 Shots of liquor per week     Comment: 12oz of vodka/ daily     family history is not on file.  Current Medications[4]  Allergies[5]    Physical Examination    General: Well developed, awake/alert/oriented x3, no distress, alert and cooperative    Longstanding feet paresthesias  RIGHT 4 DF / 4+ EHL states since graft harvest from 3x bypass     Otherwise full strength    Results    I personally reviewed and interpreted the imaging results which included:    CT A/P 3/2/25:  Mild dextrocurve  No bony erosiion   Degenerative disc disease throughout lower thoracic/ lumbar spine, straightened alignemnt     MRI 2/27/25:   R L4-5, L5-S1 FS   Epidural Abscess,  ventralL5, Dorsal sac to S1-2    IMPRESSION:  Abnormal T2 signal increase and enhancement involving the L3, L4 and  S1 vertebrae suspicious for osteomyelitis and likely infection  involving the intervening discs. Epidural abscesses at and below the  L5 level as described above. Overall findings appear roughly similar  to the prior MRI.        Assessment and Plan:    Kit Duncan is a 65 y.o. year old male who presents to the spine clinic for follow-up regarding lumbro-sacral abscess.     ID is following his infection.  He has completed his ABX course and they ordered an MRI for MAY 19.  Clinically, he is recovering very well with no significant back pain and resolution of his radicular pain. CT showed no bony destruction on 3/2/25.     He has an extensive cardiac history and reasonably would like to avoid any surgery.  He does have significant degenerative lumbar disc disease with foraminal stenosis on the RIGHT at L4-5 and L5-S1.      He is going to have [facility] and there are no restrictions from [facility].    He does not need follow up from us, unless he develops new severe back pain or radiculopathy,  loss of bowel/bladder control, new weakness / loss of sensation.        I have  reviewed all prior documentation and reviewed the electronic medical record since admission. I have personally have reviewed all advanced imaging not just the reports and used my interpretation as documented as the relevant findings. I have reviewed the risks and benefits of all treatment recommendations listed in this note with the patient and family.       The above clinical summary has been dictated with voice recognition software. It has not been proofread for grammatical errors, typographical mistakes, or other semantic inconsistencies.    Thank you for visiting our office today. It was our pleasure to take part in your healthcare.     Do not hesitate to call with any questions regarding your plan of care after leaving at [phone] M-F 8am-4pm.     To clinicians, thank you very much for this kind referral. It is a privilege to partner with you in the care of your patients. My office would be delighted to assist you with any further consultations or with questions regarding the plan of care outlined. Do not hesitate to call the office or contact me directly.       Sincerely,      Carloz Mohamud MD, PhD  Attending Neurosurgeon, [facility]   of Neurological Surgery  [facility]  Office: [phone]  Fax: [phone]    [facility]  [address]  [address] [place] [address]                 [1]   Patient Active Problem List  Diagnosis    YANIRA (acute kidney injury)    Anemia    Aortic stenosis    ASCVD (arteriosclerotic cardiovascular disease)    Asplenia    Atelectasis    AV dissociation    AVM (arteriovenous malformation) (HHS-HCC)    Basal cell carcinoma, eyelid    Basal cell carcinoma, trunk    Borderline diabetes    BRBPR (bright red blood per rectum)    CAD S/P percutaneous coronary angioplasty    Colon polyp    Coronary artery disease involving autologous artery coronary bypass  graft without angina pectoris    Coronary atherosclerosis    Diabetes mellitus type 2, diet-controlled    Diverticulosis    Elevated troponin I level    ETOH abuse    Facial droop    Fever    GERD (gastroesophageal reflux disease)    Hiatal hernia    Heart murmur    History of aortic valve replacement    History of stroke    Hodgkin disease (Multi)    HTN (hypertension), benign    Hyperglycemia    Internal hemorrhoid    Iron deficiency anemia    Left leg weakness    Leukocytosis    Hyperlipidemia    Mixed hyperlipidemia    Nonrheumatic tricuspid valve regurgitation    Pain, postoperative, acute    Paroxysmal atrial fibrillation (Multi)    Persistent atrial fibrillation (Multi)    Peripheral edema    Radiation-induced heart disease    Reactive thrombocytosis    Thrombocytosis    Restless leg    S/P TVR (tricuspid valve repair)    Subcutaneous emphysema (CMS-HCC)    Type 2 diabetes mellitus, without long-term current use of insulin (Multi)    Atrial flutter (Multi)    Sinus node dysfunction (Multi)    Junctional bradycardia    BMI 21.0-21.9, adult    Never smoked tobacco    Abnormal MRI, lumbar spine   [2]   Past Medical History:  Diagnosis Date    Arrhythmia     Arthritis     Cancer (Multi)     CHF (congestive heart failure)     Coronary artery disease     Diabetes mellitus (Multi)     History of transfusion     Hypertension     Stroke (Multi)    [3]   Past Surgical History:  Procedure Laterality Date    AORTIC VALVE REPLACEMENT      APPENDECTOMY      CARDIAC ELECTROPHYSIOLOGY PROCEDURE N/A 02/27/2024    Procedure: Ablation SVT;  Surgeon: Chepe Dennis MD;  Location: [facility];  Service: Electrophysiology;  Laterality: N/A;    CARDIAC ELECTROPHYSIOLOGY PROCEDURE N/A 02/28/2024    Procedure: PPM IMPLANT DUAL;  Surgeon: Chepe Dennis MD;  Location: [facility];  Service: Electrophysiology;  Laterality: N/A;    CORONARY ARTERY BYPASS GRAFT  06/2024    HERNIA REPAIR      IR CVC PICC  3/3/2025     IR CVC PICC 3/3/2025 [facility]   [4]   Current Outpatient Medications:     0.9 % sodium chloride (sodium chloride 0.9%) solution, Infuse 10 mL into a venous catheter 2 times a day. 10ml before infusion and 10ml after infusion.  waste 5-6 ml prior to lab draw and flush with 20ml after blood draw.  Indications: maintain iv patency using hospitals protocol, Disp: , Rfl:     acetaminophen (Tylenol) 325 mg tablet, Take 2 tablets (650 mg) by mouth every 4 hours if needed for mild pain (1 - 3)., Disp: , Rfl: 0    apixaban (Eliquis) 5 mg tablet, Take 5 mg by mouth 2 times a day. Indications: treatment to prevent blood clots in chronic atrial fibrillation (Patient not taking: Reported on 3/27/2025), Disp: , Rfl:     aspirin 81 mg EC tablet, Take 1 tablet by mouth once daily., Disp: , Rfl:     coenzyme Q10-vitamin E 100-5 mg-unit capsule, Take 1 capsule by mouth once daily., Disp: , Rfl:     cyanocobalamin (Vitamin B-12) 1,000 mcg tablet, Take 1 tablet by mouth once daily., Disp: , Rfl:     empagliflozin (Jardiance) 25 mg, Take 1 tablet by mouth once daily., Disp: , Rfl:     EPINEPHrine 0.3 mg/0.3 mL injection syringe, Inject 0.3 mL into the muscle 1 time if needed., Disp: , Rfl:     ferrous sulfate, dried 160 mg (50 mg iron) ER tablet, TAKE 160 MG BY MOUTH 2 TIMES DAILY, Disp: , Rfl:     folic acid (Folvite) 800 mcg tablet, Take 1 tablet (800 mcg) by mouth once daily., Disp: , Rfl:     furosemide (Lasix) 20 mg tablet, Take 1 tablet by mouth 2 times a day., Disp: , Rfl:     heparin sodium,porcine/PF (HEPARIN, PORCINE, LOCK FLUSH IV), Infuse 50 Units into a venous catheter once daily. using hospitals protocol. if drawing labs waste 5-10ml and follow with 20ml normal saline flush and 5ml heparin 10units/ml.  Indications: maintain iv patency, Disp: , Rfl:     ibuprofen 600 mg tablet, Take 1 tablet (600 mg) by mouth every 6 hours if needed for moderate pain (4 - 6)., Disp: 60 tablet, Rfl: 0    magnesium oxide (Mag-Ox) 400 mg (241.3 mg  magnesium) tablet, Take 1 tablet by mouth every other day., Disp: , Rfl:     metoprolol tartrate (Lopressor) 25 mg tablet, Take 1 tablet (25 mg) by mouth 2 times a day. (Patient taking differently: Take 2 tablets by mouth 2 times a day. Indications: high blood pressure), Disp: 60 tablet, Rfl: 5    nitroglycerin (Nitrostat) 0.4 mg SL tablet, Place 1 tablet under the tongue every 5 minutes if needed., Disp: , Rfl:     pantoprazole (ProtoNix) 20 mg EC tablet, Take 1 tablet (20 mg) by mouth once daily., Disp: , Rfl:     pravastatin (Pravachol) 40 mg tablet, Take 1 tablet by mouth once daily at bedtime., Disp: , Rfl:     spironolactone (Aldactone) 25 mg tablet, Take 25 mg by mouth early in the morning. Indications: accumulation of fluid resulting from chronic heart failure, Disp: , Rfl:     thiamine (Vitamin B-1) 100 mg tablet, Take 1 tablet (100 mg) by mouth once daily., Disp: 30 tablet, Rfl: 0    traZODone (Desyrel) 50 mg tablet, Take 50 mg by mouth once daily at bedtime. Indications: insomnia associated with depression, Disp: , Rfl:   [5]   Allergies  Allergen Reactions    Pollen Extracts Hives    Atorvastatin Other    Insects Extract Other     Patient is allergic to flying ants    Iodinated Contrast Media Unknown    Ragweed Other

## 2025-05-06 NOTE — TELEPHONE ENCOUNTER
Received request for prescription refills for patient.   Patient follows with Dr. Dennis     Request is for Eliquis   Is patient currently on medication yes     Last OV 1/2/2025  Next OV 7/02/2025    Pended for signing and sent to provider

## 2025-05-07 PROBLEM — Z12.5 SCREENING FOR PROSTATE CANCER: Status: RESOLVED | Noted: 2025-04-07 | Resolved: 2025-05-07

## 2025-05-07 PROBLEM — Z13.29 SCREENING FOR THYROID DISORDER: Status: RESOLVED | Noted: 2025-04-07 | Resolved: 2025-05-07

## 2025-05-08 ENCOUNTER — APPOINTMENT (OUTPATIENT)
Facility: CLINIC | Age: 65
End: 2025-05-08
Payer: COMMERCIAL

## 2025-05-08 VITALS
SYSTOLIC BLOOD PRESSURE: 102 MMHG | HEIGHT: 66 IN | DIASTOLIC BLOOD PRESSURE: 66 MMHG | HEART RATE: 81 BPM | BODY MASS INDEX: 20.73 KG/M2 | TEMPERATURE: 96.8 F | WEIGHT: 129 LBS

## 2025-05-08 DIAGNOSIS — Z90.81 H/O SPLENECTOMY: ICD-10-CM

## 2025-05-08 DIAGNOSIS — G06.2 EPIDURAL ABSCESS (HHS-HCC): ICD-10-CM

## 2025-05-08 DIAGNOSIS — R63.4 WEIGHT LOSS: ICD-10-CM

## 2025-05-08 DIAGNOSIS — G89.29 CHRONIC LOW BACK PAIN WITH SCIATICA, SCIATICA LATERALITY UNSPECIFIED, UNSPECIFIED BACK PAIN LATERALITY: ICD-10-CM

## 2025-05-08 DIAGNOSIS — R53.83 OTHER FATIGUE: Primary | ICD-10-CM

## 2025-05-08 DIAGNOSIS — M54.40 CHRONIC LOW BACK PAIN WITH SCIATICA, SCIATICA LATERALITY UNSPECIFIED, UNSPECIFIED BACK PAIN LATERALITY: ICD-10-CM

## 2025-05-08 PROCEDURE — 3074F SYST BP LT 130 MM HG: CPT | Performed by: STUDENT IN AN ORGANIZED HEALTH CARE EDUCATION/TRAINING PROGRAM

## 2025-05-08 PROCEDURE — 3078F DIAST BP <80 MM HG: CPT | Performed by: STUDENT IN AN ORGANIZED HEALTH CARE EDUCATION/TRAINING PROGRAM

## 2025-05-08 PROCEDURE — 99244 OFF/OP CNSLTJ NEW/EST MOD 40: CPT | Performed by: STUDENT IN AN ORGANIZED HEALTH CARE EDUCATION/TRAINING PROGRAM

## 2025-05-08 PROCEDURE — 3008F BODY MASS INDEX DOCD: CPT | Performed by: STUDENT IN AN ORGANIZED HEALTH CARE EDUCATION/TRAINING PROGRAM

## 2025-05-08 NOTE — PATIENT INSTRUCTIONS
Blood work in am    I will let you know if anything wrong I will let you know    RTC to be decided

## 2025-05-08 NOTE — PROGRESS NOTES
Patient referred Dr. Debbie Soliz  for evaluation of adrenal nodule    Subjective   Kit Duncan is a 65 y.o. male with a hx of Hodgkin lymphoma s/p surgery, spleenectomy and radiation, CVD, melanoma and recent spinal infection who presents for evaluation of fatigue and tachycardia  At age 19 had hodgkin disease s/p surgery and spleenectomy   Had radiation neck/chest/abdomen  4 MI-- s/p 5 PCI and s/p CABG June 2023 s/p PM  Had Bovin   2CVA  Collapsed CBD  3 melanoma last 5 yrs ago    Recently had a spinal infx took Abx vancomycin/rocephine  Has been having low BP   Tachycardia and fatigue  Started having issues during   Lost 20lbs since the treatment then gained 5 lbs in 2 weeks  Haven't been working out s much  Appetite was low when he was on abx but now improving   Was on lasix stopped  Does not sleep well. But now sleep 8-12 hours but intermittent  Has been on gabapentin and Zoloft  Drinks 12-14 ounces of vodka a day before the infection.  Currently drinking 4-8 ounces vodka  HR in the morning 112-120 and sometimes during the day  Nausea/vomiting sometimes  No recent Gcs  Headache  Blurry vision    Meds:  Metoprolol 25 mg BID  Jardiance 25   Eliquis  PPI  Pravastatin  Folic acid  B12  Iron    FHX reviewed    ROS  all pertinent systems reviewed and are otherwise negative   Physical Exam  Physical Exam  Constitutional:       Appearance: Normal appearance.   HENT:      Head: Normocephalic and atraumatic.      Nose: Nose normal.      Mouth/Throat:      Mouth: Mucous membranes are moist.   Eyes:      Extraocular Movements: Extraocular movements intact.   Cardiovascular:      Rate and Rhythm: Normal rate.   Pulmonary:      Effort: Pulmonary effort is normal. No respiratory distress.   Abdominal:      General: Abdomen is flat.      Palpations: Abdomen is soft.   Musculoskeletal:      Right lower leg: No edema.      Left lower leg: No edema.   Skin:     General: Skin is warm and dry.   Neurological:       General: No focal deficit present.      Mental Status: He is alert.   Psychiatric:         Mood and Affect: Mood normal.    Current Medication List  Current Medications[1]  Results    No recent endocrine labs in the system  Assessment/Plan   Kit Duncan is a 65 y.o. male with a hx of Hodgkin lymphoma s/p surgery, spleenectomy and radiation, CVD, melanoma and recent spinal infection who presents for evaluation of fatigue and tachycardia  At age 19 had hodgkin disease s/p surgery and spleenectomy   Had radiation neck/chest/abdomen  4 MI-- s/p 5 PCI and s/p CABG June 2023 s/p PM  Had Bovin   2CVA  Collapsed CBD  3 melanoma last 5 yrs ago  Recently had a spinal infx took Abx vancomycin/rocephine  Has been having low BP , Tachycardia and fatigue  Lost 20lbs since the treatment then gained 5 lbs in 2 weeks  Haven't been working out s much  Appetite was low when he was on abx but now improving   Was on lasix stopped  Drinks 12-14 ounces of vodka a day before the infection.  Currently drinking 4-8 ounces vodka  Clinically less likely related to adrenal insufficiency or thyroid issues  PLAN:  Blood work including morning cortisol, DHEAs, ACTH and TFTs    RTC to be decided according to blood work             [1]   Current Outpatient Medications:     0.9 % sodium chloride (sodium chloride 0.9%) solution, Infuse 10 mL into a venous catheter 2 times a day. 10ml before infusion and 10ml after infusion.  waste 5-6 ml prior to lab draw and flush with 20ml after blood draw.  Indications: maintain iv patency using [facility] protocol, Disp: , Rfl:     acetaminophen (Tylenol) 325 mg tablet, Take 2 tablets (650 mg) by mouth every 4 hours if needed for mild pain (1 - 3)., Disp: , Rfl: 0    aspirin 81 mg EC tablet, Take 1 tablet by mouth once daily., Disp: , Rfl:     coenzyme Q10-vitamin E 100-5 mg-unit capsule, Take 1 capsule by mouth once daily., Disp: , Rfl:     cyanocobalamin (Vitamin B-12) 1,000 mcg tablet, Take 1 tablet by  mouth once daily., Disp: , Rfl:     Eliquis 5 mg tablet, TAKE 1 TABLET BY MOUTH TWICE A DAY, Disp: 180 tablet, Rfl: 3    empagliflozin (Jardiance) 25 mg, Take 1 tablet by mouth once daily., Disp: , Rfl:     EPINEPHrine 0.3 mg/0.3 mL injection syringe, Inject 0.3 mL into the muscle 1 time if needed., Disp: , Rfl:     ferrous sulfate, dried 160 mg (50 mg iron) ER tablet, TAKE 160 MG BY MOUTH 2 TIMES DAILY, Disp: , Rfl:     folic acid (Folvite) 800 mcg tablet, Take 1 tablet (800 mcg) by mouth once daily., Disp: , Rfl:     furosemide (Lasix) 20 mg tablet, Take 1 tablet by mouth 2 times a day., Disp: , Rfl:     heparin sodium,porcine/PF (HEPARIN, PORCINE, LOCK FLUSH IV), Infuse 50 Units into a venous catheter once daily. using [facility] protocol. if drawing labs waste 5-10ml and follow with 20ml normal saline flush and 5ml heparin 10units/ml.  Indications: maintain iv patency, Disp: , Rfl:     ibuprofen 600 mg tablet, Take 1 tablet (600 mg) by mouth every 6 hours if needed for moderate pain (4 - 6)., Disp: 60 tablet, Rfl: 0    magnesium oxide (Mag-Ox) 400 mg (241.3 mg magnesium) tablet, Take 1 tablet by mouth every other day., Disp: , Rfl:     metoprolol tartrate (Lopressor) 25 mg tablet, Take 1 tablet (25 mg) by mouth 2 times a day. (Patient taking differently: Take 2 tablets by mouth 2 times a day. Indications: high blood pressure), Disp: 60 tablet, Rfl: 5    nitroglycerin (Nitrostat) 0.4 mg SL tablet, Place 1 tablet under the tongue every 5 minutes if needed., Disp: , Rfl:     pantoprazole (ProtoNix) 20 mg EC tablet, Take 1 tablet (20 mg) by mouth once daily., Disp: , Rfl:     pravastatin (Pravachol) 40 mg tablet, Take 1 tablet by mouth once daily at bedtime., Disp: , Rfl:     spironolactone (Aldactone) 25 mg tablet, Take 25 mg by mouth early in the morning. Indications: accumulation of fluid resulting from chronic heart failure, Disp: , Rfl:     thiamine (Vitamin B-1) 100 mg tablet, Take 1 tablet (100 mg) by mouth once  daily., Disp: 30 tablet, Rfl: 0    traZODone (Desyrel) 50 mg tablet, Take 50 mg by mouth once daily at bedtime. Indications: insomnia associated with depression, Disp: , Rfl:

## 2025-05-13 ENCOUNTER — HOSPITAL ENCOUNTER (OUTPATIENT)
Dept: PHYSICAL THERAPY | Age: 65
Setting detail: THERAPIES SERIES
Discharge: HOME | End: 2025-05-13
Payer: COMMERCIAL

## 2025-05-13 PROCEDURE — 97162 PT EVAL MOD COMPLEX 30 MIN: CPT

## 2025-05-13 PROCEDURE — 97530 THERAPEUTIC ACTIVITIES: CPT

## 2025-05-13 PROCEDURE — 97110 THERAPEUTIC EXERCISES: CPT

## 2025-05-13 ASSESSMENT — PAIN DESCRIPTION - PAIN TYPE: TYPE: CHRONIC PAIN

## 2025-05-13 ASSESSMENT — PAIN DESCRIPTION - ORIENTATION: ORIENTATION: RIGHT;LEFT

## 2025-05-13 ASSESSMENT — PAIN DESCRIPTION - DESCRIPTORS: DESCRIPTORS: ACHING;SORE;DISCOMFORT

## 2025-05-13 ASSESSMENT — PAIN DESCRIPTION - LOCATION: LOCATION: BACK

## 2025-05-13 ASSESSMENT — PAIN SCALES - GENERAL: PAINLEVEL_OUTOF10: 2

## 2025-05-13 NOTE — PROGRESS NOTES
Physical Therapy: Initial Evaluation    Patient: Conner Cheung (65 y.o. male)   Examination Date: 2025  Plan of Care Certification Period: 2025 to 25      :  1960 ;    Confirmed: Yes MRN: [id number]  CSN: [id number]   Insurance: Payor: [employer] [facility] / Plan: [employer] [facility] / Product Type: *No Product type* /   Insurance ID: [id number] - (Medicaid Managed) Secondary Insurance (if applicable):    Referring Physician: Chelo Pal MD Dr Ravichandran   PCP: Chelo Pal MD Visits to Date/Visits Approved:  (insurance, needs prior auth)    No Show/Cancelled Appts:      Medical Diagnosis: No admission diagnoses are documented for this encounter. chronic midline low back pain with sciatica  Treatment Diagnosis: Chronic LBP with sciatica     PERTINENT MEDICAL HISTORY      Self reported health status:: Excellent    Medical History:     Past Medical History:   Diagnosis Date    Abnormal echocardiogram     EF 49% TR with mild pumonary HTN    Atrial fibrillation (HCC)     Basal cell carcinoma, trunk 2016    CAD (coronary artery disease)     s/p stents    CAD S/P percutaneous coronary angioplasty 2014    Chronic congestive heart failure (HCC) 2024    Family history of heart disease 2016    GERD (gastroesophageal reflux disease)     Heart murmur     benign-result of AVR    History of basal cell cancer     face    History of stroke 10/13/2014    History of tobacco abuse 2016    Hodgkin disease (HCC)     1979    Hodgkin disease (HCC)     1979     Hyperlipidemia 2014    Hypertension     Persistent atrial fibrillation (HCC) 2022    Pre-diabetes 2015    S/P AVR     bovine    Spinal osteoarthritis 2025    Tachycardia, unspecified 2017     Surgical History:   Past Surgical History:   Procedure Laterality Date    ANGIOPLASTY  2015    MG JAMSHID AQUINO  PCI PROCEDURE    AORTIC VALVE

## 2025-05-14 LAB
ACTH PLAS-MCNC: 9 PG/ML (ref 6–50)
ALBUMIN SERPL-MCNC: 3.5 G/DL (ref 3.6–5.1)
BUN SERPL-MCNC: 16 MG/DL (ref 7–25)
BUN/CREAT SERPL: ABNORMAL (CALC) (ref 6–22)
CALCIUM SERPL-MCNC: 10 MG/DL (ref 8.6–10.3)
CHLORIDE SERPL-SCNC: 101 MMOL/L (ref 98–110)
CO2 SERPL-SCNC: 24 MMOL/L (ref 20–32)
CORTIS AM PEAK SERPL-MCNC: 20 MCG/DL
CREAT SERPL-MCNC: 0.95 MG/DL (ref 0.7–1.35)
EGFRCR SERPLBLD CKD-EPI 2021: 89 ML/MIN/1.73M2
GLUCOSE SERPL-MCNC: 135 MG/DL (ref 65–99)
PHOSPHATE SERPL-MCNC: 3.6 MG/DL (ref 2.5–4.5)
POTASSIUM SERPL-SCNC: 5 MMOL/L (ref 3.5–5.3)
SODIUM SERPL-SCNC: 134 MMOL/L (ref 135–146)
T4 FREE SERPL-MCNC: 1.1 NG/DL (ref 0.8–1.8)
TSH SERPL-ACNC: 0.91 MIU/L (ref 0.4–4.5)
U DHEA SERPL-MCNC: 103 NG/DL (ref 147–1760)

## 2025-05-19 ENCOUNTER — HOSPITAL ENCOUNTER (OUTPATIENT)
Dept: RADIOLOGY | Facility: HOSPITAL | Age: 65
Discharge: HOME | End: 2025-05-19
Payer: COMMERCIAL

## 2025-05-19 ENCOUNTER — APPOINTMENT (OUTPATIENT)
Dept: CARDIOLOGY | Facility: HOSPITAL | Age: 65
End: 2025-05-19
Payer: COMMERCIAL

## 2025-05-19 DIAGNOSIS — G89.29 CHRONIC LOW BACK PAIN WITH SCIATICA, SCIATICA LATERALITY UNSPECIFIED, UNSPECIFIED BACK PAIN LATERALITY: ICD-10-CM

## 2025-05-19 DIAGNOSIS — M54.40 CHRONIC LOW BACK PAIN WITH SCIATICA, SCIATICA LATERALITY UNSPECIFIED, UNSPECIFIED BACK PAIN LATERALITY: ICD-10-CM

## 2025-05-19 PROCEDURE — 72158 MRI LUMBAR SPINE W/O & W/DYE: CPT | Performed by: RADIOLOGY

## 2025-05-19 PROCEDURE — 72158 MRI LUMBAR SPINE W/O & W/DYE: CPT

## 2025-05-19 PROCEDURE — A9575 INJ GADOTERATE MEGLUMI 0.1ML: HCPCS | Performed by: INTERNAL MEDICINE

## 2025-05-19 PROCEDURE — 2550000001 HC RX 255 CONTRASTS: Performed by: INTERNAL MEDICINE

## 2025-05-19 RX ORDER — GADOTERATE MEGLUMINE 376.9 MG/ML
0.2 INJECTION INTRAVENOUS
Status: COMPLETED | OUTPATIENT
Start: 2025-05-19 | End: 2025-05-19

## 2025-05-19 RX ADMIN — GADOTERATE MEGLUMINE 11.5 ML: 376.9 INJECTION INTRAVENOUS at 11:42

## 2025-05-19 NOTE — NURSING NOTE
Pt in MRI for exam.  Pt has PPM.  Device to be placed in surescan mode by Radiology RN-DOO@ 85bpm.  Device monitoring equipment placed on pt.  HR 85  Spo2 97% RA.  No ectopy noted.  Tele monitored throughout exam by Radiology RN.  Pt tolerated exam fairly.  Device settings replaced-surescan off.

## 2025-05-20 ENCOUNTER — HOSPITAL ENCOUNTER (OUTPATIENT)
Dept: PHYSICAL THERAPY | Age: 65
Setting detail: THERAPIES SERIES
Discharge: HOME | End: 2025-05-20
Payer: COMMERCIAL

## 2025-05-20 PROCEDURE — 97116 GAIT TRAINING THERAPY: CPT

## 2025-05-20 PROCEDURE — 97110 THERAPEUTIC EXERCISES: CPT

## 2025-05-20 ASSESSMENT — PAIN DESCRIPTION - LOCATION: LOCATION: BACK

## 2025-05-20 ASSESSMENT — PAIN DESCRIPTION - ORIENTATION: ORIENTATION: RIGHT;LEFT

## 2025-05-20 ASSESSMENT — PAIN DESCRIPTION - DESCRIPTORS: DESCRIPTORS: ACHING;SORE

## 2025-05-20 ASSESSMENT — PAIN DESCRIPTION - PAIN TYPE: TYPE: CHRONIC PAIN

## 2025-05-20 NOTE — PROGRESS NOTES
Physical Therapy: Daily Note   Patient: Conner Cheung (65 y.o. male)   Examination Date: 2025  Plan of Care/Certification Expiration Date: 25    No data recorded   :  1960 # of Visits since SOC:   2   MRN: [id number]  CSN: [id number] Start of Care Date:   2025   Insurance: Payor: [employer] / Plan: [employer] PL[employer] / Product Type: *No Product type* /   Insurance ID: [id number] - (Medicaid Managed) Secondary Insurance (if applicable):    Referring Physician: Chelo Pal MD Dr Ravichandran   PCP: Chelo Pal MD Visits to Date/Visits Approved:     No Show/Cancelled Appts: 0  0     Medical Diagnosis: No admission diagnoses are documented for this encounter.    Treatment Diagnosis: Chronic LBP with sciatica        SUBJECTIVE EXAMINATION   Pain Level: Pain Screening  Patient Currently in Pain: Yes  Pain Assessment: 0-10  Pain Type: Chronic pain  Pain Location: Back  Pain Orientation: Right, Left  Pain Descriptors: Aching, Sore    Patient Comments: Subjective: Pt states stiffness in LB at 3/10. He states stiffness mostly in morning.    HEP Compliance: Good  Any changes to allergies, medications, or have you had any medical procedures/ER visits since your last visit?: No     OBJECTIVE EXAMINATION   Restrictions:  No data recorded No data recorded No data recorded        TREATMENT     Exercises:      Treatment Reasoning    Exercise 2: tall sit breathing in and out 3 count for posture x 10  Exercise 3: bridge with adductor ball 3 hold and 3 slow lower  x 10 reps  Exercise 4: seated hamstring stretch 30 sec x 3 each leg  Exercise 5: standing heel/ toe raises x 10 H2  Exercise 6: marches x 10 H3 1 UE support  Exercise 7: PPT x 10 H5  Exercise 8: LTR's x10 H5  Exercise 9: SKTC X 3 H30  Exercise 10: SLR'S X10 h3                          Gait: (CPT 64471)  Treatment Reasoning    Gait Training 1: Pt ambulated 440' lap with Straight cane in L

## 2025-05-22 ENCOUNTER — HOSPITAL ENCOUNTER (OUTPATIENT)
Dept: PHYSICAL THERAPY | Age: 65
Setting detail: THERAPIES SERIES
Discharge: HOME | End: 2025-05-22
Payer: COMMERCIAL

## 2025-05-22 PROCEDURE — 97530 THERAPEUTIC ACTIVITIES: CPT

## 2025-05-22 PROCEDURE — 97110 THERAPEUTIC EXERCISES: CPT

## 2025-05-22 NOTE — PROGRESS NOTES
Physical Therapy  Physical Therapy: Daily Note   Patient: Conner Cheung (65 y.o. male)   Examination Date: 2025  Plan of Care/Certification Expiration Date: 25    No data recorded   :  1960 # of Visits since SOC:   3   MRN: [id number]  CSN: [id number] Start of Care Date:   2025   Insurance: Payor: [employer] / Plan: [employer] PLAN / Product Type: *No Product type* /   Insurance ID: [id number] - (Medicaid Managed) Secondary Insurance (if applicable):    Referring Physician: Chelo Pal MD Dr Ravichandran   PCP: Chelo Pal MD Visits to Date/Visits Approved: 3 / 8    No Show/Cancelled Appts: 0  0     Medical Diagnosis: No admission diagnoses are documented for this encounter. chronic midline low back pain with sciatica  Treatment Diagnosis: Chronic LBP with sciatica        SUBJECTIVE EXAMINATION   Pain Level:      Patient Comments: Subjective: My low back is sore today 3 to 4/10. I am stiff in the mornings and not sure how much I can do this morning. I though I would still come in. I feel I may do to much with    HEP Compliance: Good        OBJECTIVE EXAMINATION   Restrictions:  No data recorded No data recorded No data recorded              TREATMENT     Exercises:      Treatment Reasoning    Exercise 4: seated hamstring stretch 30 sec x 3 each leg  Exercise 9: SKTC X 3 H30  Exercise 11: DKTC 30 sec H x 2  Exercise 12: Seated DF RTB 2x15'  Exercise 13: Supine IT band stretch with sheet 20 sec H x 2  Exercise 14: Supine hip adductor stretch with shee 20 sec H x 2    Limitations addressed: Mobility, Strength, Flexibility, Activity tolerance, Posture, Pain modulation                     Therapeutic Activities: (CPT 36264) Treatment Reasoning     Supine <-> sit x 2 with good slow technique. Pt. Slow to move reporting to reduce back pain. Pt. Demo's occasional twisting of upper and lower back. Pt. Educated on safety awareness and

## 2025-05-23 ENCOUNTER — HOSPITAL ENCOUNTER (OUTPATIENT)
Dept: LAB | Age: 65
Discharge: HOME | End: 2025-05-23

## 2025-05-23 ENCOUNTER — RESULTS FOLLOW-UP (OUTPATIENT)
Dept: FAMILY MEDICINE CLINIC | Age: 65
End: 2025-05-23

## 2025-05-23 DIAGNOSIS — D50.8 OTHER IRON DEFICIENCY ANEMIA: ICD-10-CM

## 2025-05-23 DIAGNOSIS — E61.1 IRON DEFICIENCY: ICD-10-CM

## 2025-05-23 DIAGNOSIS — E78.5 HYPERLIPIDEMIA ASSOCIATED WITH TYPE 2 DIABETES MELLITUS (HCC): ICD-10-CM

## 2025-05-23 DIAGNOSIS — E11.69 HYPERLIPIDEMIA ASSOCIATED WITH TYPE 2 DIABETES MELLITUS (HCC): ICD-10-CM

## 2025-05-23 DIAGNOSIS — Z12.5 SCREENING FOR PROSTATE CANCER: ICD-10-CM

## 2025-05-23 DIAGNOSIS — Z13.29 SCREENING FOR THYROID DISORDER: ICD-10-CM

## 2025-05-23 DIAGNOSIS — E11.9 CONTROLLED TYPE 2 DIABETES MELLITUS WITHOUT COMPLICATION, WITHOUT LONG-TERM CURRENT USE OF INSULIN (HCC): ICD-10-CM

## 2025-05-23 DIAGNOSIS — F32.A DEPRESSION, UNSPECIFIED DEPRESSION TYPE: ICD-10-CM

## 2025-05-23 LAB
ALBUMIN SERPL-MCNC: 3.3 G/DL (ref 3.5–4.6)
ALP SERPL-CCNC: 200 U/L (ref 35–104)
ALT SERPL-CCNC: 15 U/L (ref 0–41)
ANION GAP SERPL CALCULATED.3IONS-SCNC: 11 MEQ/L (ref 9–15)
AST SERPL-CCNC: 24 U/L (ref 0–40)
BASOPHILS # BLD: 0.1 K/UL (ref 0–0.2)
BASOPHILS NFR BLD: 1 %
BILIRUB SERPL-MCNC: 0.4 MG/DL (ref 0.2–0.7)
BUN SERPL-MCNC: 14 MG/DL (ref 8–23)
BURR CELLS: ABNORMAL
CALCIUM SERPL-MCNC: 9.4 MG/DL (ref 8.5–9.9)
CHLORIDE SERPL-SCNC: 101 MEQ/L (ref 95–107)
CHOLEST SERPL-MCNC: 136 MG/DL (ref 0–199)
CO2 SERPL-SCNC: 26 MEQ/L (ref 20–31)
CREAT SERPL-MCNC: 0.79 MG/DL (ref 0.7–1.2)
CREAT UR-MCNC: 75.4 MG/DL
EOSINOPHIL # BLD: 0.3 K/UL (ref 0–0.7)
EOSINOPHIL NFR BLD: 2 %
ERYTHROCYTE [DISTWIDTH] IN BLOOD BY AUTOMATED COUNT: 15 % (ref 11.5–14.5)
GLOBULIN SER CALC-MCNC: 2.8 G/DL (ref 2.3–3.5)
GLUCOSE FASTING: 119 MG/DL (ref 70–99)
HCT VFR BLD AUTO: 43.6 % (ref 42–52)
HDLC SERPL-MCNC: 51 MG/DL (ref 40–59)
HGB BLD-MCNC: 14.5 G/DL (ref 14–18)
IRON % SATURATION: 13 % (ref 20–55)
IRON: 36 UG/DL (ref 61–157)
LDL CHOLESTEROL: 75 MG/DL (ref 0–129)
LYMPHOCYTES # BLD: 0.3 K/UL (ref 1–4.8)
LYMPHOCYTES NFR BLD: 2 %
MCH RBC QN AUTO: 30.4 PG (ref 27–31.3)
MCHC RBC AUTO-ENTMCNC: 33.3 % (ref 33–37)
MCV RBC AUTO: 91.4 FL (ref 79–92.2)
MICROALBUMIN UR-MCNC: <1.2 MG/DL
MICROALBUMIN/CREAT UR-RTO: NORMAL MG/G (ref 0–30)
MONOCYTES # BLD: 1.1 K/UL (ref 0.2–0.8)
MONOCYTES NFR BLD: 9.4 %
NEUTROPHILS # BLD: 10.8 K/UL (ref 1.4–6.5)
NEUTS SEG NFR BLD: 86 %
NRBC BLD-RTO: 1 /100 WBC
PLATELET # BLD AUTO: 515 K/UL (ref 130–400)
PLATELET BLD QL SMEAR: ABNORMAL
POIKILOCYTOSIS BLD QL SMEAR: ABNORMAL
POTASSIUM SERPL-SCNC: 3.8 MEQ/L (ref 3.4–4.9)
PROT SERPL-MCNC: 6.1 G/DL (ref 6.3–8)
PSA SERPL-MCNC: 0.73 NG/ML (ref 0–4)
RBC # BLD AUTO: 4.77 M/UL (ref 4.7–6.1)
SODIUM SERPL-SCNC: 138 MEQ/L (ref 135–144)
TOTAL IRON BINDING CAPACITY: 281 UG/DL (ref 250–450)
TRIGLYCERIDE, FASTING: 49 MG/DL (ref 0–150)
TSH SERPL-MCNC: 0.89 UIU/ML (ref 0.44–3.86)
UNSATURATED IRON BINDING CAPACITY: 245 UG/DL (ref 112–347)
WBC # BLD AUTO: 12.5 K/UL (ref 4.8–10.8)

## 2025-05-23 NOTE — TELEPHONE ENCOUNTER
Pharmacy is requesting medication refill. Please approve or deny this request.    Rx requested:  Requested Prescriptions     Pending Prescriptions Disp Refills    sertraline (ZOLOFT) 50 MG tablet [Pharmacy Med Name: SERTRALINE HCL 50 MG TABLET] 90 tablet 1     Sig: TAKE 1 TABLET BY MOUTH EVERY DAY         Last Office Visit:   4/7/2025      Next Visit Date:  Future Appointments   Date Time Provider Department Center   5/30/2025  3:00 PM [facility] [place]   6/3/2025  8:45 AM Sofia Apple PTA [facility]  PT [facility]   6/5/2025  8:00 AM Matthew Cooper PTA [facility]  PT [facility]   6/27/2025  9:15 AM Dayo Simons DO [facility]   8/20/2025 10:30 AM [facility]   10/16/2025  2:00 PM Dayo Simons DO [facility]

## 2025-05-27 ENCOUNTER — TELEPHONE (OUTPATIENT)
Age: 65
End: 2025-05-27

## 2025-05-27 ENCOUNTER — APPOINTMENT (OUTPATIENT)
Dept: PHYSICAL THERAPY | Age: 65
End: 2025-05-27
Payer: COMMERCIAL

## 2025-05-27 NOTE — TELEPHONE ENCOUNTER
Appointment Request From: Conner Cheung      With Provider: Dr. Dayo Simons DO [[facility]]      Preferred Date Range: 5/26/2025 - 5/30/2025      Preferred Times: Any Time      Reason for visit: Request an Appointment      Health Maintenance Topic:      Comments:   Follow up after spinal infection to discuss continued tachycardia.     Appointment Request  (Newest Message First)  Conner SANDOVAL [facility]  (supporting Dayo Simons DO)4 days ago       Appointment Request From: Conner Cheung     With Provider: Dr. Dayo Simons DO [[facility]]     Preferred Date Range: 5/26/2025 - 5/30/2025     Preferred Times: Any Time     Reason for visit: Request an Appointment     Health Maintenance Topic:      Comments:  Follow up after spinal infection to discuss continued tachycardia.    PT ALREADY HAS AN APPT SCHEDULED FOR 6/27/25 WITH DR HOLIDAY.

## 2025-05-30 ENCOUNTER — OFFICE VISIT (OUTPATIENT)
Dept: FAMILY MEDICINE CLINIC | Age: 65
End: 2025-05-30
Payer: COMMERCIAL

## 2025-05-30 ENCOUNTER — HOSPITAL ENCOUNTER (OUTPATIENT)
Dept: PHYSICAL THERAPY | Age: 65
Setting detail: THERAPIES SERIES
End: 2025-05-30
Payer: COMMERCIAL

## 2025-05-30 VITALS
HEART RATE: 79 BPM | WEIGHT: 132 LBS | DIASTOLIC BLOOD PRESSURE: 62 MMHG | HEIGHT: 66 IN | OXYGEN SATURATION: 99 % | SYSTOLIC BLOOD PRESSURE: 110 MMHG | BODY MASS INDEX: 21.21 KG/M2

## 2025-05-30 DIAGNOSIS — E11.9 CONTROLLED TYPE 2 DIABETES MELLITUS WITHOUT COMPLICATION, WITHOUT LONG-TERM CURRENT USE OF INSULIN (HCC): Primary | ICD-10-CM

## 2025-05-30 DIAGNOSIS — I15.2 HYPERTENSION ASSOCIATED WITH DIABETES (HCC): ICD-10-CM

## 2025-05-30 DIAGNOSIS — I48.19 PERSISTENT ATRIAL FIBRILLATION (HCC): ICD-10-CM

## 2025-05-30 DIAGNOSIS — E11.59 HYPERTENSION ASSOCIATED WITH DIABETES (HCC): ICD-10-CM

## 2025-05-30 DIAGNOSIS — D75.839 THROMBOCYTOSIS: ICD-10-CM

## 2025-05-30 DIAGNOSIS — Z85.72 HISTORY OF LYMPHOMA: ICD-10-CM

## 2025-05-30 DIAGNOSIS — D50.8 OTHER IRON DEFICIENCY ANEMIA: ICD-10-CM

## 2025-05-30 DIAGNOSIS — E11.69 HYPERLIPIDEMIA ASSOCIATED WITH TYPE 2 DIABETES MELLITUS (HCC): ICD-10-CM

## 2025-05-30 DIAGNOSIS — E78.5 HYPERLIPIDEMIA ASSOCIATED WITH TYPE 2 DIABETES MELLITUS (HCC): ICD-10-CM

## 2025-05-30 PROBLEM — D64.9 ABSOLUTE ANEMIA: Status: RESOLVED | Noted: 2025-04-07 | Resolved: 2025-05-30

## 2025-05-30 PROCEDURE — 3074F SYST BP LT 130 MM HG: CPT | Performed by: INTERNAL MEDICINE

## 2025-05-30 PROCEDURE — G8420 CALC BMI NORM PARAMETERS: HCPCS | Performed by: INTERNAL MEDICINE

## 2025-05-30 PROCEDURE — 3046F HEMOGLOBIN A1C LEVEL >9.0%: CPT | Performed by: INTERNAL MEDICINE

## 2025-05-30 PROCEDURE — 2022F DILAT RTA XM EVC RTNOPTHY: CPT | Performed by: INTERNAL MEDICINE

## 2025-05-30 PROCEDURE — 3078F DIAST BP <80 MM HG: CPT | Performed by: INTERNAL MEDICINE

## 2025-05-30 PROCEDURE — G8427 DOCREV CUR MEDS BY ELIG CLIN: HCPCS | Performed by: INTERNAL MEDICINE

## 2025-05-30 PROCEDURE — 99214 OFFICE O/P EST MOD 30 MIN: CPT | Performed by: INTERNAL MEDICINE

## 2025-05-30 PROCEDURE — 1036F TOBACCO NON-USER: CPT | Performed by: INTERNAL MEDICINE

## 2025-05-30 PROCEDURE — 3017F COLORECTAL CA SCREEN DOC REV: CPT | Performed by: INTERNAL MEDICINE

## 2025-05-30 RX ORDER — THIAMINE MONONITRATE (VIT B1) 100 MG
100 TABLET ORAL DAILY
Qty: 90 TABLET | Refills: 1 | Status: SHIPPED | OUTPATIENT
Start: 2025-05-30

## 2025-05-30 ASSESSMENT — ENCOUNTER SYMPTOMS
VOICE CHANGE: 0
PHOTOPHOBIA: 0
COLOR CHANGE: 0
BLOOD IN STOOL: 0
NAUSEA: 0
EYE PAIN: 0
ABDOMINAL PAIN: 0
CONSTIPATION: 0

## 2025-05-30 NOTE — PROGRESS NOTES
Physical Therapy: Daily Note   Patient: Conner Cheung (65 y.o. male)   Examination Date: 2025  Plan of Care/Certification Expiration Date: 25    No data recorded   :  1960 # of Visits since SOC:   4   MRN: [id number]  CSN: [id number] Start of Care Date:   2025   Insurance: Payor: [employer] / Plan: [employer] PLAN / Product Type: *No Product type* /   Insurance ID: [id number] - (Medicaid Managed) Secondary Insurance (if applicable):    Referring Physician: Chelo Pal MD     PCP: Chelo Pal MD Visits to Date/Visits Approved: 3 / 8    No Show/Cancelled Appts: 0      Medical Diagnosis: No admission diagnoses are documented for this encounter.    Treatment Diagnosis: Chronic LBP with sciatica        SUBJECTIVE EXAMINATION   Pain Level:      Patient Comments: Subjective: Pt cx d/t going to [facility].         Therapy Time  Individual Time In:         Individual Time Out:    Minutes:  0        Electronically signed by Sofia Apple PTA  on 2025 at 1:32 PM   POC NOTE       Yes

## 2025-05-30 NOTE — PROGRESS NOTES
[facility] PR[facility]  [address]  [address]  [facility] [address]  Dept: [phone]  Dept Fax: [phone]  Loc: [phone]     5/30/2025    Visit type: Follow up    The patient (or guardian, if applicable) and other individuals in attendance with the patient were advised that Artificial Intelligence will be utilized during this visit to record, process the conversation to generate a clinical note, and support improvement of the AI technology. The patient (or guardian, if applicable) and other individuals in attendance at the appointment consented to the use of AI, including the recording.      Reason for Visit: Follow-up (Follow up after spinal infection, Patient states he's feeling pretty good now, going to [place] next weds ), Shortness of Breath (Still having SOB), and Edema (In both legs more in the feet )       ASSESSMENT/PLAN     Assessment & Plan  1. Iron deficiency.  - Iron levels remain suboptimal, although there is no evidence of anemia. Blood count is within normal limits.  - Current regimen of iron supplementation at a dosage of 50 mg twice daily is advised.  - Discussion included the potential for constipation with higher doses of iron, and the use of Dulcolax for management.  - Repeat blood work will be scheduled in 3 months to monitor iron levels.    2. Alcohol use.  - Chronic alcohol consumption discussed, including potential health risks such as liver cirrhosis, early dementia, cerebral ataxia, cerebellar ataxia, and mental status changes.  - Advised to take thiamine supplements to mitigate these risks.  - Patient is currently taking B12 and folic acid supplements.  - Prescription for thiamine will be provided.    3. Back pain.  - MRI scan did not reveal any abscess or bacterial infection. White blood cell count remains slightly elevated, a condition persisting since splenectomy.  - Significant improvement in back condition reported.  -

## 2025-06-05 NOTE — TELEPHONE ENCOUNTER
Comments:     Last Office Visit (last PCP visit):   5/30/2025    Next Visit Date:  Future Appointments   Date Time Provider Department Center   6/27/2025  9:15 AM Holiday, DO [facility]   8/20/2025 10:30 AM [facility]   10/16/2025  2:00 PM Holiday, DO [facility]       **If hasn't been seen in over a year OR hasn't followed up according to last diabetes/ADHD visit, make appointment for patient before sending refill to provider.    Rx requested:  Requested Prescriptions     Pending Prescriptions Disp Refills    Ferrous Sulfate ER 50 MG TBCR [Pharmacy Med Name: SLOW RELEASE IRON 160 MG TAB] 180 tablet      Sig: TAKE 160 MG BY MOUTH IN THE MORNING AND AT BEDTIME

## 2025-06-06 ENCOUNTER — TELEPHONE (OUTPATIENT)
Dept: GASTROENTEROLOGY | Age: 65
End: 2025-06-06

## 2025-06-06 NOTE — TELEPHONE ENCOUNTER
Incoming Rx PA from [facility] of [employer]. Will fill out and waiting on provider to sign.     Rx is for the True Vitamin B1 100mg tabs

## 2025-06-06 NOTE — TELEPHONE ENCOUNTER
Called [facility] scheduling [facility].Please call [phone] ext [phone] schedule colon screening .

## 2025-06-16 ENCOUNTER — TELEPHONE (OUTPATIENT)
Dept: GASTROENTEROLOGY | Age: 65
End: 2025-06-16

## 2025-06-16 NOTE — TELEPHONE ENCOUNTER
Called [facility] scheduling [facility].Please call [phone] ext [phone] schedule colon screening .

## 2025-06-17 ENCOUNTER — TELEPHONE (OUTPATIENT)
Dept: GASTROENTEROLOGY | Age: 65
End: 2025-06-17

## 2025-06-17 NOTE — TELEPHONE ENCOUNTER
Patient returned call . He let me know that he spoke to DR Booth and he was inform that he is not due till 2026,

## 2025-07-02 ENCOUNTER — APPOINTMENT (OUTPATIENT)
Dept: CARDIOLOGY | Facility: CLINIC | Age: 65
End: 2025-07-02
Payer: COMMERCIAL

## 2025-07-02 ENCOUNTER — APPOINTMENT (OUTPATIENT)
Dept: CARDIOLOGY | Facility: HOSPITAL | Age: 65
End: 2025-07-02
Payer: COMMERCIAL

## 2025-07-07 ENCOUNTER — OFFICE VISIT (OUTPATIENT)
Dept: FAMILY MEDICINE CLINIC | Age: 65
End: 2025-07-07
Payer: COMMERCIAL

## 2025-07-07 VITALS
BODY MASS INDEX: 19.66 KG/M2 | OXYGEN SATURATION: 99 % | HEART RATE: 63 BPM | RESPIRATION RATE: 18 BRPM | DIASTOLIC BLOOD PRESSURE: 62 MMHG | SYSTOLIC BLOOD PRESSURE: 102 MMHG | WEIGHT: 121.8 LBS

## 2025-07-07 DIAGNOSIS — Z85.72 HISTORY OF LYMPHOMA: ICD-10-CM

## 2025-07-07 DIAGNOSIS — R63.0 LOSS OF APPETITE: Primary | ICD-10-CM

## 2025-07-07 DIAGNOSIS — R63.4 LOSS OF WEIGHT: ICD-10-CM

## 2025-07-07 DIAGNOSIS — E11.9 CONTROLLED TYPE 2 DIABETES MELLITUS WITHOUT COMPLICATION, WITHOUT LONG-TERM CURRENT USE OF INSULIN (HCC): ICD-10-CM

## 2025-07-07 PROCEDURE — 99214 OFFICE O/P EST MOD 30 MIN: CPT | Performed by: INTERNAL MEDICINE

## 2025-07-07 PROCEDURE — G8420 CALC BMI NORM PARAMETERS: HCPCS | Performed by: INTERNAL MEDICINE

## 2025-07-07 PROCEDURE — 3074F SYST BP LT 130 MM HG: CPT | Performed by: INTERNAL MEDICINE

## 2025-07-07 PROCEDURE — G8427 DOCREV CUR MEDS BY ELIG CLIN: HCPCS | Performed by: INTERNAL MEDICINE

## 2025-07-07 PROCEDURE — 3017F COLORECTAL CA SCREEN DOC REV: CPT | Performed by: INTERNAL MEDICINE

## 2025-07-07 PROCEDURE — 1036F TOBACCO NON-USER: CPT | Performed by: INTERNAL MEDICINE

## 2025-07-07 PROCEDURE — 2022F DILAT RTA XM EVC RTNOPTHY: CPT | Performed by: INTERNAL MEDICINE

## 2025-07-07 PROCEDURE — 3046F HEMOGLOBIN A1C LEVEL >9.0%: CPT | Performed by: INTERNAL MEDICINE

## 2025-07-07 PROCEDURE — 3078F DIAST BP <80 MM HG: CPT | Performed by: INTERNAL MEDICINE

## 2025-07-07 ASSESSMENT — ENCOUNTER SYMPTOMS
BLOOD IN STOOL: 0
VOICE CHANGE: 0
COLOR CHANGE: 0
NAUSEA: 0
ABDOMINAL PAIN: 0
EYE PAIN: 0
PHOTOPHOBIA: 0
CONSTIPATION: 0

## 2025-07-07 NOTE — PROGRESS NOTES
[facility] PR[facility]  [address]   [address]  [facility] [address]  Dept: [phone]  Dept Fax: [phone]  Loc: [phone]     7/7/2025    Visit type: Acute care    The patient (or guardian, if applicable) and other individuals in attendance with the patient were advised that Artificial Intelligence will be utilized during this visit to record, process the conversation to generate a clinical note, and support improvement of the AI technology. The patient (or guardian, if applicable) and other individuals in attendance at the appointment consented to the use of AI, including the recording.      Reason for Visit: Follow-up (Follow up from spinal infection. Losing weight. )       ASSESSMENT/PLAN     Assessment & Plan  1. Weight loss.  - BMI is currently at 19, which is considered low for his height.  - A repeat CT scan of the abdomen is recommended due to his history of lymphoma.  But he wants to wait until seen by gastroenterology  - Advised to continue using medical marijuana to stimulate appetite and promote weight gain.  Recently he has to stop medical marijuana since he was out of town in [place] he cannot use medical marijuana, now he is eating more appetite is good after starting medical marijuana  - Consultation with Dr. Booth is suggested to explore any physical causes for his eating difficulties. Blood work will be conducted prior to the next visit. If there is no improvement in weight or if further weight loss occurs, a CT scan will be performed.    2. Loss of appetite.  - Reports a lack of appetite and rapid satiety, which may be related to his PTSD and inability to use cannabis in [place].  - Advised to continue using medical marijuana to help with appetite stimulation.  - Consultation with Dr. Booth is suggested to explore any physical causes for his eating difficulties.  - Blood work will be conducted prior to the next visit.    3.

## 2025-07-21 ENCOUNTER — HOSPITAL ENCOUNTER (OUTPATIENT)
Dept: CARDIOLOGY | Age: 65
Discharge: HOME | End: 2025-07-21
Payer: COMMERCIAL

## 2025-07-21 PROCEDURE — 93280 PM DEVICE PROGR EVAL DUAL: CPT

## 2025-07-22 ENCOUNTER — HOSPITAL ENCOUNTER (OUTPATIENT)
Dept: LAB | Age: 65
Discharge: HOME | End: 2025-07-22
Payer: COMMERCIAL

## 2025-07-22 DIAGNOSIS — E11.9 CONTROLLED TYPE 2 DIABETES MELLITUS WITHOUT COMPLICATION, WITHOUT LONG-TERM CURRENT USE OF INSULIN (HCC): ICD-10-CM

## 2025-07-22 DIAGNOSIS — D50.8 OTHER IRON DEFICIENCY ANEMIA: ICD-10-CM

## 2025-07-22 DIAGNOSIS — D75.839 THROMBOCYTOSIS: ICD-10-CM

## 2025-07-22 LAB
ALBUMIN SERPL-MCNC: 3.6 G/DL (ref 3.5–4.6)
ALP SERPL-CCNC: 133 U/L (ref 35–104)
ALT SERPL-CCNC: 14 U/L (ref 0–41)
ANION GAP SERPL CALCULATED.3IONS-SCNC: 12 MEQ/L (ref 9–15)
AST SERPL-CCNC: 21 U/L (ref 0–40)
BASOPHILS # BLD: 0.1 K/UL (ref 0–0.2)
BASOPHILS NFR BLD: 0.8 %
BILIRUB SERPL-MCNC: 0.5 MG/DL (ref 0.2–0.7)
BUN SERPL-MCNC: 17 MG/DL (ref 8–23)
CALCIUM SERPL-MCNC: 9.1 MG/DL (ref 8.5–9.9)
CHLORIDE SERPL-SCNC: 98 MEQ/L (ref 95–107)
CO2 SERPL-SCNC: 26 MEQ/L (ref 20–31)
CREAT SERPL-MCNC: 0.85 MG/DL (ref 0.7–1.2)
EOSINOPHIL # BLD: 0.1 K/UL (ref 0–0.7)
EOSINOPHIL NFR BLD: 1.4 %
ERYTHROCYTE [DISTWIDTH] IN BLOOD BY AUTOMATED COUNT: 17 % (ref 11.5–14.5)
GLOBULIN SER CALC-MCNC: 2.6 G/DL (ref 2.3–3.5)
GLUCOSE FASTING: 110 MG/DL (ref 70–99)
HCT VFR BLD AUTO: 44.2 % (ref 42–52)
HGB BLD-MCNC: 14.7 G/DL (ref 14–18)
LYMPHOCYTES # BLD: 0.4 K/UL (ref 1–4.8)
LYMPHOCYTES NFR BLD: 3.4 %
MCH RBC QN AUTO: 29.3 PG (ref 27–31.3)
MCHC RBC AUTO-ENTMCNC: 33.3 % (ref 33–37)
MCV RBC AUTO: 88.2 FL (ref 79–92.2)
MONOCYTES # BLD: 1.5 K/UL (ref 0.2–0.8)
MONOCYTES NFR BLD: 14.3 %
NEUTROPHILS # BLD: 8.2 K/UL (ref 1.4–6.5)
NEUTS SEG NFR BLD: 79.7 %
NON-UH HIE FERRITIN: 63 NG/ML (ref 22–322)
NON-UH HIE HEPATITIS B SURFACE ANTIBODY QUANT: <3.1
NON-UH HIE HEPATITIS B SURFACE ANTIBODY: NORMAL
NON-UH HIE HEPATITIS B SURFACE ANTIGEN: NORMAL
NON-UH HIE HEPATITIS C ANTIBODY: NORMAL
NON-UH HIE IRON: 57 UG/DL (ref 65–175)
NON-UH HIE SATURATION: 18.8 % (ref 20–50)
NON-UH HIE TIBC: 303 UG/ML (ref 250–425)
PLATELET # BLD AUTO: 418 K/UL (ref 130–400)
POTASSIUM SERPL-SCNC: 3.7 MEQ/L (ref 3.4–4.9)
PROT SERPL-MCNC: 6.2 G/DL (ref 6.3–8)
RBC # BLD AUTO: 5.01 M/UL (ref 4.7–6.1)
SODIUM SERPL-SCNC: 136 MEQ/L (ref 135–144)
WBC # BLD AUTO: 10.3 K/UL (ref 4.8–10.8)

## 2025-07-22 PROCEDURE — 83036 HEMOGLOBIN GLYCOSYLATED A1C: CPT

## 2025-07-22 PROCEDURE — 83540 ASSAY OF IRON: CPT

## 2025-07-22 PROCEDURE — 85025 COMPLETE CBC W/AUTO DIFF WBC: CPT

## 2025-07-22 PROCEDURE — 36415 COLL VENOUS BLD VENIPUNCTURE: CPT

## 2025-07-22 PROCEDURE — 80053 COMPREHEN METABOLIC PANEL: CPT

## 2025-07-22 PROCEDURE — 83550 IRON BINDING TEST: CPT

## 2025-07-23 ENCOUNTER — OFFICE VISIT (OUTPATIENT)
Dept: FAMILY MEDICINE CLINIC | Age: 65
End: 2025-07-23
Payer: COMMERCIAL

## 2025-07-23 VITALS
RESPIRATION RATE: 18 BRPM | DIASTOLIC BLOOD PRESSURE: 68 MMHG | OXYGEN SATURATION: 99 % | SYSTOLIC BLOOD PRESSURE: 104 MMHG | BODY MASS INDEX: 20.43 KG/M2 | HEART RATE: 63 BPM | WEIGHT: 126.6 LBS

## 2025-07-23 DIAGNOSIS — E11.69 HYPERLIPIDEMIA ASSOCIATED WITH TYPE 2 DIABETES MELLITUS (HCC): ICD-10-CM

## 2025-07-23 DIAGNOSIS — E11.59 HYPERTENSION ASSOCIATED WITH DIABETES (HCC): ICD-10-CM

## 2025-07-23 DIAGNOSIS — E11.9 CONTROLLED TYPE 2 DIABETES MELLITUS WITHOUT COMPLICATION, WITHOUT LONG-TERM CURRENT USE OF INSULIN (HCC): Primary | ICD-10-CM

## 2025-07-23 DIAGNOSIS — E78.5 HYPERLIPIDEMIA ASSOCIATED WITH TYPE 2 DIABETES MELLITUS (HCC): ICD-10-CM

## 2025-07-23 DIAGNOSIS — I15.2 HYPERTENSION ASSOCIATED WITH DIABETES (HCC): ICD-10-CM

## 2025-07-23 PROBLEM — R63.0 LOSS OF APPETITE: Status: RESOLVED | Noted: 2025-07-07 | Resolved: 2025-07-23

## 2025-07-23 PROBLEM — R63.4 LOSS OF WEIGHT: Status: RESOLVED | Noted: 2025-07-07 | Resolved: 2025-07-23

## 2025-07-23 LAB
ESTIMATED AVERAGE GLUCOSE: 166 MG/DL
HBA1C MFR BLD: 7.4 % (ref 4–6)
IRON % SATURATION: 23 % (ref 20–55)
IRON: 67 UG/DL (ref 61–157)
NON-UH HIE ANTI-NUCLEAR ANTIBODY: NORMAL
TOTAL IRON BINDING CAPACITY: 288 UG/DL (ref 250–450)
UNSATURATED IRON BINDING CAPACITY: 221 UG/DL (ref 112–347)

## 2025-07-23 PROCEDURE — G8420 CALC BMI NORM PARAMETERS: HCPCS | Performed by: INTERNAL MEDICINE

## 2025-07-23 PROCEDURE — 3017F COLORECTAL CA SCREEN DOC REV: CPT | Performed by: INTERNAL MEDICINE

## 2025-07-23 PROCEDURE — 3074F SYST BP LT 130 MM HG: CPT | Performed by: INTERNAL MEDICINE

## 2025-07-23 PROCEDURE — 1036F TOBACCO NON-USER: CPT | Performed by: INTERNAL MEDICINE

## 2025-07-23 PROCEDURE — 3078F DIAST BP <80 MM HG: CPT | Performed by: INTERNAL MEDICINE

## 2025-07-23 PROCEDURE — 99214 OFFICE O/P EST MOD 30 MIN: CPT | Performed by: INTERNAL MEDICINE

## 2025-07-23 PROCEDURE — 3046F HEMOGLOBIN A1C LEVEL >9.0%: CPT | Performed by: INTERNAL MEDICINE

## 2025-07-23 PROCEDURE — G8427 DOCREV CUR MEDS BY ELIG CLIN: HCPCS | Performed by: INTERNAL MEDICINE

## 2025-07-23 PROCEDURE — 2022F DILAT RTA XM EVC RTNOPTHY: CPT | Performed by: INTERNAL MEDICINE

## 2025-07-23 ASSESSMENT — ENCOUNTER SYMPTOMS
VOICE CHANGE: 0
EYE PAIN: 0
BLOOD IN STOOL: 0
CONSTIPATION: 0
PHOTOPHOBIA: 0
NAUSEA: 0
COLOR CHANGE: 0
ABDOMINAL PAIN: 0

## 2025-07-23 NOTE — PROGRESS NOTES
[facility] PRIMARY CARE  [address]  [address]  [facility] [place] [facility] [address]  Dept: [phone]  Dept Fax: [phone]  Loc: [phone]     7/23/2025    Visit type: Follow up    The patient (or guardian, if applicable) and other individuals in attendance with the patient were advised that Artificial Intelligence will be utilized during this visit to record, process the conversation to generate a clinical note, and support improvement of the AI technology. The patient (or guardian, if applicable) and other individuals in attendance at the appointment consented to the use of AI, including the recording.      Reason for Visit: 2 Week Follow-Up       ASSESSMENT/PLAN     Assessment & Plan  1. Diabetes mellitus.  - Blood glucose levels have improved from 119 to 110.  - Physical exam and lab results indicate effective management with Jardiance.  - Advised to continue monitoring diet, particularly reducing carbohydrate intake and increasing protein consumption.  - Encouraged to incorporate regular exercise into the routine.    2. Hypokalemia.  - Potassium level is currently 3.7, within the normal range of 3.4-4.9.  - Physical exam and lab results indicate potential for potassium decrease due to twice-daily Lasix (furosemide) regimen.  - Advised to hold off on taking potassium supplements for now.  - Follow-up blood test to be conducted in one month to monitor potassium levels.    3. Weight gain.  - Gained over 7 pounds.  - Advised to continue monitoring diet and incorporate regular exercise into the routine.    Follow-up: The patient will follow up in 3 months.     1. Hypertension associated with diabetes (HCC)  2. Hyperlipidemia associated with type 2 diabetes mellitus (HCC)  3. Controlled type 2 diabetes mellitus without complication, without long-term current use of insulin (HCC)  -     Basic Metabolic Panel; Future    Return in about 3 months (around 10/23/2025).  No orders of the

## 2025-07-24 LAB
NON-UH HIE ANTI-MITOCHONDRIAL ANTIBODIES: 7 (ref 0–24.9)
NON-UH HIE HEPATITIS A ANTIBODIES, TOTAL: NEGATIVE
NON-UH HIE LIVER-KIDNEY MICROSOME ABS, IGG: NORMAL

## 2025-07-25 LAB
NON-UH HIE ALPHA 1 ANTITRYPSIN: 180 MG/DL (ref 90–200)
NON-UH HIE CERULOPLASMIN: 26 MG/DL (ref 15–30)
NON-UH HIE SMOOTH MUSCLE AB, IGG TITER: NORMAL

## 2025-08-14 LAB — NON-UH HIE POC GLUCOSE: 133 MG/DL (ref 72–100)

## 2025-08-25 ENCOUNTER — PATIENT MESSAGE (OUTPATIENT)
Dept: FAMILY MEDICINE CLINIC | Age: 65
End: 2025-08-25

## (undated) DEVICE — CATHETER, THERMOCOOL SMART TOUCH, SF, F-J CURVE

## (undated) DEVICE — INTRODUCER SYSTEM, PRELUDE SNAP, SPLITTABLE, 9 FR X 13 CM

## (undated) DEVICE — CABLE, HYPERTRONICS, 25 X 34-PIN, RED, 10FT

## (undated) DEVICE — HEMOSTAT, ARISTA, ABSORBABLE, 3 GRAMS

## (undated) DEVICE — CATHETHER, CS, BI-DIRECTIONAL, 7FR X 115CM, F-J CURVE, 2MM TIP, 2-8-2 SPACING

## (undated) DEVICE — INTRODUCER, SHEATH, FAST-CATH, 7 FR X 23 CM

## (undated) DEVICE — CABLE, CONNECTOR, DAIG RESPONSE, 210CM, BLACK

## (undated) DEVICE — CATHETER, ELECTROPHYSIOLOGY, STEERABLE, DUO DEC, 2-10-2 SPACING

## (undated) DEVICE — Device

## (undated) DEVICE — INTRODUCER SYSTEM, PRELUDE SNAP, SPLITTABLE, HEMOSTATIC, 7FR

## (undated) DEVICE — INTRODUCER, HYDROPHILIC, PRELUDE SNAP, 7FR X 25CM, STERILE

## (undated) DEVICE — CLOSURE SYSTEM, VASCULAR, MVP 6-12FR, VENOUS

## (undated) DEVICE — CATHETER, NAVISTAR, EZ-STEER, F-J TYPE 4MM

## (undated) DEVICE — DRESSING, AQUACEL AG, HYDROFIBER W/SILVER, 3.5 X 6 IN

## (undated) DEVICE — SHIELD, PERIPHERAL, SCATPAD, 500 SERIES, 34 X 11

## (undated) DEVICE — CABLE, 34 HYP, 34 LEMO, 10FT, SMART TOUCH

## (undated) DEVICE — SHIELD, SCOOP FENESTRATION, SCATPAD, ABSORBENT, DUAL, LEFT SUB/ C

## (undated) DEVICE — INTRODUCER, SHEATH, FAST-CATH, 8FR X 12CM, C-LOCK

## (undated) DEVICE — TUBING SET, IRRIGATION, SMARTABLATE

## (undated) DEVICE — CATHETER, QUADPOLAR, VIKING, 6FR 115CM, JOS, 4E,2,5,2MM